# Patient Record
Sex: FEMALE | Race: WHITE | NOT HISPANIC OR LATINO | Employment: OTHER | URBAN - METROPOLITAN AREA
[De-identification: names, ages, dates, MRNs, and addresses within clinical notes are randomized per-mention and may not be internally consistent; named-entity substitution may affect disease eponyms.]

---

## 2017-01-18 ENCOUNTER — TRANSCRIBE ORDERS (OUTPATIENT)
Dept: ADMINISTRATIVE | Facility: HOSPITAL | Age: 62
End: 2017-01-18

## 2017-01-18 DIAGNOSIS — I82.4Z9 EMBOLISM FROM THROMBOSIS OF VEIN OF DISTAL END OF LOWER EXTREMITY (HCC): ICD-10-CM

## 2017-01-18 DIAGNOSIS — I82.499 DEEP VEIN THROMBOSIS (DVT) OF OTHER VEIN OF LOWER EXTREMITY, UNSPECIFIED CHRONICITY, UNSPECIFIED LATERALITY (HCC): ICD-10-CM

## 2017-01-18 DIAGNOSIS — Z12.31 SCREENING MAMMOGRAM, ENCOUNTER FOR: Primary | ICD-10-CM

## 2017-01-18 DIAGNOSIS — Z79.01 CURRENT USE OF ANTICOAGULANT THERAPY: ICD-10-CM

## 2017-01-18 LAB
INR PPP: 2.33 (ref 0.9–1.1)
PROTHROMBIN TIME: 26 SECONDS (ref 12.1–15)

## 2017-01-25 ENCOUNTER — HOSPITAL ENCOUNTER (OUTPATIENT)
Dept: MAMMOGRAPHY | Facility: HOSPITAL | Age: 62
Discharge: HOME | End: 2017-01-25
Admitting: FAMILY MEDICINE

## 2017-01-25 DIAGNOSIS — Z12.31 SCREENING MAMMOGRAM, ENCOUNTER FOR: ICD-10-CM

## 2017-01-25 PROCEDURE — 77063 BREAST TOMOSYNTHESIS BI: CPT

## 2017-01-25 PROCEDURE — G0202 SCR MAMMO BI INCL CAD: HCPCS

## 2017-02-21 DIAGNOSIS — I82.4Z9 EMBOLISM FROM THROMBOSIS OF VEIN OF DISTAL END OF LOWER EXTREMITY (HCC): Primary | ICD-10-CM

## 2017-02-21 LAB
INR PPP: 2.1 (ref 0.9–1.1)
PROTHROMBIN TIME: 23.9 SECONDS (ref 12.1–15)

## 2017-04-04 RX ORDER — WARFARIN SODIUM 2 MG/1
TABLET ORAL
Qty: 38 TABLET | Refills: 2 | Status: SHIPPED | OUTPATIENT
Start: 2017-04-04 | End: 2017-08-21 | Stop reason: SDUPTHER

## 2017-04-04 RX ORDER — OMEPRAZOLE 20 MG/1
CAPSULE, DELAYED RELEASE ORAL
Qty: 90 CAPSULE | Refills: 0 | Status: SHIPPED | OUTPATIENT
Start: 2017-04-04 | End: 2017-07-19 | Stop reason: SDUPTHER

## 2017-04-08 ENCOUNTER — RESULTS ENCOUNTER (OUTPATIENT)
Dept: FAMILY MEDICINE CLINIC | Facility: CLINIC | Age: 62
End: 2017-04-08

## 2017-04-08 DIAGNOSIS — I10 ESSENTIAL HYPERTENSION: ICD-10-CM

## 2017-04-08 DIAGNOSIS — E55.9 VITAMIN D DEFICIENCY: ICD-10-CM

## 2017-04-08 DIAGNOSIS — E78.2 MIXED HYPERLIPIDEMIA: ICD-10-CM

## 2017-04-21 ENCOUNTER — LAB (OUTPATIENT)
Dept: FAMILY MEDICINE CLINIC | Facility: CLINIC | Age: 62
End: 2017-04-21

## 2017-04-21 DIAGNOSIS — I82.4Z9 EMBOLISM FROM THROMBOSIS OF VEIN OF DISTAL END OF LOWER EXTREMITY (HCC): Primary | ICD-10-CM

## 2017-04-21 LAB
INR PPP: 2.73 (ref 0.9–1.1)
PROTHROMBIN TIME: 29.5 SECONDS (ref 12.1–15)

## 2017-04-27 RX ORDER — METOPROLOL SUCCINATE 25 MG/1
TABLET, EXTENDED RELEASE ORAL
Qty: 90 TABLET | Refills: 0 | Status: SHIPPED | OUTPATIENT
Start: 2017-04-27 | End: 2017-07-25 | Stop reason: SDUPTHER

## 2017-05-23 DIAGNOSIS — I82.4Z9 EMBOLISM FROM THROMBOSIS OF VEIN OF DISTAL END OF LOWER EXTREMITY (HCC): Primary | ICD-10-CM

## 2017-05-24 DIAGNOSIS — E55.9 VITAMIN D DEFICIENCY: ICD-10-CM

## 2017-05-24 DIAGNOSIS — I10 ESSENTIAL HYPERTENSION: ICD-10-CM

## 2017-05-24 DIAGNOSIS — R53.83 FATIGUE, UNSPECIFIED TYPE: ICD-10-CM

## 2017-05-24 DIAGNOSIS — E78.2 MIXED HYPERLIPIDEMIA: Primary | ICD-10-CM

## 2017-05-24 LAB
25(OH)D3+25(OH)D2 SERPL-MCNC: 41.7 NG/ML
ALBUMIN SERPL-MCNC: 4.3 G/DL (ref 3.5–5.2)
ALBUMIN/GLOB SERPL: 1.5 G/DL
ALP SERPL-CCNC: 101 U/L (ref 40–129)
ALT SERPL-CCNC: 51 U/L (ref 5–33)
AST SERPL-CCNC: 76 U/L (ref 5–32)
BASOPHILS # BLD AUTO: 0.03 10*3/MM3 (ref 0–0.2)
BASOPHILS NFR BLD AUTO: 0.8 % (ref 0–2)
BILIRUB SERPL-MCNC: 1 MG/DL (ref 0.2–1.2)
BUN SERPL-MCNC: 11 MG/DL (ref 8–23)
BUN/CREAT SERPL: 14.9 (ref 7–25)
CALCIUM SERPL-MCNC: 9.3 MG/DL (ref 8.8–10.5)
CHLORIDE SERPL-SCNC: 101 MMOL/L (ref 98–107)
CHOLEST SERPL-MCNC: 195 MG/DL (ref 0–200)
CO2 SERPL-SCNC: 24.8 MMOL/L (ref 22–29)
CREAT SERPL-MCNC: 0.74 MG/DL (ref 0.57–1)
EOSINOPHIL # BLD AUTO: 0.12 10*3/MM3 (ref 0.1–0.3)
EOSINOPHIL NFR BLD AUTO: 3.4 % (ref 0–4)
ERYTHROCYTE [DISTWIDTH] IN BLOOD BY AUTOMATED COUNT: 14.6 % (ref 11.5–14.5)
GLOBULIN SER CALC-MCNC: 2.9 GM/DL
GLUCOSE SERPL-MCNC: 67 MG/DL (ref 65–99)
HCT VFR BLD AUTO: 41.1 % (ref 37–47)
HDLC SERPL-MCNC: 103 MG/DL (ref 40–60)
HGB BLD-MCNC: 13.9 G/DL (ref 12–16)
IMM GRANULOCYTES # BLD: 0.01 10*3/MM3 (ref 0–0.03)
IMM GRANULOCYTES NFR BLD: 0.3 % (ref 0–0.5)
INR PPP: 1.97 (ref 0.9–1.1)
LDLC SERPL CALC-MCNC: 77 MG/DL (ref 0–100)
LDLC/HDLC SERPL: 0.75 {RATIO}
LYMPHOCYTES # BLD AUTO: 0.57 10*3/MM3 (ref 0.6–4.8)
LYMPHOCYTES NFR BLD AUTO: 15.9 % (ref 20–45)
MCH RBC QN AUTO: 32 PG (ref 27–31)
MCHC RBC AUTO-ENTMCNC: 33.8 G/DL (ref 31–37)
MCV RBC AUTO: 94.5 FL (ref 81–99)
MONOCYTES # BLD AUTO: 0.36 10*3/MM3 (ref 0–1)
MONOCYTES NFR BLD AUTO: 10.1 % (ref 3–8)
NEUTROPHILS # BLD AUTO: 2.49 10*3/MM3 (ref 1.5–8.3)
NEUTROPHILS NFR BLD AUTO: 69.5 % (ref 45–70)
NRBC BLD AUTO-RTO: 0 /100 WBC (ref 0–0)
PLATELET # BLD AUTO: 213 10*3/MM3 (ref 140–500)
POTASSIUM SERPL-SCNC: 4.2 MMOL/L (ref 3.5–5.2)
PROT SERPL-MCNC: 7.2 G/DL (ref 6–8.5)
PROTHROMBIN TIME: 22.7 SECONDS (ref 12.1–15)
RBC # BLD AUTO: 4.35 10*6/MM3 (ref 4.2–5.4)
SODIUM SERPL-SCNC: 143 MMOL/L (ref 136–145)
TRIGL SERPL-MCNC: 75 MG/DL (ref 0–150)
TSH SERPL DL<=0.005 MIU/L-ACNC: 1.17 MIU/ML (ref 0.27–4.2)
VLDLC SERPL CALC-MCNC: 15 MG/DL (ref 7–27)
WBC # BLD AUTO: 3.58 10*3/MM3 (ref 4.8–10.8)

## 2017-05-31 ENCOUNTER — OFFICE VISIT (OUTPATIENT)
Dept: FAMILY MEDICINE CLINIC | Facility: CLINIC | Age: 62
End: 2017-05-31

## 2017-05-31 VITALS
HEART RATE: 83 BPM | DIASTOLIC BLOOD PRESSURE: 76 MMHG | WEIGHT: 167 LBS | SYSTOLIC BLOOD PRESSURE: 120 MMHG | OXYGEN SATURATION: 97 % | TEMPERATURE: 98.5 F | HEIGHT: 66 IN | BODY MASS INDEX: 26.84 KG/M2

## 2017-05-31 DIAGNOSIS — I82.499 DEEP VEIN THROMBOSIS (DVT) OF OTHER VEIN OF LOWER EXTREMITY, UNSPECIFIED CHRONICITY, UNSPECIFIED LATERALITY (HCC): ICD-10-CM

## 2017-05-31 DIAGNOSIS — I10 ESSENTIAL HYPERTENSION: ICD-10-CM

## 2017-05-31 DIAGNOSIS — K21.00 GASTROESOPHAGEAL REFLUX DISEASE WITH ESOPHAGITIS: ICD-10-CM

## 2017-05-31 DIAGNOSIS — E78.2 MIXED HYPERLIPIDEMIA: Primary | ICD-10-CM

## 2017-05-31 DIAGNOSIS — E55.9 VITAMIN D DEFICIENCY: ICD-10-CM

## 2017-05-31 DIAGNOSIS — Z78.0 POST-MENOPAUSAL: ICD-10-CM

## 2017-05-31 DIAGNOSIS — I82.4Z9 EMBOLISM FROM THROMBOSIS OF VEIN OF DISTAL END OF LOWER EXTREMITY (HCC): ICD-10-CM

## 2017-05-31 PROCEDURE — 99213 OFFICE O/P EST LOW 20 MIN: CPT | Performed by: FAMILY MEDICINE

## 2017-05-31 RX ORDER — SIMVASTATIN 40 MG
40 TABLET ORAL NIGHTLY
Qty: 90 TABLET | Refills: 1 | Status: SHIPPED | OUTPATIENT
Start: 2017-05-31 | End: 2019-05-29 | Stop reason: SDUPTHER

## 2017-06-26 ENCOUNTER — OFFICE VISIT (OUTPATIENT)
Dept: ORTHOPEDIC SURGERY | Facility: CLINIC | Age: 62
End: 2017-06-26

## 2017-06-26 VITALS
WEIGHT: 167 LBS | SYSTOLIC BLOOD PRESSURE: 126 MMHG | BODY MASS INDEX: 26.84 KG/M2 | HEIGHT: 66 IN | HEART RATE: 99 BPM | DIASTOLIC BLOOD PRESSURE: 85 MMHG

## 2017-06-26 DIAGNOSIS — R52 PAIN: Primary | ICD-10-CM

## 2017-06-26 DIAGNOSIS — S62.335A CLOSED DISPLACED FRACTURE OF NECK OF FOURTH METACARPAL BONE OF LEFT HAND, INITIAL ENCOUNTER: ICD-10-CM

## 2017-06-26 PROCEDURE — 73130 X-RAY EXAM OF HAND: CPT | Performed by: ORTHOPAEDIC SURGERY

## 2017-06-26 PROCEDURE — 26600 TREAT METACARPAL FRACTURE: CPT | Performed by: ORTHOPAEDIC SURGERY

## 2017-06-26 PROCEDURE — 99214 OFFICE O/P EST MOD 30 MIN: CPT | Performed by: ORTHOPAEDIC SURGERY

## 2017-06-26 NOTE — PROGRESS NOTES
Subjective: Left fourth metacarpal fracture     Patient ID: Camille Pantoja is a 61 y.o. female.    Chief Complaint:    History of Present Illness 61-year-old female known to me presents for evaluation of her left hand which he injured when she fell on Saturday the 24th while walking a dog.  Develop immediate pain and discomfort in the hand.  Presents here for evaluation of her nondominant hand.       Social History     Occupational History   • Not on file.     Social History Main Topics   • Smoking status: Never Smoker   • Smokeless tobacco: Never Used   • Alcohol use Yes   • Drug use: No   • Sexual activity: Defer      Review of Systems   Constitutional: Negative for chills, diaphoresis, fever and unexpected weight change.   HENT: Positive for tinnitus. Negative for hearing loss, nosebleeds and sore throat.    Eyes: Negative for pain and visual disturbance.   Respiratory: Negative for cough, shortness of breath and wheezing.    Cardiovascular: Negative for chest pain and palpitations.   Gastrointestinal: Negative for abdominal pain, diarrhea, nausea and vomiting.   Endocrine: Negative for cold intolerance, heat intolerance and polydipsia.   Genitourinary: Negative for difficulty urinating, dysuria and hematuria.   Musculoskeletal: Positive for arthralgias, joint swelling and myalgias.   Skin: Negative for rash and wound.   Allergic/Immunologic: Negative for environmental allergies.   Neurological: Negative for dizziness, syncope and numbness.   Hematological: Does not bruise/bleed easily.   Psychiatric/Behavioral: Negative for dysphoric mood and sleep disturbance. The patient is not nervous/anxious.          Past Medical History:   Diagnosis Date   • Hyperlipidemia    • Hypertension      Past Surgical History:   Procedure Laterality Date   • HYSTERECTOMY     • ORIF SHOULDER DISLOCATION W/ HUMERAL FRACTURE     • TIBIA FRACTURE SURGERY       Family History   Problem Relation Age of Onset   • Breast cancer Brother           Objective:  Vitals:    06/26/17 1534   BP: 126/85   Pulse: 99     Last 3 weights    06/26/17  1534   Weight: 167 lb (75.8 kg)     Body mass index is 27.37 kg/(m^2).       Ortho Exam  AP lateral oblique view of the left hand shows a an oblique fracture the orthopedic metacarpal shaft with shortening.  The x-ray shows shortening to the point where the fourth metacarpal is the same length as the fifth.  No prior x-rays for comparison.  She is alert and oriented ×3.  On exam there is ecchymosis and tenderness to the dorsal and volar aspect of the hand.  She has full extension and there is no extensor lag at this time.  There is no motor deficit good distal pulses no sensory loss.  She has full flexion and extension of the hand.  She is not able to take any anti-inflammatories due to fact he is on warfarin is also intolerant to Tylenol.  There is pain to palpation over the fracture site dorsally.    Assessment:       1. Pain    2. Closed displaced fracture of neck of fourth metacarpal bone of left hand, initial encounter          Plan:      discussed the fracture and the reviewed the x-rays with her.  There is shortening there is no real functional problem at this time but the shortening does make her susceptible to extensor lag but she does not want to consider surgery.  She is placed in the ulnar gutter splint shoulder return in 3 weeks with x-ray of that left hand.  Also x-ray the left knee on return if she relates an injury to that knee several months ago this still gives her occasional discomfort      Work Status:    [facility] query complete.    Orders:  Orders Placed This Encounter   Procedures   • XR Hand 3+ View Left       Medications:  No orders of the defined types were placed in this encounter.      Followup:  Return in about 3 weeks (around 7/17/2017).          Dragon transcription disclaimer     Much of this encounter note is an electronic transcription/translation of spoken language to printed text.  The electronic translation of spoken language may permit erroneous, or at times, nonsensical words or phrases to be inadvertently transcribed. Although I have reviewed the note for such errors, some may still exist.

## 2017-06-27 DIAGNOSIS — I82.499 DEEP VEIN THROMBOSIS (DVT) OF OTHER VEIN OF LOWER EXTREMITY, UNSPECIFIED CHRONICITY, UNSPECIFIED LATERALITY (HCC): Primary | ICD-10-CM

## 2017-06-28 LAB
INR PPP: 1.55 (ref 0.9–1.1)
PROTHROMBIN TIME: 18.7 SECONDS (ref 12.1–15)

## 2017-07-19 ENCOUNTER — OFFICE VISIT (OUTPATIENT)
Dept: ORTHOPEDIC SURGERY | Facility: CLINIC | Age: 62
End: 2017-07-19

## 2017-07-19 VITALS — WEIGHT: 165 LBS | HEIGHT: 66 IN | BODY MASS INDEX: 26.52 KG/M2

## 2017-07-19 DIAGNOSIS — R52 PAIN: Primary | ICD-10-CM

## 2017-07-19 DIAGNOSIS — S62.335A CLOSED DISPLACED FRACTURE OF NECK OF FOURTH METACARPAL BONE OF LEFT HAND, INITIAL ENCOUNTER: ICD-10-CM

## 2017-07-19 DIAGNOSIS — M17.12 PRIMARY OSTEOARTHRITIS OF LEFT KNEE: ICD-10-CM

## 2017-07-19 PROCEDURE — 99214 OFFICE O/P EST MOD 30 MIN: CPT | Performed by: ORTHOPAEDIC SURGERY

## 2017-07-19 PROCEDURE — 73562 X-RAY EXAM OF KNEE 3: CPT | Performed by: ORTHOPAEDIC SURGERY

## 2017-07-19 PROCEDURE — 73130 X-RAY EXAM OF HAND: CPT | Performed by: ORTHOPAEDIC SURGERY

## 2017-07-19 RX ORDER — OMEPRAZOLE 20 MG/1
CAPSULE, DELAYED RELEASE ORAL
Qty: 90 CAPSULE | Refills: 0 | Status: SHIPPED | OUTPATIENT
Start: 2017-07-19 | End: 2017-10-18 | Stop reason: SDUPTHER

## 2017-07-19 NOTE — PROGRESS NOTES
Subjective: Left hand pain, left knee pain     Patient ID: Camille Pantoja is a 61 y.o. female.    Chief Complaint:    History of Present Illness 61-year-old female is seen in follow-up to the left hand injury sustained approximately 3 weeks ago and for new complaint of left knee pain that she's had for almost 2 years.  When last seen regarding the hand she had a mildly displaced fourth metacarpal fracture of the left hand with shortening and she opted to treated nonoperatively was placed in this leg.  She subsequently fell that weekend after her initial visit exam is some slight increase in the pain since that episode.  With regard to the knee she's had a two-year history of knee pain resulting from which he had a seizure at home on the left knee.  She states as result of that injury she developed immediate swelling that gradually resolved and she now had intermittent pain in that knee.  States when cause discomfort for 4-5 days and then no pain for a day or 2 when she is symptomatic she has difficulty getting in and out of a chair and navigating steps.  He is unable to take any oral agents due to the fact that she is on Coumadin.  Has not sought treatment for the knee up until today.       Social History     Occupational History   • Not on file.     Social History Main Topics   • Smoking status: Never Smoker   • Smokeless tobacco: Never Used   • Alcohol use Yes   • Drug use: No   • Sexual activity: Defer      Review of Systems   Constitutional: Negative for chills, diaphoresis, fever and unexpected weight change.   HENT: Negative for hearing loss, nosebleeds, sore throat and tinnitus.    Eyes: Negative for pain and visual disturbance.   Respiratory: Negative for cough, shortness of breath and wheezing.    Cardiovascular: Negative for chest pain and palpitations.   Gastrointestinal: Negative for abdominal pain, diarrhea, nausea and vomiting.   Endocrine: Negative for cold intolerance, heat intolerance and  polydipsia.   Genitourinary: Negative for difficulty urinating, dysuria and hematuria.   Musculoskeletal: Positive for arthralgias and myalgias. Negative for joint swelling.   Skin: Negative for rash and wound.   Allergic/Immunologic: Negative for environmental allergies.   Neurological: Negative for dizziness, syncope and numbness.   Hematological: Does not bruise/bleed easily.   Psychiatric/Behavioral: Negative for dysphoric mood and sleep disturbance. The patient is not nervous/anxious.          Past Medical History:   Diagnosis Date   • Hyperlipidemia    • Hypertension      Past Surgical History:   Procedure Laterality Date   • HYSTERECTOMY     • ORIF SHOULDER DISLOCATION W/ HUMERAL FRACTURE     • TIBIA FRACTURE SURGERY       Family History   Problem Relation Age of Onset   • Breast cancer Brother          Objective:  There were no vitals filed for this visit.  Last 3 weights    07/19/17  1517   Weight: 165 lb (74.8 kg)     Body mass index is 27.04 kg/(m^2).       Ortho Exam  AP lateral oblique of the left hand done to evaluate the left fourth metacarpal fracture shows some slight displacement but no further shortening of the fracture.  Compared to previous x-rays again there is further displacement.  AP lateral sunrise view of the left knee shows no acute changes there is mild decrease in the medial joint space and patellofemoral joint space.  No prior x-rays for comparison.  On exam she is alert and oriented ×3.  This splint on the left hand is an good condition.  Non-insulin-dependent significant pain in his no swelling she has good capillary refill.  With regard to the left knee there is no swelling effusion erythema.  Skin is cool to touch.  His no motor deficit good distal pulses.  Calf is nontender negative Homans.  Quad function is 5 over 5.  There is mild patellofemoral crepitus with range of motion which is 0-125° but there is no instability.  Quad function is 5 over 5.  There is no joint line  tenderness but negative Tl's.    Assessment:       1. Pain    2. Closed displaced fracture of neck of fourth metacarpal bone of left hand, initial encounter    3. Primary osteoarthritis of left knee          Plan:      reviewed the x-rays of the hand and the knee with the patient.  Despite the slight displacement of the metacarpal fracture I think it is no further displacement she 48 healing but that is a possibility may develop a nonunion and she understands.  With regard to the knee over prescribed Voltaren gel to apply 3-4 times a day to try to control her mild arthritic changes.  Return to see me in 2 weeks with a repeat x-ray of the left hand      Work Status:    [facility] query complete.    Orders:  Orders Placed This Encounter   Procedures   • XR Hand 3+ View Left   • XR Knee 3 View Left       Medications:  New Medications Ordered This Visit   Medications   • diclofenac (VOLTAREN) 1 % gel gel     Sig: Apply 4 g topically 4 (Four) Times a Day.     Dispense:  100 g     Refill:  5       Followup:  Return in about 2 weeks (around 8/2/2017).          Dragon transcription disclaimer     Much of this encounter note is an electronic transcription/translation of spoken language to printed text. The electronic translation of spoken language may permit erroneous, or at times, nonsensical words or phrases to be inadvertently transcribed. Although I have reviewed the note for such errors, some may still exist.

## 2017-07-25 RX ORDER — METOPROLOL SUCCINATE 25 MG/1
TABLET, EXTENDED RELEASE ORAL
Qty: 90 TABLET | Refills: 0 | Status: SHIPPED | OUTPATIENT
Start: 2017-07-25 | End: 2017-10-30 | Stop reason: SDUPTHER

## 2017-08-02 ENCOUNTER — OFFICE VISIT (OUTPATIENT)
Dept: ORTHOPEDIC SURGERY | Facility: CLINIC | Age: 62
End: 2017-08-02

## 2017-08-02 VITALS — BODY MASS INDEX: 26.52 KG/M2 | WEIGHT: 165 LBS | HEIGHT: 66 IN

## 2017-08-02 DIAGNOSIS — R52 PAIN: Primary | ICD-10-CM

## 2017-08-02 DIAGNOSIS — S62.335A CLOSED DISPLACED FRACTURE OF NECK OF FOURTH METACARPAL BONE OF LEFT HAND, INITIAL ENCOUNTER: ICD-10-CM

## 2017-08-02 PROCEDURE — 99024 POSTOP FOLLOW-UP VISIT: CPT | Performed by: ORTHOPAEDIC SURGERY

## 2017-08-02 PROCEDURE — 73130 X-RAY EXAM OF HAND: CPT | Performed by: ORTHOPAEDIC SURGERY

## 2017-08-02 NOTE — PROGRESS NOTES
Subjective: Fourth metacarpal fracture left hand     Patient ID: Camille Pantoja is a 61 y.o. female.    Chief Complaint:    History of Present Illness patient approximate 5 weeks out doing well with minimal to no pain.  She has been in an ulnar gutter splint that she's been wearing since the initial injury.       Social History     Occupational History   • Not on file.     Social History Main Topics   • Smoking status: Never Smoker   • Smokeless tobacco: Never Used   • Alcohol use Yes   • Drug use: No   • Sexual activity: Defer      Review of Systems   Constitutional: Negative for chills, diaphoresis, fever and unexpected weight change.   HENT: Negative for hearing loss, nosebleeds, sore throat and tinnitus.    Eyes: Negative for pain and visual disturbance.   Respiratory: Negative for cough, shortness of breath and wheezing.    Cardiovascular: Negative for chest pain and palpitations.   Gastrointestinal: Negative for abdominal pain, diarrhea, nausea and vomiting.   Endocrine: Negative for cold intolerance, heat intolerance and polydipsia.   Genitourinary: Negative for difficulty urinating, dysuria and hematuria.   Musculoskeletal: Positive for arthralgias, joint swelling and myalgias.   Skin: Negative for rash and wound.   Allergic/Immunologic: Negative for environmental allergies.   Neurological: Negative for dizziness, syncope and numbness.   Hematological: Does not bruise/bleed easily.   Psychiatric/Behavioral: Negative for dysphoric mood and sleep disturbance. The patient is not nervous/anxious.          Past Medical History:   Diagnosis Date   • Hyperlipidemia    • Hypertension      Past Surgical History:   Procedure Laterality Date   • HYSTERECTOMY     • ORIF SHOULDER DISLOCATION W/ HUMERAL FRACTURE     • TIBIA FRACTURE SURGERY       Family History   Problem Relation Age of Onset   • Breast cancer Brother          Objective:  There were no vitals filed for this visit.  Last 3 weights    08/02/17  1454    Weight: 165 lb (74.8 kg)     Body mass index is 27.04 kg/(m^2).       Ortho Exam  AP lateral oblique view of the hand shows a solitary and a callus formation at the fracture site but no change in position compared to previous x-rays.  Exam out of the cast she'll full range of motion of the hand no extensor lag noted on the fourth digit.  No motor deficit good distal pulses no sensory loss.    Assessment:       1. Pain    2. Closed displaced fracture of neck of fourth metacarpal bone of left hand, initial encounter          Plan:      placed in a wrist immobilizer that she skin remove for bathing but she is to wear at all times.  Can remove for exercises which she is at home but she is to wear the splint at work.  Return in 4 weeks with a final x-ray.      Work Status:    [facility] query complete.    Orders:  Orders Placed This Encounter   Procedures   • XR Hand 3+ View Left       Medications:  No orders of the defined types were placed in this encounter.      Followup:  Return in about 4 weeks (around 8/30/2017).          Dragon transcription disclaimer     Much of this encounter note is an electronic transcription/translation of spoken language to printed text. The electronic translation of spoken language may permit erroneous, or at times, nonsensical words or phrases to be inadvertently transcribed. Although I have reviewed the note for such errors, some may still exist.

## 2017-08-21 ENCOUNTER — OFFICE VISIT (OUTPATIENT)
Dept: ORTHOPEDIC SURGERY | Facility: CLINIC | Age: 62
End: 2017-08-21

## 2017-08-21 VITALS — BODY MASS INDEX: 26.03 KG/M2 | HEIGHT: 66 IN | WEIGHT: 162 LBS

## 2017-08-21 DIAGNOSIS — S62.335A CLOSED DISPLACED FRACTURE OF NECK OF FOURTH METACARPAL BONE OF LEFT HAND, INITIAL ENCOUNTER: ICD-10-CM

## 2017-08-21 DIAGNOSIS — R52 PAIN: Primary | ICD-10-CM

## 2017-08-21 PROCEDURE — 73130 X-RAY EXAM OF HAND: CPT | Performed by: ORTHOPAEDIC SURGERY

## 2017-08-21 PROCEDURE — 99024 POSTOP FOLLOW-UP VISIT: CPT | Performed by: ORTHOPAEDIC SURGERY

## 2017-08-21 RX ORDER — WARFARIN SODIUM 2 MG/1
TABLET ORAL
Qty: 38 TABLET | Refills: 1 | Status: SHIPPED | OUTPATIENT
Start: 2017-08-21 | End: 2017-11-06 | Stop reason: SDUPTHER

## 2017-08-21 NOTE — PROGRESS NOTES
Subjective: Left fourth metacarpal fracture     Patient ID: Camille Pantoja is a 61 y.o. female.    Chief Complaint:    History of Present Illness patient is almost 2 months out the above-stated injury and is doing well with minimal discomfort in her hand.  She has in a wrist splint that she wears at work but leave the splint off at home.       Social History     Occupational History   • Not on file.     Social History Main Topics   • Smoking status: Never Smoker   • Smokeless tobacco: Never Used   • Alcohol use Yes   • Drug use: No   • Sexual activity: Defer      Review of Systems   Constitutional: Negative for chills, diaphoresis, fever and unexpected weight change.   HENT: Negative for hearing loss, nosebleeds, sore throat and tinnitus.    Eyes: Negative for pain and visual disturbance.   Respiratory: Negative for cough, shortness of breath and wheezing.    Cardiovascular: Negative for chest pain and palpitations.   Gastrointestinal: Negative for abdominal pain, diarrhea, nausea and vomiting.   Endocrine: Negative for cold intolerance, heat intolerance and polydipsia.   Genitourinary: Negative for difficulty urinating, dysuria and hematuria.   Musculoskeletal: Positive for arthralgias and myalgias. Negative for joint swelling.   Skin: Negative for rash and wound.   Allergic/Immunologic: Negative for environmental allergies.   Neurological: Negative for dizziness, syncope and numbness.   Hematological: Does not bruise/bleed easily.   Psychiatric/Behavioral: Negative for dysphoric mood and sleep disturbance. The patient is not nervous/anxious.          Past Medical History:   Diagnosis Date   • Hyperlipidemia    • Hypertension      Past Surgical History:   Procedure Laterality Date   • HYSTERECTOMY     • ORIF SHOULDER DISLOCATION W/ HUMERAL FRACTURE     • TIBIA FRACTURE SURGERY       Family History   Problem Relation Age of Onset   • Breast cancer Brother          Objective:  There were no vitals filed for this  visit.  Last 3 weights    08/21/17  1451   Weight: 162 lb (73.5 kg)     Body mass index is 26.55 kg/(m^2).       Ortho Exam  AP lateral oblique view of the left hand shows complete consolidation at the fracture site.  Is some shortening of the metacarpal but no functional problems patient is full range of motion of the handle there is some stiffness.  His no motor deficit good distal pulses no sensory loss.    Assessment:       1. Pain    2. Closed displaced fracture of neck of fourth metacarpal bone of left hand, initial encounter          Plan:      activity as tolerated with regard to the hand.  I assured her the soreness should slowly resolve.  Return to see me as needed      Work Status:    [facility] query complete.    Orders:  Orders Placed This Encounter   Procedures   • XR Hand 3+ View Left       Medications:  No orders of the defined types were placed in this encounter.      Followup:  Return if symptoms worsen or fail to improve.          Dragon transcription disclaimer     Much of this encounter note is an electronic transcription/translation of spoken language to printed text. The electronic translation of spoken language may permit erroneous, or at times, nonsensical words or phrases to be inadvertently transcribed. Although I have reviewed the note for such errors, some may still exist.

## 2017-08-29 DIAGNOSIS — I82.4Z9 EMBOLISM FROM THROMBOSIS OF VEIN OF DISTAL END OF LOWER EXTREMITY (HCC): Primary | ICD-10-CM

## 2017-08-29 LAB
INR PPP: 2.91 (ref 0.9–1.1)
PROTHROMBIN TIME: 31 SECONDS (ref 12.1–15)

## 2017-10-02 DIAGNOSIS — I82.499 DEEP VEIN THROMBOSIS (DVT) OF OTHER VEIN OF LOWER EXTREMITY, UNSPECIFIED CHRONICITY, UNSPECIFIED LATERALITY (HCC): ICD-10-CM

## 2017-10-02 DIAGNOSIS — I82.4Z9 EMBOLISM FROM THROMBOSIS OF VEIN OF DISTAL END OF LOWER EXTREMITY (HCC): Primary | ICD-10-CM

## 2017-10-03 LAB
INR PPP: 2.9 (ref 0.9–1.1)
PROTHROMBIN TIME: 30.9 SECONDS (ref 12.1–15)

## 2017-10-18 ENCOUNTER — HOSPITAL ENCOUNTER (OUTPATIENT)
Dept: OCCUPATIONAL THERAPY | Facility: HOSPITAL | Age: 62
Setting detail: THERAPIES SERIES
Discharge: HOME | End: 2017-10-18

## 2017-10-18 ENCOUNTER — OFFICE VISIT (OUTPATIENT)
Dept: ORTHOPEDIC SURGERY | Facility: CLINIC | Age: 62
End: 2017-10-18

## 2017-10-18 VITALS — BODY MASS INDEX: 25.43 KG/M2 | HEIGHT: 67 IN | WEIGHT: 162 LBS

## 2017-10-18 DIAGNOSIS — S62.335S CLOSED DISPLACED FRACTURE OF NECK OF FOURTH METACARPAL BONE OF LEFT HAND, SEQUELA: Primary | ICD-10-CM

## 2017-10-18 DIAGNOSIS — S62.335A CLOSED DISPLACED FRACTURE OF NECK OF FOURTH METACARPAL BONE OF LEFT HAND, INITIAL ENCOUNTER: ICD-10-CM

## 2017-10-18 DIAGNOSIS — R52 PAIN: Primary | ICD-10-CM

## 2017-10-18 PROCEDURE — 73130 X-RAY EXAM OF HAND: CPT | Performed by: ORTHOPAEDIC SURGERY

## 2017-10-18 PROCEDURE — L3999 UPPER LIMB ORTHOSIS NOS: HCPCS

## 2017-10-18 PROCEDURE — 99213 OFFICE O/P EST LOW 20 MIN: CPT | Performed by: ORTHOPAEDIC SURGERY

## 2017-10-18 PROCEDURE — G8986 CARRY D/C STATUS: HCPCS

## 2017-10-18 PROCEDURE — G8984 CARRY CURRENT STATUS: HCPCS

## 2017-10-18 PROCEDURE — 97762: CPT

## 2017-10-18 PROCEDURE — G8985 CARRY GOAL STATUS: HCPCS

## 2017-10-18 RX ORDER — OMEPRAZOLE 20 MG/1
CAPSULE, DELAYED RELEASE ORAL
Qty: 90 CAPSULE | Refills: 0 | Status: SHIPPED | OUTPATIENT
Start: 2017-10-18 | End: 2018-01-16 | Stop reason: SDUPTHER

## 2017-10-18 NOTE — PROGRESS NOTES
Subjective: Left hand pain     Patient ID: Camille Pantoja is a 61 y.o. female.    Chief Complaint:    History of Present Illness patient is almost 4 months out from fracture of the left fourth metacarpal shaft.  Patient opted to treated nonoperatively in the fracture has healed but she has significant shortening of the fourth metacarpal.  She complains of swelling and pain in the hand.       Social History     Occupational History   • Not on file.     Social History Main Topics   • Smoking status: Never Smoker   • Smokeless tobacco: Never Used   • Alcohol use Yes   • Drug use: No   • Sexual activity: Defer      Review of Systems   Constitutional: Negative for chills, diaphoresis, fever and unexpected weight change.   HENT: Negative for hearing loss, nosebleeds, sore throat and tinnitus.    Eyes: Negative for pain and visual disturbance.   Respiratory: Negative for cough, shortness of breath and wheezing.    Cardiovascular: Negative for chest pain and palpitations.   Gastrointestinal: Negative for abdominal pain, diarrhea, nausea and vomiting.   Endocrine: Negative for cold intolerance, heat intolerance and polydipsia.   Genitourinary: Negative for difficulty urinating, dysuria and hematuria.   Musculoskeletal: Positive for arthralgias and myalgias. Negative for joint swelling.   Skin: Negative for rash and wound.   Allergic/Immunologic: Negative for environmental allergies.   Neurological: Negative for dizziness, syncope and numbness.   Hematological: Does not bruise/bleed easily.   Psychiatric/Behavioral: Negative for dysphoric mood and sleep disturbance. The patient is not nervous/anxious.          Past Medical History:   Diagnosis Date   • Hyperlipidemia    • Hypertension      Past Surgical History:   Procedure Laterality Date   • HYSTERECTOMY     • ORIF SHOULDER DISLOCATION W/ HUMERAL FRACTURE     • TIBIA FRACTURE SURGERY       Family History   Problem Relation Age of Onset   • Breast cancer Brother           Objective:  There were no vitals filed for this visit.  Last 3 weights    10/18/17  1151   Weight: 162 lb (73.5 kg)     Body mass index is 25.76 kg/(m^2).       Ortho Exam  AP lateral oblique view of the hand shows complete consolidation at the fracture site and significant shortening of the metacarpal itself.  There is no malrotation but there is shortening of the least 4-5 mm.  The hand does show some fusiform swelling to the hand but not the digits.  She complains of pain index metacarpophalangeal joint which is full range of motion of the hand no sensory loss no motor deficit good distal pulses.  There is no ecchymosis.  Skin is cool to touch.  She can't take anti-inflammatories as she is on Coumadin.    Assessment:       1. Pain    2. Closed displaced fracture of neck of fourth metacarpal bone of left hand, initial encounter          Plan:      • occupational therapy for an edema glove.  We'll repeat orders of the Voltaren gel to apply to the hand 3-4 times a day.  Patient told her she doesn't show improvement a month call or make a referral to hand specialist      Work Status:    [facility] query complete.    Orders:  Orders Placed This Encounter   Procedures   • XR Hand 3+ View Left   • Ambulatory Referral to Occupational Therapy       Medications:  New Medications Ordered This Visit   Medications   • diclofenac (VOLTAREN) 1 % gel gel     Sig: Apply 4 g topically 4 (Four) Times a Day.     Dispense:  100 g     Refill:  5       Followup:  Return if symptoms worsen or fail to improve.          Dragon transcription disclaimer     Much of this encounter note is an electronic transcription/translation of spoken language to printed text. The electronic translation of spoken language may permit erroneous, or at times, nonsensical words or phrases to be inadvertently transcribed. Although I have reviewed the note for such errors, some may still exist.

## 2017-10-18 NOTE — THERAPY DISCHARGE NOTE
"Outpatient Occupational Therapy Hand Initial Eval/Discharge    Rebeca Cochran     Patient Name: Camille Pantoja  : 1955  MRN: [id number]  Today's Date: 10/18/2017      Visit Date: 10/18/2017    Patient Active Problem List   Diagnosis   • Embolism from thrombosis of vein of distal end of lower extremity   • Deep vein thrombosis of lower extremity   • Hyperlipidemia   • Hypertension   • Gastroesophageal reflux disease with esophagitis   • Vitamin D deficiency   • Tinnitus   • Osteoarthritis        Past Medical History:   Diagnosis Date   • Hyperlipidemia    • Hypertension         Past Surgical History:   Procedure Laterality Date   • HYSTERECTOMY     • ORIF SHOULDER DISLOCATION W/ HUMERAL FRACTURE     • TIBIA FRACTURE SURGERY           Visit Dx:    ICD-10-CM ICD-9-CM   1. Closed displaced fracture of neck of fourth metacarpal bone of left hand, sequela S62.335S 905.2             Patient History       10/18/17 1200          History    Chief Complaint --   edema  -SD      Date Current Problem(s) Began 17  -SD      Brief Description of Current Complaint Patient had 2 falls in  while walking her dog and fractured the left hand. Paitent reported the physician recommended surgery however she declined.  Patient reported her ROM has improved but she continues to have edema.    -SD      Patient/Caregiver Goals Decrease swelling  -SD      Current Tobacco Use No  -SD      Patient's Rating of General Health Good  -SD      Hand Dominance right-handed  -SD      Occupation/sports/leisure activities Retired  -SD      Pain     Pain Location Other (Comment)   It's aggravating\" did not place numeric value  -SD      Is your sleep disturbed? No  -SD      Fall Risk Assessment    Any falls in the past year: Yes   2 (5 days apart resulting in current injury)  -SD      Number of falls reported in the last 12 months 2  -SD      Factors that contributed to the fall: --   both falls resulting from walking injured dog  -SD      " Daily Activities    Primary Language English  -SD      Are you able to read Yes  -SD      Are you able to write Yes  -SD        User Key  (r) = Recorded By, (t) = Taken By, (c) = Cosigned By    Initials Name Provider Type    RADHA Raya, OTR Occupational Therapist             Hand Therapy (last 24 hours)      Hand Eval       10/18/17 1200          Right Middle Circumference (cm)    Right Middle Prox. [place] Cir (cm) 6.3  -SD      Right Middle PIPJ (IPJ) Cir (cm) 6  -SD      Right Middle Mid [place] Cir (cm) 5.6  -SD      Right Middle DIPJ (IPJ) Cir (cm) 4.9  -SD      Right Ring Circumference (cm)    Right Ring Prox. [place] (cm) 4  -SD      Right Ring PiPJ (IPJ) Cir (cm) 5.7  -SD      Right Ring Mid [place] (cm) 5.6  -SD      Right Ring DIPJ (IPJ) Cir (cm) 4.7  -SD      Left Middle PIPJ (IPJ) Cir (cm)    Left Middle Prox. [place] (cm) 6.5  -SD      Left Middle PIPJ (IPJ) Cir (cm) 6  -SD      Left Middle Mid [place] (cm) 5.6  -SD      Left Middle DIPJ (IPJ) Cir (cm) 4.9  -SD      Left Ring Mid [place] (cm)    Left Ring Prox. [place] (cm) 5.7  -SD      Left Ring PIPJ (IPJ) Cir (cm) 5.7  -SD      Left Ring Mid [place] (cm) 5.6  -SD      Left Ring DIPJ (IPJ) Cir (cm) 4.7  -SD      Therapy Education    Education Details Edema management techniques  -SD      Given Symptoms/condition management;Edema management   edema glove, digit sleeves  -SD      Program New  -SD      How Provided Verbal;Demonstration  -SD      Provided to Patient  -SD      Level of Understanding Teach back education performed;Verbalized;Demonstrated  -SD        User Key  (r) = Recorded By, (t) = Taken By, (c) = Cosigned By    Initials Name Provider Type    DALILA Palumbo Occupational Therapist                        Therapy Education       10/18/17 1308          Therapy Education    Given Edema management;Symptoms/condition management;Other (comment)   edema glove and digit sleeves  -SD      Program New   -SD      How Provided Demonstration;Verbal  -SD      Provided to Patient  -SD      Level of Understanding Teach back education performed;Verbalized  -SD        User Key  (r) = Recorded By, (t) = Taken By, (c) = Cosigned By    Initials Name Provider Type    RADHA Raya OTR Occupational Therapist                     OT Goals       10/18/17 1200       OT Short Term Goals    STG Date to Achieve 10/18/17  -SD     STG 1 Patient to don/doff left edema garments independently.  -SD     STG 1 Progress Met  -SD     Long Term Goals    LTG Date to Achieve 10/18/17  -SD     LTG 1 Patient to demonstrate 3 edema management techniques for decreased left hand edema.  -SD     LTG 1 Progress Met  -SD     LTG 1 Progress Comments Overhead pumping, retrograde edema massage, edema garments  -SD       User Key  (r) = Recorded By, (t) = Taken By, (c) = Cosigned By    Initials Name Provider Type    RADHA Raya OTCALEB Occupational Therapist                OT Assessment/Plan       10/18/17 1303       OT Assessment    Impairments Edema  -SD     Assessment Comments Patient present with min edema in left digit 3 and moderate edema along MCP's.  Patient referred to OT for edema management including edema glove and digit sleeves.  Patient educated on overhead pumping, retrograde edema massage and edema garments.  -SD     Please refer to paper survey for additional self-reported information Yes  -SD     OT Rehab Potential Good  -SD     Patient/caregiver participated in establishment of treatment plan and goals Yes  -SD     Patient would benefit from skilled therapy intervention Yes  -SD     OT Plan    OT Frequency One time visit  -SD     Planned CPT's? OT EVAL LOW COMPLEXITY: 70874  -SD     OT Plan Comments Patient issued edema glove, digit sleeves and educated on management of edema.  -SD       User Key  (r) = Recorded By, (t) = Taken By, (c) = Cosigned By    Initials Name Provider Type    DALILA Palumbo Occupational  Therapist                    Outcome Measures       10/18/17 1300          Quick DASH    Open a tight or new jar. 2  -SD      Do heavy household chores (e.g., wash walls, wash floors) 2  -SD      Carry a shopping bag or briefcase 1  -SD      Wash your back 1  -SD      Use a knife to cut food 1  -SD      Recreational activities in which you take some force or impact through your arm, should or hand (e.g. golf, hammering, tennis, etc.) 2  -SD      During the past week, to what extent has your arm, shoulder, or hand problem interfered with your normal social activites with family, friends, neighbors or groups? 2  -SD      During the past week, were you limited in your work or other regular daily activities as a result of your arm, shoulder or hand problem? 2  -SD      Arm, Shoulder, or hand pain 2  -SD      Tingling (pins and needles) in your arm, shoulder, or hand 1  -SD      During the past week, how much difficulty have you had sleeping because of the pain in your arm, shoulder or hand? 1  -SD      Number of Questions Answered 11  -SD      Quick DASH Score 13.64  -SD      Functional Assessment    Outcome Measure Options Quick DASH  -SD        User Key  (r) = Recorded By, (t) = Taken By, (c) = Cosigned By    Initials Name Provider Type    DALILA Palumbo Occupational Therapist            Time Calculation:        Therapy Charges for Today     Code Description Service Date Service Provider Modifiers Qty    65774551693 HC OT CARRY MOV HAND OBJ CURRENT 10/18/2017 DALILA Vora GO, CI 1    87985099747 HC OT CARRY MOV HAND OBJ PROJECTED 10/18/2017 DALILA Vora GO, CI 1    97406769178 HC OT CARRY MOV HAND OBJ DISCHARGE 10/18/2017 DALILA Vora GO, CI 1          OT G-codes  OT Professional Judgement Used?: Yes  OT Functional Scales Options: Quick DASH  Score: 13.64  Functional Limitation: Carrying, moving and handling objects  Carrying, Moving and Handling Objects Current Status (): At  least 1 percent but less than 20 percent impaired, limited or restricted  Carrying, Moving and Handling Objects Goal Status (): At least 1 percent but less than 20 percent impaired, limited or restricted  Carrying, Moving and Handling Objects Discharge Status (): At least 1 percent but less than 20 percent impaired, limited or restricted            Reinaldo Raya, OTR  10/18/2017

## 2017-10-30 DIAGNOSIS — I82.4Z9 EMBOLISM FROM THROMBOSIS OF VEIN OF DISTAL END OF LOWER EXTREMITY (HCC): Primary | ICD-10-CM

## 2017-10-30 RX ORDER — METOPROLOL SUCCINATE 25 MG/1
TABLET, EXTENDED RELEASE ORAL
Qty: 90 TABLET | Refills: 0 | Status: SHIPPED | OUTPATIENT
Start: 2017-10-30 | End: 2018-01-29 | Stop reason: SDUPTHER

## 2017-11-07 RX ORDER — WARFARIN SODIUM 2 MG/1
TABLET ORAL
Qty: 38 TABLET | Refills: 0 | Status: SHIPPED | OUTPATIENT
Start: 2017-11-07 | End: 2017-12-20 | Stop reason: SDUPTHER

## 2017-11-13 ENCOUNTER — PROCEDURE VISIT (OUTPATIENT)
Dept: FAMILY MEDICINE CLINIC | Facility: CLINIC | Age: 62
End: 2017-11-13

## 2017-11-13 VITALS
DIASTOLIC BLOOD PRESSURE: 80 MMHG | TEMPERATURE: 98.3 F | SYSTOLIC BLOOD PRESSURE: 126 MMHG | HEART RATE: 88 BPM | RESPIRATION RATE: 16 BRPM | WEIGHT: 165 LBS | HEIGHT: 67 IN | BODY MASS INDEX: 25.9 KG/M2 | OXYGEN SATURATION: 98 %

## 2017-11-13 DIAGNOSIS — Z01.419 PAP SMEAR, AS PART OF ROUTINE GYNECOLOGICAL EXAMINATION: Primary | ICD-10-CM

## 2017-11-13 LAB
DEVELOPER EXPIRATION DATE: NORMAL
DEVELOPER LOT NUMBER: NORMAL
EXPIRATION DATE: NORMAL
FECAL OCCULT BLOOD SCREEN, POC: NEGATIVE
Lab: NORMAL
NEGATIVE CONTROL: NEGATIVE
POSITIVE CONTROL: POSITIVE

## 2017-11-13 PROCEDURE — 99396 PREV VISIT EST AGE 40-64: CPT | Performed by: PHYSICIAN ASSISTANT

## 2017-11-13 PROCEDURE — G0328 FECAL BLOOD SCRN IMMUNOASSAY: HCPCS | Performed by: PHYSICIAN ASSISTANT

## 2017-11-13 NOTE — PROGRESS NOTES
"Subjective   Camille Pantoja is a 61 y.o. female.   Chief Complaint   Patient presents with   • Gynecologic Exam       History of Present Illness     Camille is a 61 year old female who presents for pap exam.  She has gained 3 pounds since October 18,2017.  She had a mammogram in January 2017.  Camille states she had a colonoscopy in 2015 by Dr. Reji Lynn.  Describes her bowel movements as daily without dark black tarry stools.  She does not do breast exams at home.  Denied any breast pain, breast discharge, chest pain, shortness of air, cough, upper respiratory symptoms, dysuria, urine frequency, incontinence, vaginal discharge or swelling of ankles.  Appetite has been slightly healthy and sleep has been normal.    The following portions of the patient's history were reviewed and updated as appropriate: allergies, current medications, past family history, past medical history, past social history and past surgical history.    Review of Systems   Constitutional: Negative.    HENT: Negative.    Eyes: Negative.    Respiratory: Negative.    Cardiovascular: Negative.  Negative for chest pain, palpitations and leg swelling.   Gastrointestinal: Negative.    Endocrine: Negative.    Genitourinary: Negative.  Negative for dysuria, frequency and vaginal discharge.   Musculoskeletal: Negative.    Skin: Negative.    Allergic/Immunologic: Negative.    Neurological: Negative.    Hematological: Negative.    Psychiatric/Behavioral: Negative.    All other systems reviewed and are negative.    Vitals:    11/13/17 1445   BP: 126/80   BP Location: Left arm   Patient Position: Sitting   Cuff Size: Adult   Pulse: 88   Resp: 16   Temp: 98.3 °F (36.8 °C)   TempSrc: Oral   SpO2: 98%   Weight: 165 lb (74.8 kg)   Height: 66.5\" (168.9 cm)     Wt Readings from Last 3 Encounters:   11/13/17 165 lb (74.8 kg)   10/18/17 162 lb (73.5 kg)   08/21/17 162 lb (73.5 kg)       BP Readings from Last 3 Encounters:   11/13/17 126/80   06/26/17 126/85 "   05/31/17 120/76     Body mass index is 26.23 kg/(m^2).  Allergies   Allergen Reactions   • Cephalexin    • Citrus    • Fish Allergy        Objective   Physical Exam   Constitutional: She is oriented to person, place, and time. Vital signs are normal. She appears well-developed and well-nourished.   Neck: Phonation normal. Neck supple. No edema present. No thyroid mass and no thyromegaly present.   Cardiovascular: Normal rate, regular rhythm, S1 normal, S2 normal, normal heart sounds and normal pulses.  PMI is not displaced.    No murmur heard.  Pulmonary/Chest: Effort normal and breath sounds normal. Chest wall is not dull to percussion. She exhibits no mass, no tenderness, no bony tenderness, no laceration, no crepitus, no edema, no deformity, no swelling and no retraction. Right breast exhibits no inverted nipple, no mass, no nipple discharge, no skin change and no tenderness. Left breast exhibits no inverted nipple, no mass, no nipple discharge, no skin change and no tenderness. Breasts are symmetrical. There is no breast swelling.   Abdominal: Soft. Normal appearance and bowel sounds are normal. There is no hepatomegaly. There is no tenderness. Hernia confirmed negative in the right inguinal area and confirmed negative in the left inguinal area.   Genitourinary: Rectum normal and vagina normal. Rectal exam shows guaiac negative stool. No breast tenderness, discharge or bleeding. Pelvic exam was performed with patient supine. No labial fusion. There is no rash, tenderness, lesion or injury on the right labia. There is no rash, tenderness, lesion or injury on the left labia. Right adnexum displays no mass, no tenderness and no fullness. Left adnexum displays no mass, no tenderness and no fullness. No erythema, tenderness or bleeding in the vagina. No foreign body in the vagina. No signs of injury around the vagina. No vaginal discharge found.   Genitourinary Comments: Exam was chaperoned by Yadira Najera LPN.   Uterus is absent due to partial hysterectomy.   Lymphadenopathy:     She has no cervical adenopathy.     She has no axillary adenopathy.        Right: No inguinal adenopathy present.        Left: No inguinal adenopathy present.   Neurological: She is alert and oriented to person, place, and time.   Skin: Skin is warm, dry and intact.   Psychiatric: She has a normal mood and affect. Her speech is normal and behavior is normal. Judgment and thought content normal. Cognition and memory are normal.     Results for orders placed or performed in visit on 11/13/17   POC Occult Blood Stool   Result Value Ref Range    Fecal Occult Blood Negative Negative    Lot Number [id number]     Expiration Date 0715608     DEVELOPER LOT NUMBER [id number]     DEVELOPER EXPIRATION DATE [id number]     Positive Control Positive Positive    Negative Control Negative Negative     Assessment/Plan   Camille was seen today for gynecologic exam.    Diagnoses and all orders for this visit:    Pap smear, as part of routine gynecological examination  -     POC Occult Blood Stool  -     Pap IG, Rfx HPV All Pth - ThinPrep Vial, Cervix      Ms. Camille Pantoja was seen in office today for Pap smear examination.  A Pap smear was collected and sent to the laboratory for further evaluation.  Her in office Hemoccult was negative.  Camille will keep her follow-up appointments with Dr. Cobb.  She'll be notified of test results when completed.        Dragon transcription disclaimer    Much of this encounter note is an electronic transcription/translation of spoken language to printed text.  The electronic translation of spoken language may permit erroneous, or at times, nonsensical words or phrases to be inadvertently transcribed.  Although I have reviewed the note for such errors, some may still exist.    Margaux Bennett PA-C  Family Practice

## 2017-11-16 DIAGNOSIS — E78.2 MIXED HYPERLIPIDEMIA: ICD-10-CM

## 2017-11-16 DIAGNOSIS — I10 ESSENTIAL HYPERTENSION: ICD-10-CM

## 2017-11-16 DIAGNOSIS — E55.9 VITAMIN D DEFICIENCY: ICD-10-CM

## 2017-11-16 DIAGNOSIS — I82.499 DEEP VEIN THROMBOSIS (DVT) OF OTHER VEIN OF LOWER EXTREMITY, UNSPECIFIED CHRONICITY, UNSPECIFIED LATERALITY (HCC): ICD-10-CM

## 2017-11-16 DIAGNOSIS — I82.4Z9 EMBOLISM FROM THROMBOSIS OF VEIN OF DISTAL END OF LOWER EXTREMITY (HCC): ICD-10-CM

## 2017-11-16 LAB
CONV .: NORMAL
CYTOLOGIST CVX/VAG CYTO: NORMAL
CYTOLOGY CVX/VAG DOC THIN PREP: NORMAL
DX ICD CODE: NORMAL
HIV 1 & 2 AB SER-IMP: NORMAL
Lab: NORMAL
OTHER STN SPEC: NORMAL
PATH REPORT.FINAL DX SPEC: NORMAL
STAT OF ADQ CVX/VAG CYTO-IMP: NORMAL

## 2017-11-20 ENCOUNTER — TELEPHONE (OUTPATIENT)
Dept: FAMILY MEDICINE CLINIC | Facility: CLINIC | Age: 62
End: 2017-11-20

## 2017-11-20 LAB
25(OH)D3+25(OH)D2 SERPL-MCNC: 46.5 NG/ML
ALBUMIN SERPL-MCNC: 4.3 G/DL (ref 3.5–5.2)
ALBUMIN/GLOB SERPL: 1.4 G/DL
ALP SERPL-CCNC: 89 U/L (ref 40–129)
ALT SERPL-CCNC: 36 U/L (ref 5–33)
AST SERPL-CCNC: 50 U/L (ref 5–32)
BASOPHILS # BLD AUTO: 0.05 10*3/MM3 (ref 0–0.2)
BASOPHILS NFR BLD AUTO: 1.7 % (ref 0–2)
BILIRUB SERPL-MCNC: 0.4 MG/DL (ref 0.2–1.2)
BUN SERPL-MCNC: 11 MG/DL (ref 8–23)
BUN/CREAT SERPL: 13.4 (ref 7–25)
CALCIUM SERPL-MCNC: 10.1 MG/DL (ref 8.8–10.5)
CHLORIDE SERPL-SCNC: 101 MMOL/L (ref 98–107)
CHOLEST SERPL-MCNC: 235 MG/DL (ref 0–200)
CO2 SERPL-SCNC: 32.1 MMOL/L (ref 22–29)
CREAT SERPL-MCNC: 0.82 MG/DL (ref 0.57–1)
EOSINOPHIL # BLD AUTO: 0.19 10*3/MM3 (ref 0.1–0.3)
EOSINOPHIL NFR BLD AUTO: 6.4 % (ref 0–4)
ERYTHROCYTE [DISTWIDTH] IN BLOOD BY AUTOMATED COUNT: 14.1 % (ref 11.5–14.5)
GFR SERPLBLD CREATININE-BSD FMLA CKD-EPI: 70 ML/MIN/1.73
GFR SERPLBLD CREATININE-BSD FMLA CKD-EPI: 85 ML/MIN/1.73
GLOBULIN SER CALC-MCNC: 3.1 GM/DL
GLUCOSE SERPL-MCNC: 82 MG/DL (ref 65–99)
HCT VFR BLD AUTO: 40.9 % (ref 37–47)
HDLC SERPL-MCNC: 78 MG/DL (ref 40–60)
HGB BLD-MCNC: 13.9 G/DL (ref 12–16)
IMM GRANULOCYTES # BLD: 0.01 10*3/MM3 (ref 0–0.03)
IMM GRANULOCYTES NFR BLD: 0.3 % (ref 0–0.5)
INR PPP: 2.61 (ref 0.9–1.1)
LDLC SERPL CALC-MCNC: 139 MG/DL (ref 0–100)
LYMPHOCYTES # BLD AUTO: 0.77 10*3/MM3 (ref 0.6–4.8)
LYMPHOCYTES NFR BLD AUTO: 26.1 % (ref 20–45)
MCH RBC QN AUTO: 34.7 PG (ref 27–31)
MCHC RBC AUTO-ENTMCNC: 34 G/DL (ref 31–37)
MCV RBC AUTO: 102 FL (ref 81–99)
MONOCYTES # BLD AUTO: 0.43 10*3/MM3 (ref 0–1)
MONOCYTES NFR BLD AUTO: 14.6 % (ref 3–8)
NEUTROPHILS # BLD AUTO: 1.5 10*3/MM3 (ref 1.5–8.3)
NEUTROPHILS NFR BLD AUTO: 50.9 % (ref 45–70)
NRBC BLD AUTO-RTO: 0 /100 WBC (ref 0–0)
PLATELET # BLD AUTO: 201 10*3/MM3 (ref 140–500)
POTASSIUM SERPL-SCNC: 5.5 MMOL/L (ref 3.5–5.2)
PROT SERPL-MCNC: 7.4 G/DL (ref 6–8.5)
PROTHROMBIN TIME: 28.4 SECONDS (ref 12.1–15)
RBC # BLD AUTO: 4.01 10*6/MM3 (ref 4.2–5.4)
SODIUM SERPL-SCNC: 143 MMOL/L (ref 136–145)
TRIGL SERPL-MCNC: 88 MG/DL (ref 0–150)
TSH SERPL DL<=0.005 MIU/L-ACNC: 0.77 MIU/ML (ref 0.27–4.2)
VLDLC SERPL CALC-MCNC: 17.6 MG/DL (ref 7–27)
WBC # BLD AUTO: 2.95 10*3/MM3 (ref 4.8–10.8)

## 2017-11-20 NOTE — TELEPHONE ENCOUNTER
----- Message from Anjum Livingston MD sent at 11/20/2017 12:54 PM EST -----  Pap is normal  Repeat in 3 yr

## 2017-11-27 ENCOUNTER — OFFICE VISIT (OUTPATIENT)
Dept: FAMILY MEDICINE CLINIC | Facility: CLINIC | Age: 62
End: 2017-11-27

## 2017-11-27 VITALS
HEART RATE: 79 BPM | TEMPERATURE: 98.5 F | WEIGHT: 171 LBS | HEIGHT: 67 IN | DIASTOLIC BLOOD PRESSURE: 78 MMHG | SYSTOLIC BLOOD PRESSURE: 118 MMHG | BODY MASS INDEX: 26.84 KG/M2 | OXYGEN SATURATION: 98 %

## 2017-11-27 DIAGNOSIS — R79.89 ELEVATED LFTS: ICD-10-CM

## 2017-11-27 DIAGNOSIS — E55.9 VITAMIN D DEFICIENCY: ICD-10-CM

## 2017-11-27 DIAGNOSIS — E87.5 HYPERKALEMIA: ICD-10-CM

## 2017-11-27 DIAGNOSIS — D72.819 LEUKOPENIA, UNSPECIFIED TYPE: ICD-10-CM

## 2017-11-27 DIAGNOSIS — E78.2 MIXED HYPERLIPIDEMIA: Primary | ICD-10-CM

## 2017-11-27 DIAGNOSIS — I10 ESSENTIAL HYPERTENSION: ICD-10-CM

## 2017-11-27 PROCEDURE — 99213 OFFICE O/P EST LOW 20 MIN: CPT | Performed by: FAMILY MEDICINE

## 2017-11-27 NOTE — PROGRESS NOTES
Subjective   Camille Pantoja is a 61 y.o. female with   Chief Complaint   Patient presents with   • Hyperlipidemia     follow up labs   .    History of Present Illness     Patient presents for follow up of stable Vitamin D Deficiency and stable HTN, and unstable HLD.  Patient states that she has been using only half dose of Simvastatin because taking a whole pill causes her to feel poorly the following day.  She does not always remember to to take the half dose of Simvastatin.  Patient continues to use Warfarin, Omeprazole, and Metoprolol daily.      The following portions of the patient's history were reviewed and updated as appropriate: allergies, current medications, past family history, past medical history, past social history, past surgical history and problem list.    Review of Systems   Cardiovascular: Negative for chest pain, palpitations and leg swelling.        HTN  HLD   Endocrine: Negative for cold intolerance and heat intolerance.        Vit D Def   Hematological:        DVT   All other systems reviewed and are negative.      Objective     Vitals:    11/27/17 1504   BP: 118/78   Pulse: 79   Temp: 98.5 °F (36.9 °C)   SpO2: 98%     BP Readings from Last 3 Encounters:   11/27/17 118/78   11/13/17 126/80   06/26/17 126/85      Wt Readings from Last 3 Encounters:   11/27/17 171 lb (77.6 kg)   11/13/17 165 lb (74.8 kg)   10/18/17 162 lb (73.5 kg)      Orders Only on 11/16/2017   Component Date Value Ref Range Status   • Total Cholesterol 11/20/2017 235* 0 - 200 mg/dL Final   • Triglycerides 11/20/2017 88  0 - 150 mg/dL Final   • HDL Cholesterol 11/20/2017 78* 40 - 60 mg/dL Final   • VLDL Cholesterol 11/20/2017 17.6  7 - 27 mg/dL Final   • LDL Cholesterol  11/20/2017 139* 0 - 100 mg/dL Final   • TSH 11/20/2017 0.766  0.270 - 4.200 mIU/mL Final   • 25 Hydroxy, Vitamin D 11/20/2017 46.5  ng/mL Final    Comment: Reference Range for Total Vitamin D 25(OH)  Deficiency    <20.0 ng/mL  Insufficiency 21-29  ng/mL  Sufficiency   30-74 ng/mL     • INR 11/20/2017 2.61* 0.90 - 1.10 Final    Comment: Therapeutic Ranges for INR:2.0-3.0 (PT 20-30)                              2.5-3.5 (PT 25-34)     • Protime 11/20/2017 28.4* 12.1 - 15.0 Seconds Final   • WBC 11/20/2017 2.95* 4.80 - 10.80 10*3/mm3 Final   • RBC 11/20/2017 4.01* 4.20 - 5.40 10*6/mm3 Final   • Hemoglobin 11/20/2017 13.9  12.0 - 16.0 g/dL Final   • Hematocrit 11/20/2017 40.9  37.0 - 47.0 % Final   • MCV 11/20/2017 102.0* 81.0 - 99.0 fL Final   • MCH 11/20/2017 34.7* 27.0 - 31.0 pg Final   • MCHC 11/20/2017 34.0  31.0 - 37.0 g/dL Final   • RDW 11/20/2017 14.1  11.5 - 14.5 % Final   • Platelets 11/20/2017 201  140 - 500 10*3/mm3 Final   • Neutrophil Rel % 11/20/2017 50.9  45.0 - 70.0 % Final   • Lymphocyte Rel % 11/20/2017 26.1  20.0 - 45.0 % Final   • Monocyte Rel % 11/20/2017 14.6* 3.0 - 8.0 % Final   • Eosinophil Rel % 11/20/2017 6.4* 0.0 - 4.0 % Final   • Basophil Rel % 11/20/2017 1.7  0.0 - 2.0 % Final   • Neutrophils Absolute 11/20/2017 1.50  1.50 - 8.30 10*3/mm3 Final   • Lymphocytes Absolute 11/20/2017 0.77  0.60 - 4.80 10*3/mm3 Final   • Monocytes Absolute 11/20/2017 0.43  0.00 - 1.00 10*3/mm3 Final   • Eosinophils Absolute 11/20/2017 0.19  0.10 - 0.30 10*3/mm3 Final   • Basophils Absolute 11/20/2017 0.05  0.00 - 0.20 10*3/mm3 Final   • Immature Granulocyte Rel % 11/20/2017 0.3  0.0 - 0.5 % Final   • Immature Grans Absolute 11/20/2017 0.01  0.00 - 0.03 10*3/mm3 Final   • nRBC 11/20/2017 0.0  0.0 - 0.0 /100 WBC Final   • Glucose 11/20/2017 82  65 - 99 mg/dL Final   • BUN 11/20/2017 11  8 - 23 mg/dL Final   • Creatinine 11/20/2017 0.82  0.57 - 1.00 mg/dL Final   • eGFR Non  Am 11/20/2017 70  >60 mL/min/1.73 Final   • eGFR African Am 11/20/2017 85  >60 mL/min/1.73 Final   • BUN/Creatinine Ratio 11/20/2017 13.4  7.0 - 25.0 Final   • Sodium 11/20/2017 143  136 - 145 mmol/L Final   • Potassium 11/20/2017 5.5* 3.5 - 5.2 mmol/L Final   • Chloride 11/20/2017  101  98 - 107 mmol/L Final   • Total CO2 11/20/2017 32.1* 22.0 - 29.0 mmol/L Final   • Calcium 11/20/2017 10.1  8.8 - 10.5 mg/dL Final   • Total Protein 11/20/2017 7.4  6.0 - 8.5 g/dL Final   • Albumin 11/20/2017 4.30  3.50 - 5.20 g/dL Final   • Globulin 11/20/2017 3.1  gm/dL Final   • A/G Ratio 11/20/2017 1.4  g/dL Final   • Total Bilirubin 11/20/2017 0.4  0.2 - 1.2 mg/dL Final   • Alkaline Phosphatase 11/20/2017 89  40 - 129 U/L Final   • AST (SGOT) 11/20/2017 50* 5 - 32 U/L Final   • ALT (SGPT) 11/20/2017 36* 5 - 33 U/L Final       Physical Exam   Constitutional: She is oriented to person, place, and time. She appears well-developed and well-nourished.   HENT:   Head: Normocephalic and atraumatic.   Neck: Trachea normal and phonation normal. Neck supple. Normal carotid pulses present. Carotid bruit is not present. No thyroid mass and no thyromegaly present.   Cardiovascular: Normal rate, regular rhythm and normal heart sounds.  Exam reveals no gallop and no friction rub.    No murmur heard.  Pulmonary/Chest: Effort normal and breath sounds normal. No respiratory distress. She has no decreased breath sounds. She has no wheezes. She has no rhonchi. She has no rales.   Lymphadenopathy:     She has no cervical adenopathy.   Neurological: She is alert and oriented to person, place, and time.   Skin: Skin is warm and dry. No rash noted.   Psychiatric: She has a normal mood and affect. Her speech is normal and behavior is normal. Judgment and thought content normal. Cognition and memory are normal.   Nursing note and vitals reviewed.      Assessment/Plan   Camille was seen today for hyperlipidemia.    Diagnoses and all orders for this visit:    Mixed hyperlipidemia  -     Comprehensive Metabolic Panel; Future  -     Lipid Panel; Future    Essential hypertension  -     Comprehensive Metabolic Panel; Future  -     Lipid Panel; Future    Vitamin D deficiency    Leukopenia, unspecified type  -     CBC & Differential;  Future    Hyperkalemia    Elevated LFTs      Patient Instructions   RTO in 2 months for fasting labs.  Have a strict diet with regards to cholesterol.         Return in about 2 months (around 1/27/2018) for for recheck of cholesterol.    Scribed for Chandrakant Cobb MD by Trevon Castillo. 11/27/2017    IChandrakant MD personally performed the services described in this documentation, as scribed by Trevon Castillo in my presence, and it is both accurate and complete

## 2017-11-28 PROBLEM — R79.89 ELEVATED LFTS: Status: ACTIVE | Noted: 2017-11-28

## 2017-12-21 RX ORDER — WARFARIN SODIUM 2 MG/1
TABLET ORAL
Qty: 38 TABLET | Refills: 0 | Status: SHIPPED | OUTPATIENT
Start: 2017-12-21 | End: 2018-01-29 | Stop reason: SDUPTHER

## 2018-01-16 RX ORDER — OMEPRAZOLE 20 MG/1
CAPSULE, DELAYED RELEASE ORAL
Qty: 90 CAPSULE | Refills: 1 | Status: SHIPPED | OUTPATIENT
Start: 2018-01-16 | End: 2018-07-18 | Stop reason: SDUPTHER

## 2018-01-30 RX ORDER — WARFARIN SODIUM 2 MG/1
TABLET ORAL
Qty: 38 TABLET | Refills: 5 | Status: SHIPPED | OUTPATIENT
Start: 2018-01-30 | End: 2018-10-12 | Stop reason: SDUPTHER

## 2018-01-30 RX ORDER — METOPROLOL SUCCINATE 25 MG/1
TABLET, EXTENDED RELEASE ORAL
Qty: 90 TABLET | Refills: 1 | Status: SHIPPED | OUTPATIENT
Start: 2018-01-30 | End: 2019-05-29 | Stop reason: SDUPTHER

## 2018-02-27 ENCOUNTER — RESULTS ENCOUNTER (OUTPATIENT)
Dept: FAMILY MEDICINE CLINIC | Facility: CLINIC | Age: 63
End: 2018-02-27

## 2018-02-27 DIAGNOSIS — E78.2 MIXED HYPERLIPIDEMIA: ICD-10-CM

## 2018-02-27 DIAGNOSIS — D72.819 LEUKOPENIA, UNSPECIFIED TYPE: ICD-10-CM

## 2018-02-27 DIAGNOSIS — I10 ESSENTIAL HYPERTENSION: ICD-10-CM

## 2018-03-12 DIAGNOSIS — I82.499 DEEP VEIN THROMBOSIS (DVT) OF OTHER VEIN OF LOWER EXTREMITY, UNSPECIFIED CHRONICITY, UNSPECIFIED LATERALITY (HCC): Primary | ICD-10-CM

## 2018-03-13 LAB
INR PPP: 5.46 (ref 0.9–1.1)
PROTHROMBIN TIME: 51.3 SECONDS (ref 12.1–15)

## 2018-03-15 ENCOUNTER — TELEPHONE (OUTPATIENT)
Dept: FAMILY MEDICINE CLINIC | Facility: CLINIC | Age: 63
End: 2018-03-15

## 2018-03-15 NOTE — TELEPHONE ENCOUNTER
Godfrey called patient with coumadin change yesterday.  She states she took her medication twice in one day, on two different days last week, so she expected it to be to thin.  She states she isnt going to make the change in the coumadin, unless you arent ok with that.  She wants to stay on 2mg daily and repeat in 2 weeks.  Please advise her if that isnt ok.

## 2018-03-29 DIAGNOSIS — I82.4Z9 EMBOLISM FROM THROMBOSIS OF VEIN OF DISTAL END OF LOWER EXTREMITY (HCC): Primary | ICD-10-CM

## 2018-03-30 ENCOUNTER — TELEPHONE (OUTPATIENT)
Dept: FAMILY MEDICINE CLINIC | Facility: CLINIC | Age: 63
End: 2018-03-30

## 2018-03-30 LAB
INR PPP: 5.11 (ref 0.9–1.1)
PROTHROMBIN TIME: 48.7 SECONDS (ref 12.1–15)

## 2018-03-30 NOTE — TELEPHONE ENCOUNTER
I spoke with patient on 3/30/2018 at 7 pm.  An called from Lab with 5.1 INR result.  I spoke with   Camille on  3/30/18 at 7:10 pm .  She will hold her Coumadin for 3 days.  She will restart 2 mg on Monday April 2,2018.  She refuses to lower her dose of Coumadin.  Plan to repeat INR in 2 weeks.

## 2018-05-01 DIAGNOSIS — I82.499 DEEP VEIN THROMBOSIS (DVT) OF OTHER VEIN OF LOWER EXTREMITY, UNSPECIFIED CHRONICITY, UNSPECIFIED LATERALITY (HCC): Primary | ICD-10-CM

## 2018-05-01 LAB
INR PPP: 2.92 (ref 0.9–1.1)
PROTHROMBIN TIME: 31.1 SECONDS (ref 12.1–15)

## 2018-05-23 ENCOUNTER — TRANSCRIBE ORDERS (OUTPATIENT)
Dept: ADMINISTRATIVE | Facility: HOSPITAL | Age: 63
End: 2018-05-23

## 2018-05-23 DIAGNOSIS — Z12.39 SCREENING BREAST EXAMINATION: Primary | ICD-10-CM

## 2018-06-06 ENCOUNTER — APPOINTMENT (OUTPATIENT)
Dept: MAMMOGRAPHY | Facility: HOSPITAL | Age: 63
End: 2018-06-06

## 2018-06-28 ENCOUNTER — OFFICE VISIT (OUTPATIENT)
Dept: FAMILY MEDICINE CLINIC | Facility: CLINIC | Age: 63
End: 2018-06-28

## 2018-06-28 ENCOUNTER — HOSPITAL ENCOUNTER (OUTPATIENT)
Dept: GENERAL RADIOLOGY | Facility: HOSPITAL | Age: 63
Discharge: HOME | End: 2018-06-28
Admitting: FAMILY MEDICINE

## 2018-06-28 VITALS
TEMPERATURE: 98.4 F | RESPIRATION RATE: 18 BRPM | BODY MASS INDEX: 25.99 KG/M2 | HEART RATE: 97 BPM | WEIGHT: 165.6 LBS | OXYGEN SATURATION: 97 % | HEIGHT: 67 IN | DIASTOLIC BLOOD PRESSURE: 68 MMHG | SYSTOLIC BLOOD PRESSURE: 110 MMHG

## 2018-06-28 DIAGNOSIS — E78.2 MIXED HYPERLIPIDEMIA: ICD-10-CM

## 2018-06-28 DIAGNOSIS — R06.09 DYSPNEA ON EXERTION: ICD-10-CM

## 2018-06-28 DIAGNOSIS — M15.9 PRIMARY OSTEOARTHRITIS INVOLVING MULTIPLE JOINTS: ICD-10-CM

## 2018-06-28 DIAGNOSIS — E55.9 VITAMIN D DEFICIENCY: ICD-10-CM

## 2018-06-28 DIAGNOSIS — I10 ESSENTIAL HYPERTENSION: ICD-10-CM

## 2018-06-28 DIAGNOSIS — R06.02 SHORTNESS OF BREATH: Primary | ICD-10-CM

## 2018-06-28 DIAGNOSIS — R53.83 FATIGUE, UNSPECIFIED TYPE: ICD-10-CM

## 2018-06-28 PROCEDURE — 99214 OFFICE O/P EST MOD 30 MIN: CPT | Performed by: FAMILY MEDICINE

## 2018-06-28 PROCEDURE — 71046 X-RAY EXAM CHEST 2 VIEWS: CPT

## 2018-07-01 PROCEDURE — 93010 ELECTROCARDIOGRAM REPORT: CPT | Performed by: FAMILY MEDICINE

## 2018-07-12 DIAGNOSIS — I82.499 DEEP VEIN THROMBOSIS (DVT) OF OTHER VEIN OF LOWER EXTREMITY, UNSPECIFIED CHRONICITY, UNSPECIFIED LATERALITY (HCC): Primary | ICD-10-CM

## 2018-07-13 LAB
INR PPP: 1.95 (ref 0.9–1.1)
PROTHROMBIN TIME: 22.5 SECONDS (ref 12.1–15)

## 2018-07-18 RX ORDER — OMEPRAZOLE 20 MG/1
CAPSULE, DELAYED RELEASE ORAL
Qty: 90 CAPSULE | Refills: 0 | Status: SHIPPED | OUTPATIENT
Start: 2018-07-18 | End: 2018-10-17 | Stop reason: SDUPTHER

## 2018-07-26 ENCOUNTER — TELEPHONE (OUTPATIENT)
Dept: FAMILY MEDICINE CLINIC | Facility: CLINIC | Age: 63
End: 2018-07-26

## 2018-07-26 NOTE — TELEPHONE ENCOUNTER
Completely normal with the exclusion of old healed rib fractures.  I have we not communicated this to her before?

## 2018-07-28 ENCOUNTER — RESULTS ENCOUNTER (OUTPATIENT)
Dept: FAMILY MEDICINE CLINIC | Facility: CLINIC | Age: 63
End: 2018-07-28

## 2018-07-28 DIAGNOSIS — R06.02 SHORTNESS OF BREATH: ICD-10-CM

## 2018-07-28 DIAGNOSIS — E78.2 MIXED HYPERLIPIDEMIA: ICD-10-CM

## 2018-07-28 DIAGNOSIS — R53.83 FATIGUE, UNSPECIFIED TYPE: ICD-10-CM

## 2018-07-28 DIAGNOSIS — E55.9 VITAMIN D DEFICIENCY: ICD-10-CM

## 2018-10-12 RX ORDER — WARFARIN SODIUM 2 MG/1
TABLET ORAL
Qty: 38 TABLET | Refills: 1 | Status: SHIPPED | OUTPATIENT
Start: 2018-10-12 | End: 2019-05-29 | Stop reason: SDUPTHER

## 2018-10-18 RX ORDER — OMEPRAZOLE 20 MG/1
CAPSULE, DELAYED RELEASE ORAL
Qty: 90 CAPSULE | Refills: 0 | Status: SHIPPED | OUTPATIENT
Start: 2018-10-18 | End: 2019-01-28 | Stop reason: SDUPTHER

## 2019-01-28 RX ORDER — OMEPRAZOLE 20 MG/1
CAPSULE, DELAYED RELEASE ORAL
Qty: 90 CAPSULE | Refills: 0 | Status: SHIPPED | OUTPATIENT
Start: 2019-01-28 | End: 2019-04-30 | Stop reason: SDUPTHER

## 2019-03-04 ENCOUNTER — TRANSCRIBE ORDERS (OUTPATIENT)
Dept: ADMINISTRATIVE | Facility: HOSPITAL | Age: 64
End: 2019-03-04

## 2019-03-04 DIAGNOSIS — Z12.39 SCREENING BREAST EXAMINATION: Primary | ICD-10-CM

## 2019-05-01 RX ORDER — OMEPRAZOLE 20 MG/1
CAPSULE, DELAYED RELEASE ORAL
Qty: 90 CAPSULE | Refills: 0 | Status: SHIPPED | OUTPATIENT
Start: 2019-05-01 | End: 2019-05-29 | Stop reason: SDUPTHER

## 2019-05-22 ENCOUNTER — OFFICE VISIT (OUTPATIENT)
Dept: RETAIL CLINIC | Facility: CLINIC | Age: 64
End: 2019-05-22

## 2019-05-22 VITALS
DIASTOLIC BLOOD PRESSURE: 68 MMHG | HEART RATE: 66 BPM | OXYGEN SATURATION: 97 % | SYSTOLIC BLOOD PRESSURE: 110 MMHG | TEMPERATURE: 98.5 F | RESPIRATION RATE: 18 BRPM

## 2019-05-22 DIAGNOSIS — Z76.0 MEDICATION REFILL: Primary | ICD-10-CM

## 2019-05-22 DIAGNOSIS — I10 HYPERTENSION, UNSPECIFIED TYPE: ICD-10-CM

## 2019-05-22 PROCEDURE — 99213 OFFICE O/P EST LOW 20 MIN: CPT | Performed by: NURSE PRACTITIONER

## 2019-05-22 RX ORDER — METOPROLOL SUCCINATE 25 MG/1
25 TABLET, EXTENDED RELEASE ORAL DAILY
Qty: 30 TABLET | Refills: 0 | Status: SHIPPED | OUTPATIENT
Start: 2019-05-22 | End: 2019-05-29

## 2019-05-22 NOTE — PROGRESS NOTES
Subjective     Camille Pantoja is a 63 y.o.. female.     HPI: pt here today for medication refill    The following portions of the patient's history were reviewed and updated as appropriate: allergies, current medications, past medical history, past social history, past surgical history and problem list.    Review of Systems   Constitutional: Negative.    HENT: Negative.    Respiratory: Negative.  Negative for cough, chest tightness, shortness of breath and wheezing.    Cardiovascular: Negative.  Negative for chest pain, palpitations and leg swelling.   Gastrointestinal: Negative.    Neurological: Negative.  Negative for dizziness, light-headedness and headaches.       Objective     Vitals:    05/22/19 1543   BP: 110/68   BP Location: Left arm   Patient Position: Sitting   Cuff Size: Adult   Pulse: 66   Resp: 18   Temp: 98.5 °F (36.9 °C)   TempSrc: Oral   SpO2: 97%       Physical Exam   Constitutional: She is oriented to person, place, and time. She appears well-developed and well-nourished.   HENT:   Head: Normocephalic and atraumatic.   Eyes: Pupils are equal, round, and reactive to light.   Cardiovascular: Normal heart sounds. An irregular rhythm present.   No murmur heard.  Carotid, radial and pedal pulses were wnl; radial pulse noted to be irregular as well; no carotid bruit noted; no swelling noted to lower extremities   Pulmonary/Chest: Effort normal and breath sounds normal. No stridor. No respiratory distress. She has no wheezes. She has no rales.   Musculoskeletal:   Walks with limp   Neurological: She is alert and oriented to person, place, and time.   Vitals reviewed.      Assessment/Plan   Camille was seen today for med refill.    Diagnoses and all orders for this visit:    Medication refill  -     metoprolol succinate XL (TOPROL-XL) 25 MG 24 hr tablet; Take 1 tablet by mouth Daily for 30 days.    Hypertension, unspecified type        Patient Instructions   Discussed pt's irregular heart beat noted with  auscultation and radial pulse. Pt stating she is going to see new primary on Wednesday next week. Pt informed of s/s to monitor for for worsening issues and if having any to go to ER immediately for further cardiac workup. Pt verb. Understanding. Pt denied any issues today and HR and b/p wnl. Pt informed of adverse effects of not getting further eval. Done on abnormal rhythm and informed of the importance of further eval. Pt stating she would keep appt for next week and go to ER if having any complaints or issues.     Hypertension  Hypertension is another name for high blood pressure. High blood pressure forces your heart to work harder to pump blood. This can cause problems over time.  There are two numbers in a blood pressure reading. There is a top number (systolic) over a bottom number (diastolic). It is best to have a blood pressure below 120/80. Healthy choices can help lower your blood pressure. You may need medicine to help lower your blood pressure if:  · Your blood pressure cannot be lowered with healthy choices.  · Your blood pressure is higher than 130/80.    Follow these instructions at home:  Eating and drinking  · If directed, follow the DASH eating plan. This diet includes:  ? Filling half of your plate at each meal with fruits and vegetables.  ? Filling one quarter of your plate at each meal with whole grains. Whole grains include whole wheat pasta, brown rice, and whole grain bread.  ? Eating or drinking low-fat dairy products, such as skim milk or low-fat yogurt.  ? Filling one quarter of your plate at each meal with low-fat (lean) proteins. Low-fat proteins include fish, skinless chicken, eggs, beans, and tofu.  ? Avoiding fatty meat, cured and processed meat, or chicken with skin.  ? Avoiding premade or processed food.  · Eat less than 1,500 mg of salt (sodium) a day.  · Limit alcohol use to no more than 1 drink a day for nonpregnant women and 2 drinks a day for men. One drink equals 12 oz of  beer, 5 oz of wine, or 1½ oz of hard liquor.  Lifestyle  · Work with your doctor to stay at a healthy weight or to lose weight. Ask your doctor what the best weight is for you.  · Get at least 30 minutes of exercise that causes your heart to beat faster (aerobic exercise) most days of the week. This may include walking, swimming, or biking.  · Get at least 30 minutes of exercise that strengthens your muscles (resistance exercise) at least 3 days a week. This may include lifting weights or pilates.  · Do not use any products that contain nicotine or tobacco. This includes cigarettes and e-cigarettes. If you need help quitting, ask your doctor.  · Check your blood pressure at home as told by your doctor.  · Keep all follow-up visits as told by your doctor. This is important.  Medicines  · Take over-the-counter and prescription medicines only as told by your doctor. Follow directions carefully.  · Do not skip doses of blood pressure medicine. The medicine does not work as well if you skip doses. Skipping doses also puts you at risk for problems.  · Ask your doctor about side effects or reactions to medicines that you should watch for.  Contact a doctor if:  · You think you are having a reaction to the medicine you are taking.  · You have headaches that keep coming back (recurring).  · You feel dizzy.  · You have swelling in your ankles.  · You have trouble with your vision.  Get help right away if:  · You get a very bad headache.  · You start to feel confused.  · You feel weak or numb.  · You feel faint.  · You get very bad pain in your:  ? Chest.  ? Belly (abdomen).  · You throw up (vomit) more than once.  · You have trouble breathing.  Summary  · Hypertension is another name for high blood pressure.  · Making healthy choices can help lower blood pressure. If your blood pressure cannot be controlled with healthy choices, you may need to take medicine.  This information is not intended to replace advice given to you by  your health care provider. Make sure you discuss any questions you have with your health care provider.  Document Released: 06/05/2009 Document Revised: 11/15/2017 Document Reviewed: 11/15/2017  [employer] Interactive Patient Education © 2019 [employer]        Return for follow up with primary care provider for re-eval and tx of htn.

## 2019-05-22 NOTE — PATIENT INSTRUCTIONS
Discussed pt's irregular heart beat noted with auscultation and radial pulse. Pt stating she is going to see new primary on Wednesday next week. Pt informed of s/s to monitor for for worsening issues and if having any to go to ER immediately for further cardiac workup. Pt verb. Understanding. Pt denied any issues today and HR and b/p wnl. Pt informed of adverse effects of not getting further eval. Done on abnormal rhythm and informed of the importance of further eval. Pt stating she would keep appt for next week and go to ER if having any complaints or issues.     Hypertension  Hypertension is another name for high blood pressure. High blood pressure forces your heart to work harder to pump blood. This can cause problems over time.  There are two numbers in a blood pressure reading. There is a top number (systolic) over a bottom number (diastolic). It is best to have a blood pressure below 120/80. Healthy choices can help lower your blood pressure. You may need medicine to help lower your blood pressure if:  · Your blood pressure cannot be lowered with healthy choices.  · Your blood pressure is higher than 130/80.    Follow these instructions at home:  Eating and drinking  · If directed, follow the DASH eating plan. This diet includes:  ? Filling half of your plate at each meal with fruits and vegetables.  ? Filling one quarter of your plate at each meal with whole grains. Whole grains include whole wheat pasta, brown rice, and whole grain bread.  ? Eating or drinking low-fat dairy products, such as skim milk or low-fat yogurt.  ? Filling one quarter of your plate at each meal with low-fat (lean) proteins. Low-fat proteins include fish, skinless chicken, eggs, beans, and tofu.  ? Avoiding fatty meat, cured and processed meat, or chicken with skin.  ? Avoiding premade or processed food.  · Eat less than 1,500 mg of salt (sodium) a day.  · Limit alcohol use to no more than 1 drink a day for nonpregnant women and 2  drinks a day for men. One drink equals 12 oz of beer, 5 oz of wine, or 1½ oz of hard liquor.  Lifestyle  · Work with your doctor to stay at a healthy weight or to lose weight. Ask your doctor what the best weight is for you.  · Get at least 30 minutes of exercise that causes your heart to beat faster (aerobic exercise) most days of the week. This may include walking, swimming, or biking.  · Get at least 30 minutes of exercise that strengthens your muscles (resistance exercise) at least 3 days a week. This may include lifting weights or pilates.  · Do not use any products that contain nicotine or tobacco. This includes cigarettes and e-cigarettes. If you need help quitting, ask your doctor.  · Check your blood pressure at home as told by your doctor.  · Keep all follow-up visits as told by your doctor. This is important.  Medicines  · Take over-the-counter and prescription medicines only as told by your doctor. Follow directions carefully.  · Do not skip doses of blood pressure medicine. The medicine does not work as well if you skip doses. Skipping doses also puts you at risk for problems.  · Ask your doctor about side effects or reactions to medicines that you should watch for.  Contact a doctor if:  · You think you are having a reaction to the medicine you are taking.  · You have headaches that keep coming back (recurring).  · You feel dizzy.  · You have swelling in your ankles.  · You have trouble with your vision.  Get help right away if:  · You get a very bad headache.  · You start to feel confused.  · You feel weak or numb.  · You feel faint.  · You get very bad pain in your:  ? Chest.  ? Belly (abdomen).  · You throw up (vomit) more than once.  · You have trouble breathing.  Summary  · Hypertension is another name for high blood pressure.  · Making healthy choices can help lower blood pressure. If your blood pressure cannot be controlled with healthy choices, you may need to take medicine.  This information  is not intended to replace advice given to you by your health care provider. Make sure you discuss any questions you have with your health care provider.  Document Released: 06/05/2009 Document Revised: 11/15/2017 Document Reviewed: 11/15/2017  [employer] Interactive Patient Education © 2019 [employer]

## 2019-05-28 ENCOUNTER — HOSPITAL ENCOUNTER (OUTPATIENT)
Dept: MAMMOGRAPHY | Facility: HOSPITAL | Age: 64
Discharge: HOME | End: 2019-05-28
Admitting: FAMILY MEDICINE

## 2019-05-28 DIAGNOSIS — Z12.39 SCREENING BREAST EXAMINATION: ICD-10-CM

## 2019-05-28 PROCEDURE — 77067 SCR MAMMO BI INCL CAD: CPT

## 2019-05-28 PROCEDURE — 77063 BREAST TOMOSYNTHESIS BI: CPT

## 2019-05-29 ENCOUNTER — OFFICE VISIT (OUTPATIENT)
Dept: FAMILY MEDICINE CLINIC | Facility: CLINIC | Age: 64
End: 2019-05-29

## 2019-05-29 VITALS
BODY MASS INDEX: 25.49 KG/M2 | OXYGEN SATURATION: 98 % | HEART RATE: 97 BPM | DIASTOLIC BLOOD PRESSURE: 86 MMHG | HEIGHT: 65 IN | SYSTOLIC BLOOD PRESSURE: 130 MMHG | TEMPERATURE: 97.9 F | WEIGHT: 153 LBS

## 2019-05-29 DIAGNOSIS — Z00.00 ENCOUNTER FOR WELL ADULT EXAM WITHOUT ABNORMAL FINDINGS: ICD-10-CM

## 2019-05-29 DIAGNOSIS — E78.2 MIXED HYPERLIPIDEMIA: ICD-10-CM

## 2019-05-29 DIAGNOSIS — I82.499 DEEP VEIN THROMBOSIS (DVT) OF OTHER VEIN OF LOWER EXTREMITY, UNSPECIFIED CHRONICITY, UNSPECIFIED LATERALITY (HCC): ICD-10-CM

## 2019-05-29 DIAGNOSIS — K21.00 GASTROESOPHAGEAL REFLUX DISEASE WITH ESOPHAGITIS: ICD-10-CM

## 2019-05-29 DIAGNOSIS — R79.89 ELEVATED LFTS: ICD-10-CM

## 2019-05-29 DIAGNOSIS — I10 ESSENTIAL HYPERTENSION: Primary | ICD-10-CM

## 2019-05-29 LAB — INR PPP: 1.8 (ref 0.9–1.1)

## 2019-05-29 PROCEDURE — 36416 COLLJ CAPILLARY BLOOD SPEC: CPT | Performed by: FAMILY MEDICINE

## 2019-05-29 PROCEDURE — 85610 PROTHROMBIN TIME: CPT | Performed by: FAMILY MEDICINE

## 2019-05-29 PROCEDURE — 99214 OFFICE O/P EST MOD 30 MIN: CPT | Performed by: FAMILY MEDICINE

## 2019-05-29 RX ORDER — SIMVASTATIN 40 MG
40 TABLET ORAL NIGHTLY
Qty: 90 TABLET | Refills: 1 | Status: SHIPPED | OUTPATIENT
Start: 2019-05-29 | End: 2019-06-12

## 2019-05-29 RX ORDER — METOPROLOL SUCCINATE 25 MG/1
25 TABLET, EXTENDED RELEASE ORAL DAILY
Qty: 90 TABLET | Refills: 1 | Status: SHIPPED | OUTPATIENT
Start: 2019-05-29 | End: 2020-01-03 | Stop reason: SDUPTHER

## 2019-05-29 RX ORDER — MELATONIN
3000 DAILY
COMMUNITY
End: 2020-01-10 | Stop reason: HOSPADM

## 2019-05-29 RX ORDER — WARFARIN SODIUM 2 MG/1
2 TABLET ORAL NIGHTLY
Qty: 30 TABLET | Refills: 3 | Status: SHIPPED | OUTPATIENT
Start: 2019-05-29 | End: 2019-05-29

## 2019-05-29 RX ORDER — OMEPRAZOLE 20 MG/1
20 CAPSULE, DELAYED RELEASE ORAL DAILY
Qty: 90 CAPSULE | Refills: 0 | Status: SHIPPED | OUTPATIENT
Start: 2019-05-29 | End: 2019-07-30 | Stop reason: ALTCHOICE

## 2019-05-29 RX ORDER — WARFARIN SODIUM 2.5 MG/1
2.5 TABLET ORAL NIGHTLY
Qty: 30 TABLET | Refills: 3 | Status: SHIPPED | OUTPATIENT
Start: 2019-05-29 | End: 2019-06-25 | Stop reason: SDUPTHER

## 2019-05-29 NOTE — PROGRESS NOTES
Subjective   Camille Pantoja is a 63 y.o. female is here for   Chief Complaint   Patient presents with   • Establish Care   • Cancer   • Hyperlipidemia   • Hypertension   • Med Refill       History of Present Illness     She had a blood clot in her left leg in 2004.  She has been on warfarin milligrams once a day.  She states her last INR was 3.6 on May 3, 2019.  Week prior INR was 1.6.  She was instructed to take 4 mg 2 days in a row, and then 3 mg daily until she came in the following week to have her INR repeated in order to help get her INR up to therapeutic range.  She had been on 2 mg of Coumadin for many years.  She is not sure what through her INR off last month.  On May 14 she resumed back on 2 mg of Coumadin.  He has hypertension that is controlled with metoprolol.  His GERD is controlled with omeprazole.  She has hyperlipidemia that is controlled with Zocor.  The following portions of the patient's history were reviewed and updated as appropriate: allergies, current medications, past family history, past medical history, past social history, past surgical history and problem list.     reports that she has never smoked. She has never used smokeless tobacco. She reports that she drinks alcohol. She reports that she does not use drugs.    Review of Systems   Constitutional: Negative for activity change and unexpected weight change.   Respiratory: Positive for shortness of breath. Negative for wheezing.    Cardiovascular: Negative for chest pain and palpitations.   Gastrointestinal: Negative for abdominal pain, blood in stool and constipation.   Genitourinary: Negative for difficulty urinating and hematuria.   Musculoskeletal: Negative for gait problem.   Skin: Negative for color change and rash.        PHQ-9 Depression Screening  Little interest or pleasure in doing things?     Feeling down, depressed, or hopeless?     Trouble falling or staying asleep, or sleeping too much?     Feeling tired or having  "little energy?     Poor appetite or overeating?     Feeling bad about yourself - or that you are a failure or have let yourself or your family down?     Trouble concentrating on things, such as reading the newspaper or watching television?     Moving or speaking so slowly that other people could have noticed? Or the opposite - being so fidgety or restless that you have been moving around a lot more than usual?     Thoughts that you would be better off dead, or of hurting yourself in some way?     PHQ-9 Total Score     If you checked off any problems, how difficult have these problems made it for you to do your work, take care of things at home, or get along with other people?           Objective   /86 (BP Location: Right arm, Patient Position: Sitting, Cuff Size: Adult)   Pulse 97   Temp 97.9 °F (36.6 °C)   Ht 165.1 cm (65\")   Wt 69.4 kg (153 lb)   SpO2 98%   BMI 25.46 kg/m²   Physical Exam   Constitutional: She is oriented to person, place, and time. She appears well-developed and well-nourished. No distress.   HENT:   Head: Normocephalic and atraumatic.   Right Ear: External ear normal.   Left Ear: External ear normal.   Nose: Nose normal.   Mouth/Throat: Oropharynx is clear and moist. No oropharyngeal exudate.   Eyes: Lids are normal. Right eye exhibits no discharge. Left eye exhibits no discharge. No scleral icterus.   Neck: Trachea normal, normal range of motion and full passive range of motion without pain. Neck supple. No tracheal deviation and no edema present. No thyromegaly present.   Cardiovascular: Normal rate, regular rhythm, normal heart sounds and intact distal pulses. Exam reveals no gallop and no friction rub.   No murmur heard.  Pulmonary/Chest: Effort normal and breath sounds normal. No stridor. No tachypnea and no bradypnea. No respiratory distress. She has no decreased breath sounds. She has no wheezes. She has no rales. She exhibits no tenderness.   Abdominal: Normal appearance. " There is no hepatosplenomegaly.   Musculoskeletal: She exhibits no edema.   Lymphadenopathy:        Head (right side): No submental, no submandibular, no tonsillar, no preauricular, no posterior auricular and no occipital adenopathy present.        Head (left side): No submental, no submandibular, no tonsillar, no preauricular, no posterior auricular and no occipital adenopathy present.     She has no cervical adenopathy.        Right cervical: No superficial cervical, no deep cervical and no posterior cervical adenopathy present.       Left cervical: No superficial cervical, no deep cervical and no posterior cervical adenopathy present.   Neurological: She is alert and oriented to person, place, and time. She has normal strength and normal reflexes. She is not disoriented.   Skin: Skin is warm, dry and intact. Capillary refill takes less than 2 seconds. No rash noted. She is not diaphoretic. No cyanosis or erythema. No pallor. Nails show no clubbing.   Psychiatric: She has a normal mood and affect. Her behavior is normal. Cognition and memory are normal.   Nursing note and vitals reviewed.      Procedures    Assessment/Plan   Diagnoses and all orders for this visit:    1. Essential hypertension (Primary)  Assessment & Plan:  Controlled with metoprolol    Orders:  -     metoprolol succinate XL (TOPROL-XL) 25 MG 24 hr tablet; Take 1 tablet by mouth Daily.  Dispense: 90 tablet; Refill: 1    2. Gastroesophageal reflux disease with esophagitis  Assessment & Plan:  Controlled with omeprazole    Orders:  -     omeprazole (priLOSEC) 20 MG capsule; Take 1 capsule by mouth Daily.  Dispense: 90 capsule; Refill: 0    3. Deep vein thrombosis (DVT) of other vein of lower extremity, unspecified chronicity, unspecified laterality (CMS/HCC)  -     Discontinue: warfarin (COUMADIN) 2 MG tablet; Take 1 tablet by mouth Every Night.  Dispense: 30 tablet; Refill: 3  -     POCT INR  -     warfarin (COUMADIN) 2.5 MG tablet; Take 1 tablet  by mouth Every Night.  Dispense: 30 tablet; Refill: 3    4. Mixed hyperlipidemia  -     simvastatin (ZOCOR) 40 MG tablet; Take 1 tablet by mouth Every Night.  Dispense: 90 tablet; Refill: 1  These are all new problems to me.

## 2019-05-29 NOTE — PATIENT INSTRUCTIONS

## 2019-06-03 LAB
ALBUMIN SERPL-MCNC: 4.5 G/DL (ref 3.5–5.2)
ALBUMIN/GLOB SERPL: 1.5 G/DL
ALP SERPL-CCNC: 80 U/L (ref 39–117)
ALT SERPL-CCNC: 10 U/L (ref 1–33)
AST SERPL-CCNC: 15 U/L (ref 1–32)
BASOPHILS # BLD AUTO: (no result) 10*3/UL
BASOPHILS # BLD MANUAL: 0.05 10*3/MM3 (ref 0–0.2)
BASOPHILS NFR BLD MANUAL: 1 % (ref 0–1.5)
BILIRUB SERPL-MCNC: 0.9 MG/DL (ref 0.2–1.2)
BUN SERPL-MCNC: 10 MG/DL (ref 8–23)
BUN/CREAT SERPL: 11 (ref 7–25)
CALCIUM SERPL-MCNC: 10.6 MG/DL (ref 8.6–10.5)
CHLORIDE SERPL-SCNC: 103 MMOL/L (ref 98–107)
CHOLEST SERPL-MCNC: 129 MG/DL (ref 0–200)
CHOLEST/HDLC SERPL: 2.53 {RATIO}
CO2 SERPL-SCNC: 24.2 MMOL/L (ref 22–29)
CREAT SERPL-MCNC: 0.91 MG/DL (ref 0.57–1)
DIFFERENTIAL COMMENT: ABNORMAL
EOSINOPHIL # BLD AUTO: (no result) 10*3/UL
EOSINOPHIL # BLD MANUAL: 0.25 10*3/MM3 (ref 0–0.4)
EOSINOPHIL NFR BLD AUTO: (no result) %
EOSINOPHIL NFR BLD MANUAL: 5 % (ref 0.3–6.2)
ERYTHROCYTE [DISTWIDTH] IN BLOOD BY AUTOMATED COUNT: 13.8 % (ref 12.3–15.4)
GLOBULIN SER CALC-MCNC: 3 GM/DL
GLUCOSE SERPL-MCNC: 104 MG/DL (ref 65–99)
HCT VFR BLD AUTO: 43.4 % (ref 34–46.6)
HDLC SERPL-MCNC: 51 MG/DL (ref 40–60)
HGB BLD-MCNC: 13.7 G/DL (ref 12–15.9)
LDLC SERPL CALC-MCNC: 61 MG/DL (ref 0–100)
LYMPHOCYTES # BLD AUTO: (no result) 10*3/UL
LYMPHOCYTES # BLD MANUAL: 0.8 10*3/MM3 (ref 0.7–3.1)
LYMPHOCYTES NFR BLD AUTO: (no result) %
LYMPHOCYTES NFR BLD MANUAL: 16 % (ref 19.6–45.3)
MCH RBC QN AUTO: 28.9 PG (ref 26.6–33)
MCHC RBC AUTO-ENTMCNC: 31.6 G/DL (ref 31.5–35.7)
MCV RBC AUTO: 91.6 FL (ref 79–97)
MONOCYTES # BLD MANUAL: 0.3 10*3/MM3 (ref 0.1–0.9)
MONOCYTES NFR BLD AUTO: (no result) %
MONOCYTES NFR BLD MANUAL: 6 % (ref 5–12)
NEUTROPHILS # BLD MANUAL: 3.61 10*3/MM3 (ref 1.7–7)
NEUTROPHILS NFR BLD AUTO: (no result) %
NEUTROPHILS NFR BLD MANUAL: 72 % (ref 42.7–76)
PLATELET # BLD AUTO: 188 10*3/MM3 (ref 140–450)
PLATELET BLD QL SMEAR: ABNORMAL
POTASSIUM SERPL-SCNC: 4.4 MMOL/L (ref 3.5–5.2)
PROT SERPL-MCNC: 7.5 G/DL (ref 6–8.5)
RBC # BLD AUTO: 4.74 10*6/MM3 (ref 3.77–5.28)
RBC MORPH BLD: ABNORMAL
SODIUM SERPL-SCNC: 140 MMOL/L (ref 136–145)
TRIGL SERPL-MCNC: 83 MG/DL (ref 0–150)
TSH SERPL DL<=0.005 MIU/L-ACNC: 1.01 MIU/ML (ref 0.27–4.2)
VLDLC SERPL CALC-MCNC: 16.6 MG/DL
WBC # BLD AUTO: 5.02 10*3/MM3 (ref 3.4–10.8)

## 2019-06-04 ENCOUNTER — CLINICAL SUPPORT (OUTPATIENT)
Dept: FAMILY MEDICINE CLINIC | Facility: CLINIC | Age: 64
End: 2019-06-04

## 2019-06-04 DIAGNOSIS — I82.499 DEEP VEIN THROMBOSIS (DVT) OF OTHER VEIN OF LOWER EXTREMITY, UNSPECIFIED CHRONICITY, UNSPECIFIED LATERALITY (HCC): ICD-10-CM

## 2019-06-04 DIAGNOSIS — I82.4Z9 EMBOLISM FROM THROMBOSIS OF VEIN OF DISTAL END OF LOWER EXTREMITY (HCC): Primary | ICD-10-CM

## 2019-06-04 LAB — INR PPP: 1.5 (ref 2–2.9)

## 2019-06-04 PROCEDURE — 85610 PROTHROMBIN TIME: CPT | Performed by: FAMILY MEDICINE

## 2019-06-04 PROCEDURE — 36416 COLLJ CAPILLARY BLOOD SPEC: CPT | Performed by: FAMILY MEDICINE

## 2019-06-12 ENCOUNTER — OFFICE VISIT (OUTPATIENT)
Dept: FAMILY MEDICINE CLINIC | Facility: CLINIC | Age: 64
End: 2019-06-12

## 2019-06-12 VITALS
TEMPERATURE: 97.4 F | HEART RATE: 49 BPM | DIASTOLIC BLOOD PRESSURE: 82 MMHG | HEIGHT: 65 IN | WEIGHT: 153 LBS | OXYGEN SATURATION: 97 % | SYSTOLIC BLOOD PRESSURE: 132 MMHG | BODY MASS INDEX: 25.49 KG/M2

## 2019-06-12 DIAGNOSIS — I10 ESSENTIAL HYPERTENSION: ICD-10-CM

## 2019-06-12 DIAGNOSIS — K21.00 GASTROESOPHAGEAL REFLUX DISEASE WITH ESOPHAGITIS: ICD-10-CM

## 2019-06-12 DIAGNOSIS — E66.3 OVERWEIGHT (BMI 25.0-29.9): ICD-10-CM

## 2019-06-12 DIAGNOSIS — I82.403 DEEP VEIN THROMBOSIS (DVT) OF BOTH LOWER EXTREMITIES, UNSPECIFIED CHRONICITY, UNSPECIFIED VEIN (HCC): Primary | ICD-10-CM

## 2019-06-12 DIAGNOSIS — I82.4Z9 EMBOLISM FROM THROMBOSIS OF VEIN OF DISTAL END OF LOWER EXTREMITY (HCC): ICD-10-CM

## 2019-06-12 DIAGNOSIS — E78.2 MIXED HYPERLIPIDEMIA: ICD-10-CM

## 2019-06-12 LAB — INR PPP: 1.6 (ref 0.9–1.1)

## 2019-06-12 PROCEDURE — 36416 COLLJ CAPILLARY BLOOD SPEC: CPT | Performed by: FAMILY MEDICINE

## 2019-06-12 PROCEDURE — 99214 OFFICE O/P EST MOD 30 MIN: CPT | Performed by: FAMILY MEDICINE

## 2019-06-12 PROCEDURE — 85610 PROTHROMBIN TIME: CPT | Performed by: FAMILY MEDICINE

## 2019-06-12 RX ORDER — SIMVASTATIN 20 MG
40 TABLET ORAL NIGHTLY
Qty: 90 TABLET | Refills: 1 | Status: SHIPPED
Start: 2019-06-12 | End: 2019-06-27

## 2019-06-12 NOTE — ASSESSMENT & PLAN NOTE
DVT of the left lower extremity in 2004.  No pulmonary embolism.  INR is 1.6 on June 12, 2019.  Will continue current dose and recheck next week.

## 2019-06-12 NOTE — PROGRESS NOTES
Subjective   Camille Pantoja is a 63 y.o. female is here for   Chief Complaint   Patient presents with   • Anticoagulation       History of Present Illness     The patient stopped her Coumadin in order to have a dental procedure.  She had a wisdom tooth pulled 6 days ago on June 5.  She resumed her Coumadin on June 6.  Prior to her dental procedure she had been taking 2 mg of Coumadin daily.  Her INR had been subtherapeutic.  Therefore she was instructed to start taking 2.5 mg after the procedure, which she has been doing.  She accidentally missed a day/dose of her Coumadin about 2 or 3 days ago.  She has hypertension that is controlled.  She also has hyperlipidemia and has been taking simvastatin 40 mg 1/2 tablet daily.  Her cholesterol has been controlled.  No medication side effects.  She has GERD that is controlled with omeprazole.    The following portions of the patient's history were reviewed and updated as appropriate: allergies, current medications, past family history, past medical history, past social history, past surgical history and problem list.     reports that she has never smoked. She has never used smokeless tobacco. She reports that she drinks alcohol. She reports that she does not use drugs.    Review of Systems   Constitutional: Negative for activity change and unexpected weight change.   Respiratory: Negative for shortness of breath and wheezing.    Cardiovascular: Negative for chest pain and palpitations.   Gastrointestinal: Negative for abdominal pain, blood in stool and constipation.   Genitourinary: Negative for difficulty urinating and hematuria.   Musculoskeletal: Negative for gait problem.   Skin: Negative for color change and rash.        PHQ-9 Depression Screening  Little interest or pleasure in doing things?     Feeling down, depressed, or hopeless?     Trouble falling or staying asleep, or sleeping too much?     Feeling tired or having little energy?     Poor appetite or overeating?  "    Feeling bad about yourself - or that you are a failure or have let yourself or your family down?     Trouble concentrating on things, such as reading the newspaper or watching television?     Moving or speaking so slowly that other people could have noticed? Or the opposite - being so fidgety or restless that you have been moving around a lot more than usual?     Thoughts that you would be better off dead, or of hurting yourself in some way?     PHQ-9 Total Score     If you checked off any problems, how difficult have these problems made it for you to do your work, take care of things at home, or get along with other people?           Objective   /82 (BP Location: Left arm, Patient Position: Sitting, Cuff Size: Adult)   Pulse (!) 49   Temp 97.4 °F (36.3 °C) (Oral)   Ht 165.1 cm (65\")   Wt 69.4 kg (153 lb)   SpO2 97%   BMI 25.46 kg/m²   Physical Exam   Constitutional: She is oriented to person, place, and time. She appears well-developed and well-nourished. No distress.   HENT:   Head: Normocephalic and atraumatic.   Right Ear: External ear normal.   Left Ear: External ear normal.   Nose: Nose normal.   Mouth/Throat: Oropharynx is clear and moist. No oropharyngeal exudate.   Eyes: Lids are normal. Right eye exhibits no discharge. Left eye exhibits no discharge. No scleral icterus.   Neck: Trachea normal, normal range of motion and full passive range of motion without pain. Neck supple. No tracheal deviation and no edema present. No thyromegaly present.   Cardiovascular: Normal rate, regular rhythm, normal heart sounds and intact distal pulses. Exam reveals no gallop and no friction rub.   No murmur heard.  Pulmonary/Chest: Effort normal and breath sounds normal. No stridor. No tachypnea and no bradypnea. No respiratory distress. She has no decreased breath sounds. She has no wheezes. She has no rales. She exhibits no tenderness.   Abdominal: Normal appearance. There is no hepatosplenomegaly. "   Musculoskeletal: She exhibits no edema.   Lymphadenopathy:        Head (right side): No submental, no submandibular, no tonsillar, no preauricular, no posterior auricular and no occipital adenopathy present.        Head (left side): No submental, no submandibular, no tonsillar, no preauricular, no posterior auricular and no occipital adenopathy present.     She has no cervical adenopathy.        Right cervical: No superficial cervical, no deep cervical and no posterior cervical adenopathy present.       Left cervical: No superficial cervical, no deep cervical and no posterior cervical adenopathy present.   Neurological: She is alert and oriented to person, place, and time. She has normal strength and normal reflexes. She is not disoriented.   Skin: Skin is warm, dry and intact. Capillary refill takes less than 2 seconds. No rash noted. She is not diaphoretic. No cyanosis or erythema. No pallor. Nails show no clubbing.   Psychiatric: She has a normal mood and affect. Her behavior is normal. Cognition and memory are normal.   Nursing note and vitals reviewed.      Procedures    Assessment/Plan   Diagnoses and all orders for this visit:    1. Deep vein thrombosis (DVT) of both lower extremities, unspecified chronicity, unspecified vein (CMS/HCC) (Primary)  -     POCT INR    2. Embolism from thrombosis of vein of distal end of lower extremity  Assessment & Plan:  DVT of the left lower extremity in 2004.  No pulmonary embolism.  INR is 1.6 on June 12, 2019.  Will continue current dose and recheck next week.      3. Mixed hyperlipidemia  Assessment & Plan:  Currently taking simvastatin 40 mg 1/2 tablet nightly.      4. Essential hypertension  Assessment & Plan:  Controlled.      5. Gastroesophageal reflux disease with esophagitis    6. Overweight (BMI 25.0-29.9)  Assessment & Plan:  Improve diet and exercise.    Her INR is 1.6 today.  Since she missed a dose of Coumadin a couple of days ago we are going to keep that 2.5  mg dose she is been currently taking and recheck her INR next week.

## 2019-06-12 NOTE — PATIENT INSTRUCTIONS

## 2019-06-19 ENCOUNTER — CLINICAL SUPPORT (OUTPATIENT)
Dept: FAMILY MEDICINE CLINIC | Facility: CLINIC | Age: 64
End: 2019-06-19

## 2019-06-19 LAB — INR PPP: 2.5 (ref 2–2.9)

## 2019-06-19 PROCEDURE — 36415 COLL VENOUS BLD VENIPUNCTURE: CPT | Performed by: FAMILY MEDICINE

## 2019-06-19 PROCEDURE — 85610 PROTHROMBIN TIME: CPT | Performed by: FAMILY MEDICINE

## 2019-06-25 ENCOUNTER — CLINICAL SUPPORT (OUTPATIENT)
Dept: FAMILY MEDICINE CLINIC | Facility: CLINIC | Age: 64
End: 2019-06-25

## 2019-06-25 ENCOUNTER — ANTICOAGULATION VISIT (OUTPATIENT)
Dept: FAMILY MEDICINE CLINIC | Facility: CLINIC | Age: 64
End: 2019-06-25

## 2019-06-25 DIAGNOSIS — I82.499 DEEP VEIN THROMBOSIS (DVT) OF OTHER VEIN OF LOWER EXTREMITY, UNSPECIFIED CHRONICITY, UNSPECIFIED LATERALITY (HCC): ICD-10-CM

## 2019-06-25 DIAGNOSIS — Z86.718 HISTORY OF DVT (DEEP VEIN THROMBOSIS): Primary | ICD-10-CM

## 2019-06-25 PROCEDURE — 36415 COLL VENOUS BLD VENIPUNCTURE: CPT | Performed by: FAMILY MEDICINE

## 2019-06-25 PROCEDURE — 85610 PROTHROMBIN TIME: CPT | Performed by: FAMILY MEDICINE

## 2019-06-25 PROCEDURE — 99212 OFFICE O/P EST SF 10 MIN: CPT | Performed by: FAMILY MEDICINE

## 2019-06-25 RX ORDER — WARFARIN SODIUM 2.5 MG/1
2.5 TABLET ORAL NIGHTLY
Qty: 30 TABLET | Refills: 3 | Status: SHIPPED | OUTPATIENT
Start: 2019-06-25 | End: 2019-07-02

## 2019-06-27 ENCOUNTER — TELEPHONE (OUTPATIENT)
Dept: FAMILY MEDICINE CLINIC | Facility: CLINIC | Age: 64
End: 2019-06-27

## 2019-06-27 DIAGNOSIS — E78.2 MIXED HYPERLIPIDEMIA: ICD-10-CM

## 2019-06-27 RX ORDER — SIMVASTATIN 40 MG
40 TABLET ORAL NIGHTLY
Start: 2019-06-27 | End: 2019-07-02 | Stop reason: SDUPTHER

## 2019-06-27 NOTE — TELEPHONE ENCOUNTER
The patient called and said that she certainly had been taking her simvastatin 20 mg 1 tablet by mouth every night instead of 2 tablets by mouth every night.  She states that she just noticed that her prescription bottle said to take 2 tablets.  Please let her know that I have changed the prescription on her chart to be a 40 mg tablet taken 1 tablet by mouth every night and that I called that prescription and to [facility] in [place].  If she would like she could take 2 of the 20 mg tablets for a total of 40 mg so that she does not have to waste any of those tablets.  When she gets the new prescription for 40 mg she would just take 140 mg tablet daily.  Please call the patient and explained this to her.  Thank you.

## 2019-07-02 ENCOUNTER — DOCUMENTATION (OUTPATIENT)
Dept: FAMILY MEDICINE CLINIC | Facility: CLINIC | Age: 64
End: 2019-07-02

## 2019-07-02 ENCOUNTER — CLINICAL SUPPORT (OUTPATIENT)
Dept: FAMILY MEDICINE CLINIC | Facility: CLINIC | Age: 64
End: 2019-07-02

## 2019-07-02 ENCOUNTER — ANTICOAGULATION VISIT (OUTPATIENT)
Dept: FAMILY MEDICINE CLINIC | Facility: CLINIC | Age: 64
End: 2019-07-02

## 2019-07-02 DIAGNOSIS — I82.499 DEEP VEIN THROMBOSIS (DVT) OF OTHER VEIN OF LOWER EXTREMITY, UNSPECIFIED CHRONICITY, UNSPECIFIED LATERALITY (HCC): ICD-10-CM

## 2019-07-02 DIAGNOSIS — Z86.718 HISTORY OF DVT (DEEP VEIN THROMBOSIS): Primary | ICD-10-CM

## 2019-07-02 DIAGNOSIS — E78.2 MIXED HYPERLIPIDEMIA: ICD-10-CM

## 2019-07-02 LAB
INR PPP: 3.5 (ref 2–2.9)
INR PPP: 3.5 (ref 2–3)

## 2019-07-02 PROCEDURE — 36415 COLL VENOUS BLD VENIPUNCTURE: CPT | Performed by: FAMILY MEDICINE

## 2019-07-02 PROCEDURE — 85610 PROTHROMBIN TIME: CPT | Performed by: FAMILY MEDICINE

## 2019-07-02 PROCEDURE — 99211 OFF/OP EST MAY X REQ PHY/QHP: CPT | Performed by: FAMILY MEDICINE

## 2019-07-02 RX ORDER — WARFARIN SODIUM 2 MG/1
2 TABLET ORAL NIGHTLY
Qty: 30 TABLET | Refills: 5
Start: 2019-07-02 | End: 2019-11-18 | Stop reason: SDUPTHER

## 2019-07-02 RX ORDER — SIMVASTATIN 20 MG
20 TABLET ORAL NIGHTLY
Qty: 90 TABLET | Refills: 1 | Status: SHIPPED | OUTPATIENT
Start: 2019-07-02 | End: 2020-01-16 | Stop reason: HOSPADM

## 2019-07-02 RX ORDER — WARFARIN SODIUM 2 MG/1
2 TABLET ORAL NIGHTLY
Qty: 30 TABLET | Refills: 5 | Status: SHIPPED
Start: 2019-07-02 | End: 2019-07-02 | Stop reason: SDUPTHER

## 2019-07-02 NOTE — PROGRESS NOTES
"In a telephone encounter note from June 27, 2019 there is a typo about the patient's previous atorvastatin - \"When she gets the new prescription for 40 mg she would just take 140 mg tablet daily. \"  This should read one (1) 40 mg tablet daily.  "

## 2019-07-09 ENCOUNTER — CLINICAL SUPPORT (OUTPATIENT)
Dept: FAMILY MEDICINE CLINIC | Facility: CLINIC | Age: 64
End: 2019-07-09

## 2019-07-09 DIAGNOSIS — I82.4Z9 DEEP VEIN THROMBOSIS (DVT) OF DISTAL VEIN OF LOWER EXTREMITY, UNSPECIFIED CHRONICITY, UNSPECIFIED LATERALITY (HCC): Primary | ICD-10-CM

## 2019-07-09 LAB — INR PPP: 2.2 (ref 2–3)

## 2019-07-09 PROCEDURE — 36416 COLLJ CAPILLARY BLOOD SPEC: CPT | Performed by: FAMILY MEDICINE

## 2019-07-09 PROCEDURE — 85610 PROTHROMBIN TIME: CPT | Performed by: FAMILY MEDICINE

## 2019-07-24 ENCOUNTER — CLINICAL SUPPORT (OUTPATIENT)
Dept: FAMILY MEDICINE CLINIC | Facility: CLINIC | Age: 64
End: 2019-07-24

## 2019-07-24 ENCOUNTER — ANTICOAGULATION VISIT (OUTPATIENT)
Dept: FAMILY MEDICINE CLINIC | Facility: CLINIC | Age: 64
End: 2019-07-24

## 2019-07-24 DIAGNOSIS — I82.4Z9 DEEP VEIN THROMBOSIS (DVT) OF DISTAL VEIN OF LOWER EXTREMITY, UNSPECIFIED CHRONICITY, UNSPECIFIED LATERALITY (HCC): Primary | ICD-10-CM

## 2019-07-24 DIAGNOSIS — I82.509 CHRONIC DEEP VEIN THROMBOSIS (DVT) OF LOWER EXTREMITY, UNSPECIFIED LATERALITY, UNSPECIFIED VEIN (HCC): ICD-10-CM

## 2019-07-24 LAB
INR PPP: 2.5 (ref 0.9–1.1)
INR PPP: 2.5 (ref 2–3)

## 2019-07-24 PROCEDURE — 85610 PROTHROMBIN TIME: CPT | Performed by: FAMILY MEDICINE

## 2019-07-30 ENCOUNTER — OFFICE VISIT (OUTPATIENT)
Dept: FAMILY MEDICINE CLINIC | Facility: CLINIC | Age: 64
End: 2019-07-30

## 2019-07-30 VITALS
OXYGEN SATURATION: 98 % | TEMPERATURE: 97.6 F | DIASTOLIC BLOOD PRESSURE: 84 MMHG | BODY MASS INDEX: 22.99 KG/M2 | RESPIRATION RATE: 14 BRPM | HEIGHT: 65 IN | HEART RATE: 55 BPM | SYSTOLIC BLOOD PRESSURE: 130 MMHG | WEIGHT: 138 LBS

## 2019-07-30 DIAGNOSIS — I10 ESSENTIAL HYPERTENSION: ICD-10-CM

## 2019-07-30 DIAGNOSIS — K14.6 TONGUE SORE: Primary | ICD-10-CM

## 2019-07-30 DIAGNOSIS — K05.10 GINGIVITIS: ICD-10-CM

## 2019-07-30 DIAGNOSIS — R12 HEARTBURN: ICD-10-CM

## 2019-07-30 PROCEDURE — 99214 OFFICE O/P EST MOD 30 MIN: CPT | Performed by: FAMILY MEDICINE

## 2019-07-30 RX ORDER — RANITIDINE 150 MG/1
150 CAPSULE ORAL DAILY PRN
Qty: 30 CAPSULE | Refills: 3 | Status: SHIPPED | OUTPATIENT
Start: 2019-07-30 | End: 2019-12-09 | Stop reason: ALTCHOICE

## 2019-07-30 NOTE — ASSESSMENT & PLAN NOTE
Controlled with metoprolol XL 25 mg daily.  No medication side effects.  Continue current treatment.

## 2019-07-30 NOTE — ASSESSMENT & PLAN NOTE
She states that she has some reflux gets worse at night.  She drinks diet soda.  She has not been drinking water.  She agrees to drink 8 ounces of water before she has 8 ounces of soda or Crystal light.  She states the only medicine she is taken for heartburn before his omeprazole.  She would like to try different medication.  Discontinue omeprazole.  Start ranitidine 150 mg once daily as needed.

## 2019-07-30 NOTE — PROGRESS NOTES
Subjective   Camille Pantoja is a 63 y.o. female is here for annual physical exam.    Chief Complaint   Patient presents with   • Consult     gerd, choking, tounge sores       History of Present Illness     She has a sore on her tongue that she noticed last night.  She also has some problems with her teeth and is good to be getting into see the dentist.    She states that she has some reflux gets worse at night.  She drinks diet soda.  She has not been drinking water.  She states the only medicine she is taken for heartburn before his omeprazole.  She would like to try different medication.    She has hypertension that is controlled with metoprolol.    The following portions of the patient's history were reviewed and updated as appropriate: allergies, current medications, past family history, past medical history, past social history, past surgical history and problem list.    Family History   Problem Relation Age of Onset   • Breast cancer Brother    • Macular degeneration Mother    • Heart disease Mother    • Heart disease Father    • Heart disease Maternal Uncle        Social History     Socioeconomic History   • Marital status:      Spouse name: Not on file   • Number of children: Not on file   • Years of education: Not on file   • Highest education level: Not on file   Tobacco Use   • Smoking status: Never Smoker   • Smokeless tobacco: Never Used   Substance and Sexual Activity   • Alcohol use: Yes     Comment: Reports a hx of drinking everyday for 18 years atleast. She says that she quit May 2018, but says that she drinks occasionally now.    • Drug use: No   • Sexual activity: Defer         Current Outpatient Medications:   •  cholecalciferol (VITAMIN D3) 1000 units tablet, Take 3,000 Units by mouth Daily., Disp: , Rfl:   •  metoprolol succinate XL (TOPROL-XL) 25 MG 24 hr tablet, Take 1 tablet by mouth Daily., Disp: 90 tablet, Rfl: 1  •  simvastatin (ZOCOR) 20 MG tablet, Take 1 tablet by mouth Every  Night., Disp: 90 tablet, Rfl: 1  •  warfarin (COUMADIN) 2 MG tablet, Take 1 tablet by mouth Every Night., Disp: 30 tablet, Rfl: 5  •  Zinc 50 MG capsule, Take 50 mg by mouth Daily., Disp: , Rfl:   •  ranitidine (ZANTAC) 150 MG capsule, Take 1 capsule by mouth Daily As Needed for Indigestion or Heartburn., Disp: 30 capsule, Rfl: 3    Review of Systems   Constitutional: Negative for activity change and unexpected weight change.   Respiratory: Positive for cough and choking. Negative for shortness of breath and wheezing.    Cardiovascular: Negative for chest pain and palpitations.   Gastrointestinal: Negative for abdominal pain, blood in stool and constipation.   Genitourinary: Negative for difficulty urinating and hematuria.   Musculoskeletal: Negative for gait problem.   Skin: Negative for color change and rash.       PHQ-9 Depression Screening  Little interest or pleasure in doing things? 0   Feeling down, depressed, or hopeless? 0   Trouble falling or staying asleep, or sleeping too much?     Feeling tired or having little energy?     Poor appetite or overeating?     Feeling bad about yourself - or that you are a failure or have let yourself or your family down?     Trouble concentrating on things, such as reading the newspaper or watching television?     Moving or speaking so slowly that other people could have noticed? Or the opposite - being so fidgety or restless that you have been moving around a lot more than usual?     Thoughts that you would be better off dead, or of hurting yourself in some way?     PHQ-9 Total Score 0   If you checked off any problems, how difficult have these problems made it for you to do your work, take care of things at home, or get along with other people?         Objective   Vitals:    07/30/19 1408   BP: 130/84   Pulse: 55   Resp: 14   Temp: 97.6 °F (36.4 °C)   SpO2: 98%     Physical Exam   Constitutional: She is oriented to person, place, and time. She appears well-developed and  well-nourished. No distress.   HENT:   Head: Normocephalic and atraumatic.   Right Ear: External ear normal.   Left Ear: External ear normal.   Nose: Nose normal.   Mouth/Throat: Oropharynx is clear and moist. No oropharyngeal exudate.   There is an open sore on the tip of the tongue on the ventral surface.  There are also some smaller sores along the lateral border on both sides of the tongue.   Eyes: Lids are normal. Right eye exhibits no discharge. Left eye exhibits no discharge. No scleral icterus.   Neck: Trachea normal, normal range of motion and full passive range of motion without pain. Neck supple. No tracheal deviation and no edema present. No thyromegaly present.   Cardiovascular: Normal rate, regular rhythm, normal heart sounds and intact distal pulses. Exam reveals no gallop and no friction rub.   No murmur heard.  Pulmonary/Chest: Effort normal and breath sounds normal. No stridor. No tachypnea and no bradypnea. No respiratory distress. She has no decreased breath sounds. She has no wheezes. She has no rales. She exhibits no tenderness.   Abdominal: Normal appearance. There is no hepatosplenomegaly.   Musculoskeletal: She exhibits no edema.   Lymphadenopathy:        Head (right side): No submental, no submandibular, no tonsillar, no preauricular, no posterior auricular and no occipital adenopathy present.        Head (left side): No submental, no submandibular, no tonsillar, no preauricular, no posterior auricular and no occipital adenopathy present.     She has no cervical adenopathy.        Right cervical: No superficial cervical, no deep cervical and no posterior cervical adenopathy present.       Left cervical: No superficial cervical, no deep cervical and no posterior cervical adenopathy present.   Neurological: She is alert and oriented to person, place, and time. She has normal strength and normal reflexes. She is not disoriented.   Skin: Skin is warm, dry and intact. Capillary refill takes less  than 2 seconds. No rash noted. She is not diaphoretic. No cyanosis or erythema. No pallor. Nails show no clubbing.   Psychiatric: She has a normal mood and affect. Her behavior is normal. Cognition and memory are normal.   Nursing note and vitals reviewed.      Procedures      Assessment/Plan   Diagnoses and all orders for this visit:    1. Tongue sore (Primary)  Assessment & Plan:  Refer to ENT          Orders:  -     Ambulatory Referral to Dentistry  -     Ambulatory Referral to ENT (Otolaryngology)    2. Gingivitis  Assessment & Plan:  Refer to dentist.      Orders:  -     Ambulatory Referral to Dentistry    3. Heartburn  Assessment & Plan:  She states that she has some reflux gets worse at night.  She drinks diet soda.  She has not been drinking water.  She agrees to drink 8 ounces of water before she has 8 ounces of soda or Crystal light.  She states the only medicine she is taken for heartburn before his omeprazole.  She would like to try different medication.  Discontinue omeprazole.  Start ranitidine 150 mg once daily as needed.        Orders:  -     ranitidine (ZANTAC) 150 MG capsule; Take 1 capsule by mouth Daily As Needed for Indigestion or Heartburn.  Dispense: 30 capsule; Refill: 3    4. Essential hypertension  Assessment & Plan:  Controlled with metoprolol XL 25 mg daily.  No medication side effects.  Continue current treatment.

## 2019-07-30 NOTE — PATIENT INSTRUCTIONS

## 2019-08-09 ENCOUNTER — TELEPHONE (OUTPATIENT)
Dept: FAMILY MEDICINE CLINIC | Facility: CLINIC | Age: 64
End: 2019-08-09

## 2019-08-09 NOTE — TELEPHONE ENCOUNTER
Pt called today and stated that the new medication that she was given at her last visit Ranitidine 150Mg is making her sick to her stomach and she would like to try something else if possible.

## 2019-08-12 NOTE — TELEPHONE ENCOUNTER
Stop taking ranitidine.  Avoid any and all beverage and food triggers.  Try Tums over-the-counter as needed.  Thank you.

## 2019-09-10 ENCOUNTER — CLINICAL SUPPORT (OUTPATIENT)
Dept: FAMILY MEDICINE CLINIC | Facility: CLINIC | Age: 64
End: 2019-09-10

## 2019-09-10 DIAGNOSIS — I82.509 CHRONIC DEEP VEIN THROMBOSIS (DVT) OF LOWER EXTREMITY, UNSPECIFIED LATERALITY, UNSPECIFIED VEIN (HCC): ICD-10-CM

## 2019-09-10 LAB — INR PPP: 2.8 (ref 2–3)

## 2019-09-10 PROCEDURE — 85610 PROTHROMBIN TIME: CPT | Performed by: FAMILY MEDICINE

## 2019-09-10 PROCEDURE — 36416 COLLJ CAPILLARY BLOOD SPEC: CPT | Performed by: FAMILY MEDICINE

## 2019-10-08 ENCOUNTER — CLINICAL SUPPORT (OUTPATIENT)
Dept: FAMILY MEDICINE CLINIC | Facility: CLINIC | Age: 64
End: 2019-10-08

## 2019-10-08 DIAGNOSIS — I82.509 CHRONIC DEEP VEIN THROMBOSIS (DVT) OF LOWER EXTREMITY, UNSPECIFIED LATERALITY, UNSPECIFIED VEIN (HCC): ICD-10-CM

## 2019-10-08 LAB — INR PPP: 2.3 (ref 2–3)

## 2019-10-08 PROCEDURE — 36416 COLLJ CAPILLARY BLOOD SPEC: CPT | Performed by: FAMILY MEDICINE

## 2019-10-08 PROCEDURE — 85610 PROTHROMBIN TIME: CPT | Performed by: FAMILY MEDICINE

## 2019-11-18 DIAGNOSIS — I82.499 DEEP VEIN THROMBOSIS (DVT) OF OTHER VEIN OF LOWER EXTREMITY, UNSPECIFIED CHRONICITY, UNSPECIFIED LATERALITY (HCC): ICD-10-CM

## 2019-11-18 RX ORDER — WARFARIN SODIUM 2 MG/1
2 TABLET ORAL NIGHTLY
Qty: 30 TABLET | Refills: 0
Start: 2019-11-18 | End: 2019-12-17 | Stop reason: SDUPTHER

## 2019-11-25 ENCOUNTER — CLINICAL SUPPORT (OUTPATIENT)
Dept: FAMILY MEDICINE CLINIC | Facility: CLINIC | Age: 64
End: 2019-11-25

## 2019-11-25 DIAGNOSIS — I82.509 CHRONIC DEEP VEIN THROMBOSIS (DVT) OF LOWER EXTREMITY, UNSPECIFIED LATERALITY, UNSPECIFIED VEIN (HCC): ICD-10-CM

## 2019-11-25 LAB — INR PPP: 2.1 (ref 2–3)

## 2019-11-25 PROCEDURE — 85610 PROTHROMBIN TIME: CPT | Performed by: FAMILY MEDICINE

## 2019-11-25 PROCEDURE — 36416 COLLJ CAPILLARY BLOOD SPEC: CPT | Performed by: FAMILY MEDICINE

## 2019-12-07 DIAGNOSIS — K21.00 GASTROESOPHAGEAL REFLUX DISEASE WITH ESOPHAGITIS: ICD-10-CM

## 2019-12-09 RX ORDER — OMEPRAZOLE 20 MG/1
CAPSULE, DELAYED RELEASE ORAL
Qty: 90 CAPSULE | Refills: 0 | Status: ON HOLD | OUTPATIENT
Start: 2019-12-09 | End: 2020-01-25 | Stop reason: SDUPTHER

## 2019-12-15 DIAGNOSIS — I82.499 DEEP VEIN THROMBOSIS (DVT) OF OTHER VEIN OF LOWER EXTREMITY, UNSPECIFIED CHRONICITY, UNSPECIFIED LATERALITY (HCC): ICD-10-CM

## 2019-12-16 RX ORDER — WARFARIN SODIUM 2 MG/1
TABLET ORAL
Qty: 30 TABLET | Refills: 0 | OUTPATIENT
Start: 2019-12-16

## 2019-12-17 DIAGNOSIS — I82.499 DEEP VEIN THROMBOSIS (DVT) OF OTHER VEIN OF LOWER EXTREMITY, UNSPECIFIED CHRONICITY, UNSPECIFIED LATERALITY (HCC): ICD-10-CM

## 2019-12-17 RX ORDER — WARFARIN SODIUM 2 MG/1
2 TABLET ORAL NIGHTLY
Qty: 30 TABLET | Refills: 0
Start: 2019-12-17 | End: 2019-12-31

## 2019-12-17 NOTE — TELEPHONE ENCOUNTER
Pt needs lab appointment for INR around the first week of January 2020. Her last INR was 11-25-19. She should be getting her INR checked monthly, please. Once her INR check appointment is scheduled the first week of January, you may authorize a refill of her coumadin.

## 2019-12-17 NOTE — TELEPHONE ENCOUNTER
Call pt cell #and left vm for pt to call to schedule appt an INR visit. Pt's home phone is not working.

## 2019-12-30 ENCOUNTER — OFFICE VISIT (OUTPATIENT)
Dept: FAMILY MEDICINE CLINIC | Facility: CLINIC | Age: 64
End: 2019-12-30

## 2019-12-30 ENCOUNTER — CLINICAL SUPPORT (OUTPATIENT)
Dept: FAMILY MEDICINE CLINIC | Facility: CLINIC | Age: 64
End: 2019-12-30

## 2019-12-30 DIAGNOSIS — I82.509 CHRONIC DEEP VEIN THROMBOSIS (DVT) OF LOWER EXTREMITY, UNSPECIFIED LATERALITY, UNSPECIFIED VEIN (HCC): ICD-10-CM

## 2019-12-30 DIAGNOSIS — J18.9 PNEUMONIA OF RIGHT LOWER LOBE DUE TO INFECTIOUS ORGANISM: Primary | ICD-10-CM

## 2019-12-30 DIAGNOSIS — I82.499 DEEP VEIN THROMBOSIS (DVT) OF OTHER VEIN OF LOWER EXTREMITY, UNSPECIFIED CHRONICITY, UNSPECIFIED LATERALITY (HCC): ICD-10-CM

## 2019-12-30 LAB — INR PPP: 1.9 (ref 2–3)

## 2019-12-30 PROCEDURE — 36415 COLL VENOUS BLD VENIPUNCTURE: CPT | Performed by: FAMILY MEDICINE

## 2019-12-30 PROCEDURE — 99214 OFFICE O/P EST MOD 30 MIN: CPT | Performed by: FAMILY MEDICINE

## 2019-12-30 PROCEDURE — 85610 PROTHROMBIN TIME: CPT | Performed by: FAMILY MEDICINE

## 2019-12-30 RX ORDER — LEVOFLOXACIN 500 MG/1
500 TABLET, FILM COATED ORAL DAILY
Qty: 7 TABLET | Refills: 0 | Status: SHIPPED | OUTPATIENT
Start: 2019-12-30 | End: 2020-01-08

## 2019-12-30 NOTE — PATIENT INSTRUCTIONS
Community-Acquired Pneumonia, Adult  Pneumonia is an infection of the lungs. It causes swelling in the airways of the lungs. Mucus and fluid may also build up inside the airways.  One type of pneumonia can happen while a person is in a hospital. A different type can happen when a person is not in a hospital (community-acquired pneumonia).   What are the causes?    This condition is caused by germs (viruses, bacteria, or fungi). Some types of germs can be passed from one person to another. This can happen when you breathe in droplets from the cough or sneeze of an infected person.  What increases the risk?  You are more likely to develop this condition if you:  · Have a long-term (chronic) disease, such as:  ? Chronic obstructive pulmonary disease (COPD).  ? Asthma.  ? Cystic fibrosis.  ? Congestive heart failure.  ? Diabetes.  ? Kidney disease.  · Have HIV.  · Have sickle cell disease.  · Have had your spleen removed.  · Do not take good care of your teeth and mouth (poor dental hygiene).  · Have a medical condition that increases the risk of breathing in droplets from your own mouth and nose.  · Have a weakened body defense system (immune system).  · Are a smoker.  · Travel to areas where the germs that cause this illness are common.  · Are around certain animals or the places they live.  What are the signs or symptoms?  · A dry cough.  · A wet (productive) cough.  · Fever.  · Sweating.  · Chest pain. This often happens when breathing deeply or coughing.  · Fast breathing or trouble breathing.  · Shortness of breath.  · Shaking chills.  · Feeling tired (fatigue).  · Muscle aches.  How is this treated?  Treatment for this condition depends on many things. Most adults can be treated at home. In some cases, treatment must happen in a hospital. Treatment may include:  · Medicines given by mouth or through an IV tube.  · Being given extra oxygen.  · Respiratory therapy.  In rare cases, treatment for very bad pneumonia  may include:  · Using a machine to help you breathe.  · Having a procedure to remove fluid from around your lungs.  Follow these instructions at home:  Medicines  · Take over-the-counter and prescription medicines only as told by your doctor.  ? Only take cough medicine if you are losing sleep.  · If you were prescribed an antibiotic medicine, take it as told by your doctor. Do not stop taking the antibiotic even if you start to feel better.  General instructions    · Sleep with your head and neck raised (elevated). You can do this by sleeping in a recliner or by putting a few pillows under your head.  · Rest as needed. Get at least 8 hours of sleep each night.  · Drink enough water to keep your pee (urine) pale yellow.  · Eat a healthy diet that includes plenty of vegetables, fruits, whole grains, low-fat dairy products, and lean protein.  · Do not use any products that contain nicotine or tobacco. These include cigarettes, e-cigarettes, and chewing tobacco. If you need help quitting, ask your doctor.  · Keep all follow-up visits as told by your doctor. This is important.  How is this prevented?  A shot (vaccine) can help prevent pneumonia. Shots are often suggested for:  · People older than 65 years of age.  · People older than 19 years of age who:  ? Are having cancer treatment.  ? Have long-term (chronic) lung disease.  ? Have problems with their body's defense system.  You may also prevent pneumonia if you take these actions:  · Get the flu (influenza) shot every year.  · Go to the dentist as often as told.  · Wash your hands often. If you cannot use soap and water, use hand .  Contact a doctor if:  · You have a fever.  · You lose sleep because your cough medicine does not help.  Get help right away if:  · You are short of breath and it gets worse.  · You have more chest pain.  · Your sickness gets worse. This is very serious if:  ? You are an older adult.  ? Your body's defense system is weak.  · You  cough up blood.  Summary  · Pneumonia is an infection of the lungs.  · Most adults can be treated at home. Some will need treatment in a hospital.  · Drink enough water to keep your pee pale yellow.  · Get at least 8 hours of sleep each night.  This information is not intended to replace advice given to you by your health care provider. Make sure you discuss any questions you have with your health care provider.  Document Released: 06/05/2009 Document Revised: 08/15/2019 Document Reviewed: 08/15/2019  [employer] Patient Education © 2019 [employer]

## 2019-12-30 NOTE — PROGRESS NOTES
Subjective   Camille Pantoja is a 64 y.o. female is here for   Chief Complaint   Patient presents with   • URI       History of Present Illness     Patient was here today for her INR check.  Her INR was 1.9.  She takes 2.5 mg of Coumadin daily.  While she was here she complained of having a cold.  We decided to go ahead and see her for a regular visit.    She states she started feeling bad a couple of days ago.  She has had a cough and is felt a little tired and weak.  She is also been coughing up some sputum and has a little tightness in her chest.  She has felt a little warm.  No nausea, vomiting, or diarrhea.    The following portions of the patient's history were reviewed and updated as appropriate: allergies, current medications, past family history, past medical history, past social history, past surgical history and problem list.     reports that she has never smoked. She has never used smokeless tobacco. She reports that she drinks alcohol. She reports that she does not use drugs.    Review of Systems   Constitutional: Negative for activity change and unexpected weight change.   HENT: Positive for congestion.    Respiratory: Positive for cough, chest tightness and shortness of breath. Negative for wheezing.    Cardiovascular: Negative for chest pain and palpitations.   Gastrointestinal: Negative for abdominal pain, blood in stool and constipation.   Genitourinary: Negative for difficulty urinating and hematuria.   Musculoskeletal: Negative for gait problem.   Skin: Negative for color change and rash.        PHQ-9 Depression Screening  Little interest or pleasure in doing things?     Feeling down, depressed, or hopeless?     Trouble falling or staying asleep, or sleeping too much?     Feeling tired or having little energy?     Poor appetite or overeating?     Feeling bad about yourself - or that you are a failure or have let yourself or your family down?     Trouble concentrating on things, such as reading  the newspaper or watching television?     Moving or speaking so slowly that other people could have noticed? Or the opposite - being so fidgety or restless that you have been moving around a lot more than usual?     Thoughts that you would be better off dead, or of hurting yourself in some way?     PHQ-9 Total Score     If you checked off any problems, how difficult have these problems made it for you to do your work, take care of things at home, or get along with other people?           Objective   There were no vitals taken for this visit.  Physical Exam   Constitutional: She is oriented to person, place, and time. She appears well-developed and well-nourished. No distress.   HENT:   Head: Normocephalic and atraumatic.   Right Ear: External ear normal.   Left Ear: External ear normal.   Nose: Nose normal.   Mouth/Throat: Oropharynx is clear and moist. No oropharyngeal exudate.   Eyes: Lids are normal. Right eye exhibits no discharge. Left eye exhibits no discharge. No scleral icterus.   Neck: Trachea normal, normal range of motion and full passive range of motion without pain. Neck supple. No tracheal deviation and no edema present. No thyromegaly present.   Cardiovascular: Normal rate, regular rhythm, normal heart sounds and intact distal pulses. Exam reveals no gallop and no friction rub.   No murmur heard.  Pulmonary/Chest: Effort normal. No stridor. No tachypnea and no bradypnea. No respiratory distress. She has decreased breath sounds in the right lower field and the left lower field. She has no wheezes. She has no rhonchi. She has rales in the right lower field. She exhibits no tenderness.   Abdominal: Normal appearance. There is no hepatosplenomegaly.   Musculoskeletal: She exhibits no edema.   Lymphadenopathy:        Head (right side): No submental, no submandibular, no tonsillar, no preauricular, no posterior auricular and no occipital adenopathy present.        Head (left side): No submental, no  submandibular, no tonsillar, no preauricular, no posterior auricular and no occipital adenopathy present.     She has no cervical adenopathy.        Right cervical: No superficial cervical, no deep cervical and no posterior cervical adenopathy present.       Left cervical: No superficial cervical, no deep cervical and no posterior cervical adenopathy present.   Neurological: She is alert and oriented to person, place, and time. She has normal strength and normal reflexes. She is not disoriented.   Skin: Skin is warm, dry and intact. Capillary refill takes less than 2 seconds. No rash noted. She is not diaphoretic. No cyanosis or erythema. No pallor. Nails show no clubbing.   Psychiatric: She has a normal mood and affect. Her behavior is normal. Cognition and memory are normal.   Nursing note and vitals reviewed.      Procedures    Assessment/Plan   Diagnoses and all orders for this visit:    1. Pneumonia of right lower lobe due to infectious organism (CMS/[facility]) (Primary)  -     levoFLOXacin (LEVAQUIN) 500 MG tablet; Take 1 tablet by mouth Daily.  Dispense: 7 tablet; Refill: 0  -     XR Chest PA & Lateral

## 2019-12-31 ENCOUNTER — HOSPITAL ENCOUNTER (OUTPATIENT)
Dept: GENERAL RADIOLOGY | Facility: HOSPITAL | Age: 64
Discharge: HOME | End: 2019-12-31
Admitting: FAMILY MEDICINE

## 2019-12-31 PROCEDURE — 71046 X-RAY EXAM CHEST 2 VIEWS: CPT

## 2019-12-31 RX ORDER — WARFARIN SODIUM 2 MG/1
TABLET ORAL
Qty: 30 TABLET | Refills: 0 | Status: SHIPPED | OUTPATIENT
Start: 2019-12-31 | End: 2020-01-10 | Stop reason: HOSPADM

## 2019-12-31 NOTE — PROGRESS NOTES
I called the patient's home and cell phone number to give her the results of her chest x-ray, however, she did not answer either phone.  I called both numbers several times.  I left messages for her to return my call.  Please call the patient and schedule an appointment as soon as possible for follow-up of her community-acquired pneumonia and her abnormal chest x-ray.

## 2020-01-03 ENCOUNTER — OFFICE VISIT (OUTPATIENT)
Dept: FAMILY MEDICINE CLINIC | Facility: CLINIC | Age: 65
End: 2020-01-03

## 2020-01-03 VITALS
TEMPERATURE: 97.8 F | OXYGEN SATURATION: 98 % | WEIGHT: 125 LBS | BODY MASS INDEX: 20.83 KG/M2 | SYSTOLIC BLOOD PRESSURE: 138 MMHG | HEIGHT: 65 IN | DIASTOLIC BLOOD PRESSURE: 78 MMHG | HEART RATE: 91 BPM

## 2020-01-03 DIAGNOSIS — I10 ESSENTIAL HYPERTENSION: ICD-10-CM

## 2020-01-03 DIAGNOSIS — J18.9 PNEUMONIA OF RIGHT LOWER LOBE DUE TO INFECTIOUS ORGANISM: Primary | ICD-10-CM

## 2020-01-03 DIAGNOSIS — E78.2 MIXED HYPERLIPIDEMIA: ICD-10-CM

## 2020-01-03 PROCEDURE — 99214 OFFICE O/P EST MOD 30 MIN: CPT | Performed by: FAMILY MEDICINE

## 2020-01-03 RX ORDER — METOPROLOL SUCCINATE 25 MG/1
25 TABLET, EXTENDED RELEASE ORAL DAILY
Qty: 90 TABLET | Refills: 1 | Status: SHIPPED | OUTPATIENT
Start: 2020-01-03 | End: 2020-01-10 | Stop reason: HOSPADM

## 2020-01-03 NOTE — ASSESSMENT & PLAN NOTE
Controlled with simvastatin 20 mg daily.  No medication side effects.  Continue current treatment.

## 2020-01-03 NOTE — PATIENT INSTRUCTIONS
Community-Acquired Pneumonia, Adult  Pneumonia is an infection of the lungs. There are different types of pneumonia. One type can develop while a person is in a hospital. A different type, called community-acquired pneumonia, develops in people who are not, or have not recently been, in the hospital or other health care facility.  What are the causes?    Pneumonia may be caused by bacteria, viruses, or funguses. Community-acquired pneumonia is often caused by Streptococcus pneumonia bacteria. These bacteria are often passed from one person to another by breathing in droplets from the cough or sneeze of an infected person.  What increases the risk?  The condition is more likely to develop in:  · People who have chronic diseases, such as chronic obstructive pulmonary disease (COPD), asthma, congestive heart failure, cystic fibrosis, diabetes, or kidney disease.  · People who have early-stage or late-stage HIV.  · People who have sickle cell disease.  · People who have had their spleen removed (splenectomy).  · People who have poor dental hygiene.  · People who have medical conditions that increase the risk of breathing in (aspirating) secretions their own mouth and nose.  · People who have a weakened immune system (immunocompromised).  · People who smoke.  · People who travel to areas where pneumonia-causing germs commonly exist.  · People who are around animal habitats or animals that have pneumonia-causing germs, including birds, bats, rabbits, cats, and farm animals.  What are the signs or symptoms?  Symptoms of this condition include:  · A dry cough.  · A wet (productive) cough.  · Fever.  · Sweating.  · Chest pain, especially when breathing deeply or coughing.  · Rapid breathing or difficulty breathing.  · Shortness of breath.  · Shaking chills.  · Fatigue.  · Muscle aches.  How is this diagnosed?  Your health care provider will take a medical history and perform a physical exam. You may also have other tests,  including:  · Imaging studies of your chest, including X-rays.  · Tests to check your blood oxygen level and other blood gases.  · Other tests on blood, mucus (sputum), fluid around your lungs (pleural fluid), and urine.  If your pneumonia is severe, other tests may be done to identify the specific cause of your illness.  How is this treated?  The type of treatment that you receive depends on many factors, such as the cause of your pneumonia, the medicines you take, and other medical conditions that you have. For most adults, treatment and recovery from pneumonia may occur at home. In some cases, treatment must happen in a hospital. Treatment may include:  · Antibiotic medicines, if the pneumonia was caused by bacteria.  · Antiviral medicines, if the pneumonia was caused by a virus.  · Medicines that are given by mouth or through an IV tube.  · Oxygen.  · Respiratory therapy.  Although rare, treating severe pneumonia may include:  · Mechanical ventilation. This is done if you are not breathing well on your own and you cannot maintain a safe blood oxygen level.  · Thoracentesis. This procedure removes fluid around one lung or both lungs to help you breathe better.  Follow these instructions at home:    · Take over-the-counter and prescription medicines only as told by your health care provider.  ? Only take cough medicine if you are losing sleep. Understand that cough medicine can prevent your body’s natural ability to remove mucus from your lungs.  ? If you were prescribed an antibiotic medicine, take it as told by your health care provider. Do not stop taking the antibiotic even if you start to feel better.  · Sleep in a semi-upright position at night. Try sleeping in a reclining chair, or place a few pillows under your head.  · Do not use tobacco products, including cigarettes, chewing tobacco, and e-cigarettes. If you need help quitting, ask your health care provider.  · Drink enough water to keep your urine  clear or pale yellow. This will help to thin out mucus secretions in your lungs.  How is this prevented?  There are ways that you can decrease your risk of developing community-acquired pneumonia. Consider getting a pneumococcal vaccine if:  · You are older than 65 years of age.  · You are older than 19 years of age and are undergoing cancer treatment, have chronic lung disease, or have other medical conditions that affect your immune system. Ask your health care provider if this applies to you.  There are different types and schedules of pneumococcal vaccines. Ask your health care provider which vaccination option is best for you.  You may also prevent community-acquired pneumonia if you take these actions:  · Get an influenza vaccine every year. Ask your health care provider which type of influenza vaccine is best for you.  · Go to the dentist on a regular basis.  · Wash your hands often. Use hand  if soap and water are not available.  Contact a health care provider if:  · You have a fever.  · You are losing sleep because you cannot control your cough with cough medicine.  Get help right away if:  · You have worsening shortness of breath.  · You have increased chest pain.  · Your sickness becomes worse, especially if you are an older adult or have a weakened immune system.  · You cough up blood.  This information is not intended to replace advice given to you by your health care provider. Make sure you discuss any questions you have with your health care provider.  Document Released: 12/18/2006 Document Revised: 09/06/2018 Document Reviewed: 04/13/2016  [employer] Interactive Patient Education © 2019 [employer]

## 2020-01-03 NOTE — PROGRESS NOTES
"Subjective   Camille Pantoja is a 64 y.o. female is here for   Chief Complaint   Patient presents with   • Cough   • Pneumonia   • Med Refill       History of Present Illness     Ms. Pantoja is here for follow-up of her pneumonia.  She came in last week and had reported having a cough and was short of breath.  She had some occasional subjective chills.  We started her on Levaquin and got a chest x-ray that showed:     \"1. New cardiomegaly and probable subtle mild vascular congestion.  2. New small right-sided effusion with right basilar atelectasis or  pneumonia. Follow-up to clearing recommended.  3. Probable left base atelectasis.\"    She started taking the Levaquin on Tuesday and states her cough and shortness of breath may be getting much better.    She has hypertension is controlled.    The following portions of the patient's history were reviewed and updated as appropriate: allergies, current medications, past family history, past medical history, past social history, past surgical history and problem list.     reports that she has never smoked. She has never used smokeless tobacco. She reports that she drinks alcohol. She reports that she does not use drugs.    Review of Systems   Constitutional: Positive for unexpected weight change. Negative for activity change.   HENT: Negative for congestion.    Respiratory: Positive for cough, shortness of breath and wheezing.    Cardiovascular: Negative for chest pain and palpitations.   Gastrointestinal: Negative for abdominal pain, blood in stool and constipation.   Genitourinary: Negative for difficulty urinating and hematuria.   Skin: Negative for color change and rash.        PHQ-9 Depression Screening  Little interest or pleasure in doing things?     Feeling down, depressed, or hopeless?     Trouble falling or staying asleep, or sleeping too much?     Feeling tired or having little energy?     Poor appetite or overeating?     Feeling bad about yourself - or that " "you are a failure or have let yourself or your family down?     Trouble concentrating on things, such as reading the newspaper or watching television?     Moving or speaking so slowly that other people could have noticed? Or the opposite - being so fidgety or restless that you have been moving around a lot more than usual?     Thoughts that you would be better off dead, or of hurting yourself in some way?     PHQ-9 Total Score     If you checked off any problems, how difficult have these problems made it for you to do your work, take care of things at home, or get along with other people?           Objective   /78 (BP Location: Left arm, Patient Position: Sitting, Cuff Size: Adult)   Pulse 91   Temp 97.8 °F (36.6 °C) (Oral)   Ht 165.1 cm (65\")   Wt 56.7 kg (125 lb)   SpO2 98%   BMI 20.80 kg/m²   Physical Exam   Constitutional: She is oriented to person, place, and time. She appears well-developed and well-nourished. No distress.   HENT:   Head: Normocephalic and atraumatic.   Right Ear: External ear normal.   Left Ear: External ear normal.   Nose: Nose normal.   Mouth/Throat: Oropharynx is clear and moist. No oropharyngeal exudate.   Eyes: Lids are normal. Right eye exhibits no discharge. Left eye exhibits no discharge. No scleral icterus.   Neck: Trachea normal, normal range of motion and full passive range of motion without pain. Neck supple. No tracheal deviation and no edema present. No thyromegaly present.   Cardiovascular: Normal rate, regular rhythm, normal heart sounds and intact distal pulses. Exam reveals no gallop and no friction rub.   No murmur heard.  Pulmonary/Chest: Effort normal. No stridor. No tachypnea and no bradypnea. No respiratory distress. She has no decreased breath sounds. She has no wheezes. She has rales in the right lower field. She exhibits no tenderness.   Right lower lobe crackles are improved compared to last week.  Breath sounds are improved.   Abdominal: Normal " appearance. There is no hepatosplenomegaly.   Musculoskeletal: She exhibits no edema.   Lymphadenopathy:        Head (right side): No submental, no submandibular, no tonsillar, no preauricular, no posterior auricular and no occipital adenopathy present.        Head (left side): No submental, no submandibular, no tonsillar, no preauricular, no posterior auricular and no occipital adenopathy present.     She has no cervical adenopathy.        Right cervical: No superficial cervical, no deep cervical and no posterior cervical adenopathy present.       Left cervical: No superficial cervical, no deep cervical and no posterior cervical adenopathy present.   Neurological: She is alert and oriented to person, place, and time. She has normal strength and normal reflexes. She is not disoriented.   Skin: Skin is warm, dry and intact. Capillary refill takes less than 2 seconds. No rash noted. She is not diaphoretic. No cyanosis or erythema. No pallor. Nails show no clubbing.   Psychiatric: She has a normal mood and affect. Her behavior is normal. Cognition and memory are normal.   Nursing note and vitals reviewed.      Procedures    Assessment/Plan   Diagnoses and all orders for this visit:    1. Pneumonia of right lower lobe due to infectious organism (CMS/HCC) (Primary)  Comments:  Currently on Levaquin 500 mg daily for 1 week.  Started Tuesday.  Continue current treatment.  Improving.  Follow-up next week for evaluation.    2. Essential hypertension  Assessment & Plan:  Controlled with metoprolol XL 25 mg daily.  No medication side effects.  Continue current treatment.      3. Mixed hyperlipidemia  Assessment & Plan:  Controlled with simvastatin 20 mg daily.  No medication side effects.  Continue current treatment.      Hold warfarin while on antibiotics.  Resume warfarin after the antibiotics are completed.

## 2020-01-08 ENCOUNTER — OFFICE VISIT (OUTPATIENT)
Dept: FAMILY MEDICINE CLINIC | Facility: CLINIC | Age: 65
End: 2020-01-08

## 2020-01-08 VITALS
RESPIRATION RATE: 16 BRPM | WEIGHT: 125 LBS | HEART RATE: 120 BPM | DIASTOLIC BLOOD PRESSURE: 62 MMHG | BODY MASS INDEX: 20.83 KG/M2 | HEIGHT: 65 IN | TEMPERATURE: 97.4 F | SYSTOLIC BLOOD PRESSURE: 114 MMHG | OXYGEN SATURATION: 98 %

## 2020-01-08 DIAGNOSIS — J18.9 PNEUMONIA DUE TO INFECTIOUS ORGANISM, UNSPECIFIED LATERALITY, UNSPECIFIED PART OF LUNG: Primary | ICD-10-CM

## 2020-01-08 PROCEDURE — 99213 OFFICE O/P EST LOW 20 MIN: CPT | Performed by: FAMILY MEDICINE

## 2020-01-08 PROCEDURE — 96160 PT-FOCUSED HLTH RISK ASSMT: CPT | Performed by: FAMILY MEDICINE

## 2020-01-08 PROCEDURE — G0439 PPPS, SUBSEQ VISIT: HCPCS | Performed by: FAMILY MEDICINE

## 2020-01-08 NOTE — PATIENT INSTRUCTIONS
Community-Acquired Pneumonia, Adult  Pneumonia is a type of lung infection that causes swelling in the airways of the lungs. Mucus and fluid may also build up inside the airways. This may cause coughing and difficulty breathing.  There are different types of pneumonia. One type can develop while a person is in a hospital. A different type is called community-acquired pneumonia. It develops in people who are not, and have not recently been, in the hospital or another type of health care facility.  What are the causes?  This condition may be caused by:  · Viruses. This is the most common cause of pneumonia.  · Bacteria. Community-acquired pneumonia is often caused by Streptococcus pneumoniae bacteria. These bacteria are often passed from one person to another by breathing in droplets from the cough or sneeze of an infected person.  · Fungi. This is the least common cause of pneumonia.  What increases the risk?  The following factors may make you more likely to develop this condition:  · Having a chronic disease, such as chronic obstructive pulmonary disease (COPD), asthma, congestive heart failure, cystic fibrosis, diabetes, or kidney disease.  · Having early-stage or late-stage HIV.  · Having sickle cell disease.  · Having had your spleen removed (splenectomy).  · Having poor dental hygiene.  · Having a medical condition that increases the risk of breathing in (aspirating) secretions from your own mouth and nose.  · Having a weakened body defense system (immune system).  · Being a smoker.  · Traveling to areas where pneumonia-causing germs commonly exist.  · Being around animal habitats or animals that have pneumonia-causing germs, including birds, bats, rabbits, cats, and farm animals.  What are the signs or symptoms?  Symptoms of this condition include:  · A dry cough.  · A wet (productive) cough.  · Fever.  · Sweating.  · Chest pain, especially when breathing deeply or coughing.  · Rapid breathing or difficulty  breathing.  · Shortness of breath.  · Shaking chills.  · Fatigue.  · Muscle aches.  How is this diagnosed?  This condition may be diagnosed based on:  · Your medical history.  · A physical exam.  You may also have tests, including:  · Chest X-rays.  · Tests of your blood oxygen level and other blood gases.  · Tests on blood, mucus (sputum), fluid around your lungs (pleural fluid), and urine.  If your pneumonia is severe, other tests may be done to find the exact cause of your illness.  How is this treated?  Treatment for this condition depends on many factors, such as the cause of your pneumonia, the medicines you take, and other medical conditions that you have.  For most adults, treatment and recovery from pneumonia may occur at home. In some cases, treatment must happen in a hospital. Treatment may include:  · Medicines that are given by mouth or through an IV, including:  ? Antibiotic medicines, if the pneumonia was caused by bacteria.  ? Antiviral medicines, if the pneumonia was caused by a virus.  · Being given extra oxygen.  · Respiratory therapy.  Although rare, treating severe pneumonia may include:  · Using a machine to help you breathe (mechanical ventilation). This is done if you are not breathing well on your own and you cannot maintain a safe blood oxygen level.  · Thoracentesis. This is a procedure to remove fluid from around one lung or both lungs to help you breathe better.  Follow these instructions at home:    Medicines  · Take over-the-counter and prescription medicines only as told by your health care provider.  ? Only take cough medicine if you are losing sleep. Be aware that cough medicine can prevent your body's natural ability to remove mucus from your lungs.  · If you were prescribed an antibiotic medicine, take it as told by your health care provider. Do not stop taking the antibiotic even if you start to feel better.  General instructions  · Sleep in a semi-upright position at night. Try  sleeping in a reclining chair, or place a few pillows under your head.  · Rest as needed and get at least 8 hours of sleep each night.  · Drink enough water to keep your urine pale yellow. This will help to thin out mucus secretions in your lungs.  · Eat a healthy diet that includes plenty of vegetables, fruits, whole grains, low-fat dairy products, and lean protein.  · Do not use any products that contain nicotine or tobacco, such as cigarettes, e-cigarettes, and chewing tobacco. If you need help quitting, ask your health care provider.  · Keep all follow-up visits as told by your health care provider. This is important.  How is this prevented?  You can lower your risk of developing community-acquired pneumonia by:  · Getting a pneumococcal vaccine. There are different types and schedules of pneumococcal vaccines. Ask your health care provider which option is best for you. Consider getting the vaccine if:  ? You are older than 65 years of age.  ? You are older than 19 years of age and are undergoing cancer treatment, have chronic lung disease, or have other medical conditions that affect your immune system. Ask your health care provider if this applies to you.  · Getting an influenza vaccine every year. Ask your health care provider which type of vaccine is best for you.  · Getting regular checkups from your dentist.  · Washing your hands often. If soap and water are not available, use hand .  Contact a health care provider if:  · You have a fever.  · You are losing sleep because you cannot control your cough with cough medicine.  Get help right away if:  · You have worsening shortness of breath.  · You have increased chest pain.  · Your sickness becomes worse, especially if you are an older adult or have a weakened immune system.  · You cough up blood.  Summary  · Pneumonia is an infection of the lungs.  · Community-acquired pneumonia develops in people who have not been in the hospital. It can be caused  by bacteria, viruses, or fungi.  · This condition may be treated with antibiotics or antiviral medicines.  · Severe cases may require hospitalization, mechanical ventilation, and other procedures to drain fluid from the lungs.  This information is not intended to replace advice given to you by your health care provider. Make sure you discuss any questions you have with your health care provider.  Document Released: 12/18/2006 Document Revised: 08/15/2019 Document Reviewed: 08/15/2019  [employer] Interactive Patient Education © 2019 [employer]

## 2020-01-08 NOTE — PROGRESS NOTES
Subsequent Medicare Wellness Visit   The ABC's of the Annual Wellness Visit    Chief Complaint   Patient presents with   • Follow-up     pneumonia        HPI:  Camille Pantoja, -1955, is a 64 y.o. female who presents for a Subsequent Medicare Wellness Visit.    Recent Hospitalizations:  No hospitalization(s) within the last year..    Current Medical Providers:  Patient Care Team:  Nahun Ann Jr., DO as PCP - General (Family Medicine)    Health Habits and Functional and Cognitive Screening and Depression Screening:  Functional & Cognitive Status 2020   Do you have difficulty preparing food and eating? No   Do you have difficulty bathing yourself, getting dressed or grooming yourself? No   Do you have difficulty using the toilet? No   Do you have difficulty moving around from place to place? No   Do you have trouble with steps or getting out of a bed or a chair? No   Current Diet Well Balanced Diet   Dental Exam Up to date   Eye Exam Not up to date   Exercise (times per week) 0 times per week   Current Exercise Activities Include None   Do you need help using the phone?  No   Are you deaf or do you have serious difficulty hearing?  No   Do you need help with transportation? No   Do you need help shopping? No   Do you need help preparing meals?  No   Do you need help with housework?  No   Do you need help with laundry? No   Do you need help taking your medications? No   Do you need help managing money? No   Do you ever drive or ride in a car without wearing a seat belt? No   Have you felt unusual stress, anger or loneliness in the last month? No   Who do you live with? Alone   If you need help, do you have trouble finding someone available to you? No   Have you been bothered in the last four weeks by sexual problems? No   Do you have difficulty concentrating, remembering or making decisions? No       Compared to one year ago, the patient feels her physical health is better and her mental health is  the same.    Depression Screen:  PHQ-2/PHQ-9 Depression Screening 1/8/2020   Little interest or pleasure in doing things 0   Feeling down, depressed, or hopeless 0   Trouble falling or staying asleep, or sleeping too much 0   Feeling tired or having little energy 0   Poor appetite or overeating 0   Feeling bad about yourself - or that you are a failure or have let yourself or your family down 0   Trouble concentrating on things, such as reading the newspaper or watching television 0   Moving or speaking so slowly that other people could have noticed. Or the opposite - being so fidgety or restless that you have been moving around a lot more than usual 0   Thoughts that you would be better off dead, or of hurting yourself in some way 0   Total Score 0   If you checked off any problems, how difficult have these problems made it for you to do your work, take care of things at home, or get along with other people? Not difficult at all         Past Medical/Family/Social History:  The following portions of the patient's history were reviewed and updated as appropriate: allergies, current medications, past family history, past medical history, past social history, past surgical history and problem list.    Allergies   Allergen Reactions   • Cephalexin Other (See Comments)     Reports she doesn't remember having an allergic reaction.    • Citrus Rash   • Duricef [Cefadroxil] Rash   • Fish Allergy Rash         Current Outpatient Medications:   •  cholecalciferol (VITAMIN D3) 1000 units tablet, Take 3,000 Units by mouth Daily., Disp: , Rfl:   •  metoprolol succinate XL (TOPROL-XL) 25 MG 24 hr tablet, Take 1 tablet by mouth Daily., Disp: 90 tablet, Rfl: 1  •  omeprazole (priLOSEC) 20 MG capsule, TAKE ONE CAPSULE BY MOUTH DAILY, Disp: 90 capsule, Rfl: 0  •  simvastatin (ZOCOR) 20 MG tablet, Take 1 tablet by mouth Every Night., Disp: 90 tablet, Rfl: 1  •  warfarin (COUMADIN) 2 MG tablet, TAKE ONE TABLET BY MOUTH EVERY EVENING,  "Disp: 30 tablet, Rfl: 0  •  Zinc 50 MG capsule, Take 50 mg by mouth Daily., Disp: , Rfl:   •  levoFLOXacin (LEVAQUIN) 500 MG tablet, Take 1 tablet by mouth Daily., Disp: 7 tablet, Rfl: 0    Aspirin use counseling: Does not need ASA (and currently is not on it)    Current medication list contains no high risk medications.  No harmful drug interactions have been identified.     Family History   Problem Relation Age of Onset   • Breast cancer Brother    • Macular degeneration Mother    • Heart disease Mother    • Heart disease Father    • Heart disease Maternal Uncle        Social History     Tobacco Use   • Smoking status: Never Smoker   • Smokeless tobacco: Never Used   Substance Use Topics   • Alcohol use: Yes     Comment: Reports a hx of drinking everyday for 18 years atleast. She says that she quit May 2018, but says that she drinks occasionally now.        Past Surgical History:   Procedure Laterality Date   • FINGER SURGERY     • HYSTERECTOMY     • JOINT REPLACEMENT     • ORIF SHOULDER DISLOCATION W/ HUMERAL FRACTURE     • TIBIA FRACTURE SURGERY         Patient Active Problem List   Diagnosis   • Embolism from thrombosis of vein of distal end of lower extremity   • Deep vein thrombosis of lower extremity (CMS/HCC)   • Hyperlipidemia   • Hypertension   • Vitamin D deficiency   • Tinnitus   • Osteoarthritis   • Pap smear, as part of routine gynecological examination   • Elevated LFTs   • Overweight (BMI 25.0-29.9)   • Gingivitis   • Tongue sore   • Heartburn       Review of Systems    Objective     Vitals:    01/08/20 1425   BP: 114/62   BP Location: Left arm   Patient Position: Sitting   Cuff Size: Adult   Pulse: 120   Resp: 16   Temp: 97.4 °F (36.3 °C)   TempSrc: Oral   SpO2: 98%   Weight: 56.7 kg (125 lb)   Height: 165.1 cm (65\")       Patient's Body mass index is 20.8 kg/m². BMI is within normal parameters. No follow-up required..      No exam data present    The patient has no evidence of cognitve impairment. "     Physical Exam    Recent Lab Results:  Lab Results   Component Value Date     (H) 05/29/2019     Lab Results   Component Value Date    TRIG 83 05/29/2019    HDL 51 05/29/2019    VLDL 16.6 05/29/2019    LDLHDL 0.75 05/24/2017       Assessment/Plan   Age-appropriate Screening Schedule:  Refer to the list below for future screening recommendations based on patient's age, sex and/or medical conditions.      Health Maintenance   Topic Date Due   • TDAP/TD VACCINES (1 - Tdap) 12/21/1964   • ZOSTER VACCINE (1 of 2) 12/21/2003   • LIPID PANEL  05/29/2020   • MAMMOGRAM  05/28/2021   • COLONOSCOPY  10/05/2025   • INFLUENZA VACCINE  Addressed       Medicare Risks and Personalized Health Plan:  Cardiovascular risk      CMS-Preventive Services Quick Reference  Medicare Preventive Services Addressed:  Annual Wellness Visit (AWV)    Advance Care Planning:  Patient has an advance directive - a copy has not been provided. Have asked the patient to send this to us to add to record    There are no diagnoses linked to this encounter.    An After Visit Summary and PPPS with all of these plans were given to the patient.      Follow Up:  No follow-ups on file.

## 2020-01-09 ENCOUNTER — APPOINTMENT (OUTPATIENT)
Dept: CT IMAGING | Facility: HOSPITAL | Age: 65
End: 2020-01-09

## 2020-01-09 ENCOUNTER — APPOINTMENT (OUTPATIENT)
Dept: GENERAL RADIOLOGY | Facility: HOSPITAL | Age: 65
End: 2020-01-09

## 2020-01-09 ENCOUNTER — HOSPITAL ENCOUNTER (INPATIENT)
Facility: HOSPITAL | Age: 65
LOS: 1 days | Discharge: ANOTHER HOSPITAL | End: 2020-01-10
Attending: EMERGENCY MEDICINE | Admitting: HOSPITALIST

## 2020-01-09 DIAGNOSIS — R53.1 WEAKNESS: ICD-10-CM

## 2020-01-09 DIAGNOSIS — I48.20 CHRONIC ATRIAL FIBRILLATION WITH RAPID VENTRICULAR RESPONSE (HCC): Primary | ICD-10-CM
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
## 2020-01-09 PROBLEM — R79.89 ELEVATED LFTS: Status: RESOLVED | Noted: 2017-11-28 | Resolved: 2020-01-09

## 2020-01-09 PROBLEM — I48.0 PAROXYSMAL ATRIAL FIBRILLATION WITH RAPID VENTRICULAR RESPONSE: Status: ACTIVE | Noted: 2020-01-09

## 2020-01-09 PROBLEM — K05.10 GINGIVITIS: Status: RESOLVED | Noted: 2019-07-30 | Resolved: 2020-01-09

## 2020-01-09 PROBLEM — Z01.419 PAP SMEAR, AS PART OF ROUTINE GYNECOLOGICAL EXAMINATION: Status: RESOLVED | Noted: 2017-11-13 | Resolved: 2020-01-09

## 2020-01-09 PROBLEM — R12 HEARTBURN: Status: RESOLVED | Noted: 2019-07-30 | Resolved: 2020-01-09

## 2020-01-09 PROBLEM — K14.6 TONGUE SORE: Status: RESOLVED | Noted: 2019-07-30 | Resolved: 2020-01-09

## 2020-01-09 LAB
ALBUMIN SERPL-MCNC: 4 G/DL (ref 3.5–5.2)
ALBUMIN/GLOB SERPL: 1.2 G/DL
ALP SERPL-CCNC: 74 U/L (ref 39–117)
ALT SERPL W P-5'-P-CCNC: 17 U/L (ref 1–33)
AMORPH URATE CRY URNS QL MICRO: ABNORMAL /HPF
ANION GAP SERPL CALCULATED.3IONS-SCNC: 15.2 MMOL/L (ref 5–15)
AST SERPL-CCNC: 26 U/L (ref 1–32)
BACTERIA UR QL AUTO: ABNORMAL /HPF
BASOPHILS # BLD AUTO: 0.04 10*3/MM3 (ref 0–0.2)
BASOPHILS NFR BLD AUTO: 0.4 % (ref 0–1.5)
BILIRUB SERPL-MCNC: 2 MG/DL (ref 0.2–1.2)
BILIRUB UR QL STRIP: NEGATIVE
BUN BLD-MCNC: 14 MG/DL (ref 8–23)
BUN/CREAT SERPL: 18.9 (ref 7–25)
CALCIUM SPEC-SCNC: 10.5 MG/DL (ref 8.6–10.5)
CHLORIDE SERPL-SCNC: 100 MMOL/L (ref 98–107)
CLARITY UR: CLEAR
CO2 SERPL-SCNC: 23.8 MMOL/L (ref 22–29)
COLOR UR: YELLOW
CREAT BLD-MCNC: 0.74 MG/DL (ref 0.57–1)
DEPRECATED RDW RBC AUTO: 51.2 FL (ref 37–54)
EOSINOPHIL # BLD AUTO: 0 10*3/MM3 (ref 0–0.4)
EOSINOPHIL NFR BLD AUTO: 0 % (ref 0.3–6.2)
ERYTHROCYTE [DISTWIDTH] IN BLOOD BY AUTOMATED COUNT: 16.2 % (ref 12.3–15.4)
GFR SERPL CREATININE-BSD FRML MDRD: 79 ML/MIN/1.73
GLOBULIN UR ELPH-MCNC: 3.4 GM/DL
GLUCOSE BLD-MCNC: 95 MG/DL (ref 65–99)
GLUCOSE UR STRIP-MCNC: NEGATIVE MG/DL
HCT VFR BLD AUTO: 42.3 % (ref 34–46.6)
HGB BLD-MCNC: 13.9 G/DL (ref 12–15.9)
HGB UR QL STRIP.AUTO: ABNORMAL
HYALINE CASTS UR QL AUTO: ABNORMAL /LPF
IMM GRANULOCYTES # BLD AUTO: 0.04 10*3/MM3 (ref 0–0.05)
IMM GRANULOCYTES NFR BLD AUTO: 0.4 % (ref 0–0.5)
INR PPP: 1.25 (ref 0.9–1.1)
KETONES UR QL STRIP: ABNORMAL
LEUKOCYTE ESTERASE UR QL STRIP.AUTO: NEGATIVE
LYMPHOCYTES # BLD AUTO: 0.89 10*3/MM3 (ref 0.7–3.1)
LYMPHOCYTES NFR BLD AUTO: 9.9 % (ref 19.6–45.3)
MCH RBC QN AUTO: 28.7 PG (ref 26.6–33)
MCHC RBC AUTO-ENTMCNC: 32.9 G/DL (ref 31.5–35.7)
MCV RBC AUTO: 87.2 FL (ref 79–97)
MONOCYTES # BLD AUTO: 0.62 10*3/MM3 (ref 0.1–0.9)
MONOCYTES NFR BLD AUTO: 6.9 % (ref 5–12)
NEUTROPHILS # BLD AUTO: 7.42 10*3/MM3 (ref 1.7–7)
NEUTROPHILS NFR BLD AUTO: 82.4 % (ref 42.7–76)
NITRITE UR QL STRIP: NEGATIVE
NRBC BLD AUTO-RTO: 0 /100 WBC (ref 0–0.2)
PH UR STRIP.AUTO: 6 [PH] (ref 4.5–8)
PLATELET # BLD AUTO: 188 10*3/MM3 (ref 140–450)
PMV BLD AUTO: 12.5 FL (ref 6–12)
POTASSIUM BLD-SCNC: 3.8 MMOL/L (ref 3.5–5.2)
PROT SERPL-MCNC: 7.4 G/DL (ref 6–8.5)
PROT UR QL STRIP: ABNORMAL
PROTHROMBIN TIME: 15.5 SECONDS (ref 12.1–15)
RBC # BLD AUTO: 4.85 10*6/MM3 (ref 3.77–5.28)
RBC # UR: ABNORMAL /HPF
REF LAB TEST METHOD: ABNORMAL
SODIUM BLD-SCNC: 139 MMOL/L (ref 136–145)
SP GR UR STRIP: 1.02 (ref 1–1.03)
SQUAMOUS #/AREA URNS HPF: ABNORMAL /HPF
TSH SERPL DL<=0.05 MIU/L-ACNC: 1.52 UIU/ML (ref 0.27–4.2)
UROBILINOGEN UR QL STRIP: ABNORMAL
WBC NRBC COR # BLD: 9.01 10*3/MM3 (ref 3.4–10.8)
WBC UR QL AUTO: ABNORMAL /HPF

## 2020-01-09 PROCEDURE — 99284 EMERGENCY DEPT VISIT MOD MDM: CPT | Performed by: EMERGENCY MEDICINE

## 2020-01-09 PROCEDURE — 93010 ELECTROCARDIOGRAM REPORT: CPT | Performed by: INTERNAL MEDICINE

## 2020-01-09 PROCEDURE — 99285 EMERGENCY DEPT VISIT HI MDM: CPT

## 2020-01-09 PROCEDURE — 99223 1ST HOSP IP/OBS HIGH 75: CPT | Performed by: INTERNAL MEDICINE

## 2020-01-09 PROCEDURE — 81001 URINALYSIS AUTO W/SCOPE: CPT | Performed by: EMERGENCY MEDICINE

## 2020-01-09 PROCEDURE — 70450 CT HEAD/BRAIN W/O DYE: CPT

## 2020-01-09 PROCEDURE — 85610 PROTHROMBIN TIME: CPT | Performed by: EMERGENCY MEDICINE

## 2020-01-09 PROCEDURE — 80307 DRUG TEST PRSMV CHEM ANLYZR: CPT | Performed by: EMERGENCY MEDICINE

## 2020-01-09 PROCEDURE — 85025 COMPLETE CBC W/AUTO DIFF WBC: CPT | Performed by: EMERGENCY MEDICINE

## 2020-01-09 PROCEDURE — 84443 ASSAY THYROID STIM HORMONE: CPT | Performed by: EMERGENCY MEDICINE

## 2020-01-09 PROCEDURE — 71045 X-RAY EXAM CHEST 1 VIEW: CPT

## 2020-01-09 PROCEDURE — 93005 ELECTROCARDIOGRAM TRACING: CPT | Performed by: EMERGENCY MEDICINE

## 2020-01-09 PROCEDURE — 80053 COMPREHEN METABOLIC PANEL: CPT | Performed by: EMERGENCY MEDICINE

## 2020-01-09 RX ORDER — DILTIAZEM HYDROCHLORIDE 5 MG/ML
5 INJECTION INTRAVENOUS ONCE
Status: COMPLETED | OUTPATIENT
Start: 2020-01-09 | End: 2020-01-09

## 2020-01-09 RX ADMIN — DILTIAZEM HYDROCHLORIDE 5 MG: 5 INJECTION INTRAVENOUS at 22:46

## 2020-01-09 RX ADMIN — SODIUM CHLORIDE 1000 ML: 9 INJECTION, SOLUTION INTRAVENOUS at 23:29

## 2020-01-09 RX ADMIN — DILTIAZEM HYDROCHLORIDE 5 MG/HR: 100 INJECTION, POWDER, LYOPHILIZED, FOR SOLUTION INTRAVENOUS at 23:45

## 2020-01-09 RX ADMIN — SODIUM CHLORIDE 100 ML/HR: 9 INJECTION, SOLUTION INTRAVENOUS at 23:45

## 2020-01-09 RX ADMIN — SODIUM CHLORIDE 1000 ML: 9 INJECTION, SOLUTION INTRAVENOUS at 21:41

## 2020-01-10 ENCOUNTER — HOSPITAL ENCOUNTER (INPATIENT)
Facility: HOSPITAL | Age: 65
LOS: 6 days | Discharge: NURSING, REHAB OR LONG-TERM CARE | End: 2020-01-16
Attending: INTERNAL MEDICINE | Admitting: INTERNAL MEDICINE

## 2020-01-10 ENCOUNTER — APPOINTMENT (OUTPATIENT)
Dept: MRI IMAGING | Facility: HOSPITAL | Age: 65
End: 2020-01-10

## 2020-01-10 ENCOUNTER — APPOINTMENT (OUTPATIENT)
Dept: CARDIOLOGY | Facility: HOSPITAL | Age: 65
End: 2020-01-10

## 2020-01-10 VITALS
HEART RATE: 99 BPM | TEMPERATURE: 98.1 F | RESPIRATION RATE: 18 BRPM | OXYGEN SATURATION: 95 % | HEIGHT: 65 IN | SYSTOLIC BLOOD PRESSURE: 121 MMHG | WEIGHT: 123 LBS | DIASTOLIC BLOOD PRESSURE: 83 MMHG | BODY MASS INDEX: 20.49 KG/M2

## 2020-01-10 DIAGNOSIS — I48.91 ATRIAL FIBRILLATION, UNSPECIFIED TYPE (HCC): ICD-10-CM

## 2020-01-10 DIAGNOSIS — I48.91 ATRIAL FIBRILLATION, UNSPECIFIED TYPE (HCC): Primary | ICD-10-CM

## 2020-01-10 PROBLEM — G93.89 CEREBRAL VENTRICULOMEGALY: Status: ACTIVE | Noted: 2020-01-10

## 2020-01-10 PROBLEM — I63.512 ACUTE ISCHEMIC LEFT MCA STROKE: Status: ACTIVE | Noted: 2020-01-10

## 2020-01-10 PROBLEM — G93.41 METABOLIC ENCEPHALOPATHY: Status: ACTIVE | Noted: 2020-01-10

## 2020-01-10 LAB
ALBUMIN SERPL-MCNC: 3.4 G/DL (ref 3.5–5.2)
ALBUMIN/GLOB SERPL: 1.3 G/DL
ALP SERPL-CCNC: 60 U/L (ref 39–117)
ALT SERPL W P-5'-P-CCNC: 13 U/L (ref 1–33)
AMMONIA BLD-SCNC: 29 UMOL/L (ref 11–51)
ANION GAP SERPL CALCULATED.3IONS-SCNC: 13.3 MMOL/L (ref 5–15)
AST SERPL-CCNC: 25 U/L (ref 1–32)
BASOPHILS # BLD AUTO: 0.05 10*3/MM3 (ref 0–0.2)
BASOPHILS NFR BLD AUTO: 0.9 % (ref 0–1.5)
BH CV ECHO MEAS - ACS: 2 CM
BH CV ECHO MEAS - AO MAX PG (FULL): 5.7 MMHG
BH CV ECHO MEAS - AO MAX PG: 10.1 MMHG
BH CV ECHO MEAS - AO MEAN PG (FULL): 3 MMHG
BH CV ECHO MEAS - AO MEAN PG: 5 MMHG
BH CV ECHO MEAS - AO ROOT AREA (BSA CORRECTED): 1.8
BH CV ECHO MEAS - AO ROOT AREA: 6.6 CM^2
BH CV ECHO MEAS - AO ROOT DIAM: 2.9 CM
BH CV ECHO MEAS - AO V2 MAX: 159 CM/SEC
BH CV ECHO MEAS - AO V2 MEAN: 106 CM/SEC
BH CV ECHO MEAS - AO V2 VTI: 26.2 CM
BH CV ECHO MEAS - ASC AORTA: 3.4 CM
BH CV ECHO MEAS - AVA(I,A): 1.6 CM^2
BH CV ECHO MEAS - AVA(I,D): 1.6 CM^2
BH CV ECHO MEAS - AVA(V,A): 2.1 CM^2
BH CV ECHO MEAS - AVA(V,D): 2.1 CM^2
BH CV ECHO MEAS - BSA(HAYCOCK): 1.6 M^2
BH CV ECHO MEAS - BSA: 1.6 M^2
BH CV ECHO MEAS - BZI_BMI: 20.5 KILOGRAMS/M^2
BH CV ECHO MEAS - BZI_METRIC_HEIGHT: 165.1 CM
BH CV ECHO MEAS - BZI_METRIC_WEIGHT: 55.8 KG
BH CV ECHO MEAS - EDV(CUBED): 145 ML
BH CV ECHO MEAS - EDV(MOD-SP2): 179 ML
BH CV ECHO MEAS - EDV(MOD-SP4): 139 ML
BH CV ECHO MEAS - EDV(TEICH): 132.6 ML
BH CV ECHO MEAS - EF(CUBED): 20.3 %
BH CV ECHO MEAS - EF(MOD-BP): 27 %
BH CV ECHO MEAS - EF(MOD-SP2): 27.9 %
BH CV ECHO MEAS - EF(MOD-SP4): 29.1 %
BH CV ECHO MEAS - EF(TEICH): 16.1 %
BH CV ECHO MEAS - ESV(CUBED): 115.5 ML
BH CV ECHO MEAS - ESV(MOD-SP2): 129 ML
BH CV ECHO MEAS - ESV(MOD-SP4): 98.5 ML
BH CV ECHO MEAS - ESV(TEICH): 111.2 ML
BH CV ECHO MEAS - FS: 7.3 %
BH CV ECHO MEAS - IVS/LVPW: 1.1
BH CV ECHO MEAS - IVSD: 1.2 CM
BH CV ECHO MEAS - LAT PEAK E' VEL: 7 CM/SEC
BH CV ECHO MEAS - LV DIASTOLIC VOL/BSA (35-75): 86.4 ML/M^2
BH CV ECHO MEAS - LV MASS(C)D: 241.4 GRAMS
BH CV ECHO MEAS - LV MASS(C)DI: 150 GRAMS/M^2
BH CV ECHO MEAS - LV MAX PG: 4.4 MMHG
BH CV ECHO MEAS - LV MEAN PG: 2 MMHG
BH CV ECHO MEAS - LV SYSTOLIC VOL/BSA (12-30): 61.2 ML/M^2
BH CV ECHO MEAS - LV V1 MAX: 105 CM/SEC
BH CV ECHO MEAS - LV V1 MEAN: 61 CM/SEC
BH CV ECHO MEAS - LV V1 VTI: 13.7 CM
BH CV ECHO MEAS - LVIDD: 5.3 CM
BH CV ECHO MEAS - LVIDS: 4.9 CM
BH CV ECHO MEAS - LVLD AP2: 7.6 CM
BH CV ECHO MEAS - LVLD AP4: 7.9 CM
BH CV ECHO MEAS - LVLS AP2: 7.1 CM
BH CV ECHO MEAS - LVLS AP4: 6.6 CM
BH CV ECHO MEAS - LVOT AREA (M): 3.1 CM^2
BH CV ECHO MEAS - LVOT AREA: 3.1 CM^2
BH CV ECHO MEAS - LVOT DIAM: 2 CM
BH CV ECHO MEAS - LVPWD: 1.1 CM
BH CV ECHO MEAS - MED PEAK E' VEL: 7 CM/SEC
BH CV ECHO MEAS - MR MAX PG: 89.1 MMHG
BH CV ECHO MEAS - MR MAX VEL: 472 CM/SEC
BH CV ECHO MEAS - MR MEAN PG: 56 MMHG
BH CV ECHO MEAS - MR MEAN VEL: 351 CM/SEC
BH CV ECHO MEAS - MR VTI: 140 CM
BH CV ECHO MEAS - MV DEC SLOPE: 600 CM/SEC^2
BH CV ECHO MEAS - MV DEC TIME: 114 SEC
BH CV ECHO MEAS - MV E MAX VEL: 78.6 CM/SEC
BH CV ECHO MEAS - MV MAX PG: 2.9 MMHG
BH CV ECHO MEAS - MV MEAN PG: 1.5 MMHG
BH CV ECHO MEAS - MV P1/2T MAX VEL: 93 CM/SEC
BH CV ECHO MEAS - MV P1/2T: 45.4 MSEC
BH CV ECHO MEAS - MV V2 MAX: 85 CM/SEC
BH CV ECHO MEAS - MV V2 MEAN: 59.1 CM/SEC
BH CV ECHO MEAS - MV V2 VTI: 17.4 CM
BH CV ECHO MEAS - MVA P1/2T LCG: 2.4 CM^2
BH CV ECHO MEAS - MVA(P1/2T): 4.8 CM^2
BH CV ECHO MEAS - MVA(VTI): 2.5 CM^2
BH CV ECHO MEAS - PA ACC TIME: 0.06 SEC
BH CV ECHO MEAS - PA MAX PG (FULL): 1.2 MMHG
BH CV ECHO MEAS - PA MAX PG: 2 MMHG
BH CV ECHO MEAS - PA PR(ACCEL): 52.9 MMHG
BH CV ECHO MEAS - PA V2 MAX: 70.8 CM/SEC
BH CV ECHO MEAS - PVA(V,A): 1.6 CM^2
BH CV ECHO MEAS - PVA(V,D): 1.6 CM^2
BH CV ECHO MEAS - QP/QS: 0.48
BH CV ECHO MEAS - RAP SYSTOLE: 8 MMHG
BH CV ECHO MEAS - RV MAX PG: 0.84 MMHG
BH CV ECHO MEAS - RV MEAN PG: 0 MMHG
BH CV ECHO MEAS - RV V1 MAX: 45.8 CM/SEC
BH CV ECHO MEAS - RV V1 MEAN: 29.1 CM/SEC
BH CV ECHO MEAS - RV V1 VTI: 8.1 CM
BH CV ECHO MEAS - RVOT AREA: 2.5 CM^2
BH CV ECHO MEAS - RVOT DIAM: 1.8 CM
BH CV ECHO MEAS - RVSP: 29.4 MMHG
BH CV ECHO MEAS - SI(AO): 107.6 ML/M^2
BH CV ECHO MEAS - SI(CUBED): 18.3 ML/M^2
BH CV ECHO MEAS - SI(LVOT): 26.8 ML/M^2
BH CV ECHO MEAS - SI(MOD-SP2): 31.1 ML/M^2
BH CV ECHO MEAS - SI(MOD-SP4): 25.2 ML/M^2
BH CV ECHO MEAS - SI(TEICH): 13.3 ML/M^2
BH CV ECHO MEAS - SV(AO): 173.1 ML
BH CV ECHO MEAS - SV(CUBED): 29.5 ML
BH CV ECHO MEAS - SV(LVOT): 43 ML
BH CV ECHO MEAS - SV(MOD-SP2): 50 ML
BH CV ECHO MEAS - SV(MOD-SP4): 40.5 ML
BH CV ECHO MEAS - SV(RVOT): 20.5 ML
BH CV ECHO MEAS - SV(TEICH): 21.4 ML
BH CV ECHO MEAS - TAPSE (>1.6): 1.4 CM
BH CV ECHO MEAS - TR MAX VEL: 230.5 CM/SEC
BH CV ECHO MEASUREMENTS AVERAGE E/E' RATIO: 11.23
BH CV VAS BP RIGHT ARM: NORMAL MMHG
BH CV XLRA - RV BASE: 4.2 CM
BH CV XLRA - TDI S': 8 CM/SEC
BILIRUB SERPL-MCNC: 2 MG/DL (ref 0.2–1.2)
BUN BLD-MCNC: 10 MG/DL (ref 8–23)
BUN/CREAT SERPL: 16.1 (ref 7–25)
CALCIUM SPEC-SCNC: 9.3 MG/DL (ref 8.6–10.5)
CHLORIDE SERPL-SCNC: 105 MMOL/L (ref 98–107)
CHOLEST SERPL-MCNC: 106 MG/DL (ref 0–200)
CK SERPL-CCNC: 179 U/L (ref 20–180)
CO2 SERPL-SCNC: 21.7 MMOL/L (ref 22–29)
CREAT BLD-MCNC: 0.62 MG/DL (ref 0.57–1)
CRP SERPL-MCNC: 1.05 MG/DL (ref 0–0.5)
D-LACTATE SERPL-SCNC: 1.2 MMOL/L (ref 0.5–2)
DEPRECATED RDW RBC AUTO: 51.8 FL (ref 37–54)
EOSINOPHIL # BLD AUTO: 0.09 10*3/MM3 (ref 0–0.4)
EOSINOPHIL NFR BLD AUTO: 1.5 % (ref 0.3–6.2)
ERYTHROCYTE [DISTWIDTH] IN BLOOD BY AUTOMATED COUNT: 16.2 % (ref 12.3–15.4)
ERYTHROCYTE [SEDIMENTATION RATE] IN BLOOD: 12 MM/HR (ref 0–20)
ETHANOL BLD-MCNC: <10 MG/DL (ref 0–10)
ETHANOL UR QL: <0.01 %
FERRITIN SERPL-MCNC: 109 NG/ML (ref 13–150)
FOLATE SERPL-MCNC: 11 NG/ML (ref 4.78–24.2)
GFR SERPL CREATININE-BSD FRML MDRD: 96 ML/MIN/1.73
GLOBULIN UR ELPH-MCNC: 2.6 GM/DL
GLUCOSE BLD-MCNC: 70 MG/DL (ref 65–99)
HBA1C MFR BLD: 5.5 % (ref 4.8–5.6)
HCT VFR BLD AUTO: 35.3 % (ref 34–46.6)
HDLC SERPL-MCNC: 40 MG/DL (ref 40–60)
HGB BLD-MCNC: 11.5 G/DL (ref 12–15.9)
IMM GRANULOCYTES # BLD AUTO: 0.02 10*3/MM3 (ref 0–0.05)
IMM GRANULOCYTES NFR BLD AUTO: 0.3 % (ref 0–0.5)
INR PPP: 1.3 (ref 0.9–1.1)
LDLC SERPL CALC-MCNC: 52 MG/DL (ref 0–100)
LDLC/HDLC SERPL: 1.31 {RATIO}
LEFT ATRIUM VOLUME INDEX: 52 ML/M2
LYMPHOCYTES # BLD AUTO: 0.88 10*3/MM3 (ref 0.7–3.1)
LYMPHOCYTES NFR BLD AUTO: 15.1 % (ref 19.6–45.3)
MAGNESIUM SERPL-MCNC: 1.1 MG/DL (ref 1.6–2.4)
MAGNESIUM SERPL-MCNC: 1.1 MG/DL (ref 1.6–2.4)
MAGNESIUM SERPL-MCNC: 2.7 MG/DL (ref 1.6–2.4)
MAXIMAL PREDICTED HEART RATE: 154 BPM
MCH RBC QN AUTO: 28.6 PG (ref 26.6–33)
MCHC RBC AUTO-ENTMCNC: 32.6 G/DL (ref 31.5–35.7)
MCV RBC AUTO: 87.8 FL (ref 79–97)
MONOCYTES # BLD AUTO: 0.37 10*3/MM3 (ref 0.1–0.9)
MONOCYTES NFR BLD AUTO: 6.3 % (ref 5–12)
NEUTROPHILS # BLD AUTO: 4.43 10*3/MM3 (ref 1.7–7)
NEUTROPHILS NFR BLD AUTO: 75.9 % (ref 42.7–76)
NRBC BLD AUTO-RTO: 0 /100 WBC (ref 0–0.2)
NT-PROBNP SERPL-MCNC: ABNORMAL PG/ML (ref 5–900)
PHOSPHATE SERPL-MCNC: 2.5 MG/DL (ref 2.5–4.5)
PLATELET # BLD AUTO: 150 10*3/MM3 (ref 140–450)
PMV BLD AUTO: 12.8 FL (ref 6–12)
POTASSIUM BLD-SCNC: 3.2 MMOL/L (ref 3.5–5.2)
POTASSIUM BLD-SCNC: 3.3 MMOL/L (ref 3.5–5.2)
POTASSIUM BLD-SCNC: 3.6 MMOL/L (ref 3.5–5.2)
PROCALCITONIN SERPL-MCNC: 0.05 NG/ML (ref 0.1–0.25)
PROT SERPL-MCNC: 6 G/DL (ref 6–8.5)
PROTHROMBIN TIME: 16 SECONDS (ref 12.1–15)
RBC # BLD AUTO: 4.02 10*6/MM3 (ref 3.77–5.28)
SODIUM BLD-SCNC: 140 MMOL/L (ref 136–145)
STRESS TARGET HR: 131 BPM
TRIGL SERPL-MCNC: 69 MG/DL (ref 0–150)
TROPONIN T SERPL-MCNC: 0.07 NG/ML (ref 0–0.03)
TROPONIN T SERPL-MCNC: 0.07 NG/ML (ref 0–0.03)
TSH SERPL DL<=0.05 MIU/L-ACNC: 0.99 UIU/ML (ref 0.27–4.2)
URATE SERPL-MCNC: 5.9 MG/DL (ref 2.4–5.7)
VIT B12 BLD-MCNC: 435 PG/ML (ref 211–946)
VLDLC SERPL-MCNC: 13.8 MG/DL (ref 7–27)
WBC NRBC COR # BLD: 5.84 10*3/MM3 (ref 3.4–10.8)

## 2020-01-10 PROCEDURE — 82746 ASSAY OF FOLIC ACID SERUM: CPT | Performed by: INTERNAL MEDICINE

## 2020-01-10 PROCEDURE — 4A023N7 MEASUREMENT OF CARDIAC SAMPLING AND PRESSURE, LEFT HEART, PERCUTANEOUS APPROACH: ICD-10-PCS | Performed by: INTERNAL MEDICINE

## 2020-01-10 PROCEDURE — 84484 ASSAY OF TROPONIN QUANT: CPT | Performed by: INTERNAL MEDICINE

## 2020-01-10 PROCEDURE — 99223 1ST HOSP IP/OBS HIGH 75: CPT | Performed by: INTERNAL MEDICINE

## 2020-01-10 PROCEDURE — 84145 PROCALCITONIN (PCT): CPT | Performed by: INTERNAL MEDICINE

## 2020-01-10 PROCEDURE — 99152 MOD SED SAME PHYS/QHP 5/>YRS: CPT | Performed by: INTERNAL MEDICINE

## 2020-01-10 PROCEDURE — 99238 HOSP IP/OBS DSCHRG MGMT 30/<: CPT | Performed by: HOSPITALIST

## 2020-01-10 PROCEDURE — 25010000002 ENOXAPARIN PER 10 MG: Performed by: INTERNAL MEDICINE

## 2020-01-10 PROCEDURE — 25010000002 FENTANYL CITRATE (PF) 100 MCG/2ML SOLUTION: Performed by: INTERNAL MEDICINE

## 2020-01-10 PROCEDURE — 86140 C-REACTIVE PROTEIN: CPT | Performed by: INTERNAL MEDICINE

## 2020-01-10 PROCEDURE — 25010000002 PERFLUTREN (DEFINITY) 8.476 MG IN SODIUM CHLORIDE 0.9 % 10 ML INJECTION: Performed by: HOSPITALIST

## 2020-01-10 PROCEDURE — B2011ZZ PLAIN RADIOGRAPHY OF MULTIPLE CORONARY ARTERIES USING LOW OSMOLAR CONTRAST: ICD-10-PCS | Performed by: INTERNAL MEDICINE

## 2020-01-10 PROCEDURE — 84100 ASSAY OF PHOSPHORUS: CPT | Performed by: INTERNAL MEDICINE

## 2020-01-10 PROCEDURE — 82607 VITAMIN B-12: CPT | Performed by: INTERNAL MEDICINE

## 2020-01-10 PROCEDURE — 70551 MRI BRAIN STEM W/O DYE: CPT

## 2020-01-10 PROCEDURE — C1769 GUIDE WIRE: HCPCS | Performed by: INTERNAL MEDICINE

## 2020-01-10 PROCEDURE — 80053 COMPREHEN METABOLIC PANEL: CPT | Performed by: INTERNAL MEDICINE

## 2020-01-10 PROCEDURE — 83735 ASSAY OF MAGNESIUM: CPT | Performed by: HOSPITALIST

## 2020-01-10 PROCEDURE — 83735 ASSAY OF MAGNESIUM: CPT | Performed by: INTERNAL MEDICINE

## 2020-01-10 PROCEDURE — 82140 ASSAY OF AMMONIA: CPT | Performed by: INTERNAL MEDICINE

## 2020-01-10 PROCEDURE — 84443 ASSAY THYROID STIM HORMONE: CPT | Performed by: INTERNAL MEDICINE

## 2020-01-10 PROCEDURE — 85025 COMPLETE CBC W/AUTO DIFF WBC: CPT | Performed by: INTERNAL MEDICINE

## 2020-01-10 PROCEDURE — 93458 L HRT ARTERY/VENTRICLE ANGIO: CPT | Performed by: INTERNAL MEDICINE

## 2020-01-10 PROCEDURE — 83880 ASSAY OF NATRIURETIC PEPTIDE: CPT | Performed by: INTERNAL MEDICINE

## 2020-01-10 PROCEDURE — 25010000002 MAGNESIUM SULFATE 2 GM/50ML SOLUTION: Performed by: INTERNAL MEDICINE

## 2020-01-10 PROCEDURE — 93306 TTE W/DOPPLER COMPLETE: CPT

## 2020-01-10 PROCEDURE — C1894 INTRO/SHEATH, NON-LASER: HCPCS | Performed by: INTERNAL MEDICINE

## 2020-01-10 PROCEDURE — 80061 LIPID PANEL: CPT | Performed by: INTERNAL MEDICINE

## 2020-01-10 PROCEDURE — 84550 ASSAY OF BLOOD/URIC ACID: CPT | Performed by: INTERNAL MEDICINE

## 2020-01-10 PROCEDURE — 0 IOPAMIDOL PER 1 ML: Performed by: INTERNAL MEDICINE

## 2020-01-10 PROCEDURE — 84132 ASSAY OF SERUM POTASSIUM: CPT | Performed by: INTERNAL MEDICINE

## 2020-01-10 PROCEDURE — 85652 RBC SED RATE AUTOMATED: CPT | Performed by: INTERNAL MEDICINE

## 2020-01-10 PROCEDURE — 93306 TTE W/DOPPLER COMPLETE: CPT | Performed by: INTERNAL MEDICINE

## 2020-01-10 PROCEDURE — 25010000002 MIDAZOLAM PER 1 MG: Performed by: INTERNAL MEDICINE

## 2020-01-10 PROCEDURE — 25010000002 HEPARIN (PORCINE) PER 1000 UNITS: Performed by: INTERNAL MEDICINE

## 2020-01-10 PROCEDURE — 82550 ASSAY OF CK (CPK): CPT | Performed by: INTERNAL MEDICINE

## 2020-01-10 PROCEDURE — 85610 PROTHROMBIN TIME: CPT | Performed by: INTERNAL MEDICINE

## 2020-01-10 PROCEDURE — 83605 ASSAY OF LACTIC ACID: CPT | Performed by: INTERNAL MEDICINE

## 2020-01-10 PROCEDURE — 83036 HEMOGLOBIN GLYCOSYLATED A1C: CPT | Performed by: INTERNAL MEDICINE

## 2020-01-10 PROCEDURE — 84132 ASSAY OF SERUM POTASSIUM: CPT | Performed by: HOSPITALIST

## 2020-01-10 PROCEDURE — 82728 ASSAY OF FERRITIN: CPT | Performed by: INTERNAL MEDICINE

## 2020-01-10 RX ORDER — SODIUM CHLORIDE 0.9 % (FLUSH) 0.9 %
10 SYRINGE (ML) INJECTION EVERY 12 HOURS SCHEDULED
Status: DISCONTINUED | OUTPATIENT
Start: 2020-01-10 | End: 2020-01-10 | Stop reason: HOSPADM

## 2020-01-10 RX ORDER — ONDANSETRON 2 MG/ML
4 INJECTION INTRAMUSCULAR; INTRAVENOUS EVERY 6 HOURS PRN
Status: DISCONTINUED | OUTPATIENT
Start: 2020-01-10 | End: 2020-01-16 | Stop reason: HOSPADM

## 2020-01-10 RX ORDER — SODIUM CHLORIDE 9 MG/ML
INJECTION, SOLUTION INTRAVENOUS CONTINUOUS PRN
Status: COMPLETED | OUTPATIENT
Start: 2020-01-10 | End: 2020-01-10

## 2020-01-10 RX ORDER — FENTANYL CITRATE 50 UG/ML
INJECTION, SOLUTION INTRAMUSCULAR; INTRAVENOUS AS NEEDED
Status: DISCONTINUED | OUTPATIENT
Start: 2020-01-10 | End: 2020-01-10 | Stop reason: HOSPADM

## 2020-01-10 RX ORDER — TRAMADOL HYDROCHLORIDE 50 MG/1
50 TABLET ORAL EVERY 6 HOURS PRN
Status: DISCONTINUED | OUTPATIENT
Start: 2020-01-10 | End: 2020-01-10 | Stop reason: HOSPADM

## 2020-01-10 RX ORDER — ACETAMINOPHEN 325 MG/1
650 TABLET ORAL EVERY 4 HOURS PRN
Status: DISCONTINUED | OUTPATIENT
Start: 2020-01-10 | End: 2020-01-10 | Stop reason: HOSPADM

## 2020-01-10 RX ORDER — SENNA AND DOCUSATE SODIUM 50; 8.6 MG/1; MG/1
2 TABLET, FILM COATED ORAL 2 TIMES DAILY PRN
Status: DISCONTINUED | OUTPATIENT
Start: 2020-01-10 | End: 2020-01-16 | Stop reason: HOSPADM

## 2020-01-10 RX ORDER — FAMOTIDINE 20 MG/1
40 TABLET, FILM COATED ORAL DAILY
Status: DISCONTINUED | OUTPATIENT
Start: 2020-01-10 | End: 2020-01-10 | Stop reason: HOSPADM

## 2020-01-10 RX ORDER — LOSARTAN POTASSIUM 50 MG/1
50 TABLET ORAL NIGHTLY
Status: DISCONTINUED | OUTPATIENT
Start: 2020-01-10 | End: 2020-01-10 | Stop reason: HOSPADM

## 2020-01-10 RX ORDER — LIDOCAINE HYDROCHLORIDE 20 MG/ML
INJECTION, SOLUTION INFILTRATION; PERINEURAL AS NEEDED
Status: DISCONTINUED | OUTPATIENT
Start: 2020-01-10 | End: 2020-01-10 | Stop reason: HOSPADM

## 2020-01-10 RX ORDER — POTASSIUM CHLORIDE 7.45 MG/ML
10 INJECTION INTRAVENOUS
Status: DISCONTINUED | OUTPATIENT
Start: 2020-01-10 | End: 2020-01-10 | Stop reason: HOSPADM

## 2020-01-10 RX ORDER — ONDANSETRON 4 MG/1
4 TABLET, FILM COATED ORAL EVERY 6 HOURS PRN
Status: DISCONTINUED | OUTPATIENT
Start: 2020-01-10 | End: 2020-01-16 | Stop reason: HOSPADM

## 2020-01-10 RX ORDER — DOCUSATE SODIUM 100 MG/1
100 CAPSULE, LIQUID FILLED ORAL 2 TIMES DAILY PRN
Status: DISCONTINUED | OUTPATIENT
Start: 2020-01-10 | End: 2020-01-16 | Stop reason: HOSPADM

## 2020-01-10 RX ORDER — MELATONIN
2000 DAILY
Start: 2020-01-11 | End: 2020-01-25 | Stop reason: HOSPADM

## 2020-01-10 RX ORDER — LOSARTAN POTASSIUM 50 MG/1
50 TABLET ORAL NIGHTLY
Start: 2020-01-10 | End: 2020-01-16 | Stop reason: HOSPADM

## 2020-01-10 RX ORDER — METOPROLOL SUCCINATE 25 MG/1
25 TABLET, EXTENDED RELEASE ORAL DAILY
Status: DISCONTINUED | OUTPATIENT
Start: 2020-01-10 | End: 2020-01-10

## 2020-01-10 RX ORDER — MELATONIN
2000 DAILY
Status: DISCONTINUED | OUTPATIENT
Start: 2020-01-10 | End: 2020-01-10 | Stop reason: HOSPADM

## 2020-01-10 RX ORDER — METOPROLOL SUCCINATE 50 MG/1
50 TABLET, EXTENDED RELEASE ORAL DAILY
Status: ON HOLD
Start: 2020-01-11 | End: 2020-01-13 | Stop reason: SDUPTHER

## 2020-01-10 RX ORDER — DILTIAZEM HYDROCHLORIDE 100 MG/1
INJECTION, POWDER, LYOPHILIZED, FOR SOLUTION INTRAVENOUS
Status: COMPLETED
Start: 2020-01-10 | End: 2020-01-10

## 2020-01-10 RX ORDER — DOCUSATE SODIUM 100 MG/1
100 CAPSULE, LIQUID FILLED ORAL 2 TIMES DAILY PRN
Status: DISCONTINUED | OUTPATIENT
Start: 2020-01-10 | End: 2020-01-10 | Stop reason: HOSPADM

## 2020-01-10 RX ORDER — METOPROLOL SUCCINATE 50 MG/1
50 TABLET, EXTENDED RELEASE ORAL DAILY
Status: DISCONTINUED | OUTPATIENT
Start: 2020-01-11 | End: 2020-01-10 | Stop reason: HOSPADM

## 2020-01-10 RX ORDER — ATORVASTATIN CALCIUM 10 MG/1
10 TABLET, FILM COATED ORAL DAILY
Status: DISCONTINUED | OUTPATIENT
Start: 2020-01-10 | End: 2020-01-11

## 2020-01-10 RX ORDER — POTASSIUM CHLORIDE 1.5 G/1.77G
40 POWDER, FOR SOLUTION ORAL AS NEEDED
Status: DISCONTINUED | OUTPATIENT
Start: 2020-01-10 | End: 2020-01-10 | Stop reason: HOSPADM

## 2020-01-10 RX ORDER — MIDAZOLAM HYDROCHLORIDE 1 MG/ML
INJECTION INTRAMUSCULAR; INTRAVENOUS AS NEEDED
Status: DISCONTINUED | OUTPATIENT
Start: 2020-01-10 | End: 2020-01-10 | Stop reason: HOSPADM

## 2020-01-10 RX ORDER — ACETAMINOPHEN 650 MG/1
650 SUPPOSITORY RECTAL EVERY 4 HOURS PRN
Status: DISCONTINUED | OUTPATIENT
Start: 2020-01-10 | End: 2020-01-10 | Stop reason: HOSPADM

## 2020-01-10 RX ORDER — ACETAMINOPHEN 325 MG/1
650 TABLET ORAL EVERY 4 HOURS PRN
Status: DISCONTINUED | OUTPATIENT
Start: 2020-01-10 | End: 2020-01-16 | Stop reason: HOSPADM

## 2020-01-10 RX ORDER — SODIUM CHLORIDE 9 MG/ML
100 INJECTION, SOLUTION INTRAVENOUS CONTINUOUS
Status: DISCONTINUED | OUTPATIENT
Start: 2020-01-10 | End: 2020-01-10

## 2020-01-10 RX ORDER — SODIUM CHLORIDE 0.9 % (FLUSH) 0.9 %
10 SYRINGE (ML) INJECTION AS NEEDED
Status: DISCONTINUED | OUTPATIENT
Start: 2020-01-10 | End: 2020-01-10 | Stop reason: HOSPADM

## 2020-01-10 RX ORDER — MAGNESIUM SULFATE HEPTAHYDRATE 40 MG/ML
4 INJECTION, SOLUTION INTRAVENOUS AS NEEDED
Status: DISCONTINUED | OUTPATIENT
Start: 2020-01-10 | End: 2020-01-10 | Stop reason: HOSPADM

## 2020-01-10 RX ORDER — MAGNESIUM SULFATE HEPTAHYDRATE 40 MG/ML
2 INJECTION, SOLUTION INTRAVENOUS AS NEEDED
Status: DISCONTINUED | OUTPATIENT
Start: 2020-01-10 | End: 2020-01-10 | Stop reason: HOSPADM

## 2020-01-10 RX ORDER — ZINC SULFATE 50(220)MG
220 CAPSULE ORAL DAILY
Status: DISCONTINUED | OUTPATIENT
Start: 2020-01-10 | End: 2020-01-10 | Stop reason: HOSPADM

## 2020-01-10 RX ORDER — METOPROLOL SUCCINATE 50 MG/1
50 TABLET, EXTENDED RELEASE ORAL DAILY
Status: DISCONTINUED | OUTPATIENT
Start: 2020-01-10 | End: 2020-01-11

## 2020-01-10 RX ORDER — POTASSIUM CHLORIDE 20 MEQ/1
40 TABLET, EXTENDED RELEASE ORAL AS NEEDED
Status: DISCONTINUED | OUTPATIENT
Start: 2020-01-10 | End: 2020-01-10 | Stop reason: HOSPADM

## 2020-01-10 RX ORDER — SODIUM CHLORIDE 9 MG/ML
100 INJECTION, SOLUTION INTRAVENOUS CONTINUOUS
Status: DISCONTINUED | OUTPATIENT
Start: 2020-01-10 | End: 2020-01-15

## 2020-01-10 RX ORDER — SODIUM CHLORIDE 9 MG/ML
40 INJECTION, SOLUTION INTRAVENOUS AS NEEDED
Status: DISCONTINUED | OUTPATIENT
Start: 2020-01-10 | End: 2020-01-10 | Stop reason: HOSPADM

## 2020-01-10 RX ORDER — LOSARTAN POTASSIUM 50 MG/1
50 TABLET ORAL NIGHTLY
Status: DISCONTINUED | OUTPATIENT
Start: 2020-01-10 | End: 2020-01-15

## 2020-01-10 RX ORDER — PANTOPRAZOLE SODIUM 40 MG/1
40 TABLET, DELAYED RELEASE ORAL EVERY MORNING
Status: DISCONTINUED | OUTPATIENT
Start: 2020-01-11 | End: 2020-01-16 | Stop reason: HOSPADM

## 2020-01-10 RX ORDER — WARFARIN SODIUM 2.5 MG/1
2.5 TABLET ORAL
Status: DISCONTINUED | OUTPATIENT
Start: 2020-01-10 | End: 2020-01-11

## 2020-01-10 RX ORDER — NITROGLYCERIN 0.4 MG/1
0.4 TABLET SUBLINGUAL
Status: DISCONTINUED | OUTPATIENT
Start: 2020-01-10 | End: 2020-01-10 | Stop reason: HOSPADM

## 2020-01-10 RX ORDER — LOSARTAN POTASSIUM 50 MG/1
50 TABLET ORAL
Status: DISCONTINUED | OUTPATIENT
Start: 2020-01-10 | End: 2020-01-10

## 2020-01-10 RX ORDER — ACETAMINOPHEN 160 MG/5ML
650 SOLUTION ORAL EVERY 4 HOURS PRN
Status: DISCONTINUED | OUTPATIENT
Start: 2020-01-10 | End: 2020-01-10 | Stop reason: HOSPADM

## 2020-01-10 RX ORDER — METOPROLOL SUCCINATE 25 MG/1
25 TABLET, EXTENDED RELEASE ORAL ONCE
Status: COMPLETED | OUTPATIENT
Start: 2020-01-10 | End: 2020-01-10

## 2020-01-10 RX ORDER — PANTOPRAZOLE SODIUM 40 MG/1
40 TABLET, DELAYED RELEASE ORAL EVERY MORNING
Status: DISCONTINUED | OUTPATIENT
Start: 2020-01-10 | End: 2020-01-10 | Stop reason: HOSPADM

## 2020-01-10 RX ORDER — ATORVASTATIN CALCIUM 10 MG/1
10 TABLET, FILM COATED ORAL DAILY
Status: DISCONTINUED | OUTPATIENT
Start: 2020-01-10 | End: 2020-01-10 | Stop reason: HOSPADM

## 2020-01-10 RX ADMIN — METOPROLOL SUCCINATE 25 MG: 25 TABLET, EXTENDED RELEASE ORAL at 08:11

## 2020-01-10 RX ADMIN — DILTIAZEM HYDROCHLORIDE 5 MG: 100 INJECTION, POWDER, LYOPHILIZED, FOR SOLUTION INTRAVENOUS at 00:45

## 2020-01-10 RX ADMIN — PANTOPRAZOLE SODIUM 40 MG: 40 TABLET, DELAYED RELEASE ORAL at 08:11

## 2020-01-10 RX ADMIN — FAMOTIDINE 40 MG: 20 TABLET ORAL at 08:10

## 2020-01-10 RX ADMIN — MAGNESIUM SULFATE HEPTAHYDRATE 2 G: 40 INJECTION, SOLUTION INTRAVENOUS at 11:34

## 2020-01-10 RX ADMIN — ZINC SULFATE CAP 220 MG (50 MG ELEMENTAL ZN) 220 MG: 220 (50 ZN) CAP at 08:13

## 2020-01-10 RX ADMIN — ATORVASTATIN CALCIUM 10 MG: 10 TABLET, FILM COATED ORAL at 08:10

## 2020-01-10 RX ADMIN — MAGNESIUM SULFATE HEPTAHYDRATE 2 G: 40 INJECTION, SOLUTION INTRAVENOUS at 10:41

## 2020-01-10 RX ADMIN — METOPROLOL SUCCINATE 25 MG: 25 TABLET, EXTENDED RELEASE ORAL at 10:45

## 2020-01-10 RX ADMIN — LOSARTAN POTASSIUM 50 MG: 50 TABLET, FILM COATED ORAL at 21:17

## 2020-01-10 RX ADMIN — PERFLUTREN 1.5 ML: 6.52 INJECTION, SUSPENSION INTRAVENOUS at 07:30

## 2020-01-10 RX ADMIN — VITAMIN D, TAB 1000IU (100/BT) 2000 UNITS: 25 TAB at 08:10

## 2020-01-10 RX ADMIN — MAGNESIUM SULFATE HEPTAHYDRATE 2 G: 40 INJECTION, SOLUTION INTRAVENOUS at 08:10

## 2020-01-10 RX ADMIN — ATORVASTATIN CALCIUM 10 MG: 10 TABLET, FILM COATED ORAL at 21:17

## 2020-01-10 RX ADMIN — ACETAMINOPHEN 650 MG: 325 TABLET, FILM COATED ORAL at 21:17

## 2020-01-10 RX ADMIN — WARFARIN SODIUM 2.5 MG: 2.5 TABLET ORAL at 21:17

## 2020-01-10 RX ADMIN — SODIUM CHLORIDE, PRESERVATIVE FREE 10 ML: 5 INJECTION INTRAVENOUS at 08:13

## 2020-01-10 RX ADMIN — POTASSIUM CHLORIDE 40 MEQ: 1500 TABLET, EXTENDED RELEASE ORAL at 08:09

## 2020-01-10 RX ADMIN — SODIUM CHLORIDE, PRESERVATIVE FREE 10 ML: 5 INJECTION INTRAVENOUS at 01:05

## 2020-01-10 NOTE — ED NOTES
"Pt's \"neighbor\" at bedside reports that patient's speech is slurred and delayed more then normal and that pt normally is up walking around fine. Pt appears fatigued with weak BLE. Pt is Aox4 but has delayed responses. Pt is not sure when she last urinated and reports that she has not been eating or drinking well.  Pt lip, mucous membranes, and skin very dry and flaky. Neighbor reports \"she recently was treated for pneumonia\" but pt was not hospitalized. Pt reports that she lives alone.      Carli Sevilla, RN  01/09/20 8636    "

## 2020-01-10 NOTE — ED NOTES
"Patient reports when questioned that the last time she drank \"was a couple days ago\". Pt denies drinking any ETOH today. Pt reports preferred drink is vodka with juice of which she drinks \"a couple drinks a day\".      Carli Sevilla, RN  01/10/20 0011    "

## 2020-01-10 NOTE — ED PROVIDER NOTES
Subjective   History of Present Illness  History of Present Illness    Chief complaint: Patient found on the floor with inability to get up    Location: Home    Quality/Severity: Unable to get up from the floor    Timing/Onset/Duration: Found by neighbor at 6:30 PM.    Modifying Factors: Nothing makes it better    Associated Symptoms: No headache.  No fever or chills.  The patient's had a cough, she has just gotten over pneumonia.  The cough is nearly resolved.  No chest pain or shortness of breath.  No abdominal pain.  No vomiting or diarrhea.  No nausea.  No change in the color of her stools patient states that her legs have been stiff for a while.    Narrative: This 64-year-old white female who recently got over pneumonia, presents after being found sitting on the floor leaning up against her bed at home by a neighbor.  The patient has not eaten today.  She lives alone.    PCP: Marissa      Review of Systems   Constitutional: Positive for chills. Negative for fever.   HENT: Negative for ear pain and sore throat.    Eyes: Negative for visual disturbance.   Respiratory: Positive for cough. Negative for chest tightness and shortness of breath.    Cardiovascular: Negative for chest pain, palpitations and leg swelling.   Gastrointestinal: Negative for abdominal pain, blood in stool, constipation, diarrhea, nausea and vomiting.   Genitourinary: Negative for difficulty urinating and dysuria.   Musculoskeletal: Negative for back pain, neck pain and neck stiffness.   Skin: Negative for color change, pallor, rash and wound.   Neurological: Positive for weakness (Generalized). Negative for dizziness, speech difficulty, light-headedness, numbness and headaches.   Hematological: Negative for adenopathy.   Psychiatric/Behavioral: Negative.  Negative for confusion.        Medication List      ASK your doctor about these medications    cholecalciferol 25 MCG (1000 UT) tablet  Commonly known as:  VITAMIN D3     metoprolol  succinate XL 25 MG 24 hr tablet  Commonly known as:  TOPROL-XL  Take 1 tablet by mouth Daily.     omeprazole 20 MG capsule  Commonly known as:  priLOSEC  TAKE ONE CAPSULE BY MOUTH DAILY     simvastatin 20 MG tablet  Commonly known as:  ZOCOR  Take 1 tablet by mouth Every Night.     warfarin 2 MG tablet  Commonly known as:  COUMADIN  TAKE ONE TABLET BY MOUTH EVERY EVENING     Zinc 50 MG capsule          Past Medical History:   Diagnosis Date   • Hyperlipidemia    • Hypertension    • Irregular heart beat    • Skin cancer        Allergies   Allergen Reactions   • Cephalexin Other (See Comments)     Reports she doesn't remember having an allergic reaction.    • Citrus Rash   • Duricef [Cefadroxil] Rash   • Fish Allergy Rash       Past Surgical History:   Procedure Laterality Date   • FINGER SURGERY     • HYSTERECTOMY     • JOINT REPLACEMENT     • ORIF SHOULDER DISLOCATION W/ HUMERAL FRACTURE     • TIBIA FRACTURE SURGERY         Family History   Problem Relation Age of Onset   • Breast cancer Brother    • Macular degeneration Mother    • Heart disease Mother    • Heart disease Father    • Heart disease Maternal Uncle        Social History     Socioeconomic History   • Marital status:      Spouse name: Not on file   • Number of children: Not on file   • Years of education: Not on file   • Highest education level: Not on file   Tobacco Use   • Smoking status: Never Smoker   • Smokeless tobacco: Never Used   Substance and Sexual Activity   • Alcohol use: Yes     Comment: Reports a hx of drinking everyday for 18 years atleast. She says that she quit May 2018, but says that she drinks occasionally now.    • Drug use: No   • Sexual activity: Defer           Objective   Physical Exam   Constitutional: She is oriented to person, place, and time. She appears well-developed and well-nourished. No distress.   ED Triage Vitals (01/09/20 1943)  Temp: 97.5 °F (36.4 °C)  Heart Rate: 56  Resp: 18  BP: 107/81  SpO2: 96 %  Temp  src: Oral  Heart Rate Source: Monitor  Patient Position: Sitting  BP Location: Left arm  FiO2 (%): n/a    The patient's vitals were reviewed by me.  Unless otherwise noted they are within normal limits.     HENT:   Head: Normocephalic and atraumatic.   Right Ear: External ear normal.   Left Ear: External ear normal.   Nose: Nose normal.   Mucous membranes are dry   Eyes: Pupils are equal, round, and reactive to light. Conjunctivae and EOM are normal. Right eye exhibits no discharge. Left eye exhibits no discharge. No scleral icterus.   Neck: Normal range of motion. Neck supple. No JVD present. No tracheal deviation present. No thyromegaly present.   There is no tenderness, deformity, or bony step-offs upon palpation the cervical, thoracic, lumbar sacrococcygeal spine.   Cardiovascular: Normal rate, regular rhythm, normal heart sounds and intact distal pulses. Exam reveals no gallop and no friction rub.   No murmur heard.  Pulmonary/Chest: Effort normal and breath sounds normal. No stridor. No respiratory distress. She has no wheezes. She has no rales. She exhibits no tenderness.   Abdominal: Soft. Bowel sounds are normal. She exhibits no distension and no mass. There is no tenderness. There is no rebound and no guarding. No hernia.   Musculoskeletal: Normal range of motion. She exhibits no edema, tenderness or deformity.   Lymphadenopathy:     She has no cervical adenopathy.   Neurological: She is alert and oriented to person, place, and time. No cranial nerve deficit or sensory deficit. She exhibits abnormal muscle tone (Generalized weakness). Coordination normal.   Skin: Skin is warm and dry. No rash noted. She is not diaphoretic. No erythema. No pallor.   Psychiatric: Her behavior is normal.   Nursing note and vitals reviewed.      Procedures           ED Course  ED Course as of Jan 09 2226   Thu Jan 09, 2020 2208 The laboratory values were reviewed by me.  The total bilirubin is 2.0.  The anion gap is 15.  The  PT is 15.  The INR is 1.25.    [RC]      ED Course User Index  [RC] Philippe Carreon MD      9:05 PM, 01/09/20:  The EKG was obtained at 2059.  EKG was read by me at 2104.  The EKG shows an atrial fibrillation with rate of 130.  There is a left anterior fascicular block.  There is left ventricular hypertrophy.  The QRS, and QT interval are unremarkable.  There is no acute ST elevation or depression.  There is artifact in V4 and V5.        11:21 PM, 01/09/20:  The patient was reassessed.  She has no new complaints.  Her vital signs were reviewed and are stable.  Abdominal exam: Soft nontender no masses positive bowel sounds.  Neurological; conscious alert oriented x4 with no focal deficits noted.    11:20 PM, 01/09/20:  The patient's diagnosis of A. fib with rapid ventricular response, fall, and generalized weakness was discussed with her.  The patient will be admitted to Dr. Issa.  The patient is in fair condition.                                    [facility]  No orders to display     Labs Reviewed   PROTIME-INR     Xr Chest Pa & Lateral    Result Date: 12/31/2019  Narrative: XR CHEST PA AND LATERAL-: 12/31/2019 11:35 AM  INDICATION:  Shortness of air that began 2 weeks ago. Cough.  COMPARISON:  06/28/2018.  FINDINGS: PA and lateral views of the chest.  Interval development of cardiomegaly. There is mild indistinctness and prominence of interstitium suggestive of faint vascular congestion. There is new bandlike atelectasis in the left lung base. There is a small right-sided effusion with right basilar atelectasis or pneumonia. No pneumothorax. Chronic left rib deformities. Sequela of prior right shoulder surgery. There is thoracic spondylosis and kyphosis.      Impression:  1. New cardiomegaly and probable subtle mild vascular congestion. 2. New small right-sided effusion with right basilar atelectasis or pneumonia. Follow-up to clearing recommended. 3. Probable left base atelectasis.  This report was finalized on  12/31/2019 11:55 AM by Dr. Keith Dickerson MD.        Final diagnoses:   Chronic atrial fibrillation with rapid ventricular response   Fall, initial encounter   Weakness         ED Medications:  Medications - No data to display    New Medications:     Medication List      ASK your doctor about these medications    cholecalciferol 25 MCG (1000 UT) tablet  Commonly known as:  VITAMIN D3     metoprolol succinate XL 25 MG 24 hr tablet  Commonly known as:  TOPROL-XL  Take 1 tablet by mouth Daily.     omeprazole 20 MG capsule  Commonly known as:  priLOSEC  TAKE ONE CAPSULE BY MOUTH DAILY     simvastatin 20 MG tablet  Commonly known as:  ZOCOR  Take 1 tablet by mouth Every Night.     warfarin 2 MG tablet  Commonly known as:  COUMADIN  TAKE ONE TABLET BY MOUTH EVERY EVENING     Zinc 50 MG capsule          Stopped Medications:     Medication List      ASK your doctor about these medications    cholecalciferol 25 MCG (1000 UT) tablet  Commonly known as:  VITAMIN D3     metoprolol succinate XL 25 MG 24 hr tablet  Commonly known as:  TOPROL-XL  Take 1 tablet by mouth Daily.     omeprazole 20 MG capsule  Commonly known as:  priLOSEC  TAKE ONE CAPSULE BY MOUTH DAILY     simvastatin 20 MG tablet  Commonly known as:  ZOCOR  Take 1 tablet by mouth Every Night.     warfarin 2 MG tablet  Commonly known as:  COUMADIN  TAKE ONE TABLET BY MOUTH EVERY EVENING     Zinc 50 MG capsule            Final diagnoses:   Chronic atrial fibrillation with rapid ventricular response   Fall, initial encounter   Weakness            Philippe Carreon MD  01/09/20 [id number]       Philippe Carreon MD  01/09/20 [id number]

## 2020-01-10 NOTE — ED NOTES
MD notified of orthostatics. IVF's ordered. Pt reports she does not feel like she could urinate prior to going to CT     Carli Sevilla RN  01/09/20 [id number]

## 2020-01-10 NOTE — H&P
[facility]      Patient Name: Camille Pantoja  : 1955  MRN: [id number]  Primary Care Physician: Nahun Ann Jr., DO  Date of admission: 2020    Patient Care Team:  Nahun Ann Jr., DO as PCP - General (Family Medicine)          Subjective   History Present Illness     Chief Complaint:   Chief Complaint   Patient presents with   • Fall   • Weakness - Generalized       Ms. Pantoja is a 64 y.o.  presents to [facility] complaining of change in mental status.  I have seen and examined the patient in the ER and also spoke to nurse at the bedside as well as ER physician.  I do not have any family member or neighbor at the bedside.  I reviewed the chart.  According to the chart the patient is a 64-year-old female who was recently treated for right lower lobe pneumonia with Levaquin and she lives at home by herself.  Neighbor called the paramedics as patient was found on the floor and was not acting as normal.  According to the neighbor and per records patient has not been eating well and does not seem to be drinking enough fluids and she is just little confused.  When I inquired from the patient she told me that she is at [facility] ER and she was brought into the ER by her neighbor but she does not know what is wrong with her.  She was surprised when I told her that she will be keeping her in the hospital.  She denies any complaints of fever/chills/nausea/vomiting/diarrhea or any other symptoms whatsoever.  History is not reliable because she does not know much about her health history at this time.  I reviewed the chart and found that patient has history of DVT as well as patient history of recent pneumonia and she also has history of hypertension hyperlipidemia.         History of Present Illness    Review of Systems   Unable to perform ROS: mental status change (Not sure to be accurate)   All other systems reviewed and are  negative.          Personal History     Past Medical History:   Past Medical History:   Diagnosis Date   • Hyperlipidemia    • Hypertension    • Irregular heart beat    • Skin cancer        Surgical History:      Past Surgical History:   Procedure Laterality Date   • FINGER SURGERY     • HYSTERECTOMY     • JOINT REPLACEMENT     • ORIF SHOULDER DISLOCATION W/ HUMERAL FRACTURE     • TIBIA FRACTURE SURGERY             Family History: family history includes Breast cancer in her brother; Heart disease in her father, maternal uncle, and mother; Macular degeneration in her mother. Otherwise pertinent FHx was reviewed and unremarkable.     Social History:  reports that she has never smoked. She has never used smokeless tobacco. She reports that she drinks alcohol. She reports that she does not use drugs.      Medications:  Prior to Admission medications    Medication Sig Start Date End Date Taking? Authorizing Provider   cholecalciferol (VITAMIN D3) 1000 units tablet Take 3,000 Units by mouth Daily.    Provider, MD Trace   metoprolol succinate XL (TOPROL-XL) 25 MG 24 hr tablet Take 1 tablet by mouth Daily. 1/3/20   Nahun Ann Jr., DO   omeprazole (priLOSEC) 20 MG capsule TAKE ONE CAPSULE BY MOUTH DAILY 12/9/19   Nahun Ann Jr., DO   simvastatin (ZOCOR) 20 MG tablet Take 1 tablet by mouth Every Night. 7/2/19   Nahun Ann Jr., DO   warfarin (COUMADIN) 2 MG tablet TAKE ONE TABLET BY MOUTH EVERY EVENING 12/31/19   Nahun Ann Jr., DO   Zinc 50 MG capsule Take 50 mg by mouth Daily.    Provider, MD Trace       Allergies:    Allergies   Allergen Reactions   • Cephalexin Other (See Comments)     Reports she doesn't remember having an allergic reaction.    • Citrus Rash   • Duricef [Cefadroxil] Rash   • Fish Allergy Rash       Objective   Objective     Vital Signs  Temp:  [97.5 °F (36.4 °C)] 97.5 °F (36.4 °C)  Heart Rate:  [] 103  Resp:  [15-22] 18  BP: (101-144)/()  135/103  SpO2:  [95 %-98 %] 97 %  on   ;   Device (Oxygen Therapy): room air  Body mass index is 20.8 kg/m².    Physical Exam   Constitutional: She is oriented to person, place, and time. No distress.   HENT:   Head: Normocephalic and atraumatic.   Nose: Nose normal.   Mouth/Throat: Oropharynx is clear and moist. No oropharyngeal exudate.   Eyes: Pupils are equal, round, and reactive to light. Conjunctivae and EOM are normal. Right eye exhibits no discharge. Left eye exhibits no discharge. No scleral icterus.   Neck: Normal range of motion. No JVD present. No tracheal deviation present. No thyromegaly present.   Cardiovascular: Normal rate, regular rhythm, normal heart sounds and intact distal pulses. Exam reveals no gallop and no friction rub.   No murmur heard.  Pulmonary/Chest: Effort normal and breath sounds normal. No stridor. No respiratory distress. She has no wheezes. She has no rales. She exhibits no tenderness.   Abdominal: Soft. Bowel sounds are normal. She exhibits no distension and no mass. There is no tenderness. There is no rebound and no guarding. No hernia.   Musculoskeletal: Normal range of motion. She exhibits no edema, tenderness or deformity.   Lymphadenopathy:     She has no cervical adenopathy.   Neurological: She is alert and oriented to person, place, and time. No cranial nerve deficit or sensory deficit. She exhibits normal muscle tone. Coordination normal.   Skin: Skin is warm and dry. No rash noted. She is not diaphoretic. No erythema.   Nursing note and vitals reviewed.      Results Review:  I have personally reviewed most recent cardiac tracings, lab results and radiology images and interpretations and agree with findings, most notably: Patient has rapid A. fib in the EKG and also CT scan of the head shows possible hydrocephalus.  I also looked at her INR which is 1.25 some not sure she has been taking her medications at home.    Results from last 7 days   Lab Units 01/09/20 2052   WBC  10*3/mm3 9.01   HEMOGLOBIN g/dL 13.9   HEMATOCRIT % 42.3   PLATELETS 10*3/mm3 188   INR  1.25*     Results from last 7 days   Lab Units 01/09/20 2052   SODIUM mmol/L 139   POTASSIUM mmol/L 3.8   CHLORIDE mmol/L 100   CO2 mmol/L 23.8   BUN mg/dL 14   CREATININE mg/dL 0.74   GLUCOSE mg/dL 95   CALCIUM mg/dL 10.5   ALT (SGPT) U/L 17   AST (SGOT) U/L 26     Estimated Creatinine Clearance: 61.9 mL/min (by C-G formula based on SCr of 0.74 mg/dL).  Brief Urine Lab Results  (Last result in the past 365 days)      Color   Clarity   Blood   Leuk Est   Nitrite   Protein   CREAT   Urine HCG        01/09/20 2244 Yellow Clear Small (1+) Negative Negative 30 mg/dL (1+)               Microbiology Results (last 10 days)     ** No results found for the last 240 hours. **          ECG/EMG Results (most recent)     Procedure Component Value Units Date/Time    ECG 12 Lead [[id number]] Collected:  01/09/20 2059     Updated:  01/09/20 2102    Narrative:       HEART RATE= 130  bpm  RR Interval= 460  ms  NM Interval=   ms  P Horizontal Axis=   deg  P Front Axis=   deg  QRSD Interval= 84  ms  QT Interval= 320  ms  QRS Axis= -40  deg  T Wave Axis= 118  deg  - ABNORMAL ECG -  Atrial fibrillation  Left anterior fascicular block  Nonspecific repol abnormality, diffuse leads  Electronically Signed By:   Date and Time of Study: 2020-01-09 20:59:29                    Ct Head Without Contrast    Result Date: 1/9/2020  Senescent changes without acute abnormality. Somewhat disproportional ventriculomegaly is noted. Correlate clinically for evidence of normal pressure hydrocephalus. Signer Name: Anthony Vidal MD  Signed: 1/9/2020 9:31 PM  Workstation Name: [email or web address]    Xr Chest 1 View    Result Date: 1/9/2020  Cardiomegaly with probable small bilateral pleural effusions right greater than left. Signer Name: DIANE Rodriguez MD  Signed: 1/9/2020 9:47 PM  Workstation Name: [email or web address]   [place]        Estimated Creatinine Clearance: 61.9 mL/min (by C-G formula based on SCr of 0.74 mg/dL).    Assessment/Plan   Assessment/Plan       Active Hospital Problems    Diagnosis  POA   • **Paroxysmal atrial fibrillation with rapid ventricular response (CMS/HCC) [I48.0]  Yes     Priority: High     Class: Acute   • Chronic atrial fibrillation with rapid ventricular response [I48.20]  Yes   • Vitamin D deficiency [E55.9]  Yes   • Osteoarthritis [M19.90]  Yes   • Hyperlipidemia [E78.5]  Yes   • Hypertension [I10]  Yes   • Deep vein thrombosis of lower extremity (CMS/HCC) [I82.409]  Yes      Resolved Hospital Problems   No resolved problems to display.       #1 change in mental status due to unknown etiology  -Patient does not have any systemic signs and symptoms of sepsis or acute stroke  -Her CT scan does show some hydrocephalus so I may consider doing MRI of the brain and neurology consult  -I doubt that this is due to Levaquin which she was taking it for pneumonia  -I will check vitamin B12 as well as TSH and ammonia level  -Patient has normal neurological exam    #2 rapid A. fib new diagnosis  -I have reviewed her previous EKGs in the system I do not find any history of A. fib as well as I inquired patient and she says she does not have any history of irregular heartbeat.  Patient does have a history of being on Coumadin but this was due to DVT in the past.  -We will keep her on the telemetry and also use Cardizem drip since she was given 5 mg of Cardizem in the ER  -I will get echocardiogram on her as well as cardiology consult  -Patient will be anticoagulate with Lovenox at this time    #3 DVT by history  -Patient will be anticoagulated with Lovenox and will be switched to either Eliquis or warfarin at the time of discharge    #4 hypertension patient is on metoprolol XL which will be continued    #5 hyperlipidemia we will check lipid profile as well as will continue statin therapy    #6 GERD stable on  omeprazole and will continue it      VTE Prophylaxis - Pharmacy to dose Lovenox.    CODE STATUS:    Code Status and Medical Interventions:   Ordered at: 01/09/20 5893     Level Of Support Discussed With:    Patient     Code Status:    CPR     Medical Interventions (Level of Support Prior to Arrest):    Full       Admission Status:  I believe this patient meets inpatient criteria.      I discussed the patient's findings and my recommendations with patient.        Electronically signed by Ministerio Schultz MD, 01/09/20, 11:31 PM.  [facility]

## 2020-01-10 NOTE — NURSING NOTE
Called EMS to get an ETA.  They currently didn't have any trucks available but thought they'd have a truck here in 10-15 minutes to transport pt to [facility] for heart cath.     Called and notified Megan in cath lab of ETA for EMS to  patient for transport.

## 2020-01-10 NOTE — CONSULTS
"  Patient Identification:  NAME:  Camille Pantoja  Age:  64 y.o.   Sex:  female   :  1955   MRN:  [id number]       Chief complaint: I fell out of bed, reason for consult abnormal CT brain/ventriculomegaly    History of present illness: This patient is a 64-year-old right-handed white female who has had some diarrhea recently lower lobe pneumonia on the left side treated with Levaquin who became progressively weak and when she reached for something in her bed which was very tall she slid out got on the floor and was not able to get up she did not have syncope at that time was just too weak to get off the floor.  Quality was no syncope context patient who has enlargement of the ventricles out of proportion to her amount of atrophy associated symptoms she does not have urinary incontinence and she has not had any true falls for \"a couple of years\".  She denies any loss of memory function or trouble doing checks etc..  No modifying factors other than the Levaquin she had for the pneumonia location is not pertinent she denies anything to suggest a stroke no paresthesias paralysis.  The MRI I have just spoken to the radiologist and she does appear to have an acute left hemisphere subcortical stroke! She does have some weakness and confusion that appears to have cleared overnight, but again she does not appear to have focality on her exam..  She has been transferred to [facility] for a cardiac cath.  She will be followed by stroke neurology there thanks      Past medical history:  Past Medical History:   Diagnosis Date   • Hyperlipidemia    • Hypertension    • Irregular heart beat    • Skin cancer        Past surgical history:  Past Surgical History:   Procedure Laterality Date   • FINGER SURGERY     • HYSTERECTOMY     • JOINT REPLACEMENT     • ORIF SHOULDER DISLOCATION W/ HUMERAL FRACTURE     • TIBIA FRACTURE SURGERY         Allergies:  Cephalexin; Citrus; Duricef [cefadroxil]; and Fish allergy    Home " medications:  Medications Prior to Admission   Medication Sig Dispense Refill Last Dose   • cholecalciferol (VITAMIN D3) 1000 units tablet Take 3,000 Units by mouth Daily.   Unknown at Unknown time   • metoprolol succinate XL (TOPROL-XL) 25 MG 24 hr tablet Take 1 tablet by mouth Daily. 90 tablet 1 Unknown at Unknown time   • omeprazole (priLOSEC) 20 MG capsule TAKE ONE CAPSULE BY MOUTH DAILY 90 capsule 0 Unknown at Unknown time   • simvastatin (ZOCOR) 20 MG tablet Take 1 tablet by mouth Every Night. 90 tablet 1 Unknown at Unknown time   • warfarin (COUMADIN) 2 MG tablet TAKE ONE TABLET BY MOUTH EVERY EVENING 30 tablet 0 Unknown at Unknown time   • Zinc 50 MG capsule Take 50 mg by mouth Daily.   Unknown at Unknown time        Hospital medications:    atorvastatin 10 mg Oral Daily   cholecalciferol 2,000 Units Oral Daily   enoxaparin 1 mg/kg Subcutaneous Q12H   famotidine 40 mg Oral Daily   losartan 50 mg Oral Nightly   [START ON 1/11/2020] metoprolol succinate XL 50 mg Oral Daily   pantoprazole 40 mg Oral QAM   sodium chloride 10 mL Intravenous Q12H   zinc sulfate 220 mg Oral Daily       Pharmacy to Dose enoxaparin (LOVENOX)      •  acetaminophen **OR** acetaminophen **OR** acetaminophen  •  docusate sodium  •  magnesium sulfate **OR** magnesium sulfate **OR** magnesium sulfate  •  nitroglycerin  •  Pharmacy to Dose enoxaparin (LOVENOX)  •  potassium & sodium phosphates **OR** potassium & sodium phosphates  •  potassium chloride **OR** potassium chloride **OR** potassium chloride  •  sodium chloride  •  sodium chloride  •  traMADol    Family history:  Family History   Problem Relation Age of Onset   • Breast cancer Brother    • Macular degeneration Mother    • Heart disease Mother    • Heart disease Father    • Heart disease Maternal Uncle        Social history:  Social History     Tobacco Use   • Smoking status: Never Smoker   • Smokeless tobacco: Never Used   Substance Use Topics   • Alcohol use: Yes     Comment:  Reports a hx of drinking everyday for 18 years atleast. She says that she quit May 2018, but says that she drinks occasionally now.    • Drug use: No       Review of systems:    She denies any urinary incontinence she strongly disagrees that she has had any balance problems and states it is been years since she had any type of fall and this time she just slid out of bed.  She denies any loss of memory function either still does checkbooks everything without problems.  No hair eyes ears nose throat skin joint  lymphatic hematologic or oncologic complaints no current fever chills or rash she did have diarrhea when she came in and is been very weak in a general fashion no rash vertigo double vision paresthesias or paralysis    Objective:  Vitals Ranges:   Temp:  [97.5 °F (36.4 °C)-98.4 °F (36.9 °C)] 98.1 °F (36.7 °C)  Heart Rate:  [] 101  Resp:  [15-22] 18  BP: ()/() 117/80      Physical Exam:  Awake alert and oriented x3 in no distress fund of knowledge good attention span concentration good recent remote memory pretty good language function normal well-developed well-nourished no distress pupils one half constricting to 1 normal disc retinas and visual fields extraocular movements are full including good upgaze nasolabial folds palate tongue symmetrical normal hearing facial sensation head turning shoulder shrug motor not the greatest effort but 5- out of 5 x4 no pronator drift some distal atrophy in the intrinsic hand muscles no rigidity or bradykinesia reflexes trace throughout symmetrical toes downgoing bilaterally normal light touch face arms and legs coordination normal in the upper extremity she is able to ambulate without at this time with a walker as she is doing only since she slid off her bed and laid on the floor.  Heart regular without murmur neck supple without bruits extremities no clubbing cyanosis edema visual acuity normal at 3 feet.  In short she has nothing to suggest focality.   Her language is good in particular    Results review:   I reviewed the patient's new clinical results.    Data review:  Lab Results (last 24 hours)     Procedure Component Value Units Date/Time    Folate [[id number]]  (Normal) Collected:  01/10/20 0549    Specimen:  Blood Updated:  01/10/20 1101     Folate 11.00 ng/mL     Narrative:       Results may be falsely increased if patient taking Biotin.      Vitamin B12 [[id number]]  (Normal) Collected:  01/10/20 0549    Specimen:  Blood Updated:  01/10/20 1101     Vitamin B-12 435 pg/mL     Narrative:       Results may be falsely increased if patient taking Biotin.      C-reactive Protein [[id number]]  (Abnormal) Collected:  01/10/20 0549    Specimen:  Blood Updated:  01/10/20 1046     C-Reactive Protein 1.05 mg/dL     Troponin [[id number]]  (Abnormal) Collected:  01/10/20 0847    Specimen:  Blood Updated:  01/10/20 0926     Troponin T 0.070 ng/mL     Narrative:       Troponin T Reference Range:  <= 0.03 ng/mL-   Negative for AMI  >0.03 ng/mL-     Abnormal for myocardial necrosis.  Clinicians would have to utilize clinical acumen, EKG, Troponin and serial changes to determine if it is an Acute Myocardial Infarction or myocardial injury due to an underlying chronic condition.       Results may be falsely decreased if patient taking Biotin.      Protime-INR [[id number]]  (Abnormal) Collected:  01/10/20 0549    Specimen:  Blood Updated:  01/10/20 0637     Protime 16.0 Seconds      INR 1.30    Narrative:       Therapeutic Ranges for INR: 2.0-3.0 (PT 20-30)                              2.5-3.5 (PT 25-34)    Troponin [[id number]]  (Abnormal) Collected:  01/10/20 0549    Specimen:  Blood Updated:  01/10/20 0634     Troponin T 0.065 ng/mL     Narrative:       Troponin T Reference Range:  <= 0.03 ng/mL-   Negative for AMI  >0.03 ng/mL-     Abnormal for myocardial necrosis.  Clinicians would have to utilize clinical acumen, EKG, Troponin and serial changes to determine if it is an Acute  Myocardial Infarction or myocardial injury due to an underlying chronic condition.       Results may be falsely decreased if patient taking Biotin.      Procalcitonin [[id number]]  (Abnormal) Collected:  01/10/20 0549    Specimen:  Blood Updated:  01/10/20 0625     Procalcitonin 0.05 ng/mL     Narrative:       Results may be falsely decreased if patient taking Biotin.     TSH [[id number]]  (Normal) Collected:  01/10/20 0549    Specimen:  Blood Updated:  01/10/20 0625     TSH 0.991 uIU/mL     BNP [[id number]]  (Abnormal) Collected:  01/10/20 0549    Specimen:  Blood Updated:  01/10/20 0623     proBNP 11,108.0 pg/mL     Narrative:       Among patients with dyspnea, NT-proBNP is highly sensitive for the detection of acute congestive heart failure. In addition NT-proBNP of <300 pg/ml effectively rules out acute congestive heart failure with 99% negative predictive value.    Results may be falsely decreased if patient taking Biotin.      CK [[id number]]  (Normal) Collected:  01/10/20 0549    Specimen:  Blood Updated:  01/10/20 0621     Creatine Kinase 179 U/L     Comprehensive Metabolic Panel [[id number]]  (Abnormal) Collected:  01/10/20 0549    Specimen:  Blood Updated:  01/10/20 0621     Glucose 70 mg/dL      BUN 10 mg/dL      Creatinine 0.62 mg/dL      Sodium 140 mmol/L      Potassium 3.2 mmol/L      Chloride 105 mmol/L      CO2 21.7 mmol/L      Calcium 9.3 mg/dL      Total Protein 6.0 g/dL      Albumin 3.40 g/dL      ALT (SGPT) 13 U/L      AST (SGOT) 25 U/L      Alkaline Phosphatase 60 U/L      Total Bilirubin 2.0 mg/dL      eGFR Non African Amer 96 mL/min/1.73      Globulin 2.6 gm/dL      A/G Ratio 1.3 g/dL      BUN/Creatinine Ratio 16.1     Anion Gap 13.3 mmol/L     Narrative:       GFR Normal >60  Chronic Kidney Disease <60  Kidney Failure <15      Lipid Panel [[id number]] Collected:  01/10/20 0549    Specimen:  Blood Updated:  01/10/20 0621     Total Cholesterol 106 mg/dL      Triglycerides 69 mg/dL      HDL  Cholesterol 40 mg/dL      LDL Cholesterol  52 mg/dL      VLDL Cholesterol 13.8 mg/dL      LDL/HDL Ratio 1.31    Narrative:       Cholesterol Reference Ranges  (U.S. Department of Health and Human Services ATP III Classifications)    Desirable          <200 mg/dL  Borderline High    200-239 mg/dL  High Risk          >240 mg/dL      Triglyceride Reference Ranges  (U.S. Department of Health and Human Services ATP III Classifications)    Normal           <150 mg/dL  Borderline High  150-199 mg/dL  High             200-499 mg/dL  Very High        >500 mg/dL    HDL Reference Ranges  (U.S. Department of Health and Human Services ATP III Classifcations)    Low     <40 mg/dl (major risk factor for CHD)  High    >60 mg/dl ('negative' risk factor for CHD)        LDL Reference Ranges  (U.S. Department of Health and Human Services ATP III Classifcations)    Optimal          <100 mg/dL  Near Optimal     100-129 mg/dL  Borderline High  130-159 mg/dL  High             160-189 mg/dL  Very High        >189 mg/dL    Ferritin [[id number]]  (Normal) Collected:  01/10/20 0549    Specimen:  Blood Updated:  01/10/20 0621     Ferritin 109.00 ng/mL     Narrative:       Results may be falsely decreased if patient taking Biotin.      Uric Acid [[id number]]  (Abnormal) Collected:  01/10/20 0549    Specimen:  Blood Updated:  01/10/20 0621     Uric Acid 5.9 mg/dL     Magnesium [[id number]]  (Abnormal) Collected:  01/10/20 0549    Specimen:  Blood Updated:  01/10/20 0621     Magnesium 1.1 mg/dL     Phosphorus [[id number]]  (Normal) Collected:  01/10/20 0549    Specimen:  Blood Updated:  01/10/20 0621     Phosphorus 2.5 mg/dL     Ammonia [[id number]]  (Normal) Collected:  01/10/20 0549    Specimen:  Blood Updated:  01/10/20 0616     Ammonia 29 umol/L     Lactic Acid, Plasma [[id number]]  (Normal) Collected:  01/10/20 0549    Specimen:  Blood Updated:  01/10/20 0613     Lactate 1.2 mmol/L     Hemoglobin A1c [[id number]]  (Normal) Collected:  01/10/20 0549      Specimen:  Blood Updated:  01/10/20 0609     Hemoglobin A1C 5.50 %     Narrative:       Hemoglobin A1C Ranges:    Increased Risk for Diabetes  5.7% to 6.4%  Diabetes                     >= 6.5%  Diabetic Goal                < 7.0%    Sedimentation Rate [[id number]]  (Normal) Collected:  01/10/20 0549    Specimen:  Blood Updated:  01/10/20 0602     Sed Rate 12 mm/hr     CBC Auto Differential [[id number]]  (Abnormal) Collected:  01/10/20 0549    Specimen:  Blood Updated:  01/10/20 0601     WBC 5.84 10*3/mm3      RBC 4.02 10*6/mm3      Hemoglobin 11.5 g/dL      Hematocrit 35.3 %      MCV 87.8 fL      MCH 28.6 pg      MCHC 32.6 g/dL      RDW 16.2 %      RDW-SD 51.8 fl      MPV 12.8 fL      Platelets 150 10*3/mm3      Neutrophil % 75.9 %      Lymphocyte % 15.1 %      Monocyte % 6.3 %      Eosinophil % 1.5 %      Basophil % 0.9 %      Immature Grans % 0.3 %      Neutrophils, Absolute 4.43 10*3/mm3      Lymphocytes, Absolute 0.88 10*3/mm3      Monocytes, Absolute 0.37 10*3/mm3      Eosinophils, Absolute 0.09 10*3/mm3      Basophils, Absolute 0.05 10*3/mm3      Immature Grans, Absolute 0.02 10*3/mm3      nRBC 0.0 /100 WBC     Ethanol [[id number]] Collected:  01/09/20 2052    Specimen:  Blood Updated:  01/10/20 0027     Ethanol <10 mg/dL      Ethanol % <0.010 %     Urinalysis, Microscopic Only - Urine, Catheter [[id number]]  (Abnormal) Collected:  01/09/20 2244    Specimen:  Urine, Catheter Updated:  01/09/20 2258     RBC, UA 0-2 /HPF      WBC, UA None Seen /HPF      Bacteria, UA Trace /HPF      Squamous Epithelial Cells, UA 0-2 /HPF      Hyaline Casts, UA None Seen /LPF      Amorphous Crystals, UA Moderate/2+ /HPF      Methodology Manual Light Microscopy    Urinalysis With Microscopic If Indicated (No Culture) - Urine, Catheter [[id number]]  (Abnormal) Collected:  01/09/20 2244    Specimen:  Urine, Catheter Updated:  01/09/20 2251     Color, UA Yellow     Appearance, UA Clear     pH, UA 6.0     Specific Gravity, UA 1.020      Glucose, UA Negative     Ketones, UA 40 mg/dL (2+)     Bilirubin, UA Negative     Blood, UA Small (1+)     Protein, UA 30 mg/dL (1+)     Leuk Esterase, UA Negative     Nitrite, UA Negative     Urobilinogen, UA 0.2 E.U./dL    TSH [[id number]]  (Normal) Collected:  01/09/20 2052    Specimen:  Blood Updated:  01/09/20 2134     TSH 1.520 uIU/mL     Comprehensive Metabolic Panel [[id number]]  (Abnormal) Collected:  01/09/20 2052    Specimen:  Blood Updated:  01/09/20 2124     Glucose 95 mg/dL      BUN 14 mg/dL      Creatinine 0.74 mg/dL      Sodium 139 mmol/L      Potassium 3.8 mmol/L      Chloride 100 mmol/L      CO2 23.8 mmol/L      Calcium 10.5 mg/dL      Total Protein 7.4 g/dL      Albumin 4.00 g/dL      ALT (SGPT) 17 U/L      AST (SGOT) 26 U/L      Alkaline Phosphatase 74 U/L      Total Bilirubin 2.0 mg/dL      eGFR Non African Amer 79 mL/min/1.73      Globulin 3.4 gm/dL      A/G Ratio 1.2 g/dL      BUN/Creatinine Ratio 18.9     Anion Gap 15.2 mmol/L     Narrative:       GFR Normal >60  Chronic Kidney Disease <60  Kidney Failure <15      Protime-INR [[id number]]  (Abnormal) Collected:  01/09/20 2052    Specimen:  Blood Updated:  01/09/20 2117     Protime 15.5 Seconds      INR 1.25    Narrative:       Therapeutic Ranges for INR: 2.0-3.0 (PT 20-30)                              2.5-3.5 (PT 25-34)    CBC & Differential [[id number]] Collected:  01/09/20 2052    Specimen:  Blood Updated:  01/09/20 2107    Narrative:       The following orders were created for panel order CBC & Differential.  Procedure                               Abnormality         Status                     ---------                               -----------         ------                     CBC Auto Differential[[id number]]        Abnormal            Final result                 Please view results for these tests on the individual orders.    CBC Auto Differential [[id number]]  (Abnormal) Collected:  01/09/20 2052    Specimen:  Blood Updated:  01/09/20  2107     WBC 9.01 10*3/mm3      RBC 4.85 10*6/mm3      Hemoglobin 13.9 g/dL      Hematocrit 42.3 %      MCV 87.2 fL      MCH 28.7 pg      MCHC 32.9 g/dL      RDW 16.2 %      RDW-SD 51.2 fl      MPV 12.5 fL      Platelets 188 10*3/mm3      Neutrophil % 82.4 %      Lymphocyte % 9.9 %      Monocyte % 6.9 %      Eosinophil % 0.0 %      Basophil % 0.4 %      Immature Grans % 0.4 %      Neutrophils, Absolute 7.42 10*3/mm3      Lymphocytes, Absolute 0.89 10*3/mm3      Monocytes, Absolute 0.62 10*3/mm3      Eosinophils, Absolute 0.00 10*3/mm3      Basophils, Absolute 0.04 10*3/mm3      Immature Grans, Absolute 0.04 10*3/mm3      nRBC 0.0 /100 WBC            Imaging:  Imaging Results (Last 24 Hours)     Procedure Component Value Units Date/Time    MRI Brain Without Contrast [[id number]] Resulted:  01/10/20 0941     Updated:  01/10/20 1047    CT Head Without Contrast [[id number]] Collected:  01/09/20 2131     Updated:  01/09/20 2211    Narrative:       CT Head WO    HISTORY:   Postural hypotension after falling earlier today    TECHNIQUE:   Axial unenhanced head CT. Radiation dose reduction techniques included automated exposure control or exposure modulation based on body size. Count of known CT and cardiac nuc med studies performed in previous 12 months: 0.     Time of scan: 9:24 PM    COMPARISON:   None.    FINDINGS:   No intracranial hemorrhage, mass, or infarct. No hydrocephalus or extra-axial fluid collection. There are senescent changes, including volume loss and nonspecific white matter change, but no acute abnormality is seen. Ventricular enlargement is somewhat  out of proportion to the sulci. Consider normal pressure hydrocephalus in the appropriate clinical setting. The skull base, calvarium, and extracranial soft tissues are normal.      Impression:       Senescent changes without acute abnormality. Somewhat disproportional ventriculomegaly is noted. Correlate clinically for evidence of normal pressure  hydrocephalus.          Signer Name: Anthony Vidal MD   Signed: 1/9/2020 9:31 PM   Workstation Name: [email or web address]    [facility]    XR Chest 1 View [[id number]] Collected:  01/09/20 2147     Updated:  01/09/20 2210    Narrative:       CR Chest 1 Vw    INDICATION:   Low blood pressure. Patient fell today.     COMPARISON:    12/31/2019    FINDINGS:  Single portable AP view(s) of the chest.  Cardiomegaly without failure. Blunting of both CP angles may indicate small amount pleural fluid right greater than left. Postsurgical changes right proximal humerus from prior ORIF. No focal consolidation. Old  left rib fractures.      Impression:       Cardiomegaly with probable small bilateral pleural effusions right greater than left.    Signer Name: DIANE Rodriguez MD   Signed: 1/9/2020 9:47 PM   Workstation Name: [email or web address]    [facility]         I reviewed the CT with an independent eyeball review which shows no evidence of stroke or hemorrhage does show enlarged ventricles   The MRI by my independent eyeball review does show change consistent with an acute stroke left hemisphere subcortically.  Assessment and Plan:       Paroxysmal atrial fibrillation with rapid ventricular response (CMS/HCC)    Deep vein thrombosis of lower extremity (CMS/HCC)    Hyperlipidemia    Hypertension    Vitamin D deficiency    Osteoarthritis    Chronic atrial fibrillation with rapid ventricular response    This patient indeed has enlarged ventricles/ventriculomegaly but does not appear to have any significant dementia nor urinary incontinence.  She states she has not had any falls and this time actually rolled/fell out of bed.  She does not present like an acute stroke but according to the MRI and my independent eyeball review she does have an acute left hemisphere subcortical stroke.  I would not recommend further neurologic work-up at this time as she is being transferred to [facility] for cardiac catheterization there is no evidence of hemorrhage or edema.   She has had some confusion that goes with a metabolic encephalopathy which has improved dramatically overnight with hydration IV and current treatment.  She is neurologically cleared for any and all cardiac procedures as planned and I agree with cardiology plans.  She will be followed by stroke neurology at [facility].  I am okay certainly with holding the Lovenox for now as she is going to get a cardiac cath soon.   thanks      Bobby Grady MD  01/10/20  11:54 AM

## 2020-01-10 NOTE — PLAN OF CARE
Problem: Patient Care Overview  Goal: Plan of Care Review  Outcome: Ongoing (interventions implemented as appropriate)  Flowsheets (Taken 1/10/2020 0615)  Progress: improving  Plan of Care Reviewed With: patient  Outcome Summary: New Admit - Remains on Cardizem gtt at 5mg/hr. A-fib per monitor with heart rate running 90's - 110's. Blood pressure stable. Denies pain, palpitation, and any chest discomfort. Afebrile.

## 2020-01-10 NOTE — PLAN OF CARE
Discharge Planning Assessment   Rebeca Cochran     Patient Name: Camille Pantoja  MRN: [id number]  Today's Date: 1/10/2020    Admit Date: 1/9/2020    Discharge Needs Assessment       Row Name 01/10/20 1514       Living Environment    Lives With  alone    Current Living Arrangements  home/apartment/condo    Primary Care Provided by  self    Provides Primary Care For  no one    Family Caregiver if Needed  parent(s)    Family Caregiver Names  Judi Pantoja- mother     Quality of Family Relationships  unable to assess    Able to Return to Prior Arrangements  yes       Resource/Environmental Concerns    Resource/Environmental Concerns  none    Transportation Concerns  car, none       Transition Planning    Patient/Family Anticipates Transition to  home    Patient/Family Anticipated Services at Transition  none    Transportation Anticipated  car, drives self;family or friend will provide       Discharge Needs Assessment    Readmission Within the Last 30 Days  no previous admission in last 30 days    Concerns to be Addressed  no discharge needs identified    Equipment Currently Used at Home  none    Anticipated Changes Related to Illness  none    Equipment Needed After Discharge  none            Discharge Plan       Row Name 01/10/20 1516       Plan    Plan  D/C to [facility]    Patient/Family in Agreement with Plan  yes    Plan Comments  Into room to visit with pt.  Facesheet verified.  Pt lives in a home alone.  She has a neighbor who checks on her, and a sister in law who checks on her.  Pt is typically independent with ADL;s and driving.  She denies any problems picking up medications or paying for them.  She does not use any DME/HH/rehab services.  Pt will be d/c to [facility] for cardiad cath.  No further needs noted at this time.             Destination        Coordination has not been started for this encounter.          Durable Medical Equipment        Coordination has not been started for this encounter.           Dialysis/Infusion        Coordination has not been started for this encounter.          Home Medical Care        Coordination has not been started for this encounter.          Therapy        Coordination has not been started for this encounter.          Community Resources        Coordination has not been started for this encounter.            Expected Discharge Date and Time       Expected Discharge Date Expected Discharge Time    Cj 10, 2020             Demographic Summary       Row Name 01/10/20 1513       General Information    Admission Type  inpatient    Arrived From  home    Referral Source  admission list    Reason for Consult  discharge planning    Preferred Language  English     Used During This Interaction  no            Functional Status       Row Name 01/10/20 1513       Functional Status    Usual Activity Tolerance  good    Current Activity Tolerance  good       Functional Status, IADL    Medications  independent    Meal Preparation  independent    Housekeeping  independent    Laundry  independent    Shopping  independent       Mental Status    General Appearance WDL  WDL       Mental Status Summary    Recent Changes in Mental Status/Cognitive Functioning  no changes            Psychosocial    No documentation.         Abuse/Neglect    No documentation.         Legal    No documentation.         Substance Abuse    No documentation.         Patient Forms    No documentation.             Tomer Carmona RN

## 2020-01-10 NOTE — H&P (VIEW-ONLY)
Date of Hospital Visit: [unfilled]ENC@  Encounter Provider: Jovita Brown MD  Place of Service: [facility] CARDIOLOGY  Patient Name: Camille Pantoja  :1955  Referral Provider: No ref. provider found    Chief complaint    weakness    History of Present Illness    This is a 64-year-old female who recently had left lower lobe pneumonia diagnosed last week and treated with Levaquin.  She began to have diarrhea and to become progressively more weak.  She lives alone and her neighbor found her down the floor. She said that she had slid off of her bed.  She not been eating well or drinking enough fluid and seemed confused according to the neighbor.  She came to the emergency department by EMS.  She denied complaints of fevers, chills, nausea, vomiting or diarrhea.    She has a history of DVT, hypertension, GERD and hyperlipidemia.  Her blood pressure on arrival was 107/81 with a heart rate of 56.  Her labs showed troponin 0 0.065, proBNP greater than 11,000, potassium 3.2, TSH normal, HDL 40, LDL 52, magnesium low at 1.1, unremarkable CBC.  EKG showed atrial fibrillation with rapid ventricular response.  There is also left anterior fascicular block.  When compared with ECG from 2018, the atrial fibrillation was new but the left anterior fascicular block is old.  At home, she was on warfarin for a DVT that happened in .     She still feels somewhat weak at this time but denies chest pain, pressure, difficulty breathing.  She is never had atrial fibrillation before.  She has no orthopnea or PND.  She does not smoke, and quit drinking heavy alcohol in 2018.  Her mother, father, maternal and paternal uncle all had myocardial infarction's.  She is not  and has no children.    Past Medical History:   Diagnosis Date   • Hyperlipidemia    • Hypertension    • Irregular heart beat    • Skin cancer        Past Surgical History:   Procedure Laterality Date   • FINGER SURGERY     •  HYSTERECTOMY     • JOINT REPLACEMENT     • ORIF SHOULDER DISLOCATION W/ HUMERAL FRACTURE     • TIBIA FRACTURE SURGERY         Medications Prior to Admission   Medication Sig Dispense Refill Last Dose   • cholecalciferol (VITAMIN D3) 1000 units tablet Take 3,000 Units by mouth Daily.   Unknown at Unknown time   • metoprolol succinate XL (TOPROL-XL) 25 MG 24 hr tablet Take 1 tablet by mouth Daily. 90 tablet 1 Unknown at Unknown time   • omeprazole (priLOSEC) 20 MG capsule TAKE ONE CAPSULE BY MOUTH DAILY 90 capsule 0 Unknown at Unknown time   • simvastatin (ZOCOR) 20 MG tablet Take 1 tablet by mouth Every Night. 90 tablet 1 Unknown at Unknown time   • warfarin (COUMADIN) 2 MG tablet TAKE ONE TABLET BY MOUTH EVERY EVENING 30 tablet 0 Unknown at Unknown time   • Zinc 50 MG capsule Take 50 mg by mouth Daily.   Unknown at Unknown time       Current Meds  Scheduled Meds:  atorvastatin 10 mg Oral Daily   cholecalciferol 2,000 Units Oral Daily   enoxaparin 1 mg/kg Subcutaneous Q12H   famotidine 40 mg Oral Daily   metoprolol succinate XL 25 mg Oral Daily   pantoprazole 40 mg Oral QAM   sodium chloride 10 mL Intravenous Q12H   zinc sulfate 220 mg Oral Daily     Continuous Infusions:  dilTIAZem 5-15 mg/hr Last Rate: 5 mg/hr (01/09/20 2345)   Pharmacy to Dose enoxaparin (LOVENOX)     sodium chloride 100 mL/hr Last Rate: 100 mL/hr (01/09/20 2345)     PRN Meds:.•  acetaminophen **OR** acetaminophen **OR** acetaminophen  •  docusate sodium  •  magnesium sulfate **OR** magnesium sulfate **OR** magnesium sulfate  •  nitroglycerin  •  Pharmacy to Dose enoxaparin (LOVENOX)  •  potassium & sodium phosphates **OR** potassium & sodium phosphates  •  potassium chloride **OR** potassium chloride **OR** potassium chloride  •  sodium chloride  •  sodium chloride  •  traMADol    Allergies as of 01/09/2020 - Reviewed 01/09/2020   Allergen Reaction Noted   • Cephalexin Other (See Comments) 05/31/2016   • Standard Rash 05/31/2016   • Jw  "[cefadroxil] Rash 05/22/2019   • Fish allergy Rash 05/31/2016       Social History     Socioeconomic History   • Marital status:      Spouse name: Not on file   • Number of children: Not on file   • Years of education: Not on file   • Highest education level: Not on file   Tobacco Use   • Smoking status: Never Smoker   • Smokeless tobacco: Never Used   Substance and Sexual Activity   • Alcohol use: Yes     Comment: Reports a hx of drinking everyday for 18 years atleast. She says that she quit May 2018, but says that she drinks occasionally now.    • Drug use: No   • Sexual activity: Defer       Family History   Problem Relation Age of Onset   • Breast cancer Brother    • Macular degeneration Mother    • Heart disease Mother    • Heart disease Father    • Heart disease Maternal Uncle        REVIEW OF SYSTEMS:   12 point ROS was performed and is negative except as outlined in HPI        Objective:   Temp:  [97.5 °F (36.4 °C)-98.4 °F (36.9 °C)] 98.1 °F (36.7 °C)  Heart Rate:  [] 108  Resp:  [15-22] 18  BP: ()/() 134/90  Body mass index is 20.62 kg/m².  Flowsheet Rows      First Filed Value   Admission Height  165.1 cm (65\") Documented at 01/09/2020 1943   Admission Weight  56.7 kg (125 lb) Documented at 01/09/2020 1943        Vitals:    01/10/20 0810   BP: 134/90   Pulse: 108   Resp:    Temp:    SpO2:        General Appearance:    Alert, cooperative, in no acute distress, still seems very weak.    Head:    Normocephalic, without obvious abnormality, atraumatic   Eyes:            Lids and lashes normal, conjunctivae and sclerae normal, no   icterus, no pallor, corneas clear, PERRLA   Ears:    Ears appear intact with no abnormalities noted   Throat:   No oral lesions, no thrush, oral mucosa moist   Neck:   No adenopathy, supple, trachea midline, no thyromegaly, no   carotid bruit, no JVD   Back:     No kyphosis present, no scoliosis present, no skin lesions, erythema or scars, no tenderness to " palpation, range of motion normal   Lungs:     Clear to auscultation,respirations regular, even and unlabored    Heart:    irregular rhythm and normal rate, normal S1 and S2, no murmur, no gallop, no rub, no click   Chest Wall:    No abnormalities observed   Abdomen:     Normal bowel sounds, no masses, no organomegaly, soft, nontender, nondistended, no guarding, no rebound  tenderness   Extremities:   Moves all extremities well, no edema, no cyanosis, no redness   Pulses:   Pulses palpable and equal bilaterally. Normal radial, carotid, femoral, dorsalis pedis and posterior tibial pulses bilaterally.    Skin:  Psychiatric:   No bleeding, bruising or rash    Alert and oriented x 3, normal mood and affect                 Lab Review:    Results from last 7 days   Lab Units 01/10/20  0549   SODIUM mmol/L 140   POTASSIUM mmol/L 3.2*   CHLORIDE mmol/L 105   CO2 mmol/L 21.7*   BUN mg/dL 10   CREATININE mg/dL 0.62   CALCIUM mg/dL 9.3   BILIRUBIN mg/dL 2.0*   ALK PHOS U/L 60   ALT (SGPT) U/L 13   AST (SGOT) U/L 25   GLUCOSE mg/dL 70     Results from last 7 days   Lab Units 01/10/20  0549   CK TOTAL U/L 179   TROPONIN T ng/mL 0.065*     Results from last 7 days   Lab Units 01/10/20  0549 01/09/20 2052   WBC 10*3/mm3 5.84 9.01   HEMOGLOBIN g/dL 11.5* 13.9   HEMATOCRIT % 35.3 42.3   PLATELETS 10*3/mm3 150 188     Results from last 7 days   Lab Units 01/10/20  0549 01/09/20 2052   INR  1.30* 1.25*     Results from last 7 days   Lab Units 01/10/20  0549   MAGNESIUM mg/dL 1.1*     I personally viewed and interpreted the patient's EKG/Telemetry data  )  Patient Active Problem List   Diagnosis   • Deep vein thrombosis of lower extremity (CMS/HCC)   • Hyperlipidemia   • Hypertension   • Vitamin D deficiency   • Osteoarthritis   • Overweight (BMI 25.0-29.9)   • Paroxysmal atrial fibrillation with rapid ventricular response (CMS/HCC)   • Chronic atrial fibrillation with rapid ventricular response     Assessment and  Plan:    1. Weakness, confusion and fall, improving and not confused this am. Likely due to dehydration and diarrhea from levaquin.   2. Atrial fibrillation, new. Was on warfarin but INR subtherapeutic.  3. Low magnesium - replacement goal 2.   4. Elevated troponin, no ACS on ECG.   5. Hypertension  6. Hyperlipidemia, controlled.   7. H/o DVT in 2004 and been on warfarin since then.   8. Heavy alcohol use, quit in 2018.   9. Cardiomyopathy, new, set up cath today.     MRI pending for confusion and weakness. Start losartan, d/c diltiazem IV. Cath today. D/w pt therapy plan and d/w Dr. Anderson.     Jovita Brown MD  01/10/20  8:22 AM.  Time spent in reviewing chart, discussion and examination:

## 2020-01-10 NOTE — NURSING NOTE
Discharge Planning Assessment   Rebeca Cochran     Patient Name: Camille Pantoja  MRN: [id number]  Today's Date: 1/10/2020    Admit Date: 1/9/2020    Discharge Needs Assessment     Row Name 01/10/20 1514       Living Environment    Lives With  alone    Current Living Arrangements  home/apartment/condo    Primary Care Provided by  self    Provides Primary Care For  no one    Family Caregiver if Needed  parent(s)    Family Caregiver Names  Judi Pantoja- mother     Quality of Family Relationships  unable to assess    Able to Return to Prior Arrangements  yes       Resource/Environmental Concerns    Resource/Environmental Concerns  none    Transportation Concerns  car, none       Transition Planning    Patient/Family Anticipates Transition to  home    Patient/Family Anticipated Services at Transition  none    Transportation Anticipated  car, drives self;family or friend will provide       Discharge Needs Assessment    Readmission Within the Last 30 Days  no previous admission in last 30 days    Concerns to be Addressed  no discharge needs identified    Equipment Currently Used at Home  none    Anticipated Changes Related to Illness  none    Equipment Needed After Discharge  none        Discharge Plan     Row Name 01/10/20 1516       Plan    Plan  D/C to [facility]    Patient/Family in Agreement with Plan  yes    Plan Comments  Into room to visit with pt.  Facesheet verified.  Pt lives in a home alone.  She has a neighbor who checks on her, and a sister in law who checks on her.  Pt is typically independent with ADL;s and driving.  She denies any problems picking up medications or paying for them.  She does not use any DME/HH/rehab services.  Pt will be d/c to [facility] for cardiad cath.  No further needs noted at this time.         Destination      Coordination has not been started for this encounter.      Durable Medical Equipment      Coordination has not been started for this encounter.      Dialysis/Infusion       Coordination has not been started for this encounter.      Home Medical Care      Coordination has not been started for this encounter.      Therapy      Coordination has not been started for this encounter.      Community Resources      Coordination has not been started for this encounter.        Expected Discharge Date and Time     Expected Discharge Date Expected Discharge Time    Cj 10, 2020         Demographic Summary     Row Name 01/10/20 1513       General Information    Admission Type  inpatient    Arrived From  home    Referral Source  admission list    Reason for Consult  discharge planning    Preferred Language  English     Used During This Interaction  no        Functional Status     Row Name 01/10/20 1513       Functional Status    Usual Activity Tolerance  good    Current Activity Tolerance  good       Functional Status, IADL    Medications  independent    Meal Preparation  independent    Housekeeping  independent    Laundry  independent    Shopping  independent       Mental Status    General Appearance WDL  WDL       Mental Status Summary    Recent Changes in Mental Status/Cognitive Functioning  no changes        Psychosocial    No documentation.       Abuse/Neglect    No documentation.       Legal    No documentation.       Substance Abuse    No documentation.       Patient Forms    No documentation.           Tomer Carmona RN

## 2020-01-10 NOTE — PROGRESS NOTES
"Pharmacy Consult - Lovenox    Camille Pantoja has been consulted for pharmacy to dose enoxaparin for atrial fibrillation requiring full anticoagulation per Dr Schultz's request.       Relevant clinical data and objective history reviewed:  64 y.o. female 165.1 cm (65\") 56.7 kg (125 lb)    Home Anticoagulation: warfarin 2 mg daily per medication reconciliation (not confirmed with patient due to time of consult)     Past Medical History:   Diagnosis Date    Hyperlipidemia     Hypertension     Irregular heart beat     Skin cancer      is allergic to cephalexin; citrus; duricef [cefadroxil]; and fish allergy.    Lab Results   Component Value Date    WBC 9.01 01/09/2020    HGB 13.9 01/09/2020    HCT 42.3 01/09/2020    MCV 87.2 01/09/2020     01/09/2020     Lab Results   Component Value Date    GLUCOSE 95 01/09/2020    CALCIUM 10.5 01/09/2020     01/09/2020    K 3.8 01/09/2020    CO2 23.8 01/09/2020     01/09/2020    BUN 14 01/09/2020    CREATININE 0.74 01/09/2020    EGFRIFAFRI 75 05/29/2019    EGFRIFNONA 79 01/09/2020    BCR 18.9 01/09/2020    ANIONGAP 15.2 (H) 01/09/2020       Estimated Creatinine Clearance: 61.9 mL/min (by C-G formula based on SCr of 0.74 mg/dL).    Active Inpatient Anticoagulation Orders: None at this time     Assessment/Plan    Will start patient on 60 mg (1 mg/kg) subcutaneous every 12 hours, based on indicated and renal function.Pharmacy will continue to follow and adjust therapy as clinically indicated.    Lakeshia Storey, [facility]    "

## 2020-01-10 NOTE — CONSULTS
Date of Hospital Visit: [unfilled]ENC@  Encounter Provider: Jovita Brown MD  Place of Service: [facility] CARDIOLOGY  Patient Name: Camille Pantoja  :1955  Referral Provider: No ref. provider found    Chief complaint    weakness    History of Present Illness    This is a 64-year-old female who recently had left lower lobe pneumonia diagnosed last week and treated with Levaquin.  She began to have diarrhea and to become progressively more weak.  She lives alone and her neighbor found her down the floor. She said that she had slid off of her bed.  She not been eating well or drinking enough fluid and seemed confused according to the neighbor.  She came to the emergency department by EMS.  She denied complaints of fevers, chills, nausea, vomiting or diarrhea.    She has a history of DVT, hypertension, GERD and hyperlipidemia.  Her blood pressure on arrival was 107/81 with a heart rate of 56.  Her labs showed troponin 0 0.065, proBNP greater than 11,000, potassium 3.2, TSH normal, HDL 40, LDL 52, magnesium low at 1.1, unremarkable CBC.  EKG showed atrial fibrillation with rapid ventricular response.  There is also left anterior fascicular block.  When compared with ECG from 2018, the atrial fibrillation was new but the left anterior fascicular block is old.  At home, she was on warfarin for a DVT that happened in .     She still feels somewhat weak at this time but denies chest pain, pressure, difficulty breathing.  She is never had atrial fibrillation before.  She has no orthopnea or PND.  She does not smoke, and quit drinking heavy alcohol in 2018.  Her mother, father, maternal and paternal uncle all had myocardial infarction's.  She is not  and has no children.    Past Medical History:   Diagnosis Date   • Hyperlipidemia    • Hypertension    • Irregular heart beat    • Skin cancer        Past Surgical History:   Procedure Laterality Date   • FINGER SURGERY     •  HYSTERECTOMY     • JOINT REPLACEMENT     • ORIF SHOULDER DISLOCATION W/ HUMERAL FRACTURE     • TIBIA FRACTURE SURGERY         Medications Prior to Admission   Medication Sig Dispense Refill Last Dose   • cholecalciferol (VITAMIN D3) 1000 units tablet Take 3,000 Units by mouth Daily.   Unknown at Unknown time   • metoprolol succinate XL (TOPROL-XL) 25 MG 24 hr tablet Take 1 tablet by mouth Daily. 90 tablet 1 Unknown at Unknown time   • omeprazole (priLOSEC) 20 MG capsule TAKE ONE CAPSULE BY MOUTH DAILY 90 capsule 0 Unknown at Unknown time   • simvastatin (ZOCOR) 20 MG tablet Take 1 tablet by mouth Every Night. 90 tablet 1 Unknown at Unknown time   • warfarin (COUMADIN) 2 MG tablet TAKE ONE TABLET BY MOUTH EVERY EVENING 30 tablet 0 Unknown at Unknown time   • Zinc 50 MG capsule Take 50 mg by mouth Daily.   Unknown at Unknown time       Current Meds  Scheduled Meds:  atorvastatin 10 mg Oral Daily   cholecalciferol 2,000 Units Oral Daily   enoxaparin 1 mg/kg Subcutaneous Q12H   famotidine 40 mg Oral Daily   metoprolol succinate XL 25 mg Oral Daily   pantoprazole 40 mg Oral QAM   sodium chloride 10 mL Intravenous Q12H   zinc sulfate 220 mg Oral Daily     Continuous Infusions:  dilTIAZem 5-15 mg/hr Last Rate: 5 mg/hr (01/09/20 2345)   Pharmacy to Dose enoxaparin (LOVENOX)     sodium chloride 100 mL/hr Last Rate: 100 mL/hr (01/09/20 2345)     PRN Meds:.•  acetaminophen **OR** acetaminophen **OR** acetaminophen  •  docusate sodium  •  magnesium sulfate **OR** magnesium sulfate **OR** magnesium sulfate  •  nitroglycerin  •  Pharmacy to Dose enoxaparin (LOVENOX)  •  potassium & sodium phosphates **OR** potassium & sodium phosphates  •  potassium chloride **OR** potassium chloride **OR** potassium chloride  •  sodium chloride  •  sodium chloride  •  traMADol    Allergies as of 01/09/2020 - Reviewed 01/09/2020   Allergen Reaction Noted   • Cephalexin Other (See Comments) 05/31/2016   • Eccles Rash 05/31/2016   • Jw  "[cefadroxil] Rash 05/22/2019   • Fish allergy Rash 05/31/2016       Social History     Socioeconomic History   • Marital status:      Spouse name: Not on file   • Number of children: Not on file   • Years of education: Not on file   • Highest education level: Not on file   Tobacco Use   • Smoking status: Never Smoker   • Smokeless tobacco: Never Used   Substance and Sexual Activity   • Alcohol use: Yes     Comment: Reports a hx of drinking everyday for 18 years atleast. She says that she quit May 2018, but says that she drinks occasionally now.    • Drug use: No   • Sexual activity: Defer       Family History   Problem Relation Age of Onset   • Breast cancer Brother    • Macular degeneration Mother    • Heart disease Mother    • Heart disease Father    • Heart disease Maternal Uncle        REVIEW OF SYSTEMS:   12 point ROS was performed and is negative except as outlined in HPI        Objective:   Temp:  [97.5 °F (36.4 °C)-98.4 °F (36.9 °C)] 98.1 °F (36.7 °C)  Heart Rate:  [] 108  Resp:  [15-22] 18  BP: ()/() 134/90  Body mass index is 20.62 kg/m².  Flowsheet Rows      First Filed Value   Admission Height  165.1 cm (65\") Documented at 01/09/2020 1943   Admission Weight  56.7 kg (125 lb) Documented at 01/09/2020 1943        Vitals:    01/10/20 0810   BP: 134/90   Pulse: 108   Resp:    Temp:    SpO2:        General Appearance:    Alert, cooperative, in no acute distress, still seems very weak.    Head:    Normocephalic, without obvious abnormality, atraumatic   Eyes:            Lids and lashes normal, conjunctivae and sclerae normal, no   icterus, no pallor, corneas clear, PERRLA   Ears:    Ears appear intact with no abnormalities noted   Throat:   No oral lesions, no thrush, oral mucosa moist   Neck:   No adenopathy, supple, trachea midline, no thyromegaly, no   carotid bruit, no JVD   Back:     No kyphosis present, no scoliosis present, no skin lesions, erythema or scars, no tenderness to " palpation, range of motion normal   Lungs:     Clear to auscultation,respirations regular, even and unlabored    Heart:    irregular rhythm and normal rate, normal S1 and S2, no murmur, no gallop, no rub, no click   Chest Wall:    No abnormalities observed   Abdomen:     Normal bowel sounds, no masses, no organomegaly, soft, nontender, nondistended, no guarding, no rebound  tenderness   Extremities:   Moves all extremities well, no edema, no cyanosis, no redness   Pulses:   Pulses palpable and equal bilaterally. Normal radial, carotid, femoral, dorsalis pedis and posterior tibial pulses bilaterally.    Skin:  Psychiatric:   No bleeding, bruising or rash    Alert and oriented x 3, normal mood and affect                 Lab Review:    Results from last 7 days   Lab Units 01/10/20  0549   SODIUM mmol/L 140   POTASSIUM mmol/L 3.2*   CHLORIDE mmol/L 105   CO2 mmol/L 21.7*   BUN mg/dL 10   CREATININE mg/dL 0.62   CALCIUM mg/dL 9.3   BILIRUBIN mg/dL 2.0*   ALK PHOS U/L 60   ALT (SGPT) U/L 13   AST (SGOT) U/L 25   GLUCOSE mg/dL 70     Results from last 7 days   Lab Units 01/10/20  0549   CK TOTAL U/L 179   TROPONIN T ng/mL 0.065*     Results from last 7 days   Lab Units 01/10/20  0549 01/09/20 2052   WBC 10*3/mm3 5.84 9.01   HEMOGLOBIN g/dL 11.5* 13.9   HEMATOCRIT % 35.3 42.3   PLATELETS 10*3/mm3 150 188     Results from last 7 days   Lab Units 01/10/20  0549 01/09/20 2052   INR  1.30* 1.25*     Results from last 7 days   Lab Units 01/10/20  0549   MAGNESIUM mg/dL 1.1*     I personally viewed and interpreted the patient's EKG/Telemetry data  )  Patient Active Problem List   Diagnosis   • Deep vein thrombosis of lower extremity (CMS/HCC)   • Hyperlipidemia   • Hypertension   • Vitamin D deficiency   • Osteoarthritis   • Overweight (BMI 25.0-29.9)   • Paroxysmal atrial fibrillation with rapid ventricular response (CMS/HCC)   • Chronic atrial fibrillation with rapid ventricular response     Assessment and  Plan:    1. Weakness, confusion and fall, improving and not confused this am. Likely due to dehydration and diarrhea from levaquin.   2. Atrial fibrillation, new. Was on warfarin but INR subtherapeutic.  3. Low magnesium - replacement goal 2.   4. Elevated troponin, no ACS on ECG.   5. Hypertension  6. Hyperlipidemia, controlled.   7. H/o DVT in 2004 and been on warfarin since then.   8. Heavy alcohol use, quit in 2018.   9. Cardiomyopathy, new, set up cath today.     MRI pending for confusion and weakness. Start losartan, d/c diltiazem IV. Cath today. D/w pt therapy plan and d/w Dr. Anderson.     Jovita Brown MD  01/10/20  8:22 AM.  Time spent in reviewing chart, discussion and examination:

## 2020-01-10 NOTE — ED NOTES
Spoke with Carli in lab regarding ETOH add on from labs sent earlier Carli Bradley, RN  01/10/20 3772

## 2020-01-10 NOTE — DISCHARGE SUMMARY
Camille Pantoja  1955  [id number]    Hospitalists Discharge Summary    Date of Admission: 1/9/2020  Date of Discharge:  1/10/2020    Primary Discharge Diagnoses:    1.  Acute Infarct of Left Basal Ganglia and Left Insular Cortex  2.  New-onset Atrial Fibrillation w/ RVR    Secondary Discharge Diagnoses:    1.  HTN  2.  Dyslipidemia  3.  GERD  4.  Hx/O DVT w/ subtherapeutic INR    History of Present Illness (taken from H&P):    Ms. Pantoja is a 64 y.o.  presents to [facility] complaining of change in mental status.  I have seen and examined the patient in the ER and also spoke to nurse at the bedside as well as ER physician.  I do not have any family member or neighbor at the bedside.  I reviewed the chart.  According to the chart the patient is a 64-year-old female who was recently treated for right lower lobe pneumonia with Levaquin and she lives at home by herself.  Neighbor called the paramedics as patient was found on the floor and was not acting as normal.  According to the neighbor and per records patient has not been eating well and does not seem to be drinking enough fluids and she is just little confused.  When I inquired from the patient she told me that she is at [facility] and she was brought into the ER by her neighbor but she does not know what is wrong with her.  She was surprised when I told her that she will be keeping her in the hospital.  She denies any complaints of fever/chills/nausea/vomiting/diarrhea or any other symptoms whatsoever.  History is not reliable because she does not know much about her health history at this time.  I reviewed the chart and found that patient has history of DVT as well as patient history of recent pneumonia and she also has history of hypertension hyperlipidemia.    Hospital Course:  Ms. Pantoja was admitted to the ICU on a Diltiazem drip.  MRI was performed, and those results are noted.  Infact, I feel like the MRI findings reconcile  her clinical condition between the PCP office note of January 8th and the described findings that brought the patient to the ER.  She is now being transferred to [facility] form invasive cardiac testing.     PCP  Patient Care Team:  Nahun Ann Jr., DO as PCP - General (Family Medicine)    Consults:   Consults     Date and Time Order Name Status Description    1/10/2020 0041 Inpatient Neurology Consult General Completed     1/10/2020 0041 Inpatient Cardiology Consult Completed           Operations and Procedures Performed:       Ct Head Without Contrast    Result Date: 1/9/2020  Narrative: CT Head WO HISTORY: Postural hypotension after falling earlier today TECHNIQUE: Axial unenhanced head CT. Radiation dose reduction techniques included automated exposure control or exposure modulation based on body size. Count of known CT and cardiac nuc med studies performed in previous 12 months: 0. Time of scan: 9:24 PM COMPARISON: None. FINDINGS: No intracranial hemorrhage, mass, or infarct. No hydrocephalus or extra-axial fluid collection. There are senescent changes, including volume loss and nonspecific white matter change, but no acute abnormality is seen. Ventricular enlargement is somewhat out of proportion to the sulci. Consider normal pressure hydrocephalus in the appropriate clinical setting. The skull base, calvarium, and extracranial soft tissues are normal.     Impression: Senescent changes without acute abnormality. Somewhat disproportional ventriculomegaly is noted. Correlate clinically for evidence of normal pressure hydrocephalus. Signer Name: Anthony Vidal MD  Signed: 1/9/2020 9:31 PM  Workstation Name: [id number]  [facility]    Mri Brain Without Contrast    Result Date: 1/10/2020  Narrative: MRI Brain WO INDICATION: Decreased alertness. Status post fall 1/9/2020. TECHNIQUE: MRI of the brain without IV contrast. COMPARISON:  Head CT 1/9/2020 FINDINGS: Diffuse prominence of  ventricles, sulci and basilar cisterns compatible with generalized atrophy. Foci of increased T2 and FLAIR signal within the left basal ganglia and left insular cortex with corresponding restricted diffusion and low signal on the ADC map consistent with acute infarct. Increased T2 and FLAIR signal within the periventricular white matter which is nonspecific but may reflect chronic microvascular disease. No mass, mass effect or midline shift. No hemorrhage or abnormal extra axial fluid collections. Normal intracranial flow voids. Bony calvarium, skull base and mastoids unremarkable. Bilateral maxillary and left ethmoid sinus mucosal disease.     Impression: Acute infarct left basal ganglia and left insular cortex in the distribution of the left MCA. Ventriculomegaly with increased T2 signal within the periventricular white matter. Though this could be indicative of NPH this is most likely the sequela of chronic microvascular disease. Correlate with patient's clinical presentation. Dr. Grady relates that the patient does not have any clinical features of NPH. Bilateral maxillary and left ethmoid sinus mucosal disease. Results called to Dr. Grady at [facility] ICU at the time of this dictation. Signer Name: DIANE Rodriguez MD  Signed: 1/10/2020 12:07 PM  Workstation Name: [id number]  [facility]    Xr Chest 1 View    Result Date: 1/9/2020  Narrative: CR Chest 1 Vw INDICATION: Low blood pressure. Patient fell today. COMPARISON:  12/31/2019 FINDINGS: Single portable AP view(s) of the chest.  Cardiomegaly without failure. Blunting of both CP angles may indicate small amount pleural fluid right greater than left. Postsurgical changes right proximal humerus from prior ORIF. No focal consolidation. Old left rib fractures.     Impression: Cardiomegaly with probable small bilateral pleural effusions right greater than left. Signer Name: DIANE Rodriguez MD  Signed: 1/9/2020 9:47 PM  Workstation  Name: [email or web address]  [facility] [place]    Xr Chest Pa & Lateral    Result Date: 12/31/2019  Narrative: XR CHEST PA AND LATERAL-: 12/31/2019 11:35 AM  INDICATION:  Shortness of air that began 2 weeks ago. Cough.  COMPARISON:  06/28/2018.  FINDINGS: PA and lateral views of the chest.  Interval development of cardiomegaly. There is mild indistinctness and prominence of interstitium suggestive of faint vascular congestion. There is new bandlike atelectasis in the left lung base. There is a small right-sided effusion with right basilar atelectasis or pneumonia. No pneumothorax. Chronic left rib deformities. Sequela of prior right shoulder surgery. There is thoracic spondylosis and kyphosis.      Impression:  1. New cardiomegaly and probable subtle mild vascular congestion. 2. New small right-sided effusion with right basilar atelectasis or pneumonia. Follow-up to clearing recommended. 3. Probable left base atelectasis.  This report was finalized on 12/31/2019 11:55 AM by Dr. Keith Dickerson MD.        Allergies:  is allergic to cephalexin; citrus; duricef [cefadroxil]; and fish allergy.      Discharge Medications:     Discharge Medications      New Medications      Instructions Start Date   enoxaparin 60 MG/0.6ML solution syringe  Commonly known as:  LOVENOX   1 mg/kg (60 mg), Subcutaneous, Every 12 Hours      losartan 50 MG tablet  Commonly known as:  COZAAR   50 mg, Oral, Nightly         Changes to Medications      Instructions Start Date   cholecalciferol 25 MCG (1000 UT) tablet  Commonly known as:  VITAMIN D3  What changed:  how much to take   2,000 Units, Oral, Daily   Start Date:  January 11, 2020     metoprolol succinate XL 50 MG 24 hr tablet  Commonly known as:  TOPROL-XL  What changed:    · medication strength  · how much to take   50 mg, Oral, Daily   Start Date:  January 11, 2020        Continue These Medications      Instructions Start Date   omeprazole 20 MG capsule  Commonly known as:   priLOSEC   TAKE ONE CAPSULE BY MOUTH DAILY      simvastatin 20 MG tablet  Commonly known as:  ZOCOR   20 mg, Oral, Nightly      Zinc 50 MG capsule   50 mg, Oral, Daily         Stop These Medications    warfarin 2 MG tablet  Commonly known as:  COUMADIN            Last Lab Results:   Lab Results (most recent)     Procedure Component Value Units Date/Time    Troponin [[id number]]  (Abnormal) Collected:  01/10/20 0847    Specimen:  Blood Updated:  01/10/20 0926     Troponin T 0.070 ng/mL     Narrative:       Troponin T Reference Range:  <= 0.03 ng/mL-   Negative for AMI  >0.03 ng/mL-     Abnormal for myocardial necrosis.  Clinicians would have to utilize clinical acumen, EKG, Troponin and serial changes to determine if it is an Acute Myocardial Infarction or myocardial injury due to an underlying chronic condition.       Results may be falsely decreased if patient taking Biotin.      Protime-INR [[id number]]  (Abnormal) Collected:  01/10/20 0549    Specimen:  Blood Updated:  01/10/20 0637     Protime 16.0 Seconds      INR 1.30    Narrative:       Therapeutic Ranges for INR: 2.0-3.0 (PT 20-30)                              2.5-3.5 (PT 25-34)    Troponin [[id number]]  (Abnormal) Collected:  01/10/20 0549    Specimen:  Blood Updated:  01/10/20 0634     Troponin T 0.065 ng/mL     Narrative:       Troponin T Reference Range:  <= 0.03 ng/mL-   Negative for AMI  >0.03 ng/mL-     Abnormal for myocardial necrosis.  Clinicians would have to utilize clinical acumen, EKG, Troponin and serial changes to determine if it is an Acute Myocardial Infarction or myocardial injury due to an underlying chronic condition.       Results may be falsely decreased if patient taking Biotin.      Procalcitonin [[id number]]  (Abnormal) Collected:  01/10/20 0549    Specimen:  Blood Updated:  01/10/20 0625     Procalcitonin 0.05 ng/mL     Narrative:       Results may be falsely decreased if patient taking Biotin.     TSH [[id number]]  (Normal) Collected:   01/10/20 0549    Specimen:  Blood Updated:  01/10/20 0625     TSH 0.991 uIU/mL     BNP [[id number]]  (Abnormal) Collected:  01/10/20 0549    Specimen:  Blood Updated:  01/10/20 0623     proBNP 11,108.0 pg/mL     Narrative:       Among patients with dyspnea, NT-proBNP is highly sensitive for the detection of acute congestive heart failure. In addition NT-proBNP of <300 pg/ml effectively rules out acute congestive heart failure with 99% negative predictive value.    Results may be falsely decreased if patient taking Biotin.      CK [[id number]]  (Normal) Collected:  01/10/20 0549    Specimen:  Blood Updated:  01/10/20 0621     Creatine Kinase 179 U/L     Comprehensive Metabolic Panel [[id number]]  (Abnormal) Collected:  01/10/20 0549    Specimen:  Blood Updated:  01/10/20 0621     Glucose 70 mg/dL      BUN 10 mg/dL      Creatinine 0.62 mg/dL      Sodium 140 mmol/L      Potassium 3.2 mmol/L      Chloride 105 mmol/L      CO2 21.7 mmol/L      Calcium 9.3 mg/dL      Total Protein 6.0 g/dL      Albumin 3.40 g/dL      ALT (SGPT) 13 U/L      AST (SGOT) 25 U/L      Alkaline Phosphatase 60 U/L      Total Bilirubin 2.0 mg/dL      eGFR Non African Amer 96 mL/min/1.73      Globulin 2.6 gm/dL      A/G Ratio 1.3 g/dL      BUN/Creatinine Ratio 16.1     Anion Gap 13.3 mmol/L     Narrative:       GFR Normal >60  Chronic Kidney Disease <60  Kidney Failure <15      Lipid Panel [[id number]] Collected:  01/10/20 0549    Specimen:  Blood Updated:  01/10/20 0621     Total Cholesterol 106 mg/dL      Triglycerides 69 mg/dL      HDL Cholesterol 40 mg/dL      LDL Cholesterol  52 mg/dL      VLDL Cholesterol 13.8 mg/dL      LDL/HDL Ratio 1.31    Narrative:       Cholesterol Reference Ranges  (U.S. Department of Health and Human Services ATP III Classifications)    Desirable          <200 mg/dL  Borderline High    200-239 mg/dL  High Risk          >240 mg/dL      Triglyceride Reference Ranges  (U.S. Department of Health and Human Services ATP III  Classifications)    Normal           <150 mg/dL  Borderline High  150-199 mg/dL  High             200-499 mg/dL  Very High        >500 mg/dL    HDL Reference Ranges  (U.S. Department of Health and Human Services ATP III Classifcations)    Low     <40 mg/dl (major risk factor for CHD)  High    >60 mg/dl ('negative' risk factor for CHD)        LDL Reference Ranges  (U.S. Department of Health and Human Services ATP III Classifcations)    Optimal          <100 mg/dL  Near Optimal     100-129 mg/dL  Borderline High  130-159 mg/dL  High             160-189 mg/dL  Very High        >189 mg/dL    Ferritin [[id number]]  (Normal) Collected:  01/10/20 0549    Specimen:  Blood Updated:  01/10/20 0621     Ferritin 109.00 ng/mL     Narrative:       Results may be falsely decreased if patient taking Biotin.      Uric Acid [[id number]]  (Abnormal) Collected:  01/10/20 0549    Specimen:  Blood Updated:  01/10/20 0621     Uric Acid 5.9 mg/dL     Magnesium [[id number]]  (Abnormal) Collected:  01/10/20 0549    Specimen:  Blood Updated:  01/10/20 0621     Magnesium 1.1 mg/dL     Phosphorus [[id number]]  (Normal) Collected:  01/10/20 0549    Specimen:  Blood Updated:  01/10/20 0621     Phosphorus 2.5 mg/dL     Ammonia [[id number]]  (Normal) Collected:  01/10/20 0549    Specimen:  Blood Updated:  01/10/20 0616     Ammonia 29 umol/L     Lactic Acid, Plasma [[id number]]  (Normal) Collected:  01/10/20 0549    Specimen:  Blood Updated:  01/10/20 0613     Lactate 1.2 mmol/L     Hemoglobin A1c [[id number]]  (Normal) Collected:  01/10/20 0549    Specimen:  Blood Updated:  01/10/20 0609     Hemoglobin A1C 5.50 %     Narrative:       Hemoglobin A1C Ranges:    Increased Risk for Diabetes  5.7% to 6.4%  Diabetes                     >= 6.5%  Diabetic Goal                < 7.0%    Sedimentation Rate [[id number]]  (Normal) Collected:  01/10/20 0549    Specimen:  Blood Updated:  01/10/20 0602     Sed Rate 12 mm/hr     CBC Auto Differential [[id number]]   (Abnormal) Collected:  01/10/20 0549    Specimen:  Blood Updated:  01/10/20 0601     WBC 5.84 10*3/mm3      RBC 4.02 10*6/mm3      Hemoglobin 11.5 g/dL      Hematocrit 35.3 %      MCV 87.8 fL      MCH 28.6 pg      MCHC 32.6 g/dL      RDW 16.2 %      RDW-SD 51.8 fl      MPV 12.8 fL      Platelets 150 10*3/mm3      Neutrophil % 75.9 %      Lymphocyte % 15.1 %      Monocyte % 6.3 %      Eosinophil % 1.5 %      Basophil % 0.9 %      Immature Grans % 0.3 %      Neutrophils, Absolute 4.43 10*3/mm3      Lymphocytes, Absolute 0.88 10*3/mm3      Monocytes, Absolute 0.37 10*3/mm3      Eosinophils, Absolute 0.09 10*3/mm3      Basophils, Absolute 0.05 10*3/mm3      Immature Grans, Absolute 0.02 10*3/mm3      nRBC 0.0 /100 WBC     Vitamin B12 [[id number]] Collected:  01/10/20 0549    Specimen:  Blood Updated:  01/10/20 0552    Folate [[id number]] Collected:  01/10/20 0549    Specimen:  Blood Updated:  01/10/20 0552    C-reactive Protein [[id number]] Collected:  01/10/20 0549    Specimen:  Blood Updated:  01/10/20 0552    Ethanol [[id number]] Collected:  01/09/20 2052    Specimen:  Blood Updated:  01/10/20 0027     Ethanol <10 mg/dL      Ethanol % <0.010 %     Urinalysis, Microscopic Only - Urine, Catheter [[id number]]  (Abnormal) Collected:  01/09/20 2244    Specimen:  Urine, Catheter Updated:  01/09/20 2258     RBC, UA 0-2 /HPF      WBC, UA None Seen /HPF      Bacteria, UA Trace /HPF      Squamous Epithelial Cells, UA 0-2 /HPF      Hyaline Casts, UA None Seen /LPF      Amorphous Crystals, UA Moderate/2+ /HPF      Methodology Manual Light Microscopy    Urinalysis With Microscopic If Indicated (No Culture) - Urine, Catheter [[id number]]  (Abnormal) Collected:  01/09/20 2244    Specimen:  Urine, Catheter Updated:  01/09/20 2251     Color, UA Yellow     Appearance, UA Clear     pH, UA 6.0     Specific Gravity, UA 1.020     Glucose, UA Negative     Ketones, UA 40 mg/dL (2+)     Bilirubin, UA Negative     Blood, UA Small (1+)     Protein,  UA 30 mg/dL (1+)     Leuk Esterase, UA Negative     Nitrite, UA Negative     Urobilinogen, UA 0.2 E.U./dL    TSH [[id number]]  (Normal) Collected:  01/09/20 2052    Specimen:  Blood Updated:  01/09/20 2134     TSH 1.520 uIU/mL     Comprehensive Metabolic Panel [[id number]]  (Abnormal) Collected:  01/09/20 2052    Specimen:  Blood Updated:  01/09/20 2124     Glucose 95 mg/dL      BUN 14 mg/dL      Creatinine 0.74 mg/dL      Sodium 139 mmol/L      Potassium 3.8 mmol/L      Chloride 100 mmol/L      CO2 23.8 mmol/L      Calcium 10.5 mg/dL      Total Protein 7.4 g/dL      Albumin 4.00 g/dL      ALT (SGPT) 17 U/L      AST (SGOT) 26 U/L      Alkaline Phosphatase 74 U/L      Total Bilirubin 2.0 mg/dL      eGFR Non African Amer 79 mL/min/1.73      Globulin 3.4 gm/dL      A/G Ratio 1.2 g/dL      BUN/Creatinine Ratio 18.9     Anion Gap 15.2 mmol/L     Narrative:       GFR Normal >60  Chronic Kidney Disease <60  Kidney Failure <15      Protime-INR [[id number]]  (Abnormal) Collected:  01/09/20 2052    Specimen:  Blood Updated:  01/09/20 2117     Protime 15.5 Seconds      INR 1.25    Narrative:       Therapeutic Ranges for INR: 2.0-3.0 (PT 20-30)                              2.5-3.5 (PT 25-34)    CBC & Differential [[id number]] Collected:  01/09/20 2052    Specimen:  Blood Updated:  01/09/20 2107    Narrative:       The following orders were created for panel order CBC & Differential.  Procedure                               Abnormality         Status                     ---------                               -----------         ------                     CBC Auto Differential[[id number]]        Abnormal            Final result                 Please view results for these tests on the individual orders.    CBC Auto Differential [[id number]]  (Abnormal) Collected:  01/09/20 2052    Specimen:  Blood Updated:  01/09/20 2107     WBC 9.01 10*3/mm3      RBC 4.85 10*6/mm3      Hemoglobin 13.9 g/dL      Hematocrit 42.3 %      MCV 87.2 fL       MCH 28.7 pg      MCHC 32.9 g/dL      RDW 16.2 %      RDW-SD 51.2 fl      MPV 12.5 fL      Platelets 188 10*3/mm3      Neutrophil % 82.4 %      Lymphocyte % 9.9 %      Monocyte % 6.9 %      Eosinophil % 0.0 %      Basophil % 0.4 %      Immature Grans % 0.4 %      Neutrophils, Absolute 7.42 10*3/mm3      Lymphocytes, Absolute 0.89 10*3/mm3      Monocytes, Absolute 0.62 10*3/mm3      Eosinophils, Absolute 0.00 10*3/mm3      Basophils, Absolute 0.04 10*3/mm3      Immature Grans, Absolute 0.04 10*3/mm3      nRBC 0.0 /100 WBC         Imaging Results (Most Recent)     Procedure Component Value Units Date/Time    MRI Brain Without Contrast [[id number]] Collected:  01/10/20 1207     Updated:  01/10/20 1209    Narrative:       MRI Brain WO    INDICATION:   Decreased alertness. Status post fall 1/9/2020.    TECHNIQUE:   MRI of the brain without IV contrast.    COMPARISON:    Head CT 1/9/2020    FINDINGS:  Diffuse prominence of ventricles, sulci and basilar cisterns compatible with generalized atrophy. Foci of increased T2 and FLAIR signal within the left basal ganglia and left insular cortex with corresponding restricted diffusion and low signal on the  ADC map consistent with acute infarct.    Increased T2 and FLAIR signal within the periventricular white matter which is nonspecific but may reflect chronic microvascular disease. No mass, mass effect or midline shift. No hemorrhage or abnormal extra axial fluid collections. Normal intracranial  flow voids.    Bony calvarium, skull base and mastoids unremarkable. Bilateral maxillary and left ethmoid sinus mucosal disease.      Impression:       Acute infarct left basal ganglia and left insular cortex in the distribution of the left MCA.    Ventriculomegaly with increased T2 signal within the periventricular white matter. Though this could be indicative of NPH this is most likely the sequela of chronic microvascular disease. Correlate with patient's clinical presentation.   Miguel A relates  that the patient does not have any clinical features of NPH.    Bilateral maxillary and left ethmoid sinus mucosal disease.    Results called to Dr. Grady at [facility] ICU at the time of this dictation.    Signer Name: DIANE Rodriguez MD   Signed: 1/10/2020 12:07 PM   Workstation Name: [id number]    [facility]    CT Head Without Contrast [[id number]] Collected:  01/09/20 2131     Updated:  01/09/20 2211    Narrative:       CT Head WO    HISTORY:   Postural hypotension after falling earlier today    TECHNIQUE:   Axial unenhanced head CT. Radiation dose reduction techniques included automated exposure control or exposure modulation based on body size. Count of known CT and cardiac nuc med studies performed in previous 12 months: 0.     Time of scan: 9:24 PM    COMPARISON:   None.    FINDINGS:   No intracranial hemorrhage, mass, or infarct. No hydrocephalus or extra-axial fluid collection. There are senescent changes, including volume loss and nonspecific white matter change, but no acute abnormality is seen. Ventricular enlargement is somewhat  out of proportion to the sulci. Consider normal pressure hydrocephalus in the appropriate clinical setting. The skull base, calvarium, and extracranial soft tissues are normal.      Impression:       Senescent changes without acute abnormality. Somewhat disproportional ventriculomegaly is noted. Correlate clinically for evidence of normal pressure hydrocephalus.          Signer Name: Anthony Vidal MD   Signed: 1/9/2020 9:31 PM   Workstation Name: [id number]    [facility]    XR Chest 1 View [[id number]] Collected:  01/09/20 2147     Updated:  01/09/20 2210    Narrative:       CR Chest 1 Vw    INDICATION:   Low blood pressure. Patient fell today.     COMPARISON:    12/31/2019    FINDINGS:  Single portable AP view(s) of the chest.  Cardiomegaly without failure. Blunting of both CP angles may indicate small  amount pleural fluid right greater than left. Postsurgical changes right proximal humerus from prior ORIF. No focal consolidation. Old  left rib fractures.      Impression:       Cardiomegaly with probable small bilateral pleural effusions right greater than left.    Signer Name: DIANE Rodriguez MD   Signed: 1/9/2020 9:47 PM   Workstation Name: [id number]    [facility]          PROCEDURES      Condition on Discharge: Stable    Physical Exam at Discharge  Vital Signs  Temp:  [97.5 °F (36.4 °C)-98.4 °F (36.9 °C)] 98.1 °F (36.7 °C)  Heart Rate:  [] 99  Resp:  [15-22] 18  BP: ()/() 121/83    Physical Exam:  Physical Exam   Constitutional: Patient appears older than stated age and in no acute distress   Cardiovascular: IRR, S1 normal and S2 normal.  No murmur heard.  Pulmonary/Chest: Lungs are diminished to auscultation bilaterally. No respiratory distress. No wheezes. No rhonchi. No rales.   Abdominal: Soft. Bowel sounds are normal. There is no tenderness.   Extremities: No edema.   Neurological: Cranial nerves II-XII are grossly intact with no focal deficits.    Discharge Disposition  [facility]    Visiting Nurse:    No     Home PT/OT:  No     Home Safety Evaluation:  No     DME  None    Discharge Diet:      Dietary Orders (From admission, onward)     Start     Ordered    01/10/20 0833  NPO Diet  Diet Effective Now      01/10/20 0832                Activity at Discharge:  Bedrest    Follow-up Appointments  Future Appointments   Date Time Provider Department Center   1/29/2020  2:30 PM Nahun Ann Jr., DO [facility]         Test Results Pending at Discharge   Order Current Status    Magnesium In process    Potassium In process           Patrick Anderson MD  01/10/20  1:27 PM

## 2020-01-11 ENCOUNTER — APPOINTMENT (OUTPATIENT)
Dept: MRI IMAGING | Facility: HOSPITAL | Age: 65
End: 2020-01-11

## 2020-01-11 ENCOUNTER — APPOINTMENT (OUTPATIENT)
Dept: CT IMAGING | Facility: HOSPITAL | Age: 65
End: 2020-01-11

## 2020-01-11 LAB
ANION GAP SERPL CALCULATED.3IONS-SCNC: 14.9 MMOL/L (ref 5–15)
BUN BLD-MCNC: 11 MG/DL (ref 8–23)
BUN/CREAT SERPL: 18 (ref 7–25)
CALCIUM SPEC-SCNC: 8.8 MG/DL (ref 8.6–10.5)
CHLORIDE SERPL-SCNC: 103 MMOL/L (ref 98–107)
CO2 SERPL-SCNC: 21.1 MMOL/L (ref 22–29)
CREAT BLD-MCNC: 0.61 MG/DL (ref 0.57–1)
DEPRECATED RDW RBC AUTO: 42.5 FL (ref 37–54)
ERYTHROCYTE [DISTWIDTH] IN BLOOD BY AUTOMATED COUNT: 13.9 % (ref 12.3–15.4)
GFR SERPL CREATININE-BSD FRML MDRD: 98 ML/MIN/1.73
GLUCOSE BLD-MCNC: 90 MG/DL (ref 65–99)
HCT VFR BLD AUTO: 34.7 % (ref 34–46.6)
HGB BLD-MCNC: 11.3 G/DL (ref 12–15.9)
MCH RBC QN AUTO: 28 PG (ref 26.6–33)
MCHC RBC AUTO-ENTMCNC: 32.6 G/DL (ref 31.5–35.7)
MCV RBC AUTO: 85.9 FL (ref 79–97)
PLATELET # BLD AUTO: 164 10*3/MM3 (ref 140–450)
PMV BLD AUTO: 12.4 FL (ref 6–12)
POTASSIUM BLD-SCNC: 3.6 MMOL/L (ref 3.5–5.2)
RBC # BLD AUTO: 4.04 10*6/MM3 (ref 3.77–5.28)
SODIUM BLD-SCNC: 139 MMOL/L (ref 136–145)
WBC NRBC COR # BLD: 4.47 10*3/MM3 (ref 3.4–10.8)

## 2020-01-11 PROCEDURE — 93005 ELECTROCARDIOGRAM TRACING: CPT | Performed by: PSYCHIATRY & NEUROLOGY

## 2020-01-11 PROCEDURE — 97162 PT EVAL MOD COMPLEX 30 MIN: CPT

## 2020-01-11 PROCEDURE — 25010000002 ONDANSETRON PER 1 MG: Performed by: INTERNAL MEDICINE

## 2020-01-11 PROCEDURE — 97530 THERAPEUTIC ACTIVITIES: CPT

## 2020-01-11 PROCEDURE — 25010000002 ENOXAPARIN PER 10 MG: Performed by: NURSE PRACTITIONER

## 2020-01-11 PROCEDURE — 93010 ELECTROCARDIOGRAM REPORT: CPT | Performed by: INTERNAL MEDICINE

## 2020-01-11 PROCEDURE — 70547 MR ANGIOGRAPHY NECK W/O DYE: CPT

## 2020-01-11 PROCEDURE — 25010000002 PROMETHAZINE PER 50 MG: Performed by: NURSE PRACTITIONER

## 2020-01-11 PROCEDURE — 99233 SBSQ HOSP IP/OBS HIGH 50: CPT | Performed by: NURSE PRACTITIONER

## 2020-01-11 PROCEDURE — 99223 1ST HOSP IP/OBS HIGH 75: CPT | Performed by: PSYCHIATRY & NEUROLOGY

## 2020-01-11 PROCEDURE — 70544 MR ANGIOGRAPHY HEAD W/O DYE: CPT

## 2020-01-11 PROCEDURE — 70551 MRI BRAIN STEM W/O DYE: CPT

## 2020-01-11 PROCEDURE — 85027 COMPLETE CBC AUTOMATED: CPT | Performed by: INTERNAL MEDICINE

## 2020-01-11 PROCEDURE — 25010000002 DIGOXIN PER 500 MCG: Performed by: NURSE PRACTITIONER

## 2020-01-11 PROCEDURE — 25010000002 ENOXAPARIN PER 10 MG: Performed by: INTERNAL MEDICINE

## 2020-01-11 PROCEDURE — 80048 BASIC METABOLIC PNL TOTAL CA: CPT | Performed by: INTERNAL MEDICINE

## 2020-01-11 PROCEDURE — 70450 CT HEAD/BRAIN W/O DYE: CPT

## 2020-01-11 RX ORDER — CLOPIDOGREL BISULFATE 75 MG/1
75 TABLET ORAL DAILY
Status: DISCONTINUED | OUTPATIENT
Start: 2020-01-11 | End: 2020-01-13

## 2020-01-11 RX ORDER — ATORVASTATIN CALCIUM 80 MG/1
80 TABLET, FILM COATED ORAL NIGHTLY
Status: DISCONTINUED | OUTPATIENT
Start: 2020-01-11 | End: 2020-01-16 | Stop reason: HOSPADM

## 2020-01-11 RX ORDER — DIGOXIN 0.25 MG/ML
125 INJECTION INTRAMUSCULAR; INTRAVENOUS
Status: DISCONTINUED | OUTPATIENT
Start: 2020-01-12 | End: 2020-01-13

## 2020-01-11 RX ORDER — DIGOXIN 0.25 MG/ML
250 INJECTION INTRAMUSCULAR; INTRAVENOUS ONCE
Status: COMPLETED | OUTPATIENT
Start: 2020-01-11 | End: 2020-01-11

## 2020-01-11 RX ORDER — PROMETHAZINE HYDROCHLORIDE 25 MG/ML
12.5 INJECTION, SOLUTION INTRAMUSCULAR; INTRAVENOUS ONCE
Status: COMPLETED | OUTPATIENT
Start: 2020-01-11 | End: 2020-01-11

## 2020-01-11 RX ORDER — ASPIRIN 81 MG/1
81 TABLET, CHEWABLE ORAL DAILY
Status: DISCONTINUED | OUTPATIENT
Start: 2020-01-11 | End: 2020-01-13

## 2020-01-11 RX ORDER — METOPROLOL SUCCINATE 50 MG/1
50 TABLET, EXTENDED RELEASE ORAL ONCE
Status: DISCONTINUED | OUTPATIENT
Start: 2020-01-11 | End: 2020-01-11

## 2020-01-11 RX ADMIN — ONDANSETRON 4 MG: 2 INJECTION INTRAMUSCULAR; INTRAVENOUS at 10:34

## 2020-01-11 RX ADMIN — PANTOPRAZOLE SODIUM 40 MG: 40 TABLET, DELAYED RELEASE ORAL at 06:49

## 2020-01-11 RX ADMIN — ENOXAPARIN SODIUM 60 MG: 60 INJECTION SUBCUTANEOUS at 22:29

## 2020-01-11 RX ADMIN — ENOXAPARIN SODIUM 60 MG: 60 INJECTION SUBCUTANEOUS at 09:18

## 2020-01-11 RX ADMIN — METOPROLOL SUCCINATE 50 MG: 50 TABLET, FILM COATED, EXTENDED RELEASE ORAL at 09:18

## 2020-01-11 RX ADMIN — DIGOXIN 250 MCG: 0.25 INJECTION INTRAMUSCULAR; INTRAVENOUS at 18:23

## 2020-01-11 RX ADMIN — PROMETHAZINE HYDROCHLORIDE 12.5 MG: 25 INJECTION INTRAMUSCULAR; INTRAVENOUS at 14:07

## 2020-01-11 NOTE — CONSULTS
"NEUROLOGY CONSULT - STROKE TEAM    DOS: 2020  NAME: Camille Pantoja   : 1955  PCP: Nahun Ann Jr., DO  CC: Stroke  Referring MD: Cain Mcneil MD  Patient being seen at request of Dr. Mcneil for advice and opinion on worsening slurred speech.    Neurological Problem and Interval History:  64 y.o. female presents with chief complaint of: \"Feel terrible\"  Patient with a recent left MCA distribution stroke at [facility] with no residual deficits here for a cardiac cath yesterday and post procedure with no significant symptoms. However, this morning, patient noted to have markedly worsened slurred speech and right sided weakness. Patient also feels very nauseated and vomited during exam. Patient was transferring to CT scan during initial exam and found to have right facial droop and right arm and leg weakness.  Had an MRI but not a CTA at the outside hospital. Was on warfarin for DVT but INR yesterday 1.3. In atrial fibrillation, possibly new?      Past Medical/Surgical Hx:  Past Medical History:   Diagnosis Date   • CHF (congestive heart failure) (CMS/HCC)    • Hyperlipidemia    • Hypertension    • Irregular heart beat    • Skin cancer    • Stroke (CMS/HCC)      Past Surgical History:   Procedure Laterality Date   • FINGER SURGERY     • HYSTERECTOMY     • JOINT REPLACEMENT     • ORIF SHOULDER DISLOCATION W/ HUMERAL FRACTURE     • TIBIA FRACTURE SURGERY         Review of Systems:        No fever, sweats or chills, Fall and generalized weakness  No neck pain, back pain or joint pain  No loss of hearing or tinnitus  No blurry or double vision  No rashes or edema  No chest pain or palpitations  No shortness of breath or wheezing, recent pneumonia  No diarrhea or constipation, positive for nausea and vomiting  No urinary incontinence or dysuria  No seizures or syncope  No depression or anxiety    Medications On Admission  Medications Prior to Admission   Medication Sig Dispense Refill " Last Dose   • cholecalciferol (VITAMIN D3) 25 MCG (1000 UT) tablet Take 2 tablets by mouth Daily.      • losartan (COZAAR) 50 MG tablet Take 1 tablet by mouth Every Night.      • metoprolol succinate XL (TOPROL-XL) 50 MG 24 hr tablet Take 1 tablet by mouth Daily. (Patient taking differently: Take 25 mg by mouth Daily.)      • omeprazole (priLOSEC) 20 MG capsule TAKE ONE CAPSULE BY MOUTH DAILY 90 capsule 0 Unknown at Unknown time   • simvastatin (ZOCOR) 20 MG tablet Take 1 tablet by mouth Every Night. 90 tablet 1 Unknown at Unknown time   • Zinc 50 MG capsule Take 50 mg by mouth Daily.   Unknown at Unknown time   • enoxaparin (LOVENOX) 60 MG/0.6ML solution syringe Inject 0.6 mL under the skin into the appropriate area as directed Every 12 (Twelve) Hours. Indications: Atrial Fibrillation 16.8 mL         Allergies:  Allergies   Allergen Reactions   • Cephalexin Other (See Comments)     Reports she doesn't remember having an allergic reaction.    • Citrus Rash   • Duricef [Cefadroxil] Rash   • Fish Allergy Rash       Social Hx:  Social History     Socioeconomic History   • Marital status:      Spouse name: Not on file   • Number of children: Not on file   • Years of education: Not on file   • Highest education level: Not on file   Tobacco Use   • Smoking status: Never Smoker   • Smokeless tobacco: Never Used   Substance and Sexual Activity   • Alcohol use: Yes     Comment: Reports a hx of drinking everyday for 18 years atleast. She says that she quit May 2018, but says that she drinks occasionally now.    • Drug use: No   • Sexual activity: Defer       Family Hx:  Family History   Problem Relation Age of Onset   • Breast cancer Brother    • Macular degeneration Mother    • Heart disease Mother    • Heart disease Father    • Heart disease Maternal Uncle        Review of Imaging (Interpretation of images not reports):      Laboratory Results:   Lab Results   Component Value Date    GLUCOSE 90 01/11/2020    CALCIUM  "8.8 01/11/2020     01/11/2020    K 3.6 01/11/2020    CO2 21.1 (L) 01/11/2020     01/11/2020    BUN 11 01/11/2020    CREATININE 0.61 01/11/2020    EGFRIFAFRI 75 05/29/2019    EGFRIFNONA 98 01/11/2020    BCR 18.0 01/11/2020    ANIONGAP 14.9 01/11/2020     Lab Results   Component Value Date    WBC 4.47 01/11/2020    HGB 11.3 (L) 01/11/2020    HCT 34.7 01/11/2020    MCV 85.9 01/11/2020     01/11/2020     Lab Results   Component Value Date    CHOL 106 01/10/2020     Lab Results   Component Value Date    HDL 40 01/10/2020    HDL 51 05/29/2019    HDL 78 (H) 11/20/2017     Lab Results   Component Value Date    LDL 52 01/10/2020    LDL 61 05/29/2019     (H) 11/20/2017     Lab Results   Component Value Date    TRIG 69 01/10/2020    TRIG 83 05/29/2019    TRIG 88 11/20/2017     Lab Results   Component Value Date    HGBA1C 5.50 01/10/2020     Lab Results   Component Value Date    INR 1.30 (H) 01/10/2020    INR 1.25 (H) 01/09/2020    INR 1.90 (A) 12/30/2019    PROTIME 16.0 (H) 01/10/2020    PROTIME 15.5 (H) 01/09/2020         Physical Examination:   /98 (BP Location: Right arm, Patient Position: Lying)   Pulse 114   Temp 97.8 °F (36.6 °C) (Oral)   Resp 18   Ht 165.1 cm (65\")   Wt 55.2 kg (121 lb 11.2 oz)   SpO2 98%   BMI 20.25 kg/m²   General Appearance:   Well developed, well nourished, well groomed, alert, and cooperative but ill appearing, vomiting.  HEENT: Normocephalic. Atraumatic. No JVD, no tracheal deviation.  Neck and Spine: Normal range of motion.  Normal alignment. No mass or tenderness. No bruits.  Cardiac: Regular rate and rhythm. No murmurs.  Pulmonary: No shortness of breath, clear anteriorly to auscultation  Abdomen:  Soft, non-tender, non-distended   Peripheral Vasculature: Radial pulses are equal and symmetric. No signs of distal embolization.  Extremities:    No edema or deformities.   Skin:    No rashes or discoloration    Neurological examination:  Higher " Integrative  Function: Oriented to time, place and person. Follows commands, counts fingers in all visual fields, no neglect or extinction, could read the clock and name objects.   CN II: Pupils are equal, round, and reactive to light. Blinks to visual threat and counts fingers in all fields  CN III IV VI: Extraocular movements are full without nystagmus.   CN V: Normal facial sensation   CN VII: Right facial weakness with labial dysarthria.   CN VIII:   Auditory acuity is normal.  CN IX & X:   No palatal or pharnygeal dysarthria.  CN XI: Turns head without abnormality.   CN XII:   The tongue is midline.  Slight lingual dysarthria.   Motor: Right pronator drift, slight but able to give nearly full power bilateral arms. Right leg slow to lift and drifts but able to hold off bed. Clearly weaker than left. No fasciculations, rigidity, spasticity, or abnormal movements.  Reflexes: 2+ in the upper and lower extremities. Plantar responses are flexor.  Sensation: Normal to light touch, temperature in arms and legs.   Station and Gait: Deferred for bed rest    Coordination: Finger to nose test shows no dysmetria.  Rapid alternating movements are normal.  Heel to shin normal.    NIHSS:     Diagnoses / Discussion:  64 y.o. who presents with Sx of acute left subcortical stroke. Patient with a positive MRI but had returned to normal and this worsening may indicate a thrombus with reocclusion or a new event. Will order MRI and MRAX2 to further evaluate. With negative CT for hemorrhage, if okay with cardiology would start anticoagulation. Given atrial fibrillation, would prefer eliquis or low dose heparin (no bolus). Patient's MRI lesion burden is low enough that risk of hemorrhagic stroke is minimal.     Nausea and vomiting. Without intracranial hemorrhage, it's not clear why patient abruptly began having nausea. Her EKG shows atrial fibrillation and her cardiac cath did not demonstrate a significant coronary condition. Defer  work-up to primary team.    Plan:  MRI with MRAX2 for new stroke symptoms after cardiac cath.    I have discussed the above with the patient and family.  Time spent with patient: 45min    MDM  Reviewed: previous chart, nursing note and vitals  Reviewed previous: CT scan and labs  Interpretation: labs and CT scan  Total time providing critical care: 30-74 minutes. This excludes time spent performing separately reportable procedures and services.         Johny Martin MD  Neurology    Addendum:  Patient's MRI shows minor increase in DWI changes.  MRA does show decreased flow in the left MCA and would avoid excessive hypotension as this may limit flow. Would liberalize blood pressure, to goal of >110/80.   Neurochecks per shift.  Will add aspirin and plavix to regimen.  No other acute intervention at present    Johny Martin MD MS  Neurology

## 2020-01-11 NOTE — PROGRESS NOTES
"[facility]    Patient Name: Camille Pantoja  :1955  64 y.o.  LOS: 1  Encounter Provider: JOHN Kirkpatrick      Patient Care Team:  Nahun Ann Jr., DO as PCP - General (Family Medicine)    Chief Complaint: Follow-up syncope, new onset atrial fibrillation, cardiomyopathy, recent acute stroke in the left MCA distribution    Interval History: Per nursing staff patient with new neurological changes.  She reports that when she went to assess the patient for the first time this morning she noted slurred speech, mild confusion, mild right-sided weakness.  Patient with acute left MCA distribution stroke on MRI 2020 at [facility].  Patient currently denies chest pain, shortness of breath.       Objective   Vital Signs  Temp:  [97.5 °F (36.4 °C)-98.5 °F (36.9 °C)] 97.8 °F (36.6 °C)  Heart Rate:  [] 114  Resp:  [17-18] 18  BP: ()/() 132/98    Intake/Output Summary (Last 24 hours) at 2020 1043  Last data filed at 1/10/2020 1734  Gross per 24 hour   Intake 50 ml   Output 200 ml   Net -150 ml     Flowsheet Rows      First Filed Value   Admission Height  165.1 cm (65\") Documented at 01/10/2020 1618   Admission Weight  55.6 kg (122 lb 8 oz) Documented at 01/10/2020 1618            Physical Exam   Constitutional: She is oriented to person, place, and time. Vital signs are normal. She appears well-developed and well-nourished. She is active.   Eyes: Conjunctivae are normal.   Neck: Carotid bruit is not present.   Cardiovascular: Normal rate, regular rhythm and normal heart sounds.   Pulmonary/Chest: Breath sounds normal.   Abdominal: Normal appearance.   Musculoskeletal:   No cyanosis, clubbing, edema  Normal ROM   Neurological: She is alert and oriented to person, place, and time. GCS eye subscore is 4. GCS verbal subscore is 5. GCS motor subscore is 6.   Slight right-sided pronator drift.  Right lower extremity strength 4/5.  Sensory intact.   Skin: Skin " is warm, dry and intact.   Psychiatric: She has a normal mood and affect. Her speech is normal and behavior is normal. Judgment and thought content normal. Cognition and memory are normal.       Results Review:    Results from last 7 days   Lab Units 01/11/20  0442   SODIUM mmol/L 139   POTASSIUM mmol/L 3.6   CHLORIDE mmol/L 103   CO2 mmol/L 21.1*   BUN mg/dL 11   CREATININE mg/dL 0.61   GLUCOSE mg/dL 90   CALCIUM mg/dL 8.8     Results from last 7 days   Lab Units 01/10/20  0847 01/10/20  0549   CK TOTAL U/L  --  179   TROPONIN T ng/mL 0.070* 0.065*     Results from last 7 days   Lab Units 01/11/20  0442   WBC 10*3/mm3 4.47   HEMOGLOBIN g/dL 11.3*   HEMATOCRIT % 34.7   PLATELETS 10*3/mm3 164     Results from last 7 days   Lab Units 01/10/20  0549 01/09/20  2052   INR  1.30* 1.25*     Results from last 7 days   Lab Units 01/10/20  1323   MAGNESIUM mg/dL 2.7*     Results from last 7 days   Lab Units 01/10/20  0549   CHOLESTEROL mg/dL 106   TRIGLYCERIDES mg/dL 69   HDL CHOL mg/dL 40   LDL CHOL mg/dL 52               Medication Review:     atorvastatin 10 mg Oral Daily   enoxaparin 60 mg Subcutaneous Q12H   losartan 50 mg Oral Nightly   metoprolol succinate XL 50 mg Oral Daily   pantoprazole 40 mg Oral QAM   warfarin 2.5 mg Oral Daily          sodium chloride 100 mL/hr       Assessment/Plan   1. Syncope  2. New onset atrial fibrillation  3. Cardiomyopathy  4. Recent acute left MCA distribution stroke on MRI 1/9/2020 at [facility].  Patient seen by Dr. Grady, neurology at [facility].  5. History of DVT-was on warfarin for DVT.  Initial INR subtherapeutic at 1.25.  Patient has not been therapeutic for the past month.    -When I arrived to the nursing unit patient's nurse reports that patient is had neurological changes.  She reports that night shift did not state that patient had any neurological deficits however when she went to do initial assessment she noted slurred speech, right-sided weakness, mild  confusion.  I went immediately did a personal exam and I do agree with noted slurred speech, no facial droop, mild right sided upper extremity pronator drift.  Decreased strength to the left lower extremity.  Sensation intact.  Patient denies headache.  I reviewed MRI and CT images from [facility] dated 1/9/2020.  I have ordered a stat CT head for stroke protocol.  Stat consult to neurology and I personally spoke with Dr. Martin who will come and evaluate the patient.  Unknown time of neurological change.  Due to recent acute stroke patient is not a candidate for TPA or intervention.      -New onset atrial fibrillation.  Patient rates elevated.  Will increase metoprolol succinate to 100 mg daily to start tomorrow.  Will give an additional metoprolol succinate 50 mg x 1 now.  Patient already anticoagulated with warfarin at home however she has been subtherapeutic for the past month.  Bridging with Lovenox.  Monitoring INRs.  Patient currently with subtherapeutic INR.  I do not want to increase warfarin dosing until CT head is completed to ensure the patient does not have any type of intracranial bleeding.    -Cardiac catheterization yesterday did not reveal any obstructive coronary disease.    -LVEF 27%.  Optimize goal-directed medical therapy.  Patient on metoprolol succinate, losartan.  Appears euvolemic and is not in need of diuretics at this time.    JOHN Kirkpatrick  [facility]  01/11/20  10:43 AM     ADDENDUM:  01/11/20  11:47 AM    Dr. Martin, stroke neurology on floor.  He had at discussed patient's care face-to-face.  Patient is now evidently actively vomiting despite receiving Zofran.  I am unsure what the source of her vomiting is from.  CT head was fairly unremarkable.  He has ordered repeat MRI/MRA imaging of the brain as he does suspect that this could be a new event.  He has recommended apixaban 5 mg twice daily given patient's atrial fibrillation and the fact that her INR was  subtherapeutic for so long.  I definitely agree with this recommendation.  Patient received full dose enoxaparin at this morning.  Will start apixaban 5 mg twice daily tomorrow.  We will give one-time dose of Phenergan 12.5 mg now for active vomiting.  I am hopeful that we will not need to use this routinely as I do not want a mask any changes in her neurological status, however we obviously need her still to be able to perform an MRI/MRA imaging.

## 2020-01-11 NOTE — PLAN OF CARE
Problem: Patient Care Overview  Goal: Plan of Care Review  Outcome: Ongoing (interventions implemented as appropriate)  Flowsheets (Taken 1/11/2020 1962)  Plan of Care Reviewed With: patient  Note:   Order received for swallow evaluation. Patient s/p L MCA CVA 1/9. Patient presented with neurological decline today. RN reported that patient has been vomiting as well. When SLP entered room patient was asleep and there was vomit in her bucket - SLP emptied. It took moderate cues to wake patient. Processing was judged to be slow. Speech was slurred. Patient followed commands for oral [facility] exam. She was generally weak and right sided weakness noted when she smiled. Patient could not swallow on command. Patient given one ice chip - she was able to manipulate and swallow. Swallow appeared mistimed. Patient then began to vomit. No further trials attempted. RN present. Patient does not appear safe for PO intake at this time given neurological decline. Recommend NPO and meds via alternate route. Give meds whole with puree if they must be given orally. SLP to re-evaluate patient on Monday.

## 2020-01-11 NOTE — THERAPY EVALUATION
Patient Name: Camille Pantoja  : 1955    MRN: [id number]                              Today's Date: 2020       Admit Date: 1/10/2020    Visit Dx:     ICD-10-CM ICD-9-CM   1. Atrial fibrillation, unspecified type (CMS/HCC) I48.91 427.31     Patient Active Problem List   Diagnosis   • Deep vein thrombosis of lower extremity (CMS/HCC)   • Hyperlipidemia   • Hypertension   • Vitamin D deficiency   • Osteoarthritis   • Overweight (BMI 25.0-29.9)   • Paroxysmal atrial fibrillation with rapid ventricular response (CMS/HCC)   • Chronic atrial fibrillation with rapid ventricular response   • Atrial fibrillation (CMS/HCC)   • Metabolic encephalopathy   • Cerebral ventriculomegaly   • Acute ischemic left MCA stroke (CMS/HCC)     Past Medical History:   Diagnosis Date   • CHF (congestive heart failure) (CMS/HCC)    • Hyperlipidemia    • Hypertension    • Irregular heart beat    • Skin cancer    • Stroke (CMS/HCC)      Past Surgical History:   Procedure Laterality Date   • FINGER SURGERY     • HYSTERECTOMY     • JOINT REPLACEMENT     • ORIF SHOULDER DISLOCATION W/ HUMERAL FRACTURE     • TIBIA FRACTURE SURGERY       General Information     Row Name 20 0949          PT Evaluation Time/Intention    Document Type  evaluation  -MR     Mode of Treatment  physical therapy  -MR     Row Name 2049          General Information    Patient Profile Reviewed?  yes  -MR     Prior Level of Function  independent:;all household mobility;ADL's uses RW and cane as needed  -MR     Existing Precautions/Restrictions  fall  -MR     Row Name 2049          Relationship/Environment    Lives With  alone  -MR     Row Name 2049          Resource/Environmental Concerns    Current Living Arrangements  home/apartment/condo [address] with basement  -MR     Row Name 2049          Cognitive Assessment/Intervention- PT/OT    Orientation Status (Cognition)  oriented x 4  -MR     Cognitive Assessment/Intervention  Comment  Oriented, but demonstrating delayed processing and requiring increased time for recall.  Pt also reporting she has been up multiple times this morning and RN reporting that she has not been up yet.  -MR     Row Name 01/11/20 0949          Safety Issues, Functional Mobility    Safety Issues Affecting Function (Mobility)  awareness of need for assistance;impulsivity;insight into deficits/self awareness;safety precaution awareness  -MR     Impairments Affecting Function (Mobility)  balance;cognition;endurance/activity tolerance;muscle tone abnormal  -MR       User Key  (r) = Recorded By, (t) = Taken By, (c) = Cosigned By    Initials Name Provider Type    MR TriplettJacqueline, PT Physical Therapist        Mobility     Row Name 01/11/20 0951          Bed Mobility Assessment/Treatment    Bed Mobility Assessment/Treatment  supine-sit;sit-supine  -MR     Supine-Sit [place] (Bed Mobility)  minimum assist (75% patient effort);2 person assist;verbal cues;nonverbal cues (demo/gesture)  -MR     Sit-Supine [place] (Bed Mobility)  minimum assist (75% patient effort);1 person assist;verbal cues;nonverbal cues (demo/gesture)  -MR     Comment (Bed Mobility)  Increased time for initiation of mobility.  -MR     Row Name 01/11/20 0951          Transfer Assessment/Treatment    Comment (Transfers)  Pt transferring to EOB and demonstrating R lateral lean.  Cues to correct throughout session.  -MR     Row Name 01/11/20 0951          Sit-Stand Transfer    Sit-Stand [place] (Transfers)  minimum assist (75% patient effort);moderate assist (50% patient effort);2 person assist  -MR     Assistive Device (Sit-Stand Transfers)  -- HHA x 2  -MR     Row Name 01/11/20 0951          Gait/Stairs Assessment/Training    Gait/Stairs Assessment/Training  gait/ambulation independence;gait pattern  -MR     [place] Level (Gait)  moderate assist (50% patient effort);maximum assist (25% patient effort);2 person assist;verbal  cues;nonverbal cues (demo/gesture)  -MR     Assistive Device (Gait)  -- HHA x 2  -MR     Distance in Feet (Gait)  15  -MR     Deviations/Abnormal Patterns (Gait)  base of support, wide;huyen decreased;stride length decreased  -MR     Right Sided Gait Deviations  leans right;heel strike decreased;weight shift ability decreased  -MR     Comment (Gait/Stairs)  Pt very impulsive with mobility.  Significant R lateral lean with all mobility that pt needed significant assist to correct.  Motor planning significantly impaired.  HR increasing to 158 bpm with pt in a-fib and pt returning to bed.  -MR       User Key  (r) = Recorded By, (t) = Taken By, (c) = Cosigned By    Initials Name Provider Type    Jacqueline Baltazar, PT Physical Therapist        Obj/Interventions     Row Name 01/11/20 0954          General ROM    GENERAL ROM COMMENTS  RUE with shoulder ROM < 90 degrees and elbow extension limited actively d/t spasticity; LUE WFL; RLE with hip flexion limitations and all other WFL; LLE WFL  -MR     Row Name 01/11/20 0954          MMT (Manual Muscle Testing)    General MMT Comments  RUE grossly < 3/5 d/t ROM deficits; LUE WFL; RLE grossly 4-/5 except hip flexion 3-/5; LLE grossly 4/5  -MR     Row Name 01/11/20 0954          Static Sitting Balance    Level of [place] (Unsupported Sitting, Static Balance)  contact guard assist  -MR     Comment (Unsupported Sitting, Static Balance)  R lateral lean pt unable to correct with verbal cues only  -MR     Row Name 01/11/20 0954          Dynamic Sitting Balance    Level of [place], Reaches Outside Midline (Sitting, Dynamic Balance)  minimal assist, 75% patient effort  -MR     Row Name 01/11/20 0954          Static Standing Balance    Level of [place] (Supported Standing, Static Balance)  moderate assist, 50 to 74% patient effort  -MR     Row Name 01/11/20 0954          Dynamic Standing Balance    Level of [place], Reaches Outside Midline (Standing, Dynamic  Balance)  maximal assist, 25 to 49% patient effort  -MR     Row Name 01/11/20 0954          Sensory Assessment/Intervention    Sensory General Assessment  -- pt reporting intact, but difficulty with localization of LT  -MR       User Key  (r) = Recorded By, (t) = Taken By, (c) = Cosigned By    Initials Name Provider Type    Jacqueline Baltazar, PT Physical Therapist        Goals/Plan     Row Name 01/11/20 1000          Bed Mobility Goal 1 (PT)    Activity/Assistive Device (Bed Mobility Goal 1, PT)  bed mobility activities, all  -MR     [place] Level/Cues Needed (Bed Mobility Goal 1, PT)  contact guard assist  -MR     Time Frame (Bed Mobility Goal 1, PT)  2 weeks  -MR     Row Name 01/11/20 1000          Transfer Goal 1 (PT)    Activity/Assistive Device (Transfer Goal 1, PT)  sit-to-stand/stand-to-sit  -MR     [place] Level/Cues Needed (Transfer Goal 1, PT)  contact guard assist  -MR     Time Frame (Transfer Goal 1, PT)  2 weeks  -MR     Row Name 01/11/20 1000          Gait Training Goal 1 (PT)    Activity/Assistive Device (Gait Training Goal 1, PT)  gait (walking locomotion)  -MR     [place] Level (Gait Training Goal 1, PT)  minimum assist (75% or more patient effort)  -MR     Distance (Gait Goal 1, PT)  40  -MR     Time Frame (Gait Training Goal 1, PT)  2 weeks  -MR       User Key  (r) = Recorded By, (t) = Taken By, (c) = Cosigned By    Initials Name Provider Type    Jacqueline Baltazar, PT Physical Therapist        Clinical Impression     Row Name 01/11/20 0957          Plan of Care Review    Plan of Care Reviewed With  patient  -MR     Outcome Summary  Pt presents with significant ROM, strength, and mobility deficits following L hemisphere CVA and admission for a-fib.  Pt typically lives alone and is independent with her RW or cane.  Pt currently demonstrating R lateral lean with all mobility, slurred speech, increase in RUE tone, delayed processing and requiring mod-maxA x 2 to ambulate within  the room.  Pt will require skilled interdisciplinary therapy to facilitate return to OF.  Recommend acute inpatient rehab to maximize neuromotor recovery.  -     Row Name 01/11/20 0957          Physical Therapy Clinical Impression    Criteria for Skilled Interventions Met (PT Clinical Impression)  yes  -MR     Rehab Potential (PT Clinical Summary)  good, to achieve stated therapy goals  -MR Weiss Name 01/11/20 0957          Positioning and Restraints    Pre-Treatment Position  in bed  -MR     Post Treatment Position  bed  -MR     In Bed  notified nsg;call light within reach;exit alarm on  -MR       User Key  (r) = Recorded By, (t) = Taken By, (c) = Cosigned By    Initials Name Provider Type    Jacqueline Baltazar, PT Physical Therapist        Outcome Measures     Row Name 01/11/20 1003          How much help from another person do you currently need...    Turning from your back to your side while in flat bed without using bedrails?  3  -MR     Moving from lying on back to sitting on the side of a flat bed without bedrails?  3  -MR     Moving to and from a bed to a chair (including a wheelchair)?  2  -MR     Standing up from a chair using your arms (e.g., wheelchair, bedside chair)?  2  -MR     Climbing 3-5 steps with a railing?  1  -MR     To walk in hospital room?  2  -MR     AM-PAC 6 Clicks Score (PT)  13  -MR Weiss Name 01/11/20 1003          Modified [place] Scale    Pre-Stroke Modified [place] Scale  0 - No Symptoms at all.  -MR     Modified [place] Scale  4 - Moderately severe disability.  Unable to walk without assistance, and unable to attend to own bodily needs without assistance.  -     Row Name 01/11/20 1003          Functional Assessment    Outcome Measure Options  AM-PAC 6 Clicks Basic Mobility (PT);Modified [place]  -MR       User Key  (r) = Recorded By, (t) = Taken By, (c) = Cosigned By    Initials Name Provider Type     Jacqueline Triplett, PT Physical Therapist          PT Recommendation and  Plan     Outcome Summary/Treatment Plan (PT)  Anticipated Discharge Disposition (PT): inpatient rehabilitation facility  Plan of Care Reviewed With: patient  Outcome Summary: Pt presents with significant ROM, strength, and mobility deficits following L hemisphere CVA and admission for a-fib.  Pt typically lives alone and is independent with her RW or cane.  Pt currently demonstrating R lateral lean with all mobility, slurred speech, increase in RUE tone, delayed processing and requiring mod-maxA x 2 to ambulate within the room.  Pt will require skilled interdisciplinary therapy to facilitate return to OF.  Recommend acute inpatient rehab to maximize neuromotor recovery.     Time Calculation:   PT Charges     Row Name 01/11/20 1006             Time Calculation    Start Time  0927  -MR      Stop Time  0945  -MR      Time Calculation (min)  18 min  -MR      PT Received On  01/11/20  -MR      PT - Next Appointment  01/12/20  -MR      PT Goal Re-Cert Due Date  01/25/20  -         Time Calculation- PT    Total Timed Code Minutes- PT  10 minute(s)  -MR        User Key  (r) = Recorded By, (t) = Taken By, (c) = Cosigned By    Initials Name Provider Type    Jacqueline Baltazar, FRAN Physical Therapist        Therapy Charges for Today     Code Description Service Date Service Provider Modifiers Qty    97624584196 HC PT THER SUPP EA 15 MIN 1/11/2020 Jacqueline Trpilett, PT GP 1    42085260749 HC PT EVAL MOD COMPLEXITY 1 1/11/2020 Jacqueline Triplett, PT GP 1    18092026742 HC PT THERAPEUTIC ACT EA 15 MIN 1/11/2020 Jacqueline Triplett, PT GP 1          PT G-Codes  Outcome Measure Options: AM-PAC 6 Clicks Basic Mobility (PT), Modified [place]  AM-PAC 6 Clicks Score (PT): 13  Modified [place] Scale: 4 - Moderately severe disability.  Unable to walk without assistance, and unable to attend to own bodily needs without assistance.    Jacqueline Triplett PT  1/11/2020

## 2020-01-11 NOTE — PLAN OF CARE
Problem: Patient Care Overview  Goal: Plan of Care Review  Outcome: Ongoing (interventions implemented as appropriate)  Flowsheets  Taken 1/11/2020 1005  Plan of Care Reviewed With: patient  Taken 1/11/2020 3600  Outcome Summary: Pt presents with significant ROM, strength, and mobility deficits following L hemisphere CVA and admission for a-fib.  Pt typically lives alone and is independent with her RW or cane.  Pt currently demonstrating R lateral lean with all mobility, slurred speech, increase in RUE tone, delayed processing and requiring mod-maxA x 2 to ambulate within the room.  Pt will require skilled interdisciplinary therapy to facilitate return to PLOF.  Recommend acute inpatient rehab to maximize neuromotor recovery.

## 2020-01-11 NOTE — NURSING NOTE
Case Management Discharge Note      Final Note: Discharged to short term hospital         Destination      No service has been selected for the patient.      Durable Medical Equipment      No service has been selected for the patient.      Dialysis/Infusion      No service has been selected for the patient.      Home Medical Care      No service has been selected for the patient.      Therapy      No service has been selected for the patient.      Community Resources      No service has been selected for the patient.             Final Discharge Disposition Code: 02 - [facility] for [facility]

## 2020-01-11 NOTE — PROGRESS NOTES
01/11/20  2:35 PM    Patient seen by speech therapy for speech evaluation with noted acute stroke and neurological changes.  Patient has failed swallow study.  Recommending no oral medications.  Stopped metoprolol succinate oral and initiated Lopressor 5 mg IV every 4 hours.  Will stop apixaban and transition back to Lovenox.      JOHN Salas  [facility]

## 2020-01-12 PROCEDURE — 25010000002 ENOXAPARIN PER 10 MG: Performed by: NURSE PRACTITIONER

## 2020-01-12 PROCEDURE — 25010000002 DIGOXIN PER 500 MCG: Performed by: NURSE PRACTITIONER

## 2020-01-12 PROCEDURE — 99232 SBSQ HOSP IP/OBS MODERATE 35: CPT | Performed by: NURSE PRACTITIONER

## 2020-01-12 PROCEDURE — 97116 GAIT TRAINING THERAPY: CPT

## 2020-01-12 PROCEDURE — 97112 NEUROMUSCULAR REEDUCATION: CPT

## 2020-01-12 PROCEDURE — 99233 SBSQ HOSP IP/OBS HIGH 50: CPT | Performed by: NURSE PRACTITIONER

## 2020-01-12 RX ORDER — ASPIRIN 300 MG/1
300 SUPPOSITORY RECTAL ONCE
Status: COMPLETED | OUTPATIENT
Start: 2020-01-12 | End: 2020-01-12

## 2020-01-12 RX ADMIN — ASPIRIN 300 MG: 300 SUPPOSITORY RECTAL at 12:28

## 2020-01-12 RX ADMIN — ENOXAPARIN SODIUM 60 MG: 60 INJECTION SUBCUTANEOUS at 11:09

## 2020-01-12 RX ADMIN — ENOXAPARIN SODIUM 60 MG: 60 INJECTION SUBCUTANEOUS at 23:39

## 2020-01-12 RX ADMIN — DIGOXIN 125 MCG: 0.25 INJECTION INTRAMUSCULAR; INTRAVENOUS at 12:28

## 2020-01-12 NOTE — PLAN OF CARE
Problem: Patient Care Overview  Goal: Plan of Care Review  Outcome: Ongoing (interventions implemented as appropriate)  Flowsheets (Taken 1/12/2020 1101)  Progress: improving  Plan of Care Reviewed With: patient  Outcome Summary: Pt with significant improvement in mobility, balance and cognition.  Pt able to perform gait training in hallway walking 40' with min/modA.  Will need continued skilled PT to improve functional mobility.

## 2020-01-12 NOTE — PROGRESS NOTES
"      [facility]    Patient Name: Camille Pantoja  :1955  64 y.o.  LOS: 2  Encounter Provider: JOHN Kirkpatrick      Patient Care Team:  Nahun Ann Jr., DO as PCP - General (Family Medicine)    Chief Complaint: Follow-up syncope, new onset atrial fibrillation, cardiomyopathy, recent acute stroke in the left MCA distribution    Interval History: Patient has not had any active vomiting since yesterday.  She is currently up in a chair.  Failed swallow screen yesterday.  All meds are currently IV.    Objective   Vital Signs  Temp:  [97.3 °F (36.3 °C)-98.3 °F (36.8 °C)] 98.3 °F (36.8 °C)  Heart Rate:  [] 82  Resp:  [16-18] 16  BP: (102-130)/(76-86) 128/78    Intake/Output Summary (Last 24 hours) at 2020 0942  Last data filed at 2020 0800  Gross per 24 hour   Intake --   Output 300 ml   Net -300 ml     Flowsheet Rows      First Filed Value   Admission Height  165.1 cm (65\") Documented at 01/10/2020 1618   Admission Weight  55.6 kg (122 lb 8 oz) Documented at 01/10/2020 1618            Physical Exam   Constitutional: She is oriented to person, place, and time. Vital signs are normal. She appears well-developed and well-nourished. She is active.   Eyes: Conjunctivae are normal.   Neck: Carotid bruit is not present.   Cardiovascular: Normal rate, regular rhythm and normal heart sounds.   Pulmonary/Chest: Breath sounds normal.   Abdominal: Normal appearance.   Musculoskeletal:   No cyanosis, clubbing, edema  Normal ROM   Neurological: She is alert and oriented to person, place, and time. GCS eye subscore is 4. GCS verbal subscore is 5. GCS motor subscore is 6.   Slight right-sided pronator drift.  Right lower extremity strength 4/5.  Sensory intact.   Skin: Skin is warm, dry and intact.   Psychiatric: She has a normal mood and affect. Her speech is normal and behavior is normal. Judgment and thought content normal. Cognition and memory are normal.       Results Review:  "   Results from last 7 days   Lab Units 01/11/20  0442   SODIUM mmol/L 139   POTASSIUM mmol/L 3.6   CHLORIDE mmol/L 103   CO2 mmol/L 21.1*   BUN mg/dL 11   CREATININE mg/dL 0.61   GLUCOSE mg/dL 90   CALCIUM mg/dL 8.8     Results from last 7 days   Lab Units 01/10/20  0847 01/10/20  0549   CK TOTAL U/L  --  179   TROPONIN T ng/mL 0.070* 0.065*     Results from last 7 days   Lab Units 01/11/20  0442   WBC 10*3/mm3 4.47   HEMOGLOBIN g/dL 11.3*   HEMATOCRIT % 34.7   PLATELETS 10*3/mm3 164     Results from last 7 days   Lab Units 01/10/20  0549 01/09/20  2052   INR  1.30* 1.25*     Results from last 7 days   Lab Units 01/10/20  1323   MAGNESIUM mg/dL 2.7*     Results from last 7 days   Lab Units 01/10/20  0549   CHOLESTEROL mg/dL 106   TRIGLYCERIDES mg/dL 69   HDL CHOL mg/dL 40   LDL CHOL mg/dL 52               Medication Review:     aspirin 81 mg Oral Daily   atorvastatin 80 mg Oral Nightly   clopidogrel 75 mg Oral Daily   digoxin 125 mcg Intravenous Daily   enoxaparin 1 mg/kg Subcutaneous Q12H   losartan 50 mg Oral Nightly   pantoprazole 40 mg Oral QAM          sodium chloride 100 mL/hr       Assessment/Plan   1. Syncope  2. New onset atrial fibrillation  3. Cardiomyopathy  4. Recent acute left MCA distribution stroke on MRI 1/9/2020 at [facility], MRI/MRA brain 1/11/2020 appears similar however size of infarct is slightly larger.  5. History of DVT-was on warfarin for DVT.  Initial INR subtherapeutic at 1.25.  Patient has not been therapeutic for the past month.    -MRI obtained yesterday is similar to MRI dated 1/9/2020 at [facility] although the size of the infarct appears that it may be slightly larger.  MRA images were difficult to interpret secondary to motion artifact.  Appreciate neurology assistance.    -Patient currently not able to take oral medications secondary to acute stroke and failed swallow screen.  Patient's blood pressure was too low for IV beta-blockers.  She is currently on digoxin 125  mcg IV daily.  Hopeful that as situation improves we can transition patient over to beta-blockers as I do not want to use digoxin long-term.  She is currently receiving enoxaparin for anticoagulation.  Stroke neurologist yesterday recommended using apixaban in the outpatient setting as patient has had subtherapeutic INRs and he believes that atrial fibrillation possibly led to her stroke.    -Cardiac catheterization 1/10/2020 did not reveal any obstructive coronary disease.    -LVEF 27%.  Optimize goal-directed medical therapy.  Patient on metoprolol succinate, losartan.  Appears euvolemic and is not in need of diuretics at this time.    JOHN Kirkpatrick  [facility]  01/12/20  9:42 AM

## 2020-01-12 NOTE — THERAPY TREATMENT NOTE
Patient Name: Camille Pantoja  : 1955    MRN: [id number]                              Today's Date: 2020       Admit Date: 1/10/2020    Visit Dx:     ICD-10-CM ICD-9-CM   1. Atrial fibrillation, unspecified type (CMS/HCC) I48.91 427.31     Patient Active Problem List   Diagnosis   • Deep vein thrombosis of lower extremity (CMS/HCC)   • Hyperlipidemia   • Hypertension   • Vitamin D deficiency   • Osteoarthritis   • Overweight (BMI 25.0-29.9)   • Paroxysmal atrial fibrillation with rapid ventricular response (CMS/HCC)   • Chronic atrial fibrillation with rapid ventricular response   • Atrial fibrillation (CMS/HCC)   • Metabolic encephalopathy   • Cerebral ventriculomegaly   • Acute ischemic left MCA stroke (CMS/HCC)     Past Medical History:   Diagnosis Date   • CHF (congestive heart failure) (CMS/HCC)    • Hyperlipidemia    • Hypertension    • Irregular heart beat    • Skin cancer    • Stroke (CMS/HCC)      Past Surgical History:   Procedure Laterality Date   • FINGER SURGERY     • HYSTERECTOMY     • JOINT REPLACEMENT     • ORIF SHOULDER DISLOCATION W/ HUMERAL FRACTURE     • TIBIA FRACTURE SURGERY       General Information     Row Name 20 1053          PT Evaluation Time/Intention    Document Type  therapy note (daily note)  -MR     Mode of Treatment  physical therapy  -MR     Row Name 20 1053          General Information    Existing Precautions/Restrictions  fall  -MR     Row Name 20 1053          Cognitive Assessment/Intervention- PT/OT    Orientation Status (Cognition)  oriented x 4  -MR     Cognitive Assessment/Intervention Comment  Improved processing, but still delayed.  -MR     Row Name 20 1053          Safety Issues, Functional Mobility    Impairments Affecting Function (Mobility)  balance;cognition;endurance/activity tolerance;muscle tone abnormal  -MR       User Key  (r) = Recorded By, (t) = Taken By, (c) = Cosigned By    Initials Name Provider Type    MR Triplett  Jacqueline, FRAN Physical Therapist        Mobility     Row Name 01/12/20 1054          Bed Mobility Assessment/Treatment    Bed Mobility Assessment/Treatment  sit-supine  -MR     Supine-Sit [place] (Bed Mobility)  contact guard  -MR     Assistive Device (Bed Mobility)  bed rails  -MR     Row Name 01/12/20 1054          Transfer Assessment/Treatment    Comment (Transfers)  Performing ambulatory transfer to the restroom.  Cues for safety with transfers and pt able to perform hygiene with supervision in sitting.  Improved R lateral lean this date.  -MR     Row Name 01/12/20 1054          Sit-Stand Transfer    Sit-Stand [place] (Transfers)  minimum assist (75% patient effort);2 person assist  -MR     Assistive Device (Sit-Stand Transfers)  -- HHA  -MR     Row Name 01/12/20 1054          Gait/Stairs Assessment/Training    Gait/Stairs Assessment/Training  gait/ambulation independence;gait pattern  -MR     [place] Level (Gait)  minimum assist (75% patient effort);moderate assist (50% patient effort);2 person assist progressing to min/modA x 1  -MR     Assistive Device (Gait)  -- HHA  -MR     Distance in Feet (Gait)  20 + 40 + 40 + 20  -MR     Deviations/Abnormal Patterns (Gait)  huyen decreased;stride length decreased;base of support, narrow  -MR     Right Sided Gait Deviations  leans right;heel strike decreased;weight shift ability decreased  -MR     Comment (Gait/Stairs)  Much improved R lateral lean this date.  Cueing for normalized gait pattern throughout with emphasis on equal stance time.  -MR       User Key  (r) = Recorded By, (t) = Taken By, (c) = Cosigned By    Initials Name Provider Type    MR Triplett FRAN Phillips Physical Therapist        Obj/Interventions     Row Name 01/12/20 1100          Dynamic Standing Balance    Level of [place], Reaches Outside Midline (Standing, Dynamic Balance)  minimal assist, 75% patient effort  -MR     Comment, Reaches Outside Midline (Standing, Dynamic Balance)   Pt performing reaching outside [place]ASH[place] to challenge balance requiring Cale.  -MR       User Key  (r) = Recorded By, (t) = Taken By, (c) = Cosigned By    Initials Name Provider Type     IoanaJacqueline, PT Physical Therapist        Goals/Plan    No documentation.       Clinical Impression     Row Name 01/12/20 1101          Plan of Care Review    Plan of Care Reviewed With  patient  -MR     Progress  improving  -MR     Outcome Summary  Pt with significant improvement in mobility, balance and cognition.  Pt able to perform gait training in hallway walking 40' with min/modA.  Will need continued skilled PT to improve functional mobility.  -MR     Row Name 01/12/20 1101          Physical Therapy Clinical Impression    Criteria for Skilled Interventions Met (PT Clinical Impression)  yes  -MR     Rehab Potential (PT Clinical Summary)  good, to achieve stated therapy goals  -MR     Row Name 01/12/20 1101          Positioning and Restraints    Pre-Treatment Position  in bed  -MR     Post Treatment Position  chair  -MR     In Chair  sitting;with nsg;exit alarm on  -MR       User Key  (r) = Recorded By, (t) = Taken By, (c) = Cosigned By    Initials Name Provider Type    MR GibsonIoanaJacqueline, PT Physical Therapist        Outcome Measures     Row Name 01/12/20 1102          How much help from another person do you currently need...    Turning from your back to your side while in flat bed without using bedrails?  3  -MR     Moving from lying on back to sitting on the side of a flat bed without bedrails?  3  -MR     Moving to and from a bed to a chair (including a wheelchair)?  2  -MR     Standing up from a chair using your arms (e.g., wheelchair, bedside chair)?  3  -MR     Climbing 3-5 steps with a railing?  2  -MR     To walk in hospital room?  2  -MR     AM-PAC 6 Clicks Score (PT)  15  -MR     Row Name 01/12/20 1102          Modified [place] Scale    Modified [place] Scale  4 - Moderately severe disability.  Unable to walk  without assistance, and unable to attend to own bodily needs without assistance.  -MR       User Key  (r) = Recorded By, (t) = Taken By, (c) = Cosigned By    Initials Name Provider Type    MR Triplett FRAN Phillips Physical Therapist          PT Recommendation and Plan     Outcome Summary/Treatment Plan (PT)  Anticipated Discharge Disposition (PT): inpatient rehabilitation facility  Plan of Care Reviewed With: patient  Progress: improving  Outcome Summary: Pt with significant improvement in mobility, balance and cognition.  Pt able to perform gait training in hallway walking 40' with min/modA.  Will need continued skilled PT to improve functional mobility.     Time Calculation:   PT Charges     Row Name 01/12/20 1103             Time Calculation    Start Time  0931  -MR      Stop Time  0948  -MR      Time Calculation (min)  17 min  -MR      PT Received On  01/12/20  -MR      PT - Next Appointment  01/13/20  -MR         Time Calculation- PT    Total Timed Code Minutes- PT  17 minute(s)  -MR        User Key  (r) = Recorded By, (t) = Taken By, (c) = Cosigned By    Initials Name Provider Type    MR Triplett FRAN Phillips Physical Therapist        Therapy Charges for Today     Code Description Service Date Service Provider Modifiers Qty    90772758158 HC PT THER SUPP EA 15 MIN 1/11/2020 Jacqueline Triplett, PT GP 1    15143817456 HC PT EVAL MOD COMPLEXITY 1 1/11/2020 Jacqueline Triplett, PT GP 1    11224826959 HC PT THERAPEUTIC ACT EA 15 MIN 1/11/2020 Jacqueline Triplett, PT GP 1    87368399439 HC PT THER SUPP EA 15 MIN 1/12/2020 Jacqueline Triplett, PT GP 1    88302201746 HC PT NEUROMUSC RE EDUCATION EA 15 MIN 1/12/2020 Jacqueline Triplett, PT GP 1    20429869121 HC GAIT TRAINING EA 15 MIN 1/12/2020 Jacqueline Triplett, PT GP 1          PT G-Codes  Outcome Measure Options: AM-PAC 6 Clicks Basic Mobility (PT), Modified [place]  AM-PAC 6 Clicks Score (PT): 15  Modified Paxton Scale: 4 - Moderately severe disability.  Unable to walk  without assistance, and unable to attend to own bodily needs without assistance.    Jacqueline Triplett, PT  1/12/2020

## 2020-01-12 NOTE — PROGRESS NOTES
"DOS: 2020  NAME: Camille Pantoja   : 1955  PCP: Nahun Ann Jr., DO  Chief complaint: Weakness    Stroke    Subjective: Patient feels right-sided weakness is improving.  Denies any speech difficulties.  No headache.  No shortness of air.  Pt seen in follow up today, however the problem is new to the examiner.      Objective:  Vital signs: /78 (BP Location: Right arm, Patient Position: Sitting)   Pulse 82   Temp 98.3 °F (36.8 °C) (Oral)   Resp 16   Ht 165.1 cm (65\")   Wt 55.2 kg (121 lb 11.2 oz)   SpO2 92%   BMI 20.25 kg/m²       General appearance: Well developed, well nourished, well groomed, alert and cooperative.   HEENT: Normocephalic.   Neck and spine: Normal range of motion. Normal alignment. No mass or tenderness.    Cardiac: Irregular rhythm.  Peripheral Vasculature: Radial pulses are equal and symmetric.  Chest Exam: Clear to auscultation bilaterally, no wheezes, no rhonchi.  Extremities: Normal, no edema.   Skin: No rashes or birthmarks.     Higher integrative function: Oriented to time, place, person, intact recent and remote memory, attention span, concentration and language. Spontaneous speech, fund of vocabulary are normal. Mild dysarthria.  CN II: Normal  visual fields.   CN III IV VI: Extraocular movements are full without nystagmus. Pupils are equal, round, and reactive to light.   CN V: Normal facial sensation.  CN VII: R facial weakness.  CN VIII: Auditory acuity is normal.   CN IX & X: Symmetric palatal movement.   CN XI: Sternocleidomastoid and trapezius are normal. No weakness.   CN XII: The tongue is midline.  Motor: R arm and leg 4+/5 with drift. Left arm/legh 5/5.  No fasciculations, rigidity, spasticity or abnormal movements.   Sensation: Normal light touch.  Station and gait: N/A  Muscle stretch reflexes: Reflexes are normal and symmetric in the upper and lower extremities.   Coordination: Finger to nose test showed no dysmetria.     Scheduled " Meds:  aspirin 81 mg Oral Daily   atorvastatin 80 mg Oral Nightly   clopidogrel 75 mg Oral Daily   digoxin 125 mcg Intravenous Daily   enoxaparin 1 mg/kg Subcutaneous Q12H   losartan 50 mg Oral Nightly   pantoprazole 40 mg Oral QAM     Continuous Infusions:  sodium chloride 100 mL/hr     PRN Meds:.•  acetaminophen  •  docusate sodium  •  ondansetron **OR** ondansetron  •  senna-docusate sodium    Laboratory results:  Lab Results   Component Value Date    GLUCOSE 90 01/11/2020    CALCIUM 8.8 01/11/2020     01/11/2020    K 3.6 01/11/2020    CO2 21.1 (L) 01/11/2020     01/11/2020    BUN 11 01/11/2020    CREATININE 0.61 01/11/2020    EGFRIFAFRI 75 05/29/2019    EGFRIFNONA 98 01/11/2020    BCR 18.0 01/11/2020    ANIONGAP 14.9 01/11/2020     Lab Results   Component Value Date    WBC 4.47 01/11/2020    HGB 11.3 (L) 01/11/2020    HCT 34.7 01/11/2020    MCV 85.9 01/11/2020     01/11/2020     Lab Results   Component Value Date    CHOL 106 01/10/2020     Lab Results   Component Value Date    HDL 40 01/10/2020    HDL 51 05/29/2019    HDL 78 (H) 11/20/2017     Lab Results   Component Value Date    LDL 52 01/10/2020    LDL 61 05/29/2019     (H) 11/20/2017     Lab Results   Component Value Date    TRIG 69 01/10/2020    TRIG 83 05/29/2019    TRIG 88 11/20/2017     EEG 1/11 tracings viewed by me, shows A. fib  Review and interpretation of imaging: MRI brain images viewed by me, shows left hemisphere strokes and ventriculomegaly. No hemorrhage.  MRI Brain WO     INDICATION:   Decreased alertness. Status post fall 1/9/2020.     TECHNIQUE:   MRI of the brain without IV contrast.     COMPARISON:    Head CT 1/9/2020     FINDINGS:  Diffuse prominence of ventricles, sulci and basilar cisterns compatible with generalized atrophy. Foci of increased T2 and FLAIR signal within the left basal ganglia and left insular cortex with corresponding restricted diffusion and low signal on the  ADC map consistent with acute  infarct.     Increased T2 and FLAIR signal within the periventricular white matter which is nonspecific but may reflect chronic microvascular disease. No mass, mass effect or midline shift. No hemorrhage or abnormal extra axial fluid collections. Normal intracranial  flow voids.     Bony calvarium, skull base and mastoids unremarkable. Bilateral maxillary and left ethmoid sinus mucosal disease.     IMPRESSION:  Acute infarct left basal ganglia and left insular cortex in the distribution of the left MCA.     Ventriculomegaly with increased T2 signal within the periventricular white matter. Though this could be indicative of NPH this is most likely the sequela of chronic microvascular disease. Correlate with patient's clinical presentation. Dr. Grady relates  that the patient does not have any clinical features of NPH.     Bilateral maxillary and left ethmoid sinus mucosal disease.     Results called to Dr. Grady at [facility] at the time of this dictation.     Signer Name: DIANE Rodriguez MD   Signed: 1/10/2020 12:07 PM   Workstation Name: [id number]    [facility]  MRI BRAIN WITHOUT CONTRAST, MR ANGIOGRAM HEAD WITHOUT CONTRAST, MR  ANGIOGRAM NECK WITHOUT CONTRAST     HISTORY: Slurred speech and right-sided weakness. Fall on 01/09/2020.     COMPARISON: CT head without contrast 01/11/2020, MRI brain with and  without contrast 04/27/2012.     TECHNIQUE: Brain MRI without contrast includes sagittal T1 as well as  axial diffusion, FLAIR, T1, T2, gradient echo sequences. MR angiogram of  the head and MR angiogram of the neck were obtained without contrast  administration.  MIP reconstruction images were obtained.     FINDINGS: There is an acute infarction involving the left caudate body,  left lentiform nucleus. Lentiform nucleus infarction also extends into  the external limb of the left internal capsule and measures  approximately 3 cm in AP dimension by 1.1 cm transverse. There are  also  small foci of acute infarction within the posterior left temporal lobe  abutting the posterior margin of the sylvian fissure. Small cortical  infarcts are present involving the insular cortex on the left.  Additionally, there are small cortical infarctions within the posterior  superior left frontal lobe cortex with the larger measuring 9 mm  transverse. No right cerebral or cerebellar acute infarction is evident.     There is mild confluent periventricular cerebral white matter FLAIR  hyperintense signal that is consistent with chronic small vessel  ischemic white matter change. There is ventricular enlargement  attributed to central atrophy. There is no evidence for small cortical  infarction within the posterior to the superior left frontal lobe mass  effect or shift of midline structures. No extra-axial fluid collection  is evident. There is no MRI evidence for hemorrhagic transformation of  infarction.     Neck MRA is severely limited by the degree of motion related artifact.  The proximal great vessels are not well visualized. There is flow  related enhancement within both common carotid arteries and both  proximal cervical internal carotid arteries without evidence for any  NASCET stenosis. Both cervical common carotid arteries appear to be  patent.  The distal cervical, petrous, and cavernous segments of both  internal carotid arteries are patent. Supracavernous segments appear  patent. Both middle cerebral arteries appear patent.     The proximal vertebral arteries are not visualized. The right vertebral  artery is larger than the left. There is flow-related enhancement within  both mid to distal cervical vertebral arteries. Intracranial segment of  the left vertebral artery is very small. The basilar artery and both  posterior cerebral arteries appear patent. There appears to be a fetal  origin of the left vertebral posterior cerebral artery.     IMPRESSION:  1.  Acute left cerebral infarction  involving the left caudate, left  lentiform nucleus, external limb of left internal capsule, left insular  cortex, and left posterior frontal lobe cortex. Infarction is in the  left middle cerebral artery distribution. MRI one day prior was obtained  for comparison. Overall infarctions appear very similar to the exam one  day prior though the 9 mm infarction within the posterior-superior left  frontal lobe cortex is potentially new and not demonstrated previously.  This was discussed with Francia nurse caring for the patient on 2 S.  6:11 PM 01/11/2020.   2. Inflammatory paranasal sinus disease.  3.  MR angiogram neck is very limited by motion artifact and the great  vessel origins are not visualized. There is no evidence for NASCET  carotid stenosis.  4.  Intracranial segment of the left vertebral artery is very small.  There is a fetal origin of the left posterior cerebral artery.     This report was finalized on 1/11/2020 6:14 PM by Dr. Jae Hernández M.D.    Echocardiogram Findings     Left Ventricle Left ventricular systolic function is severely decreased. Calculated EF = 27.0%. Estimated EF was in agreement with the calculated EF. Global left ventricular wall motion appears abnormal. The left ventricular cavity is moderately dilated. Left ventricular wall thickness is consistent with mild concentric hypertrophy. Left ventricular diastolic function was indeterminate. There is no evidence of a left ventricular thrombus present.   Right Ventricle Right ventricular cavity is moderately dilated. Moderately reduced right ventricular systolic function noted.   Left Atrium Left atrial cavity size is severely dilated.   Right Atrium Right atrial cavity size is severely dilated.   Aortic Valve The aortic valve is structurally normal. The valve appears trileaflet. Trace aortic valve regurgitation is present. No aortic valve stenosis is present.   Mitral Valve The mitral valve is grossly normal in structure. Mild  mitral valve regurgitation is present. No significant mitral valve stenosis is present.   Tricuspid Valve The tricuspid valve is grossly normal. No evidence of tricuspid valve stenosis is present. Mild tricuspid valve regurgitation is present. Estimated right ventricular systolic pressure from tricuspid regurgitation is normal (<35 mmHg). No evidence of pulmonary hypertension is present.   Pulmonic Valve The pulmonic valve is grossly normal in structure. There is no significant pulmonic valve stenosis present. There is trace pulmonic valve regurgitation present.   Greater Vessels No dilation of the aortic root is present. Mild dilation of the ascending aorta is present. The inferior vena cava is dilated.   Pericardium There is no evidence of pericardial effusion.       Impression:  Patient is a 64-year-old female with hypertension, hyperlipidemia, previous alcohol abuse (quit drinking in 2018) DVT on warfarin, recently treated for left lower lobe pneumonia with Levaquin who began to have diarrhea and became progressively more weak.  Patient presented [facility] 1/9 with change in mental status.  She lives alone and her neighbor found her down on the floor.  EKG at [facility] showed new onset A. fib RVR, MRI showed acute left hemisphere subcortical stroke, she had elevated troponin and new cardiomyopathy so she was transferred here for cardiac cath.Patient was on Zocor 20 mg and warfarin prior to arrival (INR 1.25 on presentation) .  Patient had returned to normal but had worsening right-sided weakness cardiac cath so repeat MRI was completed here which showed some extension of previous stroke.    Dx:   Left hemisphere stroke secondary to decreased left MCA flow  new onset A. Fib  new cardiomyopathy  recent pneumonia  History of alcohol abuse  H/O dvt with subtherapeutic INR on admission  · MRI brain 1/10: Acute left basal ganglia left insular cortex stroke, ventriculomegaly  · MRI brain 1/11: Acute  left cerebral stroke in the left caudate, lentiform nucleus, external limb of the internal capsule, and insular cortex and left posterior frontal lobe cortex, appears similar to previous but left frontal cortex stroke is new.  · MRA head/neck 1/11: Motion degraded, decreased left MCA flow compared to right MCA but no obvious large vessel occlusion.  Intracranial left vertebral artery is small.  Fetal origin of the left PCA.  · Cardiac cath 1/10: No significant CAD  · LUDA 1/10: EF 27%, mild LVH, severely dilated left atrial cavity size  · Labs: , troponin 0 0.70, LFTs/renal function normal, hemoglobin A1c 5.50, TSH 0.99, folate 11, LDL 52, ammonia 29 B12 435, sed rate 12    Plan:  ASA 81 mg started, give 300 mg VA today if she fails bedside eval today.   Plavix 75 mg started  Lipitor 80 mg started  On Lovenox 1 mg/kg every 12 hours, recommend eliquis long term. Once patient can take pills ok, eliquis can be started and will stop asa and plavix, per Dr Martin.   Neurochecks  BP control-goal blood pressure greater than 110/80  Stroke Education  BAILEE/SCDs  PT/OT/ST  D/W Dr Martin today. Will follow.

## 2020-01-12 NOTE — DISCHARGE PLACEMENT REQUEST
"Camille Pantoja (64 y.o. Female)     Date of Birth Social Security Number Address Home Phone MRN    1955  [facility] [phone] [id number]    [facility] Mar[employer] Status          [employer]        Admission Date Admission Type Admitting Provider Attending Provider Department, Room/Bed    1/10/20 Elective Layo Haddad MD Kaebnick, Brian W, MD [address] [facility], 2212/1    Discharge Date Discharge Disposition Discharge Destination                       Attending Provider:  Cain Mcneil MD    Allergies:  Cephalexin, Citrus, Duricef [Cefadroxil], Fish Allergy    Isolation:  None   Infection:  None   Code Status:  CPR    Ht:  165.1 cm (65\")   Wt:  55.2 kg (121 lb 11.2 oz)    Admission Cmt:  None   Principal Problem:  Atrial fibrillation (CMS/HCC) [I48.91] More...                 Active Insurance as of 1/10/2020     Primary Coverage     Payor Plan Insurance Group Employer/Plan Group    HUMANA MEDICARE REPLACEMENT HUMANA MEDICARE REPLACEMENT [id number]     Payor Plan Address Payor Plan Phone Number Payor Plan Fax Number Effective Dates    [address] [address] [phone]  1/1/2018 - None Entered    [facility] [address]       Subscriber Name Subscriber Birth Date Member ID       CAMILLE PANTOJA 1955 [id number]                 Emergency Contacts      (Rel.) Home Phone Work Phone Mobile Phone    TrishJudi chaidez (Mother) [phone] -- --    JonySophia abel (Sister) -- -- [phone]    [employer] (Brother) -- -- [phone]              "

## 2020-01-12 NOTE — PROGRESS NOTES
Discharge Planning Assessment  [facility]     Patient Name: Camille Pantoja  MRN: [id number]  Today's Date: 1/12/2020    Admit Date: 1/10/2020    Discharge Needs Assessment     Row Name 01/12/20 1822       Living Environment    Lives With  alone    Current Living Arrangements  home/apartment/condo house with basement with 5 steps to enter house with handrails    Provides Primary Care For  no one    Family Caregiver if Needed  parent(s)    Family Caregiver Names  Judi Pantoja(mother)     Quality of Family Relationships  involved;supportive;helpful    Able to Return to Prior Arrangements  yes       Resource/Environmental Concerns    Resource/Environmental Concerns  none    Transportation Concerns  car, none       Transition Planning    Patient/Family Anticipates Transition to  inpatient rehabilitation facility    Patient/Family Anticipated Services at Transition  rehabilitation services    Transportation Anticipated  family or friend will provide       Discharge Needs Assessment    Readmission Within the Last 30 Days  no previous admission in last 30 days    Concerns to be Addressed  basic needs    Equipment Currently Used at Home  cane, straight;walker, rolling;other (see comments) extension/grabber    Anticipated Changes Related to Illness  inability to care for self    Equipment Needed After Discharge  none    Outpatient/Agency/Support Group Needs  inpatient rehabilitation facility    Discharge Facility/Level of Care Needs  rehabilitation facility    Provided post acute provider list?  Yes    Post Acute Provider Lists  Inpatient Rehab        Discharge Plan     Row Name 01/12/20 1827       Plan    Plan  Referral to [facility]- await evaluation     Patient/Family in Agreement with Plan  yes    Plan Comments  Introduced self and explained role of CCP, IMM checked and facesheet verified with patient at bedside and patient states her emergency contact is her mother, Judi Pantoja at [phone].   "Patient states she lives alone in a house with a basement and she has 5 steps to enter house with handrail and is independent with all ADLs and currently uses no DME, but does have a straight cane, rolling walker and a \"grabber\" and denies any DME needs at this time for discharge.  Patient states her PCP is Nahun Knutson Jr., MD and she uses [facility] in [place], [place] and denies any issues with affording or obtaining medications.  Patient states ever using home health or going to rehab and would like referral to [facility](April notified at ext. [phone])- await evaluation.  CCP explained to patient that CCP will inform her if [facility] will accept her as soon as she hears from them and she verbalized understanding... CCP will follow and assist as needed.... Sonam Reeves RN,CCP         Destination      Service Provider Request Status Selected Services Address Phone Number Fax Number    [facility] Pending - No Request Sent N/A [address] [facility] [address] [phone] --      Durable Medical Equipment      Coordination has not been started for this encounter.      Dialysis/Infusion      Coordination has not been started for this encounter.      Home Medical Care      Coordination has not been started for this encounter.      Therapy      Coordination has not been started for this encounter.      Community Resources      Coordination has not been started for this encounter.          Demographic Summary     Row Name 01/12/20 1821       General Information    Admission Type  inpatient    Arrived From  home    Required Notices Provided  Important Message from Medicare    Referral Source  nursing    Reason for Consult  discharge planning    Preferred Language  English     Used During This Interaction  no       Contact Information    Permission Granted to Share Info With          Functional Status     Row Name 01/12/20 1822       Functional Status    Usual " Activity Tolerance  good    Current Activity Tolerance  fair       Functional Status, IADL    Medications  independent    Meal Preparation  independent    Housekeeping  independent    Laundry  independent    Shopping  independent       Mental Status    General Appearance WDL  WDL       Mental Status Summary    Recent Changes in Mental Status/Cognitive Functioning  no changes        Psychosocial    No documentation.       Abuse/Neglect    No documentation.       Legal    No documentation.       Substance Abuse    No documentation.       Patient Forms    No documentation.           Sonam Reeves RN

## 2020-01-13 ENCOUNTER — APPOINTMENT (OUTPATIENT)
Dept: CT IMAGING | Facility: HOSPITAL | Age: 65
End: 2020-01-13

## 2020-01-13 ENCOUNTER — APPOINTMENT (OUTPATIENT)
Dept: MRI IMAGING | Facility: HOSPITAL | Age: 65
End: 2020-01-13

## 2020-01-13 LAB
APTT PPP: 35.6 SECONDS (ref 22.7–35.4)
BASOPHILS # BLD AUTO: 0.04 10*3/MM3 (ref 0–0.2)
BASOPHILS NFR BLD AUTO: 0.4 % (ref 0–1.5)
DEPRECATED RDW RBC AUTO: 42 FL (ref 37–54)
EOSINOPHIL # BLD AUTO: 0 10*3/MM3 (ref 0–0.4)
EOSINOPHIL NFR BLD AUTO: 0 % (ref 0.3–6.2)
ERYTHROCYTE [DISTWIDTH] IN BLOOD BY AUTOMATED COUNT: 13.8 % (ref 12.3–15.4)
GLUCOSE BLDC GLUCOMTR-MCNC: 140 MG/DL (ref 70–130)
HCT VFR BLD AUTO: 41.2 % (ref 34–46.6)
HGB BLD-MCNC: 13.7 G/DL (ref 12–15.9)
IMM GRANULOCYTES # BLD AUTO: 0.05 10*3/MM3 (ref 0–0.05)
IMM GRANULOCYTES NFR BLD AUTO: 0.5 % (ref 0–0.5)
INR PPP: 1.31 (ref 0.9–1.1)
LYMPHOCYTES # BLD AUTO: 0.49 10*3/MM3 (ref 0.7–3.1)
LYMPHOCYTES NFR BLD AUTO: 5.4 % (ref 19.6–45.3)
MCH RBC QN AUTO: 28.8 PG (ref 26.6–33)
MCHC RBC AUTO-ENTMCNC: 33.3 G/DL (ref 31.5–35.7)
MCV RBC AUTO: 86.6 FL (ref 79–97)
MONOCYTES # BLD AUTO: 0.76 10*3/MM3 (ref 0.1–0.9)
MONOCYTES NFR BLD AUTO: 8.3 % (ref 5–12)
NEUTROPHILS # BLD AUTO: 7.77 10*3/MM3 (ref 1.7–7)
NEUTROPHILS NFR BLD AUTO: 85.4 % (ref 42.7–76)
NRBC BLD AUTO-RTO: 0 /100 WBC (ref 0–0.2)
PLATELET # BLD AUTO: 181 10*3/MM3 (ref 140–450)
PMV BLD AUTO: 12.1 FL (ref 6–12)
PROTHROMBIN TIME: 16 SECONDS (ref 11.7–14.2)
RBC # BLD AUTO: 4.76 10*6/MM3 (ref 3.77–5.28)
WBC NRBC COR # BLD: 9.11 10*3/MM3 (ref 3.4–10.8)

## 2020-01-13 PROCEDURE — 99233 SBSQ HOSP IP/OBS HIGH 50: CPT | Performed by: NURSE PRACTITIONER

## 2020-01-13 PROCEDURE — 25010000002 HEPARIN (PORCINE) PER 1000 UNITS: Performed by: PSYCHIATRY & NEUROLOGY

## 2020-01-13 PROCEDURE — 70498 CT ANGIOGRAPHY NECK: CPT

## 2020-01-13 PROCEDURE — 0 IOPAMIDOL PER 1 ML: Performed by: INTERNAL MEDICINE

## 2020-01-13 PROCEDURE — 97116 GAIT TRAINING THERAPY: CPT

## 2020-01-13 PROCEDURE — 82962 GLUCOSE BLOOD TEST: CPT

## 2020-01-13 PROCEDURE — 97166 OT EVAL MOD COMPLEX 45 MIN: CPT

## 2020-01-13 PROCEDURE — 25010000002 ENOXAPARIN PER 10 MG: Performed by: NURSE PRACTITIONER

## 2020-01-13 PROCEDURE — 97110 THERAPEUTIC EXERCISES: CPT

## 2020-01-13 PROCEDURE — 99232 SBSQ HOSP IP/OBS MODERATE 35: CPT | Performed by: INTERNAL MEDICINE

## 2020-01-13 PROCEDURE — 70551 MRI BRAIN STEM W/O DYE: CPT

## 2020-01-13 PROCEDURE — 0042T HC CT CEREBRAL PERFUSION W/WO CONTRAST: CPT

## 2020-01-13 PROCEDURE — 85025 COMPLETE CBC W/AUTO DIFF WBC: CPT | Performed by: PSYCHIATRY & NEUROLOGY

## 2020-01-13 PROCEDURE — 85610 PROTHROMBIN TIME: CPT | Performed by: PSYCHIATRY & NEUROLOGY

## 2020-01-13 PROCEDURE — 70496 CT ANGIOGRAPHY HEAD: CPT

## 2020-01-13 PROCEDURE — 92610 EVALUATE SWALLOWING FUNCTION: CPT

## 2020-01-13 PROCEDURE — 85730 THROMBOPLASTIN TIME PARTIAL: CPT | Performed by: PSYCHIATRY & NEUROLOGY

## 2020-01-13 RX ORDER — HEPARIN SODIUM 10000 [USP'U]/100ML
12 INJECTION, SOLUTION INTRAVENOUS
Status: DISCONTINUED | OUTPATIENT
Start: 2020-01-13 | End: 2020-01-15

## 2020-01-13 RX ORDER — METOPROLOL TARTRATE 50 MG/1
50 TABLET, FILM COATED ORAL EVERY 12 HOURS SCHEDULED
Status: DISCONTINUED | OUTPATIENT
Start: 2020-01-13 | End: 2020-01-14

## 2020-01-13 RX ORDER — METOPROLOL SUCCINATE 25 MG/1
25 TABLET, EXTENDED RELEASE ORAL DAILY
Qty: 90 TABLET | Refills: 0 | Status: SHIPPED | OUTPATIENT
Start: 2020-01-13 | End: 2020-01-16 | Stop reason: SDUPTHER

## 2020-01-13 RX ADMIN — SODIUM CHLORIDE 100 ML/HR: 9 INJECTION, SOLUTION INTRAVENOUS at 23:46

## 2020-01-13 RX ADMIN — ENOXAPARIN SODIUM 60 MG: 60 INJECTION SUBCUTANEOUS at 10:23

## 2020-01-13 RX ADMIN — IOPAMIDOL 150 ML: 755 INJECTION, SOLUTION INTRAVENOUS at 19:05

## 2020-01-13 RX ADMIN — METOPROLOL TARTRATE 5 MG: 5 INJECTION, SOLUTION INTRAVENOUS at 17:48

## 2020-01-13 RX ADMIN — SODIUM CHLORIDE 500 ML: 9 INJECTION, SOLUTION INTRAVENOUS at 20:30

## 2020-01-13 RX ADMIN — HEPARIN SODIUM 12 UNITS/KG/HR: 10000 INJECTION, SOLUTION INTRAVENOUS at 22:06

## 2020-01-13 RX ADMIN — METOPROLOL TARTRATE 5 MG: 5 INJECTION, SOLUTION INTRAVENOUS at 17:40

## 2020-01-13 RX ADMIN — METOPROLOL TARTRATE 50 MG: 50 TABLET, FILM COATED ORAL at 14:38

## 2020-01-13 RX ADMIN — METOPROLOL TARTRATE 5 MG: 5 INJECTION, SOLUTION INTRAVENOUS at 17:53

## 2020-01-13 NOTE — PROGRESS NOTES
Pharmacy Services-Eliquis Counseling     Camille Pantoja is a new start on Eliquis for atrial fibrillation s/p L hemisphere stroke. Counseling points included the followin) Eliquis' indication, patient's need for the medication, and dosing/frequency.  2) Enforced the importance of taking Eliquis as instructed every day, and that it is a twice a day medication.  3) Explained possible side effects of Eliquis therapy, including increased risk of bleeding, s/sx of bleeding, and increase in cold intolerance. Also talked about ways to control bleeding for minor cuts and scrapes.  4) Emphasized the importance of going to the emergency room if any of the following occur: Falling and hitting your head; noticing bright red blood in urine or dark/tarry stools; vomiting up blood or vomit has a coffee-ground like texture; coughing up blood.  5) Discussed the importance of informing any physician or dentist that they have been started on Eliquis, in case they need to be taken off for a procedure.  6) Discussed all important drug interactions, including over-the-counter medications and supplements.  7) Instructed the patient not to begin or discontinue any medications without informing his physician/pharmacist.    Patient expressed understanding and had no further questions.       Alma Rosa Orosco, Pharm.D., BCPS

## 2020-01-13 NOTE — PLAN OF CARE
Problem: Patient Care Overview  Goal: Plan of Care Review  Outcome: Ongoing (interventions implemented as appropriate)  Flowsheets (Taken 1/13/2020 1122)  Progress: improving  Plan of Care Reviewed With: patient  Outcome Summary: Pt tolerated treatment well this date. Increased gait distance to 100ft w/ Rw and CGA-min A x2. Cues to widen LASHON and keep walker close. Instructed pt on a few seated LE exercises, and encouraged pt to perform on own throughout the day. PT will continue to address functional mobility deficits as tolerated.

## 2020-01-13 NOTE — THERAPY TREATMENT NOTE
Patient Name: Camille Pantoja  : 1955    MRN: [id number]                              Today's Date: 2020       Admit Date: 1/10/2020    Visit Dx:     ICD-10-CM ICD-9-CM   1. Atrial fibrillation, unspecified type (CMS/HCC) I48.91 427.31     Patient Active Problem List   Diagnosis   • Deep vein thrombosis of lower extremity (CMS/HCC)   • Hyperlipidemia   • Hypertension   • Vitamin D deficiency   • Osteoarthritis   • Overweight (BMI 25.0-29.9)   • Paroxysmal atrial fibrillation with rapid ventricular response (CMS/HCC)   • Chronic atrial fibrillation with rapid ventricular response   • Atrial fibrillation (CMS/HCC)   • Metabolic encephalopathy   • Cerebral ventriculomegaly   • Acute ischemic left MCA stroke (CMS/HCC)     Past Medical History:   Diagnosis Date   • CHF (congestive heart failure) (CMS/HCC)    • Hyperlipidemia    • Hypertension    • Irregular heart beat    • Skin cancer    • Stroke (CMS/HCC)      Past Surgical History:   Procedure Laterality Date   • CARDIAC CATHETERIZATION N/A 1/10/2020    Procedure: Left Heart Cath;  Surgeon: Cain Mcneil MD;  Location: [facility] CATH INVASIVE LOCATION;  Service: Cardiovascular   • CARDIAC CATHETERIZATION N/A 1/10/2020    Procedure: Coronary angiography;  Surgeon: Cain Mcneil MD;  Location:  [facility] CATH INVASIVE LOCATION;  Service: Cardiovascular   • FINGER SURGERY     • HYSTERECTOMY     • JOINT REPLACEMENT     • ORIF SHOULDER DISLOCATION W/ HUMERAL FRACTURE     • TIBIA FRACTURE SURGERY       General Information     Row Name 20 1117          PT Evaluation Time/Intention    Document Type  therapy note (daily note)  -     Mode of Treatment  physical therapy  -     Row Name 20 1117          General Information    Existing Precautions/Restrictions  fall  -     Row Name 20 1117          Cognitive Assessment/Intervention- PT/OT    Orientation Status (Cognition)  oriented x 3  -SM       User Key  (r) = Recorded By, (t) = Taken By,  (c) = Cosigned By    Initials Name Provider Type    Alison Mccullough PTA Physical Therapy Assistant        Mobility     Row Name 01/13/20 1118          Bed Mobility Assessment/Treatment    Bed Mobility Assessment/Treatment  supine-sit  -SM     Supine-Sit [place] (Bed Mobility)  minimum assist (75% patient effort)  -SM     Sit-Supine [place] (Bed Mobility)  not tested  -     Assistive Device (Bed Mobility)  bed rails;head of bed elevated  -     Row Name 01/13/20 1118          Sit-Stand Transfer    Sit-Stand [place] (Transfers)  minimum assist (75% patient effort);2 person assist  -SM     Assistive Device (Sit-Stand Transfers)  walker, front-wheeled  -     Row Name 01/13/20 1118          Gait/Stairs Assessment/Training    [place] Level (Gait)  contact guard;minimum assist (75% patient effort);2 person assist  -     Assistive Device (Gait)  walker, front-wheeled  -SM     Distance in Feet (Gait)  100  -SM     Pattern (Gait)  step-through  -SM     Deviations/Abnormal Patterns (Gait)  huyen decreased;stride length decreased;base of support, narrow  -SM     Bilateral Gait Deviations  forward flexed posture  -SM     Comment (Gait/Stairs)  cues to widen LASHON and keep walker close  -SM       User Key  (r) = Recorded By, (t) = Taken By, (c) = Cosigned By    Initials Name Provider Type    Alison Mccullough PTA Physical Therapy Assistant        Obj/Interventions     Row Name 01/13/20 1119          Therapeutic Exercise    Lower Extremity (Therapeutic Exercise)  LAQ (long arc quad), bilateral  -SM     Lower Extremity Range of Motion (Therapeutic Exercise)  ankle dorsiflexion/plantar flexion, bilateral  -SM     Exercise Type (Therapeutic Exercise)  AROM (active range of motion)  -SM     Position (Therapeutic Exercise)  seated  -SM     Sets/Reps (Therapeutic Exercise)  10 reps  -SM       User Key  (r) = Recorded By, (t) = Taken By, (c) = Cosigned By    Initials Name Provider Type      Alison Wyman PTA Physical Therapy Assistant        Goals/Plan    No documentation.       Clinical Impression     [facility] Name 01/13/20 1121          Pain Assessment    Additional Documentation  Pain Scale: Numbers Pre/Post-Treatment (Group)  -[facility] Name 01/13/20 1121          Pain Scale: Numbers Pre/Post-Treatment    Pain Scale: Numbers, Pretreatment  3/10  -SM     Pain Scale: Numbers, Post-Treatment  3/10  -SM     Pain Location - Side  Left  -     Pain Location  shoulder  -     Pain Intervention(s)  Repositioned;Ambulation/increased activity;Rest  -[facility] Name 01/13/20 1121          Positioning and Restraints    Pre-Treatment Position  in bed  -SM     Post Treatment Position  chair  -SM     In Chair  sitting;call light within reach;encouraged to call for assist;exit alarm on  -       User Key  (r) = Recorded By, (t) = Taken By, (c) = Cosigned By    Initials Name Provider Type    Alison Mccullough PTA Physical Therapy Assistant        Outcome Measures     Row Name 01/13/20 1122          How much help from another person do you currently need...    Turning from your back to your side while in flat bed without using bedrails?  3  -SM     Moving from lying on back to sitting on the side of a flat bed without bedrails?  2  -SM     Moving to and from a bed to a chair (including a wheelchair)?  3  -SM     Standing up from a chair using your arms (e.g., wheelchair, bedside chair)?  3  -SM     Climbing 3-5 steps with a railing?  2  -SM     To walk in hospital room?  3  -SM     AM-PAC 6 Clicks Score (PT)  16  -SM     Row Name 01/13/20 1122          Functional Assessment    Outcome Measure Options  AM-PAC 6 Clicks Basic Mobility (PT)  -       User Key  (r) = Recorded By, (t) = Taken By, (c) = Cosigned By    Initials Name Provider Type    Alison Mccullough PTA Physical Therapy Assistant          PT Recommendation and Plan     Outcome Summary/Treatment Plan (PT)  Anticipated Discharge  Disposition (PT): inpatient rehabilitation facility  Plan of Care Reviewed With: patient  Progress: improving  Outcome Summary: Pt tolerated treatment well this date. Increased gait distance to 100ft w/ Rw and CGA-min A x2. Cues to widen LASHON and keep walker close. Instructed pt on a few seated LE exercises, and encouraged pt to perform on own throughout the day. PT will continue to address functional mobility deficits as tolerated.     Time Calculation:   PT Charges     Row Name 01/13/20 1124             Time Calculation    Start Time  1053  -      Stop Time  1110  -      Time Calculation (min)  17 min  -      PT Received On  01/13/20  -      PT - Next Appointment  01/14/20  -        User Key  (r) = Recorded By, (t) = Taken By, (c) = Cosigned By    Initials Name Provider Type    Alison Mccullough PTA Physical Therapy Assistant        Therapy Charges for Today     Code Description Service Date Service Provider Modifiers Qty    97968142307 HC GAIT TRAINING EA 15 MIN 1/13/2020 Alison Wyman PTA GP 1    46334974013 HC PT THER SUPP EA 15 MIN 1/13/2020 Alison Wyman PTA GP 1          PT G-Codes  Outcome Measure Options: AM-PAC 6 Clicks Basic Mobility (PT)  AM-PAC 6 Clicks Score (PT): 16  Modified [place] Scale: 4 - Moderately severe disability.  Unable to walk without assistance, and unable to attend to own bodily needs without assistance.    Alison Wyman PTA  1/13/2020

## 2020-01-13 NOTE — PLAN OF CARE
Problem: Patient Care Overview  Goal: Plan of Care Review  Outcome: Ongoing (interventions implemented as appropriate)  Flowsheets (Taken 1/13/2020 1205)  Plan of Care Reviewed With: patient  Note:   Swallow evaluation completed. Recommend regular and thins, meds whole with puree. Recommend upright, slow rate, check for pocketing/oral residue, and alternate liquids/solids. ST to follow for diet tolerance and re-eval. ST to follow for speech/language/cognitive evaluation.

## 2020-01-13 NOTE — PROGRESS NOTES
"[facility]    Patient Name: Camille Pantoja  :1955  64 y.o.  LOS: 3  Encounter Provider: Christi Peace MD      Patient Care Team:  Nahun Ann Jr., DO as PCP - General (Family Medicine)    Chief Complaint: Follow-up syncope, new onset atrial fibrillation, cardiomyopathy, recent acute stroke in the left MCA distribution    Interval History: Frustrated that she cannot eat. Denies any new neuro symptoms. No dyspnea, CP, dizziness.     Objective   Vital Signs  Temp:  [97.9 °F (36.6 °C)-98.3 °F (36.8 °C)] 98 °F (36.7 °C)  Heart Rate:  [] 125  Resp:  [16-17] 17  BP: (112-139)/() 120/100    Intake/Output Summary (Last 24 hours) at 2020 1120  Last data filed at 2020 0640  Gross per 24 hour   Intake 0 ml   Output 1000 ml   Net -1000 ml     Flowsheet Rows      First Filed Value   Admission Height  165.1 cm (65\") Documented at 01/10/2020 1618   Admission Weight  55.6 kg (122 lb 8 oz) Documented at 01/10/2020 1618          GEN: no distress, alert and oriented, somewhat slurred speech  Eyes: normal sclerae, normal lids and lashes  HENT: moist mucous membranes,   Lungs: CTAB, no rales or wheezes  Chest: no abnormalities  Neck: no JVD or carotid bruits  CV: RRR, no murmurs, +2 DP and 2+ carotid pulses b/l  Abdomen: soft, nontender, nondistended  Extremities:trace pedal edema  Skin: no rash, warm, dry  Heme/Lymph: no bruising  Psych: organized thought, normal behavior and affect        Results Review:    Results from last 7 days   Lab Units 20  0442   SODIUM mmol/L 139   POTASSIUM mmol/L 3.6   CHLORIDE mmol/L 103   CO2 mmol/L 21.1*   BUN mg/dL 11   CREATININE mg/dL 0.61   GLUCOSE mg/dL 90   CALCIUM mg/dL 8.8     Results from last 7 days   Lab Units 01/10/20  0847 01/10/20  0549   CK TOTAL U/L  --  179   TROPONIN T ng/mL 0.070* 0.065*     Results from last 7 days   Lab Units 20  0442   WBC 10*3/mm3 4.47   HEMOGLOBIN g/dL 11.3*   HEMATOCRIT % 34.7   PLATELETS " 10*3/mm3 164     Results from last 7 days   Lab Units 01/10/20  0549 01/09/20 2052   INR  1.30* 1.25*     Results from last 7 days   Lab Units 01/10/20  1323   MAGNESIUM mg/dL 2.7*     Results from last 7 days   Lab Units 01/10/20  0549   CHOLESTEROL mg/dL 106   TRIGLYCERIDES mg/dL 69   HDL CHOL mg/dL 40   LDL CHOL mg/dL 52               Medication Review:     aspirin 81 mg Oral Daily   atorvastatin 80 mg Oral Nightly   clopidogrel 75 mg Oral Daily   digoxin 125 mcg Intravenous Daily   enoxaparin 1 mg/kg Subcutaneous Q12H   losartan 50 mg Oral Nightly   pantoprazole 40 mg Oral QAM          sodium chloride 100 mL/hr     This is a 64-year-old woman who was found down at home.  On January 10, 2020 she was found to have an acute left basal ganglia and left insular cortex/left MCA infarct.  Echocardiogram done at that time showed severe biventricular heart failure, EF of less than 30%.  Her EKG showed atrial fibrillation.  She underwent cardiac catheterization which showed normal coronary arteries with depressed EF.  Shortly thereafter she had some change in neuro status.  The brain imaging was repeated and showed some extension of the previous infarct.    Assessment/Plan   1. Syncope  2. New onset atrial fibrillation  3. Cardiomyopathy  4. Recent acute left MCA distribution stroke on MRI 1/9/2020 at [facility], MRI/MRA brain 1/11/2020 appears similar however size of infarct is slightly larger.  5. History of DVT-was on warfarin for DVT.  Initial INR subtherapeutic at 1.25.  Patient has not been therapeutic for the past month.    - AF: will need AC. Restart Eliquis when able to take PO. IV digoxin for now while awaiting her swallow eval.   - Nonischemic biventricular CM: When she can start taking meds by mouth, she will need to be started on GDMT.  - CVA: recs per neuro.     Christi Peace MD  [facility]  01/13/20  11:20 AM

## 2020-01-13 NOTE — NURSING NOTE
Reviewed our rehab program with pt and left written materials. Therapy work ups in progress. Given permission to call sister in law, Luciana. Talked with her on phone. Will await therapy evals and discuss with rehab team for most appropriate level of rehab. ThanksAlex RN rehab admission nurse [phone] Luciana, her sister in law, indicated there is no family that can provide assist at home after rehab. Luciana also expressed concerns about pt being home alone PTA. Discussed with rehab team this afternoon and rec subacute level of rehab for extended stay. BLAIRE Dangelo, updated. ThanksAlex RN

## 2020-01-13 NOTE — DISCHARGE PLACEMENT REQUEST
"Camille Pantoja (64 y.o. Female)     Date of Birth Social Security Number Address Home Phone MRN    1955  [address] [facility] [address] [phone] [id number]    Sikhism Marital Status          Taoist        Admission Date Admission Type Admitting Provider Attending Provider Department, Room/Bed    1/10/20 Elective Layo Haddad MD McFarland, Rebecca M, MD [address] CVI, 2212/1    Discharge Date Discharge Disposition Discharge Destination                       Attending Provider:  Jovita Brown MD    Allergies:  Cephalexin, Beef-derived Products, Citrus, Duricef [Cefadroxil], Fish Allergy, Garlic, Onion, Peach [Prunus Persica], Wheat    Isolation:  None   Infection:  None   Code Status:  CPR    Ht:  165.1 cm (65\")   Wt:  52 kg (114 lb 9.6 oz)    Admission Cmt:  None   Principal Problem:  Atrial fibrillation (CMS/HCC) [I48.91] More...                 Active Insurance as of 1/10/2020     Primary Coverage     Payor Plan Insurance Group Employer/Plan Group    HUMANA MEDICARE REPLACEMENT HUMANA MEDICARE REPLACEMENT [id number]     Payor Plan Address Payor Plan Phone Number Payor Plan Fax Number Effective Dates    [address] [address] [phone]  1/1/2018 - None Entered    [facility] [address]       Subscriber Name Subscriber Birth Date Member ID       CAMILLE PANTOJA 1955 [id number]                 Emergency Contacts      (Rel.) Home Phone Work Phone Mobile Phone    Sophia Pantoja (Sister) [phone] -- [phone]    TrishJudi chaidez (Mother) [phone] -- --    TrishMonster chaidez (Brother) -- -- [phone]              "

## 2020-01-13 NOTE — PROGRESS NOTES
Continued Stay Note  [facility]     Patient Name: Camille Pantoja  MRN: [id number]  Today's Date: 1/13/2020    Admit Date: 1/10/2020    Discharge Plan     Row Name 01/13/20 1535       Plan    Plan  Referral to [facility] pending.      Plan Comments  S/W Yady w/ [facility] and she advised Pt not a candidate for acute rehab.  CCP S/W Pt at bedside and with her sister-in-law Sophia Pantoja via phone to advise of denial from [facility].  Pt and Sophia SNF choice is [facility].  Referral given to ronald Morris.  Pt will require [facility] if approved.  CCP following.  DAVIDE FRIEDMAN/CCP        Discharge Codes    No documentation.             Loreto Church RN

## 2020-01-13 NOTE — PROGRESS NOTES
DOS: 2020  NAME: Camille Pantoja   : 1955  PCP: Nahun Ann Jr.,     No chief complaint on file.  CC: weakness    Stroke    Subjective: Pt seen in follow up, however the problem is new to me.  Sitting up in a recliner doing well ready to take a nap and has been having some back pain after suffering her fall at home.  Denies any new weakness, numbness, speech or visual disturbances, or headaches.  Sister-in-law at bedside.  She was cleared  by speech for a regular consistency diet and thin liquids, sister-in-law states she has a lot of food allergies and requires a special diet.    Objective:  Vital signs:      Vitals:    20 0420 20 0640 20 0715 20 1050   BP: 139/93  131/98 120/100   BP Location: Right arm  Left arm Left arm   Patient Position: Sitting  Lying Lying   Pulse: 101  99 (!) 125   Resp: 16  17 17   Temp: 98.2 °F (36.8 °C)  97.9 °F (36.6 °C) 98 °F (36.7 °C)   TempSrc: Oral  Oral Oral   SpO2: 94%  95% 94%   Weight:  52 kg (114 lb 9.6 oz)     Height:           Current Facility-Administered Medications:   •  acetaminophen (TYLENOL) tablet 650 mg, 650 mg, Oral, Q4H PRN, Cain Mcneil MD, 650 mg at 01/10/20 2117  •  aspirin chewable tablet 81 mg, 81 mg, Oral, Daily, Johny Martin MD  •  atorvastatin (LIPITOR) tablet 80 mg, 80 mg, Oral, Nightly, Johny Martin MD  •  clopidogrel (PLAVIX) tablet 75 mg, 75 mg, Oral, Daily, Johny Martin MD  •  digoxin (LANOXIN) injection 125 mcg, 125 mcg, Intravenous, Daily, Joan Soriano APRN, 125 mcg at 20 1228  •  docusate sodium (COLACE) capsule 100 mg, 100 mg, Oral, BID PRN, Cain Mcneil MD  •  enoxaparin (LOVENOX) syringe 60 mg, 1 mg/kg, Subcutaneous, Q12H, Joan Soriano APRN, 60 mg at 20 1023  •  losartan (COZAAR) tablet 50 mg, 50 mg, Oral, Nightly, Cain Mcneil MD, 50 mg at 01/10/20 2117  •  metoprolol tartrate (LOPRESSOR) tablet 50 mg, 50 mg, Oral, Q12H, Christi Peace MD  •  ondansetron  (ZOFRAN) tablet 4 mg, 4 mg, Oral, Q6H PRN **OR** ondansetron (ZOFRAN) injection 4 mg, 4 mg, Intravenous, Q6H PRN, Cain Mcneil MD, 4 mg at 01/11/20 1034  •  pantoprazole (PROTONIX) EC tablet 40 mg, 40 mg, Oral, QAM, Cain Mcneil MD, 40 mg at 01/11/20 0649  •  senna-docusate sodium (SENOKOT-S) 8.6-50 MG tablet 2 tablet, 2 tablet, Oral, BID PRN, Cain Mcneil MD  •  sodium chloride 0.9 % infusion, 100 mL/hr, Intravenous, Continuous, Cain Mcneil MD    PRN meds  •  acetaminophen  •  docusate sodium  •  ondansetron **OR** ondansetron  •  senna-docusate sodium    No current facility-administered medications on file prior to encounter.      Current Outpatient Medications on File Prior to Encounter   Medication Sig   • cholecalciferol (VITAMIN D3) 25 MCG (1000 UT) tablet Take 2 tablets by mouth Daily.   • losartan (COZAAR) 50 MG tablet Take 1 tablet by mouth Every Night.   • metoprolol succinate XL (TOPROL-XL) 50 MG 24 hr tablet Take 1 tablet by mouth Daily. (Patient taking differently: Take 25 mg by mouth Daily.)   • omeprazole (priLOSEC) 20 MG capsule TAKE ONE CAPSULE BY MOUTH DAILY   • simvastatin (ZOCOR) 20 MG tablet Take 1 tablet by mouth Every Night.   • Zinc 50 MG capsule Take 50 mg by mouth Daily.   • enoxaparin (LOVENOX) 60 MG/0.6ML solution syringe Inject 0.6 mL under the skin into the appropriate area as directed Every 12 (Twelve) Hours. Indications: Atrial Fibrillation       General appearance: NAD, alert and cooperative, sleepy  HEENT: Normocephalic, atraumatic, PERRL, no masses or tenderness  COR: RRR  Resp: Even and unlabored  Extremities: Equal pulses  Skin: warm, dry    Neurological:   MS: oriented x3, recent/remote memory intact, normal attention/concentration, language intact but slow to respond, no neglect, normal fund of knowledge  CN: visual acuity grossly normal, visual fields full, PERRL, EOMI, facial sensation equal, no facial droop, hearing symmetric, palate elevates  symmetrically, shoulder shrug equal, tongue midline  Motor: +3/5 BLE, +4/5 BUE  Sensory: light touch sensation intact in all 4 ext.  Coordination: Normal finger to nose test  Gait and station: no ataxia, very weak to stand  Rapid alternating movements: slower finger to thumb tap bilaterally    Laboratory results:  Lab Results   Component Value Date    TSH 0.991 01/10/2020     Lab Results   Component Value Date    HGBA1C 5.50 01/10/2020     Lab Results   Component Value Date    PBQITPZR98 435 01/10/2020     Lab Results   Component Value Date    CHOL 106 01/10/2020    CHLPL 129 05/29/2019    CHLPL 235 (H) 11/20/2017    CHLPL 195 05/24/2017     Lab Results   Component Value Date    TRIG 69 01/10/2020    TRIG 83 05/29/2019    TRIG 88 11/20/2017     Lab Results   Component Value Date    HDL 40 01/10/2020    HDL 51 05/29/2019    HDL 78 (H) 11/20/2017     Lab Results   Component Value Date    LDL 52 01/10/2020    LDL 61 05/29/2019     (H) 11/20/2017     Lab Results   Component Value Date    WBC 4.47 01/11/2020    HGB 11.3 (L) 01/11/2020    HCT 34.7 01/11/2020    MCV 85.9 01/11/2020     01/11/2020     Lab Results   Component Value Date    GLUCOSE 90 01/11/2020    BUN 11 01/11/2020    CREATININE 0.61 01/11/2020    EGFRIFNONA 98 01/11/2020    EGFRIFAFRI 75 05/29/2019    BCR 18.0 01/11/2020    K 3.6 01/11/2020    CO2 21.1 (L) 01/11/2020    CALCIUM 8.8 01/11/2020    PROTENTOTREF 7.5 05/29/2019    ALBUMIN 3.40 (L) 01/10/2020    LABIL2 1.5 05/29/2019    AST 25 01/10/2020    ALT 13 01/10/2020     No results found for: PTT  Lab Results   Component Value Date    INR 1.30 (H) 01/10/2020    INR 1.25 (H) 01/09/2020    INR 1.90 (A) 12/30/2019    PROTIME 16.0 (H) 01/10/2020    PROTIME 15.5 (H) 01/09/2020     Brief Urine Lab Results  (Last result in the past 365 days)      Color   Clarity   Blood   Leuk Est   Nitrite   Protein   CREAT   Urine HCG        01/09/20 2244 Yellow Clear Small (1+) Negative Negative 30 mg/dL (1+)              Folate 11.0  ESR 12      Review and interpretation of imaging:  Ct Head Without Contrast    Result Date: 1/9/2020  Senescent changes without acute abnormality. Somewhat disproportional ventriculomegaly is noted. Correlate clinically for evidence of normal pressure hydrocephalus. Signer Name: Anthony Vidal MD  Signed: 1/9/2020 9:31 PM  Workstation Name: [id number]  [facility]    Mri Angiogram Head Without Contrast    Result Date: 1/11/2020  1.  Acute left cerebral infarction involving the left caudate, left lentiform nucleus, external limb of left internal capsule, left insular cortex, and left posterior frontal lobe cortex. Infarction is in the left middle cerebral artery distribution. MRI one day prior was obtained for comparison. Overall infarctions appear very similar to the exam one day prior though the 9 mm infarction within the posterior-superior left frontal lobe cortex is potentially new and not demonstrated previously. This was discussed with nurse Francia caring for the patient on 2 S. 6:11 PM 01/11/2020. 2. Inflammatory paranasal sinus disease. 3.  MR angiogram neck is very limited by motion artifact and the great vessel origins are not visualized. There is no evidence for NASCET carotid stenosis. 4.  Intracranial segment of the left vertebral artery is very small. There is a fetal origin of the left posterior cerebral artery.  This report was finalized on 1/11/2020 6:14 PM by Dr. Jae Hernández M.D.      Mri Angiogram Neck Without Contrast    Result Date: 1/11/2020  1.  Acute left cerebral infarction involving the left caudate, left lentiform nucleus, external limb of left internal capsule, left insular cortex, and left posterior frontal lobe cortex. Infarction is in the left middle cerebral artery distribution. MRI one day prior was obtained for comparison. Overall infarctions appear very similar to the exam one day prior though the 9 mm infarction within the  posterior-superior left frontal lobe cortex is potentially new and not demonstrated previously. This was discussed with Francia nurse caring for the patient on 2 S. 6:11 PM 01/11/2020. 2. Inflammatory paranasal sinus disease. 3.  MR angiogram neck is very limited by motion artifact and the great vessel origins are not visualized. There is no evidence for NASCET carotid stenosis. 4.  Intracranial segment of the left vertebral artery is very small. There is a fetal origin of the left posterior cerebral artery.  This report was finalized on 1/11/2020 6:14 PM by Dr. Jae Hernández M.D.      Mri Brain Without Contrast    Result Date: 1/11/2020  1.  Acute left cerebral infarction involving the left caudate, left lentiform nucleus, external limb of left internal capsule, left insular cortex, and left posterior frontal lobe cortex. Infarction is in the left middle cerebral artery distribution. MRI one day prior was obtained for comparison. Overall infarctions appear very similar to the exam one day prior though the 9 mm infarction within the posterior-superior left frontal lobe cortex is potentially new and not demonstrated previously. This was discussed with Francia nurse caring for the patient on 2 S. 6:11 PM 01/11/2020. 2. Inflammatory paranasal sinus disease. 3.  MR angiogram neck is very limited by motion artifact and the great vessel origins are not visualized. There is no evidence for NASCET carotid stenosis. 4.  Intracranial segment of the left vertebral artery is very small. There is a fetal origin of the left posterior cerebral artery.  This report was finalized on 1/11/2020 6:14 PM by Dr. Jae Hernández M.D.      Mri Brain Without Contrast    Result Date: 1/10/2020  Acute infarct left basal ganglia and left insular cortex in the distribution of the left MCA. Ventriculomegaly with increased T2 signal within the periventricular white matter. Though this could be indicative of NPH this is most likely the sequela  of chronic microvascular disease. Correlate with patient's clinical presentation. Dr. Grady relates that the patient does not have any clinical features of NPH. Bilateral maxillary and left ethmoid sinus mucosal disease. Results called to Dr. Grady at [facility] at the time of this dictation. Signer Name: DIANE Rodriguez MD  Signed: 1/10/2020 12:07 PM  Workstation Name: [id number]  [facility]    Xr Chest 1 View    Result Date: 1/9/2020  Cardiomegaly with probable small bilateral pleural effusions right greater than left. Signer Name: DIANE Rodriguez MD  Signed: 1/9/2020 9:47 PM  Workstation Name: [id number]  [facility]    Results for orders placed during the hospital encounter of 01/09/20   Transthoracic Echo Complete With Contrast if Necessary Per Protocol    Narrative · The left ventricular cavity is moderately dilated.  · Left ventricular wall thickness is consistent with mild concentric   hypertrophy.  · Calculated EF = 27.0%.  · Left ventricular systolic function is severely decreased.  · The following left ventricular wall segments are hypokinetic: mid   anterior, apical anterior, basal anterolateral, mid inferolateral, basal   inferoseptal, mid inferoseptal, basal anterior and basal inferoseptal. The   following left ventricular wall segments are akinetic: mid anterolateral,   apical lateral, apical inferior, basal inferior, mid inferior, basal   anteroseptal and mid anteroseptal.  · Left atrial cavity size is severely dilated.  · Right atrial cavity size is severely dilated.  · Right ventricular cavity is moderately dilated.  · Moderately reduced right ventricular systolic function noted.  · Mild tricuspid valve regurgitation is present.  · Mild mitral valve regurgitation is present            Impression/Assessment:  This is a 64-year-old female with past medical history of hyperlipidemia, DVT on warfarin PTA, alcohol abuse with reported  cessation in 2018, CHF,skin cancer, stroke on Zocor PTA recent left lower lobe pneumonia who presented to outside hospital [facility] on 1/9 with complaints of change in mental status and feeling terrible.  She reports she started to have diarrhea after being treated with Levaquin and had became progressively more weak.  EKG at [facility] revealed new onset atrial fibrillation with RVR.  MRI brain revealed an acute left hemispheric subcortical stroke.  She also was noted to have an elevated troponin and new cardiomyopathy therefore she was transferred here for further work-up and evaluation and to undergo a cardiac cath.  She was taking warfarin PTA and her INR was noted to be 1.25 on admission.  After her cardiac cath she was noted to have worsening right-sided weakness so repeat MRI brain was obtained and revealed an extension of her stroke. BP on arrival . BS 95., Cr 0.74. Ethanol negative.    Dx: Left hemisphere stroke secondary to decreased left MCA flow  new onset A. Fib  new cardiomyopathy  recent pneumonia  History of alcohol abuse  H/O dvt with subtherapeutic INR on admission    Work up to date:  · MRI brain 1/10: Acute left basal ganglia left insular cortex stroke, ventriculomegaly  · CTH WO 1/9: No evidence of acute intracranial abnormalities, ventricular enlargement noted, suspicious of normal pressure hydrocephalus.  · CTH WO 1/11: No evidence of acute intracranial abnormalities, generalized enlargement of the ventricular system but is unchanged since prior imaging in 2012.  · Repeat MRI brain 1/11: Acute left cerebral stroke in the left caudate, lentiform nucleus, external limb of the internal capsule, and insular cortex and left posterior frontal lobe cortex, appears similar to previous but left frontal cortex stroke is new.  · MRA head/neck 1/11: Motion degraded, decreased left MCA flow compared to right MCA but no obvious large vessel occlusion. Intracranial left vertebral artery  is small.  Fetal origin of the left PCA.  · Cardiac cath 1/10: No significant CAD  · 2D Echo 1/10: Left atrial size severely dilated, right atrial size severely dilated, LV function severely decreased, EF 27%, no evidence of LV thrombus present, no AV stenosis present.    Plan:  She has passed her swallow eval today per RN although no documentation from ST yet; D/C ASA, Plavix, and Lovenox  Start Eliquis 5mg BID  Follow up with Dr. Sachin Najera in our office for ventriculomegaly and questionable NPH.  Okay to resume home dose Zocor 20mg, LDL 52  Neurochecks per stroke protocol  Normalize BP, avoid hypotension, long term goal <130/80  Stroke Education  BAILEE/SCDs  PT/OT/ST  Follow up with me in 3 months for stroke follow up.  Therapies as written. CCP for discharge planning. Call RRT for any acute neurological changes. We will sign off, will see again per request.    Case discussed with patient, sister in law, RN, and Dr. Noriega, and he agrees with plan above.     Addenda 1646: Reviewed CTH with Dr. Noriega, ventriculomegaly benign given no change in 8 years since prior MRI in 2012 and likely congenital. Will schedule her for follow up with Dr. Sachin Najera or Dr. Raji Rodriguez in our office for further evaluation. She can follow up with me in 3 months or stroke follow up. I did review ST's note, okay for regular and thins, meds whole with efrem.     JOHN Hess

## 2020-01-13 NOTE — PLAN OF CARE
Problem: Patient Care Overview  Goal: Plan of Care Review  Flowsheets (Taken 1/13/2020 1219)  Plan of Care Reviewed With: patient  Outcome Summary: Pt is a 64 year old female, presents with R sided weakness following L CVA. Pt lives alone and reports independence at baseline. Pt demos impaired RUE function, impaired functional mobility and transfers, and impaired self care skills. Recommend skilled OT services to address deficits for improved safety and independence with ADLs; anticipate rehab at next level of care.

## 2020-01-13 NOTE — CODE DOCUMENTATION
RRT called for neuro changes. RN reports approx 0520-0702 pt heart rate 120-160's. Labetolol IV x3 doses given. approx 5 mins after last dose of labetolol pt slow response, not as fluent with speech. Worse right sided weakness, facial drooping  NIH this am 1, now 9-10. Cardiac monitor afib rate 110-130's. Pt slow to respond to questions but able to answer name and age appropriately. Confused to month.

## 2020-01-13 NOTE — THERAPY EVALUATION
Acute Care - Speech Language Pathology   Swallow Initial Evaluation [facility]     Patient Name: Camille Pantoja  : 1955  MRN: [id number]  Today's Date: 2020               Admit Date: 1/10/2020    Visit Dx:     ICD-10-CM ICD-9-CM   1. Atrial fibrillation, unspecified type (CMS/HCC) I48.91 427.31     Patient Active Problem List   Diagnosis   • Deep vein thrombosis of lower extremity (CMS/HCC)   • Hyperlipidemia   • Hypertension   • Vitamin D deficiency   • Osteoarthritis   • Overweight (BMI 25.0-29.9)   • Paroxysmal atrial fibrillation with rapid ventricular response (CMS/HCC)   • Chronic atrial fibrillation with rapid ventricular response   • Atrial fibrillation (CMS/HCC)   • Metabolic encephalopathy   • Cerebral ventriculomegaly   • Acute ischemic left MCA stroke (CMS/HCC)     Past Medical History:   Diagnosis Date   • CHF (congestive heart failure) (CMS/HCC)    • Hyperlipidemia    • Hypertension    • Irregular heart beat    • Skin cancer    • Stroke (CMS/HCC)      Past Surgical History:   Procedure Laterality Date   • CARDIAC CATHETERIZATION N/A 1/10/2020    Procedure: Left Heart Cath;  Surgeon: Cain Mcneil MD;  Location:  INVASIVE LOCATION;  Service: Cardiovascular   • CARDIAC CATHETERIZATION N/A 1/10/2020    Procedure: Coronary angiography;  Surgeon: Cain Mcneil MD;  Location:  INVASIVE LOCATION;  Service: Cardiovascular   • FINGER SURGERY     • HYSTERECTOMY     • JOINT REPLACEMENT     • ORIF SHOULDER DISLOCATION W/ HUMERAL FRACTURE     • TIBIA FRACTURE SURGERY          SWALLOW EVALUATION (last 72 hours)      [facility] Adult Swallow Evaluation     Row Name 20 1205                   Rehab Evaluation    Document Type  evaluation  -OC        Subjective Information  no complaints  -OC        Patient Observations  alert;cooperative;agree to therapy  -OC        Patient Effort  good  -OC        Symptoms Noted During/After Treatment  none  -OC           General  "Information    Patient Profile Reviewed  yes  -OC        Pertinent History Of Current Problem  Pt admtara dfor a-fib, change in neuro status. per MRI report 1/9/20 , \"Acute infarct left basal ganglia and left insular cortex in the distribution of the left MCA.\"  -OC        Current Method of Nutrition  NPO  -OC        Precautions/Limitations, Vision  WFL with corrective lenses  -OC        Precautions/Limitations, Hearing  WFL  -OC        Prior Level of Function-Communication  WFL  -OC        Prior Level of Function-Swallowing  no diet consistency restrictions  -OC        Plans/Goals Discussed with  patient and family;other (see comments) Sister in Law present  -OC        Barriers to Rehab  none identified  -OC        Patient's Goals for Discharge  patient did not state  -OC        Family Goals for Discharge  family did not state  -OC           Pain Scale: Numbers Pre/Post-Treatment    Pain Scale: Numbers, Pretreatment  0/10 - no pain  -OC        Pain Scale: Numbers, Post-Treatment  0/10 - no pain  -OC           Oral Motor and Function    Dentition Assessment  natural, present and adequate  -OC        Secretion Management  WNL/WFL  -OC        Mucosal Quality  moist, healthy  -OC        Volitional Swallow  WFL  -OC        Volitional Cough  WFL  -OC           Oral Musculature and Cranial Nerve Assessment    Oral Motor General Assessment  generalized oral motor weakness;lingual impairment  -OC        Lingual Impairment, Detail. Cranial Nerves IX, XII (Glossopharyngeal and Hypoglossal)  reduced strength left  -OC           Clinical Swallow Eval    Oral Prep Phase  WFL  -OC        Oral Transit  WFL  -OC        Oral Residue  impaired  -OC        Pharyngeal Phase  no overt signs/symptoms of pharyngeal impairment  -OC        Clinical Swallow Evaluation Summary  Patient demonstrated no overt s/s aspiration with thin via cup/straw, puree, mechanical soft (X1), and regular textures. Patient took a single trial of mechancial soft " (peaches) and then reported she was allergic to them, no further trials administered. RN made aware. Patient demonstrated mild L lingual/buccal residue. Pt aware and able to clear independently.   -OC           Clinical Impression    SLP Swallowing Diagnosis  mild;oral dysfunction;functional pharyngeal phase  -OC        Functional Impact  risk of aspiration/pneumonia  -OC        Rehab Potential/Prognosis, Swallowing  good, to achieve stated therapy goals  -OC        Swallow Criteria for Skilled Therapeutic Interventions Met  demonstrates skilled criteria  -OC           Recommendations    Therapy Frequency (Swallow)  PRN  -OC        Predicted Duration Therapy Intervention (Days)  until discharge  -OC        SLP Diet Recommendation  regular textures;thin liquids  -OC        Recommended Diagnostics  reassess via clinical swallow evaluation;other (see comments) SLE/COG  -OC        Recommended Precautions and Strategies  upright posture during/after eating;small bites of food and sips of liquid  -OC        SLP Rec. for Method of Medication Administration  meds whole;with pudding or applesauce  -OC        Monitor for Signs of Aspiration  yes;notify SLP if any concerns  -OC        Anticipated Dischage Disposition  anticipate therapy at next level of care  -OC           Swallow Goals (SLP)    Oral Nutrition/Hydration Goal Selection (SLP)  oral nutrition/hydration, SLP goal 1  -OC           Oral Nutrition/Hydration Goal 1 (SLP)    Oral Nutrition/Hydration Goal 1, SLP  Tolerate least restrictive diet with no overt s/s aspiration.   -OC        Time Frame (Oral Nutrition/Hydration Goal 1, SLP)  by discharge  -OC          User Key  (r) = Recorded By, (t) = Taken By, (c) = Cosigned By    Initials Name Effective Dates    OC Card, DARRICK Wright,[facility]-SLP 06/08/18 -           EDUCATION  The patient has been educated in the following areas:   Dysphagia (Swallowing Impairment).    SLP Recommendation and Plan  SLP Swallowing Diagnosis:  mild, oral dysfunction, functional pharyngeal phase  SLP Diet Recommendation: regular textures, thin liquids  Recommended Precautions and Strategies: upright posture during/after eating, small bites of food and sips of liquid  SLP Rec. for Method of Medication Administration: meds whole, with pudding or applesauce     Monitor for Signs of Aspiration: yes, notify SLP if any concerns  Recommended Diagnostics: reassess via clinical swallow evaluation, other (see comments)(SLE/COG)  Swallow Criteria for Skilled Therapeutic Interventions Met: demonstrates skilled criteria  Anticipated Dischage Disposition: anticipate therapy at next level of care  Rehab Potential/Prognosis, Swallowing: good, to achieve stated therapy goals  Therapy Frequency (Swallow): PRN  Predicted Duration Therapy Intervention (Days): until discharge       Plan of Care Reviewed With: patient    SLP GOALS     Row Name 01/13/20 1205             Oral Nutrition/Hydration Goal 1 (SLP)    Oral Nutrition/Hydration Goal 1, SLP  Tolerate least restrictive diet with no overt s/s aspiration.   -OC      Time Frame (Oral Nutrition/Hydration Goal 1, SLP)  by discharge  -OC        User Key  (r) = Recorded By, (t) = Taken By, (c) = Cosigned By    Initials Name Provider Type    BOUCHRA Howe, DARRICK Wright CCC-SLP Speech and Language Pathologist           SLP Outcome Measures (last 72 hours)      SLP Outcome Measures     Row Name 01/13/20 1205             SLP Outcome Measures    Outcome Measure Used?  Adult NOMS  -OC         Adult FCM Scores    FCM Chosen  Swallowing  -OC      Swallowing FCM Score  6  -OC        User Key  (r) = Recorded By, (t) = Taken By, (c) = Cosigned By    Initials Name Effective Dates    Adriana Albarran MA, CCC-SLP 06/08/18 -            Time Calculation:   Time Calculation- SLP     Row Name 01/13/20 1342             Time Calculation- SLP    SLP Start Time  1205  -OC      SLP Received On  01/13/20  -OC        User Key  (r) = Recorded By, (t) = Taken By,  (c) = Cosigned By    Initials Name Provider Type    OC Adriana Howe MA,CCC-SLP Speech and Language Pathologist          Therapy Charges for Today     Code Description Service Date Service Provider Modifiers Qty    [id number]  ST EVAL ORAL PHARYNG SWALLOW 4 1/13/2020 Adriana Howe MA,CCC-SLP GN 1               Adriana Howe MA,ELY-SLP  1/13/2020

## 2020-01-14 LAB
APTT PPP: 101.5 SECONDS (ref 22.7–35.4)
APTT PPP: 47.2 SECONDS (ref 22.7–35.4)
APTT PPP: 53 SECONDS (ref 22.7–35.4)
BASOPHILS # BLD AUTO: 0.03 10*3/MM3 (ref 0–0.2)
BASOPHILS NFR BLD AUTO: 0.4 % (ref 0–1.5)
DEPRECATED RDW RBC AUTO: 44.6 FL (ref 37–54)
EOSINOPHIL # BLD AUTO: 0.01 10*3/MM3 (ref 0–0.4)
EOSINOPHIL NFR BLD AUTO: 0.1 % (ref 0.3–6.2)
ERYTHROCYTE [DISTWIDTH] IN BLOOD BY AUTOMATED COUNT: 14.2 % (ref 12.3–15.4)
HCT VFR BLD AUTO: 37.3 % (ref 34–46.6)
HGB BLD-MCNC: 12.2 G/DL (ref 12–15.9)
IMM GRANULOCYTES # BLD AUTO: 0.05 10*3/MM3 (ref 0–0.05)
IMM GRANULOCYTES NFR BLD AUTO: 0.6 % (ref 0–0.5)
LYMPHOCYTES # BLD AUTO: 0.64 10*3/MM3 (ref 0.7–3.1)
LYMPHOCYTES NFR BLD AUTO: 7.8 % (ref 19.6–45.3)
MCH RBC QN AUTO: 28.6 PG (ref 26.6–33)
MCHC RBC AUTO-ENTMCNC: 32.7 G/DL (ref 31.5–35.7)
MCV RBC AUTO: 87.4 FL (ref 79–97)
MONOCYTES # BLD AUTO: 0.8 10*3/MM3 (ref 0.1–0.9)
MONOCYTES NFR BLD AUTO: 9.8 % (ref 5–12)
NEUTROPHILS # BLD AUTO: 6.66 10*3/MM3 (ref 1.7–7)
NEUTROPHILS NFR BLD AUTO: 81.3 % (ref 42.7–76)
NRBC BLD AUTO-RTO: 0 /100 WBC (ref 0–0.2)
PLATELET # BLD AUTO: 150 10*3/MM3 (ref 140–450)
PMV BLD AUTO: 12 FL (ref 6–12)
RBC # BLD AUTO: 4.27 10*6/MM3 (ref 3.77–5.28)
WBC NRBC COR # BLD: 8.19 10*3/MM3 (ref 3.4–10.8)

## 2020-01-14 PROCEDURE — 97530 THERAPEUTIC ACTIVITIES: CPT

## 2020-01-14 PROCEDURE — 85730 THROMBOPLASTIN TIME PARTIAL: CPT | Performed by: PSYCHIATRY & NEUROLOGY

## 2020-01-14 PROCEDURE — 85025 COMPLETE CBC W/AUTO DIFF WBC: CPT | Performed by: PSYCHIATRY & NEUROLOGY

## 2020-01-14 PROCEDURE — 97162 PT EVAL MOD COMPLEX 30 MIN: CPT

## 2020-01-14 PROCEDURE — 99233 SBSQ HOSP IP/OBS HIGH 50: CPT | Performed by: NURSE PRACTITIONER

## 2020-01-14 PROCEDURE — 92610 EVALUATE SWALLOWING FUNCTION: CPT

## 2020-01-14 PROCEDURE — 25010000002 HEPARIN (PORCINE) PER 1000 UNITS: Performed by: PSYCHIATRY & NEUROLOGY

## 2020-01-14 PROCEDURE — 99232 SBSQ HOSP IP/OBS MODERATE 35: CPT | Performed by: NURSE PRACTITIONER

## 2020-01-14 PROCEDURE — 85730 THROMBOPLASTIN TIME PARTIAL: CPT | Performed by: INTERNAL MEDICINE

## 2020-01-14 RX ORDER — METOPROLOL SUCCINATE 50 MG/1
50 TABLET, EXTENDED RELEASE ORAL EVERY 12 HOURS SCHEDULED
Status: DISCONTINUED | OUTPATIENT
Start: 2020-01-14 | End: 2020-01-15

## 2020-01-14 RX ADMIN — SODIUM CHLORIDE 100 ML/HR: 9 INJECTION, SOLUTION INTRAVENOUS at 20:52

## 2020-01-14 RX ADMIN — SODIUM CHLORIDE 100 ML/HR: 9 INJECTION, SOLUTION INTRAVENOUS at 10:04

## 2020-01-14 RX ADMIN — HEPARIN SODIUM 18 UNITS/KG/HR: 10000 INJECTION, SOLUTION INTRAVENOUS at 16:08

## 2020-01-14 RX ADMIN — METOPROLOL TARTRATE 50 MG: 50 TABLET, FILM COATED ORAL at 10:04

## 2020-01-14 NOTE — PROGRESS NOTES
DOS: 2020  NAME: Camille Pantoja   : 1955  PCP: Nahun Ann Jr.,     CC: weakness    Stroke    Subjective: Overnight RRT called after patient's heart rate increased to 120s 160s she received labetalol IV x3 doses and then about 5 minutes after the last dose she was slow to respond and was noted to have worsening right-sided weakness and facial droop with an increase of her NIH from 1 to a 10.  Cardiac monitor revealed atrial fibrillation with RVR. BP appears to have been ranging from a 118//108, she received IVF bolus and a stat CT head obtained.    Objective:  Vital signs:      Vitals:    20 1948 20 1949 20 2300 20 0700   BP:  (!) 143/108 (!) 128/102 135/92   BP Location:  Right arm Right arm Right arm   Patient Position:  Lying Lying Lying   Pulse:  118  65   Resp:  18 18 18   Temp:  97.4 °F (36.3 °C) 99 °F (37.2 °C)    TempSrc:  Oral Oral Oral   SpO2: 96% 97% 99% 96%   Weight:       Height:           Current Facility-Administered Medications:   •  acetaminophen (TYLENOL) tablet 650 mg, 650 mg, Oral, Q4H PRN, Cain Mcneil MD, 650 mg at 01/10/20 2117  •  atorvastatin (LIPITOR) tablet 80 mg, 80 mg, Oral, Nightly, Johny Martin MD  •  docusate sodium (COLACE) capsule 100 mg, 100 mg, Oral, BID PRN, Cain Mcneil MD  •  heparin 01768 units/250 mL (100 units/mL) in 0.45 % NaCl infusion, 12 Units/kg/hr, Intravenous, Titrated, Socrates Noriega MD, Last Rate: 7.8 mL/hr at 20 0756, 15 Units/kg/hr at 20 075  •  losartan (COZAAR) tablet 50 mg, 50 mg, Oral, Nightly, Cain Mcneil MD, 50 mg at 01/10/20 2117  •  metoprolol tartrate (LOPRESSOR) injection 5 mg, 5 mg, Intravenous, Q6H PRN, Jovita Brown MD  •  metoprolol tartrate (LOPRESSOR) tablet 50 mg, 50 mg, Oral, Q12H, Christi Peace MD, 50 mg at 20 1004  •  ondansetron (ZOFRAN) tablet 4 mg, 4 mg, Oral, Q6H PRN **OR** ondansetron (ZOFRAN) injection 4 mg, 4 mg, Intravenous, Q6H  PRN, Cain Mcneil MD, 4 mg at 01/11/20 1034  •  pantoprazole (PROTONIX) EC tablet 40 mg, 40 mg, Oral, QAM, Cain Mcneil MD, 40 mg at 01/11/20 0649  •  senna-docusate sodium (SENOKOT-S) 8.6-50 MG tablet 2 tablet, 2 tablet, Oral, BID PRN, Cain Mcneil MD  •  sodium chloride 0.9 % infusion, 100 mL/hr, Intravenous, Continuous, Cain Mcneil MD, Last Rate: 100 mL/hr at 01/14/20 1004, 100 mL/hr at 01/14/20 1004    PRN meds  •  acetaminophen  •  docusate sodium  •  metoprolol tartrate  •  ondansetron **OR** ondansetron  •  senna-docusate sodium    No current facility-administered medications on file prior to encounter.      Current Outpatient Medications on File Prior to Encounter   Medication Sig   • cholecalciferol (VITAMIN D3) 25 MCG (1000 UT) tablet Take 2 tablets by mouth Daily.   • losartan (COZAAR) 50 MG tablet Take 1 tablet by mouth Every Night.   • omeprazole (priLOSEC) 20 MG capsule TAKE ONE CAPSULE BY MOUTH DAILY   • simvastatin (ZOCOR) 20 MG tablet Take 1 tablet by mouth Every Night.   • Zinc 50 MG capsule Take 50 mg by mouth Daily.   • enoxaparin (LOVENOX) 60 MG/0.6ML solution syringe Inject 0.6 mL under the skin into the appropriate area as directed Every 12 (Twelve) Hours. Indications: Atrial Fibrillation       General appearance: NAD, decreased attention but easily redirected, drowsy  HEENT: Normocephalic, atraumatic, PERRL, no masses or tenderness  COR: afib on bedside monitor  Resp: Even and unlabored  Extremities: Equal pulses  Skin: warm, dry    Neurological:   MS: oriented x3, recent/remote memory impaired, decreased attention/concentration, language intact but slow responses when speaking, no neglect  CN: visual acuity grossly normal, visual fields full, EOMI, facial sensation equal, mild right facial droop, hearing symmetric, palate elevates symmetrically, shoulder shrug equal decreased bilaterally worse on L  Motor: +3/5 LUE, +4/5 RUE, +1/5 RLE made minimal effort to  straighten leg out and lift off of bed she complained of some pain, +4/5 LLE  Reflexes: toes downgoing  Sensory: light touch sensation intact in all 4 ext.  Coordination: dysmetria on LUE finger to nose  Rapid alternating movements: slow finger to thumb tap bilaterally    Laboratory results:  Lab Results   Component Value Date    TSH 0.991 01/10/2020     Lab Results   Component Value Date    HGBA1C 5.50 01/10/2020     Lab Results   Component Value Date    PXUETFCQ43 435 01/10/2020     Lab Results   Component Value Date    CHOL 106 01/10/2020    CHLPL 129 05/29/2019    CHLPL 235 (H) 11/20/2017    CHLPL 195 05/24/2017     Lab Results   Component Value Date    TRIG 69 01/10/2020    TRIG 83 05/29/2019    TRIG 88 11/20/2017     Lab Results   Component Value Date    HDL 40 01/10/2020    HDL 51 05/29/2019    HDL 78 (H) 11/20/2017     Lab Results   Component Value Date    LDL 52 01/10/2020    LDL 61 05/29/2019     (H) 11/20/2017     Lab Results   Component Value Date    WBC 8.19 01/14/2020    HGB 12.2 01/14/2020    HCT 37.3 01/14/2020    MCV 87.4 01/14/2020     01/14/2020     Lab Results   Component Value Date    GLUCOSE 90 01/11/2020    BUN 11 01/11/2020    CREATININE 0.61 01/11/2020    EGFRIFNONA 98 01/11/2020    EGFRIFAFRI 75 05/29/2019    BCR 18.0 01/11/2020    K 3.6 01/11/2020    CO2 21.1 (L) 01/11/2020    CALCIUM 8.8 01/11/2020    PROTENTOTREF 7.5 05/29/2019    ALBUMIN 3.40 (L) 01/10/2020    LABIL2 1.5 05/29/2019    AST 25 01/10/2020    ALT 13 01/10/2020     Lab Results   Component Value Date    PTT 53.0 (H) 01/14/2020     Lab Results   Component Value Date    INR 1.31 (H) 01/13/2020    INR 1.30 (H) 01/10/2020    INR 1.25 (H) 01/09/2020    PROTIME 16.0 (H) 01/13/2020    PROTIME 16.0 (H) 01/10/2020    PROTIME 15.5 (H) 01/09/2020     Brief Urine Lab Results  (Last result in the past 365 days)      Color   Clarity   Blood   Leuk Est   Nitrite   Protein   CREAT   Urine HCG        01/09/20 2244 Yellow Clear  Small (1+) Negative Negative 30 mg/dL (1+)               Review and interpretation of imaging:  Ct Angiogram Head    Result Date: 1/13/2020    1. No perfusion abnormality to suggest completed or threatened acute infarct at the levels evaluated. 2. No arterial cutoff or high-grade arterial stenosis identified. 3. A new area of hypodensity in the right cerebellum suggesting subacute infarct. No acute intracranial hemorrhage or hydrocephalus. No enhancing lesions of brain.    Discussed by telephone with Dr. Noriega at 1900, 1920, 01/13/2020.  This report was finalized on 1/13/2020 7:32 PM by Dr. Sachin Damon M.D.      Ct Head Without Contrast    Result Date: 1/9/2020  Senescent changes without acute abnormality. Somewhat disproportional ventriculomegaly is noted. Correlate clinically for evidence of normal pressure hydrocephalus. Signer Name: Anthony Vidal MD  Signed: 1/9/2020 9:31 PM  Workstation Name: [facility]  [facility]    Ct Angiogram Neck    Result Date: 1/13/2020    1. No perfusion abnormality to suggest completed or threatened acute infarct at the levels evaluated. 2. No arterial cutoff or high-grade arterial stenosis identified. 3. A new area of hypodensity in the right cerebellum suggesting subacute infarct. No acute intracranial hemorrhage or hydrocephalus. No enhancing lesions of brain.    Discussed by telephone with Dr. Noriega at 1900, 1920, 01/13/2020.  This report was finalized on 1/13/2020 7:32 PM by Dr. Sachin Damon M.D.      Mri Angiogram Head Without Contrast    Result Date: 1/11/2020  1.  Acute left cerebral infarction involving the left caudate, left lentiform nucleus, external limb of left internal capsule, left insular cortex, and left posterior frontal lobe cortex. Infarction is in the left middle cerebral artery distribution. MRI one day prior was obtained for comparison. Overall infarctions appear very similar to the exam one day prior though the 9 mm  infarction within the posterior-superior left frontal lobe cortex is potentially new and not demonstrated previously. This was discussed with nurse Francia caring for the patient on 2 S. 6:11 PM 01/11/2020. 2. Inflammatory paranasal sinus disease. 3.  MR angiogram neck is very limited by motion artifact and the great vessel origins are not visualized. There is no evidence for NASCET carotid stenosis. 4.  Intracranial segment of the left vertebral artery is very small. There is a fetal origin of the left posterior cerebral artery.  This report was finalized on 1/11/2020 6:14 PM by Dr. Jae Hernández M.D.      Mri Angiogram Neck Without Contrast    Result Date: 1/11/2020  1.  Acute left cerebral infarction involving the left caudate, left lentiform nucleus, external limb of left internal capsule, left insular cortex, and left posterior frontal lobe cortex. Infarction is in the left middle cerebral artery distribution. MRI one day prior was obtained for comparison. Overall infarctions appear very similar to the exam one day prior though the 9 mm infarction within the posterior-superior left frontal lobe cortex is potentially new and not demonstrated previously. This was discussed with Francia nurse caring for the patient on 2 S. 6:11 PM 01/11/2020. 2. Inflammatory paranasal sinus disease. 3.  MR angiogram neck is very limited by motion artifact and the great vessel origins are not visualized. There is no evidence for NASCET carotid stenosis. 4.  Intracranial segment of the left vertebral artery is very small. There is a fetal origin of the left posterior cerebral artery.  This report was finalized on 1/11/2020 6:14 PM by Dr. Jae Hernández M.D.      Mri Brain Without Contrast    Result Date: 1/13/2020  1. Development of right cerebellar and upper pontine infarcts without hemorrhage or mass effect. There has been evolution of the previously described left-sided infarcts without significant change otherwise. 2.  Stable mild ventricular enlargement. 3. Chronic-appearing paranasal sinus disease.  This report was finalized on 1/13/2020 11:45 PM by Ravi Posey M.D.      Mri Brain Without Contrast    Result Date: 1/11/2020  1.  Acute left cerebral infarction involving the left caudate, left lentiform nucleus, external limb of left internal capsule, left insular cortex, and left posterior frontal lobe cortex. Infarction is in the left middle cerebral artery distribution. MRI one day prior was obtained for comparison. Overall infarctions appear very similar to the exam one day prior though the 9 mm infarction within the posterior-superior left frontal lobe cortex is potentially new and not demonstrated previously. This was discussed with Francia nurse caring for the patient on 2 S. 6:11 PM 01/11/2020. 2. Inflammatory paranasal sinus disease. 3.  MR angiogram neck is very limited by motion artifact and the great vessel origins are not visualized. There is no evidence for NASCET carotid stenosis. 4.  Intracranial segment of the left vertebral artery is very small. There is a fetal origin of the left posterior cerebral artery.  This report was finalized on 1/11/2020 6:14 PM by Dr. Jae Hernández M.D.      Mri Brain Without Contrast    Result Date: 1/10/2020  Acute infarct left basal ganglia and left insular cortex in the distribution of the left MCA. Ventriculomegaly with increased T2 signal within the periventricular white matter. Though this could be indicative of NPH this is most likely the sequela of chronic microvascular disease. Correlate with patient's clinical presentation. Dr. Grady relates that the patient does not have any clinical features of NPH. Bilateral maxillary and left ethmoid sinus mucosal disease. Results called to Dr. Grady at [facility] ICU at the time of this dictation. Signer Name: DIANE Rodriguez MD  Signed: 1/10/2020 12:07 PM  Workstation Name: [id number]  [facility]  [place]    Xr Chest 1 View    Result Date: 1/9/2020  Cardiomegaly with probable small bilateral pleural effusions right greater than left. Signer Name: DIANE Rodriguez MD  Signed: 1/9/2020 9:47 PM  Workstation [facility]: [facility]  [facility] [place]    Ct Cerebral Perfusion With & Without Contrast    Result Date: 1/13/2020    1. No perfusion abnormality to suggest completed or threatened acute infarct at the levels evaluated. 2. No arterial cutoff or high-grade arterial stenosis identified. 3. A new area of hypodensity in the right cerebellum suggesting subacute infarct. No acute intracranial hemorrhage or hydrocephalus. No enhancing lesions of brain.    Discussed by telephone with Dr. Noriega at 1900, 1920, 01/13/2020.  This report was finalized on 1/13/2020 7:32 PM by Dr. Sachin Damon M.D.      Results for orders placed during the hospital encounter of 01/09/20   Transthoracic Echo Complete With Contrast if Necessary Per Protocol    Narrative · The left ventricular cavity is moderately dilated.  · Left ventricular wall thickness is consistent with mild concentric   hypertrophy.  · Calculated EF = 27.0%.  · Left ventricular systolic function is severely decreased.  · The following left ventricular wall segments are hypokinetic: mid   anterior, apical anterior, basal anterolateral, mid inferolateral, basal   inferoseptal, mid inferoseptal, basal anterior and basal inferoseptal. The   following left ventricular wall segments are akinetic: mid anterolateral,   apical lateral, apical inferior, basal inferior, mid inferior, basal   anteroseptal and mid anteroseptal.  · Left atrial cavity size is severely dilated.  · Right atrial cavity size is severely dilated.  · Right ventricular cavity is moderately dilated.  · Moderately reduced right ventricular systolic function noted.  · Mild tricuspid valve regurgitation is present.  · Mild mitral valve regurgitation is present           Impression/Assessment:  This is a 64-year-old female with past medical history of hyperlipidemia, DVT on warfarin PTA, alcohol abuse with reported cessation in 2018, CHF,skin cancer, stroke on Zocor PTA recent left lower lobe pneumonia who presented to outside hospital [facility] on 1/9 with complaints of change in mental status and feeling terrible.  She reports she started to have diarrhea after being treated with Levaquin and had became progressively more weak.  [facility] at [facility] revealed new onset atrial fibrillation with RVR.  MRI brain revealed an acute left hemispheric subcortical stroke.  She also was noted to have an elevated troponin and new cardiomyopathy therefore she was transferred here for further work-up and evaluation and to undergo a cardiac cath.  She was taking warfarin PTA and her INR was noted to be 1.25 on admission.  After her cardiac cath she was noted to have worsening right-sided weakness so repeat MRI brain was obtained and revealed an extension of her stroke. BP on arrival . BS 95., Cr 0.74. Ethanol negative.     Dx: Acute right cerebellar infarct  Left hemisphere stroke secondary to decreased left MCA flow  New onset A. Fib  New cardiomyopathy  Recent pneumonia  History of alcohol abuse  H/O dvt with subtherapeutic INR on admission     Work up to date:  · MRI brain 1/10: Acute left basal ganglia left insular cortex stroke, ventriculomegaly  · CTH WO 1/9: No evidence of acute intracranial abnormalities, ventricular enlargement noted, suspicious of normal pressure hydrocephalus.  · CTH WO 1/11: No evidence of acute intracranial abnormalities, generalized enlargement of the ventricular system but is unchanged since prior imaging in 2012.  · Repeat MRI brain 1/11: Acute left cerebral stroke in the left caudate, lentiform nucleus, external limb of the internal capsule, and insular cortex and left posterior frontal lobe cortex, appears similar to previous but left  frontal cortex stroke is new.  · MRA head/neck 1/11: Motion degraded, decreased left MCA flow compared to right MCA but no obvious large vessel occlusion. Intracranial left vertebral artery is small.  Fetal origin of the left PCA.  · Cardiac cath 1/10: No significant CAD  · 2D Echo 1/10: Left atrial size severely dilated, right atrial size severely dilated, LV function severely decreased, EF 27%, no evidence of LV thrombus present, no AV stenosis present.  · CT/CTA/CTP 1/13: New area of hypodensity within the right cerebellum, no CBF under 30% deficit or perfusion abnormality, no high-grade stenosis, subcentimeter left thyroid hypodense nodule noted    Plan:  Hep gtt started last night in the setting of new stroke, will discuss with Dr. Noriega today about transitioning to Eliquis; per review of her MAR she was never given a dose  Lipitor 80mg, LDL 52  Neurochecks per stroke protocol  Avoid hypotension, continue IVF  Stroke Education  BAILEE/SCDs  PT/OT/ST  Follow up with me on 4/8, Follow up with Dr. Sachin Najera in our office on 3/31 for NPH eval  Therapies as written. CCP for discharge planning. Call RRT for any acute neurological changes. We will continue to follow and advise.    Case discussed with patient, sister in law, and RN.    Addenda 1643: Spoke with Dr. Noriega, will keep hep gtt on overnight and if she is stable tomorrow will transition to Eliquis 5mg BID.    JOHN Hess

## 2020-01-14 NOTE — PLAN OF CARE
Problem: Patient Care Overview  Goal: Plan of Care Review  Outcome: Ongoing (interventions implemented as appropriate)  Flowsheets (Taken 1/14/2020 1187)  Progress: no change  Plan of Care Reviewed With: patient   Pt. Vss pt seems to be A&Ox4 but will not use call button and takes off brief if it is at all wet and throw it on the floor. Pt. Is still on a heparin gtt and next ptt is due at 2200. Pt. R-side is weak due to stroke and is still NPO except crushed medications in applesauce and the water protocol.

## 2020-01-14 NOTE — PLAN OF CARE
Problem: Patient Care Overview  Goal: Plan of Care Review  Outcome: Ongoing (interventions implemented as appropriate)  Flowsheets (Taken 1/14/2020 1407)  Plan of Care Reviewed With: patient  Note:   Bedside swallow eval completed this date. Swallow appears impaired compared to previous date. Recommend NPO with med crushed with puree, water/ice ok after oral care. Recommend upright small sips, hugo. Recommend VFSS to determine least restrictive diet.

## 2020-01-14 NOTE — THERAPY RE-EVALUATION
Patient Name: Camille Pantoja  : 1955    MRN: [id number]                              Today's Date: 2020       Admit Date: 1/10/2020    Visit Dx:     ICD-10-CM ICD-9-CM   1. Atrial fibrillation, unspecified type (CMS/HCC) I48.91 427.31     Patient Active Problem List   Diagnosis   • Deep vein thrombosis of lower extremity (CMS/HCC)   • Hyperlipidemia   • Hypertension   • Vitamin D deficiency   • Osteoarthritis   • Overweight (BMI 25.0-29.9)   • Paroxysmal atrial fibrillation with rapid ventricular response (CMS/HCC)   • Chronic atrial fibrillation with rapid ventricular response   • Atrial fibrillation (CMS/HCC)   • Metabolic encephalopathy   • Cerebral ventriculomegaly   • Acute ischemic left MCA stroke (CMS/HCC)     Past Medical History:   Diagnosis Date   • CHF (congestive heart failure) (CMS/HCC)    • Hyperlipidemia    • Hypertension    • Irregular heart beat    • Skin cancer    • Stroke (CMS/HCC)      Past Surgical History:   Procedure Laterality Date   • CARDIAC CATHETERIZATION N/A 1/10/2020    Procedure: Left Heart Cath;  Surgeon: Cain Mcneil MD;  [facility];  Service: Cardiovascular   • CARDIAC CATHETERIZATION N/A 1/10/2020    Procedure: Coronary angiography;  Surgeon: Cain Mcneil MD;  Location:  [facility] CATH INVASIVE LOCATION;  Service: Cardiovascular   • FINGER SURGERY     • HYSTERECTOMY     • JOINT REPLACEMENT     • ORIF SHOULDER DISLOCATION W/ HUMERAL FRACTURE     • TIBIA FRACTURE SURGERY       General Information     Row Name 20 1249          PT Evaluation Time/Intention    Document Type  re-evaluation  -MW     Mode of Treatment  physical therapy  -MW     Row Name 20 1249          General Information    Patient Profile Reviewed?  yes  -MW     Prior Level of Function  independent:  -MW     Existing Precautions/Restrictions  fall  -MW     Barriers to Rehab  medically complex  -MW     Row Name 20 1249          Cognitive  Assessment/Intervention- PT/OT    Orientation Status (Cognition)  oriented x 3  -MW     Row Name 01/14/20 1249          Safety Issues, Functional Mobility    Impairments Affecting Function (Mobility)  balance;cognition;endurance/activity tolerance;muscle tone abnormal;pain  -MW     Comment, Safety Issues/Impairments (Mobility)  Gait belt used for safety.  -MW       User Key  (r) = Recorded By, (t) = Taken By, (c) = Cosigned By    Initials Name Provider Type    Mechelle Joaquin PT Physical Therapist        Mobility     Row Name 01/14/20 1252          Bed Mobility Assessment/Treatment    Bed Mobility Assessment/Treatment  supine-sit-supine  -MW     Supine-Sit-Supine [place] (Bed Mobility)  minimum assist (75% patient effort);2 person assist  -MW     Row Name 01/14/20 1252          Transfer Assessment/Treatment    Comment (Transfers)  Pt refused transfer to bedside chair.  -MW     Row Name 01/14/20 1252          Sit-Stand Transfer    Sit-Stand [place] (Transfers)  minimum assist (75% patient effort);verbal cues;2 person assist  -MW     Assistive Device (Sit-Stand Transfers)  walker, front-wheeled  -MW     Row Name 01/14/20 1252          Gait/Stairs Assessment/Training    Gait/Stairs Assessment/Training  gait/ambulation independence;gait pattern  -MW     [place] Level (Gait)  minimum assist (75% patient effort);moderate assist (50% patient effort);2 person assist  -MW     Assistive Device (Gait)  walker, front-wheeled  -MW     Distance in Feet (Gait)  3-4 side steps towards HOB  -MW     Pattern (Gait)  step-to  -MW     Deviations/Abnormal Patterns (Gait)  antalgic;right sided deviations  -MW     Right Sided Gait Deviations  weight shift ability decreased  -MW     Comment (Gait/Stairs)  Pt reports increased pain at R ankle during WB. Gait further limited due to pt c/o pain.  -MW       User Key  (r) = Recorded By, (t) = Taken By, (c) = Cosigned By    Initials Name Provider Type    Mechelle Joaquin PT  Physical Therapist        Obj/Interventions     Row Name 01/14/20 1257          General ROM    GENERAL ROM COMMENTS  WFL except R knee AROM limited by c/o pain and increased tone  -MW     Row Name 01/14/20 1257          MMT (Manual Muscle Testing)    General MMT Comments  RLE 2+/5, LLE WFL  -MW     Row Name 01/14/20 1257          Static Sitting Balance    Level of [place] (Unsupported Sitting, Static Balance)  minimal assist, 75% patient effort  -MW     Sitting Position (Unsupported Sitting, Static Balance)  sitting on edge of bed  -MW     Time Able to Maintain Position (Unsupported Sitting, Static Balance)  30 to 45 seconds  -MW     Comment (Unsupported Sitting, Static Balance)  Leans posteriorly  -MW     Row Name 01/14/20 1257          Dynamic Sitting Balance    Level of [place], Reaches Outside Midline (Sitting, Dynamic Balance)  minimal assist, 75% patient effort;moderate assist, 50 to 74% patient effort  -MW     Sitting Position, Reaches Outside Midline (Sitting, Dynamic Balance)  sitting on edge of bed  -MW       User Key  (r) = Recorded By, (t) = Taken By, (c) = Cosigned By    Initials Name Provider Type    MW Mechelle Salamanca, PT Physical Therapist        Goals/Plan     Row Name 01/14/20 1307          Bed Mobility Goal 1 (PT)    Activity/Assistive Device (Bed Mobility Goal 1, PT)  bed mobility activities, all  -MW     [place] Level/Cues Needed (Bed Mobility Goal 1, PT)  contact guard assist  -MW     Time Frame (Bed Mobility Goal 1, PT)  1 week  -MW     Row Name 01/14/20 1307          Transfer Goal 1 (PT)    Activity/Assistive Device (Transfer Goal 1, PT)  transfers, all  -MW     [place] Level/Cues Needed (Transfer Goal 1, PT)  contact guard assist  -MW     Time Frame (Transfer Goal 1, PT)  1 week  -MW     Row Name 01/14/20 1307          Gait Training Goal 1 (PT)    Activity/Assistive Device (Gait Training Goal 1, PT)  gait (walking locomotion)  -MW     [place] Level (Gait Training Goal  1, PT)  minimum assist (75% or more patient effort)  -MW     Distance (Gait Goal 1, PT)  50  -MW     Time Frame (Gait Training Goal 1, PT)  2 weeks  -MW       User Key  (r) = Recorded By, (t) = Taken By, (c) = Cosigned By    Initials Name Provider Type    Mechelle Joaquin, PT Physical Therapist        Clinical Impression     Row Name 01/14/20 1259          Pain Assessment    Additional Documentation  Pain Scale: FACES Pre/Post-Treatment (Group)  -     Row Name 01/14/20 1259          Pain Scale: Numbers Pre/Post-Treatment    Pain Location - Side  Right  -MW     Pain Location  knee  -MW     Pre/Post Treatment Pain Comment  Pt unable to rate her pain on VRS, c/o pain at R knee and foot  -MW     Pain Intervention(s)  Repositioned;Ambulation/increased activity;Rest  -     Row Name 01/14/20 1259          Pain Scale: FACES Pre/Post-Treatment    Pain: FACES Scale, Pretreatment  6-->hurts even more  -MW     Pain: FACES Scale, Post-Treatment  6-->hurts even more  -MW     Row Name 01/14/20 1259          Plan of Care Review    Plan of Care Reviewed With  patient;family  -     Outcome Summary  Pt is a 63 yo female admitted with left CVA, found to have new stroke 1/13 during this admission. PT Re-evaluation performed today. She currently presents with Right sided weakness, RLE pain, and impaired functional mobility. Today she required Min A x 2 for bed mobility and STS at RW. She had increased pain and difficulty WB'ing at RLE. She was able to take 3-4 side steps towards the right and towards the HOB using RW with Min to Mod A x 2. Continued skilled PT indicated to address functional deficits and maximize level of independence prior to discharge.  -     Row Name 01/14/20 1259          Physical Therapy Clinical Impression    Patient/Family Goals Statement (PT Clinical Impression)  to return home.  -MW     Criteria for Skilled Interventions Met (PT Clinical Impression)  yes;treatment indicated  -MW     Rehab Potential (PT  Clinical Summary)  good, to achieve stated therapy goals  -MW     Predicted Duration of Therapy (PT)  1 week  -MW     Row Name 01/14/20 1259          Vital Signs    Pre Systolic BP Rehab  135  -MW     Pre Treatment Diastolic BP  92  -MW     Pretreatment Heart Rate (beats/min)  93  -MW     Posttreatment Heart Rate (beats/min)  87  -MW     Pre SpO2 (%)  94  -MW     O2 Delivery Pre Treatment  supplemental O2  -MW     O2 Delivery Intra Treatment  room air  -MW     Post SpO2 (%)  96  -MW     O2 Delivery Post Treatment  supplemental O2  -MW     Pre Patient Position  Supine  -MW     Intra Patient Position  Standing  -MW     Post Patient Position  Supine  -MW     Row Name 01/14/20 1259          Positioning and Restraints    Pre-Treatment Position  in bed  -MW     Post Treatment Position  bed  -MW     In Bed  notified nsg;call light within reach;encouraged to call for assist;exit alarm on;with family/caregiver All lines intact  -MW       User Key  (r) = Recorded By, (t) = Taken By, (c) = Cosigned By    Initials Name Provider Type    Mechelle Joaquin, PT Physical Therapist        Outcome Measures     Row Name 01/14/20 130          How much help from another person do you currently need...    Turning from your back to your side while in flat bed without using bedrails?  3  -MW     Moving from lying on back to sitting on the side of a flat bed without bedrails?  2  -MW     Moving to and from a bed to a chair (including a wheelchair)?  2  -MW     Standing up from a chair using your arms (e.g., wheelchair, bedside chair)?  3  -MW     Climbing 3-5 steps with a railing?  1  -MW     To walk in hospital room?  2  -MW     AM-PAC 6 Clicks Score (PT)  13  -MW     Row Name 01/14/20 1308          Modified [place] Scale    Modified [place] Scale  4 - Moderately severe disability.  Unable to walk without assistance, and unable to attend to own bodily needs without assistance.  -MW     Row Name 01/14/20 1308          Functional Assessment     Outcome Measure Options  AM-PAC 6 Clicks Basic Mobility (PT)  -MW       User Key  (r) = Recorded By, (t) = Taken By, (c) = Cosigned By    Initials Name Provider Type    Mechelle Joaquin PT Physical Therapist          PT Recommendation and Plan  Planned Therapy Interventions (PT Eval): balance training, bed mobility training, gait training, home exercise program, neuromuscular re-education, patient/family education, strengthening, transfer training  Outcome Summary/Treatment Plan (PT)  Anticipated Equipment Needs at Discharge (PT): front wheeled walker  Anticipated Discharge Disposition (PT): inpatient rehabilitation facility, skilled nursing facility  Plan of Care Reviewed With: patient, family  Outcome Summary: Pt is a 63 yo female admitted with left CVA, found to have new stroke 1/13 during this admission. PT Re-evaluation performed today. She currently presents with Right sided weakness, RLE pain, and impaired functional mobility. Today she required Min A x 2 for bed mobility and STS at RW. She had increased pain and difficulty WB'ing at RLE. She was able to take 3-4 side steps towards the right and towards the HOB using RW with Min to Mod A x 2. Continued skilled PT indicated to address functional deficits and maximize level of independence prior to discharge.     Time Calculation:   PT Charges     Row Name 01/14/20 1310             Time Calculation    Start Time  1130  -MW      Stop Time  1150  -MW      Time Calculation (min)  20 min  -MW      PT Received On  01/14/20  -MW      PT - Next Appointment  01/15/20  -MW      PT Goal Re-Cert Due Date  01/28/20  -MW         Time Calculation- PT    Total Timed Code Minutes- PT  16 minute(s)  -MW        User Key  (r) = Recorded By, (t) = Taken By, (c) = Cosigned By    Initials Name Provider Type    Mechelle Joaquin PT Physical Therapist        Therapy Charges for Today     Code Description Service Date Service Provider Modifiers Qty    52668657671 HC PT EVAL MOD  COMPLEXITY 2 1/14/2020 Mechelle Salamanca, PT GP 1    65799239627 HC PT THER SUPP EA 15 MIN 1/14/2020 Mechelle Salamanca, PT GP 1    05300973805 HC PT THERAPEUTIC ACT EA 15 MIN 1/14/2020 Mechelle Salamanca, PT GP 1          PT G-Codes  Outcome Measure Options: AM-PAC 6 Clicks Basic Mobility (PT)  AM-PAC 6 Clicks Score (PT): 13  AM-PAC 6 Clicks Score (OT): 16  Modified [place] Scale: 4 - Moderately severe disability.  Unable to walk without assistance, and unable to attend to own bodily needs without assistance.    Mechelle Salamanca, PT  1/14/2020

## 2020-01-14 NOTE — PROGRESS NOTES
Continued Stay Note  [facility]     Patient Name: Camille Pantoja  MRN: [id number]  Today's Date: 1/14/2020    Admit Date: 1/10/2020    Discharge Plan     Row Name 01/14/20 1528       Plan    Plan  Plan is skilled bed at [facility] upon DC.  Precert received today.    Plan Comments  Spoke with Alexandra/ [employer] who states precert has been received.  Pt not ready for DC yet.  CCP will follow.          Discharge Codes    No documentation.             Cait Gillespie RN

## 2020-01-14 NOTE — THERAPY EVALUATION
Acute Care - Speech Language Pathology   Swallow Initial Evaluation [facility]     Patient Name: Camille Pantoja  : 1955  MRN: [id number]  Today's Date: 2020               Admit Date: 1/10/2020    Visit Dx:     ICD-10-CM ICD-9-CM   1. Atrial fibrillation, unspecified type (CMS/HCC) I48.91 427.31     Patient Active Problem List   Diagnosis   • Deep vein thrombosis of lower extremity (CMS/HCC)   • Hyperlipidemia   • Hypertension   • Vitamin D deficiency   • Osteoarthritis   • Overweight (BMI 25.0-29.9)   • Paroxysmal atrial fibrillation with rapid ventricular response (CMS/HCC)   • Chronic atrial fibrillation with rapid ventricular response   • Atrial fibrillation (CMS/HCC)   • Metabolic encephalopathy   • Cerebral ventriculomegaly   • Acute ischemic left MCA stroke (CMS/HCC)     Past Medical History:   Diagnosis Date   • CHF (congestive heart failure) (CMS/HCC)    • Hyperlipidemia    • Hypertension    • Irregular heart beat    • Skin cancer    • Stroke (CMS/HCC)      Past Surgical History:   Procedure Laterality Date   • CARDIAC CATHETERIZATION N/A 1/10/2020    Procedure: Left Heart Cath;  Surgeon: Cain Mcneil MD;  Location: [facility] INVASIVE LOCATION;  Service: Cardiovascular   • CARDIAC CATHETERIZATION N/A 1/10/2020    Procedure: Coronary angiography;  Surgeon: Cain Mcneil MD;  Location: [facility] INVASIVE LOCATION;  Service: Cardiovascular   • FINGER SURGERY     • HYSTERECTOMY     • JOINT REPLACEMENT     • ORIF SHOULDER DISLOCATION W/ HUMERAL FRACTURE     • TIBIA FRACTURE SURGERY          SWALLOW EVALUATION (last 72 hours)      [facility] Adult Swallow Evaluation     Row Name 20 1100 20 1205                Rehab Evaluation    Document Type  --  evaluation  -OC       Subjective Information  --  no complaints  -OC       Patient Observations  agree to therapy;cooperative;alert  -OC  alert;cooperative;agree to therapy  -OC       Patient Effort  --  good  -OC       Symptoms  "Noted During/After Treatment  --  none  -OC          General Information    Patient Profile Reviewed  --  yes  -OC       Pertinent History Of Current Problem  ST re-consulted secondary to new CVA per MRI.    -OC  Pt admitte sher humphreysfib, change in neuro status. per MRI report 1/9/20 , \"Acute infarct left basal ganglia and left insular cortex in the distribution of the left MCA.\"  -OC       Current Method of Nutrition  NPO  -OC  NPO  -OC       Precautions/Limitations, Vision  --  WFL with corrective lenses  -OC       Precautions/Limitations, Hearing  --  WFL  -OC       Prior Level of Function-Communication  WFL  -OC  WFL  -OC       Prior Level of Function-Swallowing  no diet consistency restrictions  -OC  no diet consistency restrictions  -OC       Plans/Goals Discussed with  patient;agreed upon  -OC  patient and family;other (see comments) Sister in Law present  -OC       Barriers to Rehab  none identified  -OC  none identified  -OC       Patient's Goals for Discharge  patient did not state  -OC  patient did not state  -OC       Family Goals for Discharge  --  family did not state  -OC          Pain Scale: Numbers Pre/Post-Treatment    Pain Scale: Numbers, Pretreatment  0/10 - no pain  -OC  0/10 - no pain  -OC       Pain Scale: Numbers, Post-Treatment  0/10 - no pain  -OC  0/10 - no pain  -OC          Oral Motor and Function    Dentition Assessment  natural, present and adequate  -OC  natural, present and adequate  -OC       Secretion Management  WNL/WFL  -OC  WNL/WFL  -OC       Mucosal Quality  moist, healthy  -OC  moist, healthy  -OC       Volitional Swallow  WFL  -OC  WFL  -OC       Volitional Cough  WFL  -OC  WFL  -OC          Oral Musculature and Cranial Nerve Assessment    Oral Motor General Assessment  generalized oral motor weakness;lingual impairment  -OC  generalized oral motor weakness;lingual impairment  -OC       Lingual Impairment, Detail. Cranial Nerves IX, XII (Glossopharyngeal and Hypoglossal)  " reduced strength left  -OC  reduced strength left  -OC          Clinical Swallow Eval    Oral Prep Phase  WFL  -OC  WFL  -OC       Oral Transit  WFL  -OC  WFL  -OC       Oral Residue  impaired  -OC  impaired  -OC       Pharyngeal Phase  suspected pharyngeal impairment  -OC  no overt signs/symptoms of pharyngeal impairment  -OC       Clinical Swallow Evaluation Summary  Patient demonstrated dealyed cough with ice chips x2. Pt demonstrated change in vocal quality and throat clear with thins. Patient demonstrated change in vocal quality with nectar thick and  honey thick liquids, required cued throat clear with repeat swallow to clear. No overt s/s aspiration with puree.  prolonged mastication mech soft  -OC  Patient demonstrated no overt s/s aspiration with thin via cup/straw, puree, mechanical soft (X1), and regular textures. Patient took a single trial of mechancial soft (peaches) and then reported she was allergic to them, no further trials administered. RN made aware. Patient demonstrated mild L lingual/buccal residue. Pt aware and able to clear independently.   -OC          Clinical Impression    SLP Swallowing Diagnosis  oral dysfunction;suspected pharyngeal dysfunction  -OC  mild;oral dysfunction;functional pharyngeal phase  -OC       Functional Impact  risk of aspiration/pneumonia  -OC  risk of aspiration/pneumonia  -OC       Rehab Potential/Prognosis, Swallowing  good, to achieve stated therapy goals  -OC  good, to achieve stated therapy goals  -OC       Swallow Criteria for Skilled Therapeutic Interventions Met  demonstrates skilled criteria  -OC  demonstrates skilled criteria  -OC          Recommendations    Therapy Frequency (Swallow)  PRN  -OC  PRN  -OC       Predicted Duration Therapy Intervention (Days)  until discharge  -OC  until discharge  -OC       SLP Diet Recommendation  NPO;ice chips between meals after oral care, with supervision;water between meals after oral care, with supervision  -OC   regular textures;thin liquids  -OC       Recommended Diagnostics  reassess via VFSS ([facility])  -OC  reassess via clinical swallow evaluation;other (see comments) SLE/COG  -OC       Recommended Precautions and Strategies  upright posture during/after eating;small bites of food and sips of liquid  -OC  upright posture during/after eating;small bites of food and sips of liquid  -OC       SLP Rec. for Method of Medication Administration  meds crushed;with pudding or applesauce  -OC  meds whole;with pudding or applesauce  -OC       Monitor for Signs of Aspiration  yes;notify SLP if any concerns  -OC  yes;notify SLP if any concerns  -OC       Anticipated Dischage Disposition  anticipate therapy at next level of care  -OC  anticipate therapy at next level of care  -OC          Swallow Goals (SLP)    Oral Nutrition/Hydration Goal Selection (SLP)  oral nutrition/hydration, SLP goal 1  -OC  oral nutrition/hydration, SLP goal 1  -OC          Oral Nutrition/Hydration Goal 1 (SLP)    Oral Nutrition/Hydration Goal 1, SLP  Tolerate least restrictive diet with no overt s/s aspiration.   -OC  Tolerate least restrictive diet with no overt s/s aspiration.   -OC       Time Frame (Oral Nutrition/Hydration Goal 1, SLP)  by discharge  -OC  by discharge  -OC         User Key  (r) = Recorded By, (t) = Taken By, (c) = Cosigned By    Initials Name Effective Dates    OC Adriana Howe MA,[facility]-SLP 06/08/18 -           EDUCATION  The patient has been educated in the following areas:   Dysphagia (Swallowing Impairment).    SLP Recommendation and Plan  SLP Swallowing Diagnosis: oral dysfunction, suspected pharyngeal dysfunction  SLP Diet Recommendation: NPO, ice chips between meals after oral care, with supervision, water between meals after oral care, with supervision  Recommended Precautions and Strategies: upright posture during/after eating, small bites of food and sips of liquid  SLP Rec. for Method of Medication Administration: meds crushed, with  pudding or applesauce     Monitor for Signs of Aspiration: yes, notify SLP if any concerns  Recommended Diagnostics: reassess via VFSS ([facility])  Swallow Criteria for Skilled Therapeutic Interventions Met: demonstrates skilled criteria  Anticipated Dischage Disposition: anticipate therapy at next level of care  Rehab Potential/Prognosis, Swallowing: good, to achieve stated therapy goals  Therapy Frequency (Swallow): PRN  Predicted Duration Therapy Intervention (Days): until discharge       Plan of Care Reviewed With: patient    SLP GOALS     Row Name 01/14/20 1100 01/13/20 1205          Oral Nutrition/Hydration Goal 1 (SLP)    Oral Nutrition/Hydration Goal 1, SLP  Tolerate least restrictive diet with no overt s/s aspiration.   -OC  Tolerate least restrictive diet with no overt s/s aspiration.   -OC     Time Frame (Oral Nutrition/Hydration Goal 1, SLP)  by discharge  -OC  by discharge  -OC       User Key  (r) = Recorded By, (t) = Taken By, (c) = Cosigned By    Initials Name Provider Type    Adriana Albarran MA, CCC-SLP Speech and Language Pathologist           SLP Outcome Measures (last 72 hours)      SLP Outcome Measures     Row Name 01/14/20 1100 01/13/20 1205          SLP Outcome Measures    Outcome Measure Used?  Adult NOMS  -OC  Adult NOMS  -OC        Adult FCM Scores    FCM Chosen  --  Swallowing  -OC     Swallowing FCM Score  1  -OC  6  -OC       User Key  (r) = Recorded By, (t) = Taken By, (c) = Cosigned By    Initials Name Effective Dates    Adriana Albarran MA, CCC-SLP 06/08/18 -            Time Calculation:   Time Calculation- SLP     Row Name 01/14/20 1409             Time Calculation- SLP    SLP Start Time  1100  -OC      SLP Received On  01/14/20  -OC        User Key  (r) = Recorded By, (t) = Taken By, (c) = Cosigned By    Initials Name Provider Type    Adriana Albarran MA, CCC-SLP Speech and Language Pathologist          Therapy Charges for Today     Code Description Service Date Service Provider Modifiers  Qty    21988532022  ST EVAL ORAL PHARYNG SWALLOW 4 1/13/2020 Adriana Howe MA,CCC-SLP GN 1    [id number]  ST EVAL ORAL PHARYNG SWALLOW 4 1/14/2020 Adriana Howe MA,CCC-SLP GN 1               Adriana Howe MA,CCC-SLP  1/14/2020

## 2020-01-14 NOTE — PROGRESS NOTES
"    Patient Name: Camille Pantoja  :1955  64 y.o.      Patient Care Team:  Nahun Ann Jr., DO as PCP - General (Family Medicine)    Chief Complaint: follow up atrial fibrillation, stroke, cardiomyopathy    Interval History: she is asleep. Seems a little groggy. Can't really tell me much about what happened last night. MRI with new strokes. Currently on heparin drip.       Objective   Vital Signs  Temp:  [97.4 °F (36.3 °C)-99 °F (37.2 °C)] 98.9 °F (37.2 °C)  Heart Rate:  [] 85  Resp:  [16-18] 16  BP: (118-143)/() 118/89    Intake/Output Summary (Last 24 hours) at 2020 1441  Last data filed at 2020 1349  Gross per 24 hour   Intake 1500 ml   Output 250 ml   Net 1250 ml     Flowsheet Rows      First Filed Value   Admission Height  165.1 cm (65\") Documented at 01/10/2020 1618   Admission Weight  55.6 kg (122 lb 8 oz) Documented at 01/10/2020 1618          Physical Exam:   General Appearance:    Alert, cooperative, in no acute distress   Lungs:     Clear to auscultation.  Normal respiratory effort and rate.      Heart:    irregular rhythm and normal rate, normal S1 and S2, no murmurs, gallops or rubs.     Chest Wall:    No abnormalities observed   Abdomen:     Soft, nontender, positive bowel sounds.     Extremities:   no cyanosis, clubbing or edema.  No marked joint deformities.  Adequate musculoskeletal strength.       Results Review:    Results from last 7 days   Lab Units 20  0442   SODIUM mmol/L 139   POTASSIUM mmol/L 3.6   CHLORIDE mmol/L 103   CO2 mmol/L 21.1*   BUN mg/dL 11   CREATININE mg/dL 0.61   GLUCOSE mg/dL 90   CALCIUM mg/dL 8.8     Results from last 7 days   Lab Units 01/10/20  0847 01/10/20  0549   CK TOTAL U/L  --  179   TROPONIN T ng/mL 0.070* 0.065*     Results from last 7 days   Lab Units 20  0607   WBC 10*3/mm3 8.19   HEMOGLOBIN g/dL 12.2   HEMATOCRIT % 37.3   PLATELETS 10*3/mm3 150     Results from last 7 days   Lab Units 20  0607 " 01/13/20  2003 01/10/20  0549 01/09/20 2052   INR   --  1.31* 1.30* 1.25*   APTT seconds 53.0* 35.6*  --   --      Results from last 7 days   Lab Units 01/10/20  1323   MAGNESIUM mg/dL 2.7*     Results from last 7 days   Lab Units 01/10/20  0549   CHOLESTEROL mg/dL 106   TRIGLYCERIDES mg/dL 69   HDL CHOL mg/dL 40   LDL CHOL mg/dL 52               Medication Review:     atorvastatin 80 mg Oral Nightly   losartan 50 mg Oral Nightly   metoprolol tartrate 50 mg Oral Q12H   pantoprazole 40 mg Oral QAM          heparin (porcine) 12 Units/kg/hr Last Rate: 15 Units/kg/hr (01/14/20 0756)   sodium chloride 100 mL/hr Last Rate: 100 mL/hr (01/14/20 1004)       Assessment/Plan   1. Syncope  2. Atrial fibrillation - new diagnosis. Rate controlled.   3. Cardiomyopathy - non ischemic (coronary angiography 1/10/2020). Ef 27%. Change to metoprolol succinate. Continue losartan. Volume status appears well compensated.   4. Acute stroke - 1/10- MRI brain with acute left hemispheric subcortical stroke. After cardiac cath, MRI with extension of stroke.    - 1/13 new area of infarcts in the right cerebellum. Placed on heparin drip per neuro.   5. History of DVT - was on warfarin prior to admission though initial INR was 1.2 (she had been subtherapeutic for the past month)     - New stroke on MRI last night. Now on heparin drip. Transition to apixaban per neurology.    - avoid hypotension given above. Added hold parameters to losartan    JOHN Alejandro  [facility]  01/14/20  2:41 PM

## 2020-01-14 NOTE — CODE DOCUMENTATION
Spoke with Dr Noriega, new orders rec'd. Plan to transfer pt to [id number]. Francia AUGUSTINE called and notified. Awaiting room to be clean.

## 2020-01-14 NOTE — PLAN OF CARE
Problem: Patient Care Overview  Goal: Plan of Care Review  Outcome: Ongoing (interventions implemented as appropriate)  Flowsheets  Taken 1/13/2020 1122 by Alison Wyman PTA  Progress: improving  Taken 1/14/2020 1259 by Mechelle Salamanca PT  Plan of Care Reviewed With: patient;family  Outcome Summary: Pt is a 63 yo female admitted with left CVA 1/10/2020, found to have new stroke 1/13 during this admission. PT Re-evaluation performed today. She currently presents with Right sided weakness, RLE pain, and impaired functional mobility. Today she required Min A x 2 for bed mobility and STS at RW. She had increased pain and difficulty WB'ing at RLE. She was able to take 3-4 side steps towards the right and towards the HOB using RW with Min to Mod A x 2. Continued skilled PT indicated to address functional deficits and maximize level of independence prior to discharge.

## 2020-01-15 ENCOUNTER — APPOINTMENT (OUTPATIENT)
Dept: GENERAL RADIOLOGY | Facility: HOSPITAL | Age: 65
End: 2020-01-15

## 2020-01-15 LAB
APTT PPP: 76.9 SECONDS (ref 22.7–35.4)
BASOPHILS # BLD AUTO: 0.01 10*3/MM3 (ref 0–0.2)
BASOPHILS NFR BLD AUTO: 0.1 % (ref 0–1.5)
DEPRECATED RDW RBC AUTO: 46.9 FL (ref 37–54)
EOSINOPHIL # BLD AUTO: 0.01 10*3/MM3 (ref 0–0.4)
EOSINOPHIL NFR BLD AUTO: 0.1 % (ref 0.3–6.2)
ERYTHROCYTE [DISTWIDTH] IN BLOOD BY AUTOMATED COUNT: 14.6 % (ref 12.3–15.4)
HCT VFR BLD AUTO: 37.9 % (ref 34–46.6)
HGB BLD-MCNC: 12.6 G/DL (ref 12–15.9)
IMM GRANULOCYTES # BLD AUTO: 0.01 10*3/MM3 (ref 0–0.05)
IMM GRANULOCYTES NFR BLD AUTO: 0.1 % (ref 0–0.5)
LYMPHOCYTES # BLD AUTO: 0.44 10*3/MM3 (ref 0.7–3.1)
LYMPHOCYTES NFR BLD AUTO: 6.5 % (ref 19.6–45.3)
MCH RBC QN AUTO: 29 PG (ref 26.6–33)
MCHC RBC AUTO-ENTMCNC: 33.2 G/DL (ref 31.5–35.7)
MCV RBC AUTO: 87.3 FL (ref 79–97)
MONOCYTES # BLD AUTO: 0.63 10*3/MM3 (ref 0.1–0.9)
MONOCYTES NFR BLD AUTO: 9.3 % (ref 5–12)
NEUTROPHILS # BLD AUTO: 5.68 10*3/MM3 (ref 1.7–7)
NEUTROPHILS NFR BLD AUTO: 83.9 % (ref 42.7–76)
NRBC BLD AUTO-RTO: 0.1 /100 WBC (ref 0–0.2)
PLATELET # BLD AUTO: 150 10*3/MM3 (ref 140–450)
PMV BLD AUTO: 12.7 FL (ref 6–12)
RBC # BLD AUTO: 4.34 10*6/MM3 (ref 3.77–5.28)
WBC NRBC COR # BLD: 6.78 10*3/MM3 (ref 3.4–10.8)

## 2020-01-15 PROCEDURE — 73552 X-RAY EXAM OF FEMUR 2/>: CPT

## 2020-01-15 PROCEDURE — 97110 THERAPEUTIC EXERCISES: CPT | Performed by: OCCUPATIONAL THERAPIST

## 2020-01-15 PROCEDURE — 74230 X-RAY XM SWLNG FUNCJ C+: CPT

## 2020-01-15 PROCEDURE — 85025 COMPLETE CBC W/AUTO DIFF WBC: CPT | Performed by: PSYCHIATRY & NEUROLOGY

## 2020-01-15 PROCEDURE — 99232 SBSQ HOSP IP/OBS MODERATE 35: CPT | Performed by: INTERNAL MEDICINE

## 2020-01-15 PROCEDURE — 85730 THROMBOPLASTIN TIME PARTIAL: CPT | Performed by: PSYCHIATRY & NEUROLOGY

## 2020-01-15 PROCEDURE — 25010000002 HEPARIN (PORCINE) PER 1000 UNITS: Performed by: PSYCHIATRY & NEUROLOGY

## 2020-01-15 PROCEDURE — 99232 SBSQ HOSP IP/OBS MODERATE 35: CPT | Performed by: NURSE PRACTITIONER

## 2020-01-15 PROCEDURE — 92611 MOTION FLUOROSCOPY/SWALLOW: CPT | Performed by: SPEECH-LANGUAGE PATHOLOGIST

## 2020-01-15 PROCEDURE — 73020 X-RAY EXAM OF SHOULDER: CPT

## 2020-01-15 PROCEDURE — 25010000002 DIGOXIN PER 500 MCG: Performed by: INTERNAL MEDICINE

## 2020-01-15 RX ORDER — OXYCODONE HYDROCHLORIDE AND ACETAMINOPHEN 5; 325 MG/1; MG/1
1 TABLET ORAL ONCE
Status: DISCONTINUED | OUTPATIENT
Start: 2020-01-15 | End: 2020-01-16 | Stop reason: HOSPADM

## 2020-01-15 RX ORDER — DIGOXIN 125 MCG
125 TABLET ORAL
Status: DISCONTINUED | OUTPATIENT
Start: 2020-01-15 | End: 2020-01-16 | Stop reason: HOSPADM

## 2020-01-15 RX ORDER — DIGOXIN 0.25 MG/ML
250 INJECTION INTRAMUSCULAR; INTRAVENOUS ONCE
Status: COMPLETED | OUTPATIENT
Start: 2020-01-15 | End: 2020-01-15

## 2020-01-15 RX ADMIN — METOPROLOL SUCCINATE 50 MG: 50 TABLET, FILM COATED, EXTENDED RELEASE ORAL at 04:16

## 2020-01-15 RX ADMIN — METOPROLOL TARTRATE 25 MG: 25 TABLET ORAL at 08:33

## 2020-01-15 RX ADMIN — BARIUM SULFATE 55 ML: 0.81 POWDER, FOR SUSPENSION ORAL at 10:59

## 2020-01-15 RX ADMIN — DIGOXIN 250 MCG: 0.25 INJECTION INTRAMUSCULAR; INTRAVENOUS at 11:34

## 2020-01-15 RX ADMIN — ATORVASTATIN CALCIUM 80 MG: 80 TABLET, FILM COATED ORAL at 20:01

## 2020-01-15 RX ADMIN — DIGOXIN 125 MCG: 125 TABLET ORAL at 12:42

## 2020-01-15 RX ADMIN — SODIUM CHLORIDE 100 ML/HR: 9 INJECTION, SOLUTION INTRAVENOUS at 06:25

## 2020-01-15 RX ADMIN — ACETAMINOPHEN 650 MG: 325 TABLET, FILM COATED ORAL at 08:36

## 2020-01-15 RX ADMIN — APIXABAN 5 MG: 5 TABLET, FILM COATED ORAL at 20:01

## 2020-01-15 RX ADMIN — ACETAMINOPHEN 650 MG: 325 TABLET, FILM COATED ORAL at 20:01

## 2020-01-15 RX ADMIN — METOPROLOL SUCCINATE 50 MG: 50 TABLET, FILM COATED, EXTENDED RELEASE ORAL at 08:33

## 2020-01-15 RX ADMIN — PANTOPRAZOLE SODIUM 40 MG: 40 TABLET, DELAYED RELEASE ORAL at 08:33

## 2020-01-15 RX ADMIN — METOPROLOL SUCCINATE 75 MG: 50 TABLET, FILM COATED, EXTENDED RELEASE ORAL at 20:01

## 2020-01-15 RX ADMIN — BARIUM SULFATE 4 ML: 980 POWDER, FOR SUSPENSION ORAL at 10:59

## 2020-01-15 RX ADMIN — APIXABAN 5 MG: 5 TABLET, FILM COATED ORAL at 11:34

## 2020-01-15 RX ADMIN — HEPARIN SODIUM 16 UNITS/KG/HR: 10000 INJECTION, SOLUTION INTRAVENOUS at 06:24

## 2020-01-15 NOTE — PLAN OF CARE
AFib RVR 120s-130s today, as high as 150s at shift change. Cardiology changed medications, HR has been 80s-110s. Patient never symptomatic. NIH q shift. VFSS completed this AM, PO diet ordered. XR of right shoulder and hip d/t complaints of pain, no acute fractures observed. Pt needs subacute rehab at discharge.

## 2020-01-15 NOTE — PROGRESS NOTES
LOS: 5 days   Patient Care Team:  Nahun Ann Jr., DO as PCP - General (Family Medicine)    Chief Complaint:     F/u CVA, CMP, hypotension, atrial fibrillation    Interval History:     She is complaining of some right shoulder and right arm pain today.  Outside that, she has no chest pain or difficulty breathing.  Her heart rate is fast today.  Neurology is seeing her and plans to stop heparin and restart Eliquis today.  She will need rehab    Objective   Vital Signs  Temp:  [98.1 °F (36.7 °C)-98.9 °F (37.2 °C)] 98.1 °F (36.7 °C)  Heart Rate:  [] 99  Resp:  [16-18] 18  BP: (114-127)/(81-89) 114/85    Intake/Output Summary (Last 24 hours) at 1/15/2020 0746  Last data filed at 1/15/2020 0625  Gross per 24 hour   Intake 2095 ml   Output 250 ml   Net 1845 ml       Comfortable NAD  PERRL, conjunctivae clear  Neck supple, no JVD or thyromegaly appreciated  S1/S2 irregular with tachycardia, no m/r/g  Lungs CTA B, normal effort  Abdomen S/NT/ND (+) BS, no HSM appreciated  Extremities warm, no clubbing, cyanosis, or edema  abNormal gait  No visible or palpable skin lesions  A/Ox4, mood and affect appropriate    Results Review:      Results from last 7 days   Lab Units 01/11/20  0442 01/10/20  1323 01/10/20  0847 01/10/20  0549 01/09/20 2052   SODIUM mmol/L 139  --   --  140 139   POTASSIUM mmol/L 3.6 3.6 3.3* 3.2* 3.8   CHLORIDE mmol/L 103  --   --  105 100   CO2 mmol/L 21.1*  --   --  21.7* 23.8   BUN mg/dL 11  --   --  10 14   CREATININE mg/dL 0.61  --   --  0.62 0.74   GLUCOSE mg/dL 90  --   --  70 95   CALCIUM mg/dL 8.8  --   --  9.3 10.5     Results from last 7 days   Lab Units 01/10/20  0847 01/10/20  0549   CK TOTAL U/L  --  179   TROPONIN T ng/mL 0.070* 0.065*     Results from last 7 days   Lab Units 01/15/20  0555 01/14/20  0607 01/13/20 2003   WBC 10*3/mm3 6.78 8.19 9.11   HEMOGLOBIN g/dL 12.6 12.2 13.7   HEMATOCRIT % 37.9 37.3 41.2   PLATELETS 10*3/mm3 150 150 181     Results from last 7 days    Lab Units 01/15/20  0555 01/14/20  2135 01/14/20  1507  01/13/20  2003 01/10/20  0549 01/09/20 2052   INR   --   --   --   --  1.31* 1.30* 1.25*   APTT seconds 76.9* 101.5* 47.2*   < > 35.6*  --   --     < > = values in this interval not displayed.     Results from last 7 days   Lab Units 01/10/20  0549   CHOLESTEROL mg/dL 106     Results from last 7 days   Lab Units 01/10/20  1323   MAGNESIUM mg/dL 2.7*     Results from last 7 days   Lab Units 01/10/20  0549   CHOLESTEROL mg/dL 106   TRIGLYCERIDES mg/dL 69   HDL CHOL mg/dL 40   LDL CHOL mg/dL 52       I reviewed the patient's new clinical results.  I personally viewed and interpreted the patient's EKG/Telemetry data        Medication Review:     atorvastatin 80 mg Oral Nightly   losartan 50 mg Oral Nightly   metoprolol succinate XL 50 mg Oral Q12H   pantoprazole 40 mg Oral QAM         heparin (porcine) 12 Units/kg/hr Last Rate: 16 Units/kg/hr (01/15/20 0624)   sodium chloride 100 mL/hr Last Rate: 100 mL/hr (01/15/20 0625)       Assessment/Plan       Atrial fibrillation (CMS/[facility])       1. Syncope  2. Atrial fibrillation - new diagnosis. Rate high, increase toprol and add dig, stop losartan to avoid hypotension.    3. Cardiomyopathy - non ischemic (coronary angiography 1/10/2020). Ef 27%. Change to metoprolol succinate. Continue losartan. Volume status appears well compensated.   4. Acute stroke - 1/10 and again on 1/11- MRI brain with acute left hemispheric subcortical stroke. On heparin - to go to eliquis today.   5. History of DVT - was on warfarin prior to admission though initial INR was 1.2 (she had been subtherapeutic for the past month)    Stop IV fluid.  Give p.o. digoxin and Toprol.  Hopefully DC tomorrow, trying to get heart rate under better control.  I think she would be a really good candidate for the TCU at [facility] if she qualifies because we could continue to treat her cardiac issues.  She would have to be admitted to the hospitalist  there.    Jovita Brown MD  01/15/20  7:46 AM

## 2020-01-15 NOTE — MBS/VFSS/FEES
Acute Care - Speech Language Pathology   Swallow Initial Evaluation [facility]     Patient Name: Camille Pantoja  : 1955  MRN: [id number]  Today's Date: 1/15/2020               Admit Date: 1/10/2020    Visit Dx:     ICD-10-CM ICD-9-CM   1. Atrial fibrillation, unspecified type (CMS/HCC) I48.91 427.31     Patient Active Problem List   Diagnosis   • Deep vein thrombosis of lower extremity (CMS/HCC)   • Hyperlipidemia   • Hypertension   • Vitamin D deficiency   • Osteoarthritis   • Overweight (BMI 25.0-29.9)   • Paroxysmal atrial fibrillation with rapid ventricular response (CMS/HCC)   • Chronic atrial fibrillation with rapid ventricular response   • Atrial fibrillation (CMS/HCC)   • Metabolic encephalopathy   • Cerebral ventriculomegaly   • Acute ischemic left MCA stroke (CMS/HCC)     Past Medical History:   Diagnosis Date   • CHF (congestive heart failure) (CMS/HCC)    • Hyperlipidemia    • Hypertension    • Irregular heart beat    • Skin cancer    • Stroke (CMS/HCC)      Past Surgical History:   Procedure Laterality Date   • CARDIAC CATHETERIZATION N/A 1/10/2020    Procedure: Left Heart Cath;  Surgeon: Cain Mcneil MD;  Location: [facility] INVASIVE LOCATION;  Service: Cardiovascular   • CARDIAC CATHETERIZATION N/A 1/10/2020    Procedure: Coronary angiography;  Surgeon: Cain Mcneil MD;  Location: [facility] INVASIVE LOCATION;  Service: Cardiovascular   • FINGER SURGERY     • HYSTERECTOMY     • JOINT REPLACEMENT     • ORIF SHOULDER DISLOCATION W/ HUMERAL FRACTURE     • TIBIA FRACTURE SURGERY          SWALLOW EVALUATION (last 72 hours)      [facility] Adult Swallow Evaluation     Row Name 01/15/20 1000 20 1100 20 1205             Rehab Evaluation    Document Type  evaluation vfss  -SA  --  evaluation  -OC      Subjective Information  no complaints  -SA  --  no complaints  -OC      Patient Observations  alert;cooperative  -SA  agree to therapy;cooperative;alert  -OC   "alert;cooperative;agree to therapy  -OC      Patient Effort  good  -SA  --  good  -OC      Symptoms Noted During/After Treatment  none  -SA  --  none  -OC         General Information    Patient Profile Reviewed  yes  -SA  --  yes  -OC      Pertinent History Of Current Problem  --  ST re-consulted secondary to new CVA per MRI.    -OC  Pt admitte dfor a-fib, change in neuro status. per MRI report 1/9/20 , \"Acute infarct left basal ganglia and left insular cortex in the distribution of the left MCA.\"  -OC      Current Method of Nutrition  NPO  -SA  NPO  -OC  NPO  -OC      Precautions/Limitations, Vision  WFL;for purposes of eval  -SA  --  WFL with corrective lenses  -OC      Precautions/Limitations, Hearing  WFL;for purposes of eval  -SA  --  WFL  -OC      Prior Level of Function-Communication  WFL  -SA  WFL  -OC  WFL  -OC      Prior Level of Function-Swallowing  no diet consistency restrictions  -SA  no diet consistency restrictions  -OC  no diet consistency restrictions  -OC      Plans/Goals Discussed with  --  patient;agreed upon  -OC  patient and family;other (see comments) Sister in Law present  -OC      Barriers to Rehab  none identified  -SA  none identified  -OC  none identified  -OC      Patient's Goals for Discharge  return to PO diet  -SA  patient did not state  -OC  patient did not state  -OC      Family Goals for Discharge  --  --  family did not state  -OC         Pain Assessment    Additional Documentation  Pain Scale: Numbers Pre/Post-Treatment (Group)  -SA  --  --         Pain Scale: Numbers Pre/Post-Treatment    Pain Scale: Numbers, Pretreatment  0/10 - no pain  -SA  0/10 - no pain  -OC  0/10 - no pain  -OC      Pain Scale: Numbers, Post-Treatment  0/10 - no pain  -SA  0/10 - no pain  -OC  0/10 - no pain  -OC         Oral Motor and Function    Dentition Assessment  --  natural, present and adequate  -OC  natural, present and adequate  -OC      Secretion Management  --  WNL/WFL  -OC  WNL/WFL  -OC      " Mucosal Quality  --  moist, healthy  -OC  moist, healthy  -OC      Volitional Swallow  --  WFL  -OC  WFL  -OC      Volitional Cough  --  WFL  -OC  WFL  -OC         Oral Musculature and Cranial Nerve Assessment    Oral Motor General Assessment  --  generalized oral motor weakness;lingual impairment  -OC  generalized oral motor weakness;lingual impairment  -OC      Lingual Impairment, Detail. Cranial Nerves IX, XII (Glossopharyngeal and Hypoglossal)  --  reduced strength left  -OC  reduced strength left  -OC         Clinical Swallow Eval    Oral Prep Phase  --  WFL  -OC  WFL  -OC      Oral Transit  --  WFL  -OC  WFL  -OC      Oral Residue  --  impaired  -OC  impaired  -OC      Pharyngeal Phase  --  suspected pharyngeal impairment  -OC  no overt signs/symptoms of pharyngeal impairment  -OC      Clinical Swallow Evaluation Summary  --  Patient demonstrated dealyed cough with ice chips x2. Pt demonstrated change in vocal quality and throat clear with thins. Patient demonstrated change in vocal quality with nectar thick and  honey thick liquids, required cued throat clear with repeat swallow to clear. No overt s/s aspiration with puree.  prolonged mastication mech soft  -OC  Patient demonstrated no overt s/s aspiration with thin via cup/straw, puree, mechanical soft (X1), and regular textures. Patient took a single trial of mechancial soft (peaches) and then reported she was allergic to them, no further trials administered. RN made aware. Patient demonstrated mild L lingual/buccal residue. Pt aware and able to clear independently.   -OC         MBS/VFSS    Utensils Used  spoon;cup;straw  -SA  --  --      Consistencies Trialed  regular textures;soft textures;pureed;thin liquids  -SA  --  --         MBS/VFSS Interpretation    Oral Prep Phase  WFL  -SA  --  --      Oral Transit Phase  impaired  -SA  --  --      Oral Residue  impaired  -SA  --  --         Oral Transit Phase    Impaired Oral Transit Phase  premature spillage  of liquids into pharynx  -SA  --  --      Premature Spillage of Liquids into Pharynx  thin liquids;mixed consistency;mechanical soft  -SA  --  --         Oral Residue    Impaired Oral Residue  lingual residue  -SA  --  --      Lingual Residue  regular textures  -SA  --  --      Response to Oral Residue  with liquid wash  -SA  --  --         Initiation of Pharyngeal Swallow    Pharyngeal Phase  impaired pharyngeal phase of swallowing  -SA  --  --      Pharyngeal Residue  diffuse within pharynx  -SA  --  --      Response to Residue  cleared residue with cued swallow  -SA  --  --         Esophageal Phase    Esophageal Phase  other (see comments) slow but complete esophageal clearance  -SA  --  --         SLP Communication to Radiology    Summary Statement  Patient demonstrates a mild oropharyngeal dysphagia characterized by spillage, mistiming, and reduced anterior hyoid excursion.  No aspiration observed with any consistency.  Mild pharyngeal residue which cleared with multiple swallows.  Slow, but complete esophageal clearance.  -SA  --  --         Clinical Impression    SLP Swallowing Diagnosis  mild  -SA  oral dysfunction;suspected pharyngeal dysfunction  -OC  mild;oral dysfunction;functional pharyngeal phase  -OC      Functional Impact  risk of aspiration/pneumonia  -SA  risk of aspiration/pneumonia  -OC  risk of aspiration/pneumonia  -OC      Rehab Potential/Prognosis, Swallowing  good, to achieve stated therapy goals  -SA  good, to achieve stated therapy goals  -OC  good, to achieve stated therapy goals  -OC      Swallow Criteria for Skilled Therapeutic Interventions Met  demonstrates skilled criteria  -SA  demonstrates skilled criteria  -OC  demonstrates skilled criteria  -OC         Recommendations    Therapy Frequency (Swallow)  PRN  -SA  PRN  -OC  PRN  -OC      Predicted Duration Therapy Intervention (Days)  until discharge  -SA  until discharge  -OC  until discharge  -OC      SLP Diet Recommendation  soft  textures;chopped;thin liquids  -SA  NPO;ice chips between meals after oral care, with supervision;water between meals after oral care, with supervision  -OC  regular textures;thin liquids  -OC      Recommended Diagnostics  reassess via clinical swallow evaluation  -SA  reassess via VFSS ([facility])  -OC  reassess via clinical swallow evaluation;other (see comments) SLE/COG  -OC      Recommended Precautions and Strategies  upright posture during/after eating;small bites of food and sips of liquid  -SA  upright posture during/after eating;small bites of food and sips of liquid  -OC  upright posture during/after eating;small bites of food and sips of liquid  -OC      SLP Rec. for Method of Medication Administration  as tolerated  -SA  meds crushed;with pudding or applesauce  -OC  meds whole;with pudding or applesauce  -OC      Monitor for Signs of Aspiration  notify SLP if any concerns  -SA  yes;notify SLP if any concerns  -OC  yes;notify SLP if any concerns  -OC      Anticipated Dischage Disposition  home  -SA  anticipate therapy at next level of care  -OC  anticipate therapy at next level of care  -OC         Swallow Goals (SLP)    Oral Nutrition/Hydration Goal Selection (SLP)  --  oral nutrition/hydration, SLP goal 1  -OC  oral nutrition/hydration, SLP goal 1  -OC         Oral Nutrition/Hydration Goal 1 (SLP)    Oral Nutrition/Hydration Goal 1, SLP  --  Tolerate least restrictive diet with no overt s/s aspiration.   -OC  Tolerate least restrictive diet with no overt s/s aspiration.   -OC      Time Frame (Oral Nutrition/Hydration Goal 1, SLP)  --  by discharge  -OC  by discharge  -OC        User Key  (r) = Recorded By, (t) = Taken By, (c) = Cosigned By    Initials Name Effective Dates    Cait Jolley MS [facility]-SLP 03/07/18 -     OC Adriana Howe MA,[facility]-SLP 06/08/18 -           EDUCATION  The patient has been educated in the following areas:   Dysphagia (Swallowing Impairment) Modified Diet Instruction.    SLP  Recommendation and Plan  SLP Swallowing Diagnosis: mild  SLP Diet Recommendation: soft textures, chopped, thin liquids  Recommended Precautions and Strategies: upright posture during/after eating, small bites of food and sips of liquid  SLP Rec. for Method of Medication Administration: as tolerated     Monitor for Signs of Aspiration: notify SLP if any concerns  Recommended Diagnostics: reassess via clinical swallow evaluation  Swallow Criteria for Skilled Therapeutic Interventions Met: demonstrates skilled criteria  Anticipated Dischage Disposition: home  Rehab Potential/Prognosis, Swallowing: good, to achieve stated therapy goals  Therapy Frequency (Swallow): PRN  Predicted Duration Therapy Intervention (Days): until discharge       Outcome Summary: VFSS completed.  Patient demonstrates a mild oropharyngeal dysphagia characterized by spillage, mistiming, and reduced anterior hyoid excursion.  No aspiration observed with any consistency.  Mild pharyngeal residue which cleared with multiple swallows.  Slow, but complete esophageal clearance.  REC soft, chopped diet w/ thin liquids, meds as tolerated.  Upright with small bites/sips.    SLP GOALS     Row Name 01/14/20 1100 01/13/20 1205          Oral Nutrition/Hydration Goal 1 (SLP)    Oral Nutrition/Hydration Goal 1, SLP  Tolerate least restrictive diet with no overt s/s aspiration.   -OC  Tolerate least restrictive diet with no overt s/s aspiration.   -OC     Time Frame (Oral Nutrition/Hydration Goal 1, SLP)  by discharge  -OC  by discharge  -OC       User Key  (r) = Recorded By, (t) = Taken By, (c) = Cosigned By    Initials Name Provider Type    OC Adriana Howe MA,CCC-SLP Speech and Language Pathologist           SLP Outcome Measures (last 72 hours)      SLP Outcome Measures     Row Name 01/15/20 1300 01/14/20 1100 01/13/20 1205       SLP Outcome Measures    Outcome Measure Used?  --  Adult NOMS  -OC  Adult NOMS  -OC       Adult FCM Scores    FCM Chosen  --  --   Swallowing  -OC    Swallowing FCM Score  4  -SA  1  -OC  6  -OC      User Key  (r) = Recorded By, (t) = Taken By, (c) = Cosigned By    Initials Name Effective Dates    Cait Jolley MS CCC-SLP 03/07/18 -     OC Adriana Howe MACCC-SLP 06/08/18 -            Time Calculation:   Time Calculation- SLP     Row Name 01/15/20 1301             Time Calculation- SLP    SLP Start Time  1000  -      SLP Received On  01/15/20  -        User Key  (r) = Recorded By, (t) = Taken By, (c) = Cosigned By    Initials Name Provider Type    Cait Jolley MS CCC-SLP Speech and Language Pathologist          Therapy Charges for Today     Code Description Service Date Service Provider Modifiers Qty    [id number] HC ST MOTION FLUORO EVAL SWALLOW 4 1/15/2020 Cait Soto MS CCC-SLP GN 1               Cait Soto MS CCC-RITIKA  1/15/2020

## 2020-01-15 NOTE — PROGRESS NOTES
DOS: 1/15/2020  NAME: Camille Pantoja   : 1955  PCP: Nahun Ann Jr., DO    CC: weakness     Stroke    Subjective: No acute events overnight.  She did not sleep too well last night due to having a lot of right shoulder pain.  She states that she just took Tylenol but is in a lot of pain.  She remains with right lower extremity weakness as well.  Heart rate is high this morning. She denies any new weakness, numbness, speech or visual disturbances, or headaches. VFSS planned for today, ST evaluated yesterday and made her NPO with med crushed with puree, water/ice okay after oral care.    Objective:  Vital signs:      Vitals:    01/15/20 0502 01/15/20 0700 01/15/20 0833 01/15/20 0850   BP:    131/90   BP Location:    Left arm   Patient Position:    Lying   Pulse:  (!) 124 (!) 127 113   Resp:    18   Temp:       TempSrc:    Oral   SpO2:  93%  92%   Weight: 52.4 kg (115 lb 8 oz)      Height:           Current Facility-Administered Medications:   •  acetaminophen (TYLENOL) tablet 650 mg, 650 mg, Oral, Q4H PRN, Cain Mcneil MD, 650 mg at 01/15/20 0836  •  atorvastatin (LIPITOR) tablet 80 mg, 80 mg, Oral, Nightly, Johny Martin MD  •  digoxin (LANOXIN) tablet 125 mcg, 125 mcg, Oral, Daily, Jovita Brown MD  •  docusate sodium (COLACE) capsule 100 mg, 100 mg, Oral, BID PRN, Cain Mcneil MD  •  heparin 35217 units/250 mL (100 units/mL) in 0.45 % NaCl infusion, 12 Units/kg/hr, Intravenous, Titrated, Socrates Noriega MD, Last Rate: 8.32 mL/hr at 01/15/20 0624, 16 Units/kg/hr at 01/15/20 0624  •  metoprolol succinate XL (TOPROL-XL) 24 hr tablet 75 mg, 75 mg, Oral, Q12H, Jovita Brown MD, 50 mg at 01/15/20 0833  •  metoprolol tartrate (LOPRESSOR) injection 5 mg, 5 mg, Intravenous, Q6H PRN, Jovita Brown MD  •  ondansetron (ZOFRAN) tablet 4 mg, 4 mg, Oral, Q6H PRN **OR** ondansetron (ZOFRAN) injection 4 mg, 4 mg, Intravenous, Q6H PRN, Cain Mcneil MD, 4 mg at 20  1034  •  pantoprazole (PROTONIX) EC tablet 40 mg, 40 mg, Oral, QAM, Cain Mcneil MD, 40 mg at 01/15/20 0833  •  senna-docusate sodium (SENOKOT-S) 8.6-50 MG tablet 2 tablet, 2 tablet, Oral, BID PRN, Cain Mcneil MD  •  sodium chloride 0.9 % infusion, 100 mL/hr, Intravenous, Continuous, Cain Mcneil MD, Last Rate: 100 mL/hr at 01/15/20 0625, 100 mL/hr at 01/15/20 0625    PRN meds  •  acetaminophen  •  docusate sodium  •  metoprolol tartrate  •  ondansetron **OR** ondansetron  •  senna-docusate sodium    No current facility-administered medications on file prior to encounter.      Current Outpatient Medications on File Prior to Encounter   Medication Sig   • cholecalciferol (VITAMIN D3) 25 MCG (1000 UT) tablet Take 2 tablets by mouth Daily.   • losartan (COZAAR) 50 MG tablet Take 1 tablet by mouth Every Night.   • omeprazole (priLOSEC) 20 MG capsule TAKE ONE CAPSULE BY MOUTH DAILY   • simvastatin (ZOCOR) 20 MG tablet Take 1 tablet by mouth Every Night.   • Zinc 50 MG capsule Take 50 mg by mouth Daily.   • enoxaparin (LOVENOX) 60 MG/0.6ML solution syringe Inject 0.6 mL under the skin into the appropriate area as directed Every 12 (Twelve) Hours. Indications: Atrial Fibrillation       General appearance: elderly frail female NAD, alert and cooperative  HEENT: Normocephalic, atraumatic, PERRL, no masses or tenderness  COR: atrial fibrillation on bedside monitor  Resp: Even and unlabored  Extremities: Equal pulses  Skin: warm, dry    Neurological:   MS: oriented x3, unable to tell me the day of the week or date, recent/remote memory impaired, normal attention/concentration, language intact, no neglect, normal fund of knowledge  CN: unable to read the time on the clock, visual fields full, PERRL, EOMI, facial sensation equal, no facial droop, hearing symmetric, palate elevates symmetrically, no effort for shoulder shrug on right, tongue midline  Motor: 5/5 with push and pull on LUE, no effort on right with  a week , +2/5 RLE, 5/5 LLE  Sensory: light touch sensation intact in all 4 ext.  Coordination: Normal finger to nose test on left no effort on right  Rapid alternating movements: normal finger to thumb tap on left, very slow on right with minimal effort    Laboratory results:  Lab Results   Component Value Date    TSH 0.991 01/10/2020     Lab Results   Component Value Date    HGBA1C 5.50 01/10/2020     Lab Results   Component Value Date    EZFKLNEW15 435 01/10/2020     Lab Results   Component Value Date    CHOL 106 01/10/2020    CHLPL 129 05/29/2019    CHLPL 235 (H) 11/20/2017    CHLPL 195 05/24/2017     Lab Results   Component Value Date    TRIG 69 01/10/2020    TRIG 83 05/29/2019    TRIG 88 11/20/2017     Lab Results   Component Value Date    HDL 40 01/10/2020    HDL 51 05/29/2019    HDL 78 (H) 11/20/2017     Lab Results   Component Value Date    LDL 52 01/10/2020    LDL 61 05/29/2019     (H) 11/20/2017     Lab Results   Component Value Date    WBC 6.78 01/15/2020    HGB 12.6 01/15/2020    HCT 37.9 01/15/2020    MCV 87.3 01/15/2020     01/15/2020     Lab Results   Component Value Date    GLUCOSE 90 01/11/2020    BUN 11 01/11/2020    CREATININE 0.61 01/11/2020    EGFRIFNONA 98 01/11/2020    EGFRIFAFRI 75 05/29/2019    BCR 18.0 01/11/2020    K 3.6 01/11/2020    CO2 21.1 (L) 01/11/2020    CALCIUM 8.8 01/11/2020    PROTENTOTREF 7.5 05/29/2019    ALBUMIN 3.40 (L) 01/10/2020    LABIL2 1.5 05/29/2019    AST 25 01/10/2020    ALT 13 01/10/2020     Lab Results   Component Value Date    PTT 76.9 (H) 01/15/2020     Lab Results   Component Value Date    INR 1.31 (H) 01/13/2020    INR 1.30 (H) 01/10/2020    INR 1.25 (H) 01/09/2020    PROTIME 16.0 (H) 01/13/2020    PROTIME 16.0 (H) 01/10/2020    PROTIME 15.5 (H) 01/09/2020     Brief Urine Lab Results  (Last result in the past 365 days)      Color   Clarity   Blood   Leuk Est   Nitrite   Protein   CREAT   Urine HCG        01/09/20 2244 Yellow Clear Small (1+)  Negative Negative 30 mg/dL (1+)               Review and interpretation of imaging:  Ct Angiogram Head    Result Date: 1/13/2020    1. No perfusion abnormality to suggest completed or threatened acute infarct at the levels evaluated. 2. No arterial cutoff or high-grade arterial stenosis identified. 3. A new area of hypodensity in the right cerebellum suggesting subacute infarct. No acute intracranial hemorrhage or hydrocephalus. No enhancing lesions of brain.    Discussed by telephone with Dr. Noriega at 1900, 1920, 01/13/2020.  This report was finalized on 1/13/2020 7:32 PM by Dr. Sachin Dmaon M.D.      Ct Head Without Contrast    Result Date: 1/9/2020  Senescent changes without acute abnormality. Somewhat disproportional ventriculomegaly is noted. Correlate clinically for evidence of normal pressure hydrocephalus. Signer Name: Anthony Vidal MD  Signed: 1/9/2020 9:31 PM  Workstation Name: [facility]  [facility]    Ct Angiogram Neck    Result Date: 1/13/2020    1. No perfusion abnormality to suggest completed or threatened acute infarct at the levels evaluated. 2. No arterial cutoff or high-grade arterial stenosis identified. 3. A new area of hypodensity in the right cerebellum suggesting subacute infarct. No acute intracranial hemorrhage or hydrocephalus. No enhancing lesions of brain.    Discussed by telephone with Dr. Noriega at 1900, 1920, 01/13/2020.  This report was finalized on 1/13/2020 7:32 PM by Dr. Sachin Damon M.D.      Mri Angiogram Head Without Contrast    Result Date: 1/11/2020  1.  Acute left cerebral infarction involving the left caudate, left lentiform nucleus, external limb of left internal capsule, left insular cortex, and left posterior frontal lobe cortex. Infarction is in the left middle cerebral artery distribution. MRI one day prior was obtained for comparison. Overall infarctions appear very similar to the exam one day prior though the 9 mm infarction  within the posterior-superior left frontal lobe cortex is potentially new and not demonstrated previously. This was discussed with nurse Francia caring for the patient on 2 S. 6:11 PM 01/11/2020. 2. Inflammatory paranasal sinus disease. 3.  MR angiogram neck is very limited by motion artifact and the great vessel origins are not visualized. There is no evidence for NASCET carotid stenosis. 4.  Intracranial segment of the left vertebral artery is very small. There is a fetal origin of the left posterior cerebral artery.  This report was finalized on 1/11/2020 6:14 PM by Dr. Jae Hernández M.D.      Mri Angiogram Neck Without Contrast    Result Date: 1/11/2020  1.  Acute left cerebral infarction involving the left caudate, left lentiform nucleus, external limb of left internal capsule, left insular cortex, and left posterior frontal lobe cortex. Infarction is in the left middle cerebral artery distribution. MRI one day prior was obtained for comparison. Overall infarctions appear very similar to the exam one day prior though the 9 mm infarction within the posterior-superior left frontal lobe cortex is potentially new and not demonstrated previously. This was discussed with nurse Francia caring for the patient on 2 S. 6:11 PM 01/11/2020. 2. Inflammatory paranasal sinus disease. 3.  MR angiogram neck is very limited by motion artifact and the great vessel origins are not visualized. There is no evidence for NASCET carotid stenosis. 4.  Intracranial segment of the left vertebral artery is very small. There is a fetal origin of the left posterior cerebral artery.  This report was finalized on 1/11/2020 6:14 PM by Dr. Jae Hernández M.D.      Mri Brain Without Contrast    Result Date: 1/13/2020  1. Development of right cerebellar and upper pontine infarcts without hemorrhage or mass effect. There has been evolution of the previously described left-sided infarcts without significant change otherwise. 2. Stable mild  ventricular enlargement. 3. Chronic-appearing paranasal sinus disease.  This report was finalized on 1/13/2020 11:45 PM by Ravi Posey M.D.      Mri Brain Without Contrast    Result Date: 1/11/2020  1.  Acute left cerebral infarction involving the left caudate, left lentiform nucleus, external limb of left internal capsule, left insular cortex, and left posterior frontal lobe cortex. Infarction is in the left middle cerebral artery distribution. MRI one day prior was obtained for comparison. Overall infarctions appear very similar to the exam one day prior though the 9 mm infarction within the posterior-superior left frontal lobe cortex is potentially new and not demonstrated previously. This was discussed with Francia nurse caring for the patient on 2 S. 6:11 PM 01/11/2020. 2. Inflammatory paranasal sinus disease. 3.  MR angiogram neck is very limited by motion artifact and the great vessel origins are not visualized. There is no evidence for NASCET carotid stenosis. 4.  Intracranial segment of the left vertebral artery is very small. There is a fetal origin of the left posterior cerebral artery.  This report was finalized on 1/11/2020 6:14 PM by Dr. Jae Hernández M.D.      Mri Brain Without Contrast    Result Date: 1/10/2020  Acute infarct left basal ganglia and left insular cortex in the distribution of the left MCA. Ventriculomegaly with increased T2 signal within the periventricular white matter. Though this could be indicative of NPH this is most likely the sequela of chronic microvascular disease. Correlate with patient's clinical presentation. Dr. Grady relates that the patient does not have any clinical features of NPH. Bilateral maxillary and left ethmoid sinus mucosal disease. Results called to Dr. Grady at [facility] ICU at the time of this dictation. Signer Name: DIANE Rodriguez MD  Signed: 1/10/2020 12:07 PM  Workstation Name: [id number]  [facility] [place]    Xr Chest 1  View    Result Date: 1/9/2020  Cardiomegaly with probable small bilateral pleural effusions right greater than left. Signer Name: DIANE Rodriguez MD  Signed: 1/9/2020 9:47 PM  Workstation Name: [facility]  [facility]    Ct Cerebral Perfusion With & Without Contrast    Result Date: 1/13/2020    1. No perfusion abnormality to suggest completed or threatened acute infarct at the levels evaluated. 2. No arterial cutoff or high-grade arterial stenosis identified. 3. A new area of hypodensity in the right cerebellum suggesting subacute infarct. No acute intracranial hemorrhage or hydrocephalus. No enhancing lesions of brain.    Discussed by telephone with Dr. Noriega at 1900, 1920, 01/13/2020.  This report was finalized on 1/13/2020 7:32 PM by Dr. Sachin Damon M.D.      Results for orders placed during the hospital encounter of 01/09/20   Transthoracic Echo Complete With Contrast if Necessary Per Protocol    Narrative · The left ventricular cavity is moderately dilated.  · Left ventricular wall thickness is consistent with mild concentric   hypertrophy.  · Calculated EF = 27.0%.  · Left ventricular systolic function is severely decreased.  · The following left ventricular wall segments are hypokinetic: mid   anterior, apical anterior, basal anterolateral, mid inferolateral, basal   inferoseptal, mid inferoseptal, basal anterior and basal inferoseptal. The   following left ventricular wall segments are akinetic: mid anterolateral,   apical lateral, apical inferior, basal inferior, mid inferior, basal   anteroseptal and mid anteroseptal.  · Left atrial cavity size is severely dilated.  · Right atrial cavity size is severely dilated.  · Right ventricular cavity is moderately dilated.  · Moderately reduced right ventricular systolic function noted.  · Mild tricuspid valve regurgitation is present.  · Mild mitral valve regurgitation is present          Impression/Assessment:  This is a  64-year-old female with past medical history of hyperlipidemia, DVT on warfarin PTA, alcohol abuse with reported cessation in 2018, CHF,skin cancer, stroke on Zocor PTA recent left lower lobe pneumonia who presented to outside hospital [facility] on 1/9 with complaints of change in mental status and feeling terrible.  She reports she started to have diarrhea after being treated with Levaquin and had became progressively more weak.  EKG at [facility] revealed new onset atrial fibrillation with RVR.  MRI brain revealed an acute left hemispheric subcortical stroke.  She also was noted to have an elevated troponin and new cardiomyopathy therefore she was transferred here for further work-up and evaluation and to undergo a cardiac cath.  She was taking warfarin PTA and her INR was noted to be 1.25 on admission.  After her cardiac cath she was noted to have worsening right-sided weakness so repeat MRI brain was obtained and revealed an extension of her stroke. BP on arrival . BS 95., Cr 0.74. Ethanol negative.     Dx: Acute right cerebellar infarct  Left hemisphere stroke secondary to decreased left MCA flow  New onset A. Fib  New cardiomyopathy  Recent pneumonia  History of alcohol abuse  H/O dvt with subtherapeutic INR on admission     Work up to date:  · MRI brain 1/10: Acute left basal ganglia left insular cortex stroke, ventriculomegaly  · CTH WO 1/9: No evidence of acute intracranial abnormalities, ventricular enlargement noted, suspicious of normal pressure hydrocephalus.  · CTH WO 1/11: No evidence of acute intracranial abnormalities, generalized enlargement of the ventricular system but is unchanged since prior imaging in 2012.  · Repeat MRI brain 1/11: Acute left cerebral stroke in the left caudate, lentiform nucleus, external limb of the internal capsule, and insular cortex and left posterior frontal lobe cortex, appears similar to previous but left frontal cortex stroke is new.  · MRA  head/neck 1/11: Motion degraded, decreased left MCA flow compared to right MCA but no obvious large vessel occlusion. Intracranial left vertebral artery is small.  Fetal origin of the left PCA.  · Cardiac cath 1/10: No significant CAD  · 2D Echo 1/10: Left atrial size severely dilated, right atrial size severely dilated, LV function severely decreased, EF 27%, no evidence of LV thrombus present, no AV stenosis present.  · CT/CTA/CTP 1/13: New area of hypodensity within the right cerebellum, no CBF under 30% deficit or perfusion abnormality, no high-grade stenosis, subcentimeter left thyroid hypodense nodule noted  · VFSS 1/15: pending      Plan:  Stable overnight, her main complaint is her right shoulder pain to the point to where she is not attempting to lift the arm; I will order an xray of her right shoulder as well as of her right femur as she is also complaining of pain in her right upper thigh area and did fall recently  Plans for VFSS today, ST okayed her for meds crushed with puree therefore I will stop her hep gtt and start her on Eliquis 5mg BID, I have informed RN to give first dose today after stopping heparin drip no need to hold dose  BP has been stable, avoid hypotension; long term goal <130/80 short term goal <140/90; her HR has been elevated again Dr. Brown was actually in the room and discussed adjusting her medication regimen today.  Lipitor 80mg, LDL 52; continue high dose statin x1 month and then repeat lipid panel with PCP follow up  Neurochecks per stroke protocol  Stroke Education  BAILEE/SCDs  PT/OT/ST- she did not qualify for acute rehab due to not being able to tolerate at least 3 hours of therapy daily per her POA therefore subacute rehab was recommended.  She will follow up with me on 4/8 for stroke follow up and Dr. Sachin Najera in our office on 3/31 for NPH eval.  Recommend follow up with PCP for left thyroid hypodense nodule noted on CTA H/N.  Therapies as written. CCP for discharge  planning. Call RRT for any acute neurological changes. We will continue to follow and advise.    Case discussed with patient, RN, Dr. Hillman, and Dr. Noriega, and he agrees with plan above.  JOHN Hess

## 2020-01-15 NOTE — SIGNIFICANT NOTE
01/15/20 0953   Rehab Treatment   Discipline physical therapist   Reason Treatment Not Performed other (see comments)  (pt leaving floor for xray of R shoulder and R femur due to increased complaints of pain. Pt also with elevated HR. Will follow up as time allows.)   Recommendation   PT - Next Appointment 01/16/20

## 2020-01-15 NOTE — THERAPY TREATMENT NOTE
Acute Care - Occupational Therapy Treatment Note  [facility]     Patient Name: Camille Pantoja  : 1955  MRN: [id number]  Today's Date: 1/15/2020             Admit Date: 1/10/2020       ICD-10-CM ICD-9-CM   1. Atrial fibrillation, unspecified type (CMS/HCC) I48.91 427.31     Patient Active Problem List   Diagnosis   • Deep vein thrombosis of lower extremity (CMS/HCC)   • Hyperlipidemia   • Hypertension   • Vitamin D deficiency   • Osteoarthritis   • Overweight (BMI 25.0-29.9)   • Paroxysmal atrial fibrillation with rapid ventricular response (CMS/HCC)   • Chronic atrial fibrillation with rapid ventricular response   • Atrial fibrillation (CMS/HCC)   • Metabolic encephalopathy   • Cerebral ventriculomegaly   • Acute ischemic left MCA stroke (CMS/HCC)     Past Medical History:   Diagnosis Date   • CHF (congestive heart failure) (CMS/HCC)    • Hyperlipidemia    • Hypertension    • Irregular heart beat    • Skin cancer    • Stroke (CMS/HCC)      Past Surgical History:   Procedure Laterality Date   • CARDIAC CATHETERIZATION N/A 1/10/2020    Procedure: Left Heart Cath;  Surgeon: Cain Mcneil MD;  Location: [facility] INVASIVE LOCATION;  Service: Cardiovascular   • CARDIAC CATHETERIZATION N/A 1/10/2020    Procedure: Coronary angiography;  Surgeon: Cain Mcneil MD;  Location: [facility] INVASIVE LOCATION;  Service: Cardiovascular   • FINGER SURGERY     • HYSTERECTOMY     • JOINT REPLACEMENT     • ORIF SHOULDER DISLOCATION W/ HUMERAL FRACTURE     • TIBIA FRACTURE SURGERY         Therapy Treatment    Rehabilitation Treatment Summary     Row Name 01/15/20 1521             Treatment Time/Intention    Discipline  occupational therapist  -      Document Type  therapy note (daily note)  -      Subjective Information  complains of;pain  -      Mode of Treatment  individual therapy;occupational therapy  -      Patient/Family Observations  pt supine in bed. nsg aide present.  -      Patient Effort   good  -KP      Existing Precautions/Restrictions  fall  -KP      Recorded by [KP] Sintia Guerrero, OTR 01/15/20 1525      Row Name 01/15/20 1521             Cognitive Assessment/Intervention- PT/OT    Orientation Status (Cognition)  oriented x 3  -KP      Follows Commands (Cognition)  follows one step commands;50-74% accuracy;verbal cues/prompting required;repetition of directions required  -KP      Recorded by [KP] Sintia Guerrero, OTR 01/15/20 1525      Row Name 01/15/20 1521             Bed Mobility Assessment/Treatment    Comment (Bed Mobility)  pt refused  -KP      Recorded by [KP] Sintia Guerrero, OTR 01/15/20 1525      Row Name 01/15/20 1521             Bathing Assessment/Intervention    Comment (Bathing)  already washed up  -KP      Recorded by [KP] Sintia Guerrero, OTR 01/15/20 1525      Row Name 01/15/20 1521             Therapeutic Exercise    Upper Extremity Range of Motion (Therapeutic Exercise)  shoulder flexion/extension, left;forearm supination/pronation, left;elbow flexion/extension, left;elbow flexion/extension, right  -KP      Exercise Type (Therapeutic Exercise)  AAROM (active assistive range of motion);AROM (active range of motion)  -KP      Position (Therapeutic Exercise)  supine  -KP      Sets/Reps (Therapeutic Exercise)  pt does self ROM w. R elbow due to incr pain, ed pt on keeping arm straight rather than elbow bent to help w. pain, as it will affect her elbow if remained flexed. L UE 10x2 AROM  -KP      Expected Outcome (Therapeutic Exercise)  improve performance, BADLs;improve performance, transfer skills;improve functional tolerance, self-care activity  -KP      Recorded by [KP] Sintia Guerrero, OTR 01/15/20 1525      Row Name 01/15/20 1521             Positioning and Restraints    Pre-Treatment Position  in bed  -KP      Post Treatment Position  bed  -KP      In Bed  supine;encouraged to call for assist;call light within reach;exit alarm on  -KP       Recorded by [] Sintia Guerrero, OTR 01/15/20 1525      Row Name 01/15/20 1521             Pain Scale: Numbers Pre/Post-Treatment    Pain Scale: Numbers, Pretreatment  0/10 - no pain  -KP      Pain Scale: Numbers, Post-Treatment  0/10 - no pain  -KP      Recorded by [] Sintia Guerrero, OTR 01/15/20 1525      Row Name 01/15/20 1521             Plan of Care Review    Plan of Care Reviewed With  patient  -      Progress  no change  -      Outcome Summary  pt completed some ROM L UE 10x2. R UE very painful in shoulder but xray revealed no fx. Pt completed self ROM R elbow. pt states she will try to do more tomorrow, just not up for getting up today.  -KP      Recorded by [] Sintia Guerrero, OTR 01/15/20 1525      Row Name 01/15/20 1521             Outcome Summary/Treatment Plan (OT)    Daily Summary of Progress (OT)  progress toward functional goals is gradual  -      Recorded by [] Sintia Guerrero, OTR 01/15/20 1525        User Key  (r) = Recorded By, (t) = Taken By, (c) = Cosigned By    Initials Name Effective Dates Discipline     Sintia Guerrero, OTR 06/08/18 -  OT                 OT Recommendation and Plan  Outcome Summary/Treatment Plan (OT)  Daily Summary of Progress (OT): progress toward functional goals is gradual  Daily Summary of Progress (OT): progress toward functional goals is gradual  Plan of Care Review  Plan of Care Reviewed With: patient  Plan of Care Reviewed With: patient  Outcome Summary: pt completed some ROM L UE 10x2. R UE very painful in shoulder but xray revealed no fx. Pt completed self ROM R elbow. pt states she will try to do more tomorrow, just not up for getting up today.  Outcome Measures     Row Name 01/15/20 1525 01/13/20 1200          How much help from another is currently needed...    Putting on and taking off regular lower body clothing?  3  -KP  3  -MR     Bathing (including washing, rinsing, and drying)  2  -KP  2  -MR     Toileting  (which includes using toilet bed pan or urinal)  2  -KP  2  -MR     Putting on and taking off regular upper body clothing  2  -KP  3  -MR     Taking care of personal grooming (such as brushing teeth)  3  -KP  3  -MR     Eating meals  3  -KP  3  -MR     AM-PAC 6 Clicks Score (OT)  15  -KP  16  -MR        Functional Assessment    Outcome Measure Options  AM-PAC 6 Clicks Daily Activity (OT)  -KP  AM-PAC 6 Clicks Daily Activity (OT)  -MR       User Key  (r) = Recorded By, (t) = Taken By, (c) = Cosigned By    Initials Name Provider Type    Sintia Alexander OTR Occupational Therapist    Michelle Sun, OT Occupational Therapist           Time Calculation:   Time Calculation- OT     Row Name 01/15/20 1525             Time Calculation- OT    OT Start Time  1414  -      OT Stop Time  1432  -      OT Time Calculation (min)  18 min  -      Total Timed Code Minutes- OT  18 minute(s)  -      OT Received On  01/15/20  -        User Key  (r) = Recorded By, (t) = Taken By, (c) = Cosigned By    Initials Name Provider Type    Sintia Alexander OTR Occupational Therapist        Therapy Charges for Today     Code Description Service Date Service Provider Modifiers Qty    [id number]  OT THER PROC EA 15 MIN 1/15/2020 Sintia Guerrero OTR GO 1               DALILA Cochran  1/15/2020

## 2020-01-15 NOTE — PLAN OF CARE
Problem: Patient Care Overview  Goal: Plan of Care Review  Flowsheets (Taken 1/15/2020 1521)  Progress: no change  Plan of Care Reviewed With: patient  Outcome Summary: pt completed some ROM L UE 10x2. R UE very painful in shoulder but xray revealed no fx. Pt completed self ROM R elbow. pt states she will try to do more tomorrow, just not up for getting up today.

## 2020-01-15 NOTE — PLAN OF CARE
Problem: Patient Care Overview  Goal: Plan of Care Review  Outcome: Ongoing (interventions implemented as appropriate)  Flowsheets  Taken 1/15/2020 0835 by Neli Gómez RN  Plan of Care Reviewed With: patient  Taken 1/15/2020 1259 by Cait Soto MS CCC-SLP  Outcome Summary: VFSS completed.  Patient demonstrates a mild oropharyngeal dysphagia characterized by spillage, mistiming, and reduced anterior hyoid excursion.  No aspiration observed with any consistency.  Mild pharyngeal residue which cleared with multiple swallows.  Slow, but complete esophageal clearance.  REC soft, chopped diet w/ thin liquids, meds as tolerated.  Upright with small bites/sips.

## 2020-01-15 NOTE — DISCHARGE PLACEMENT REQUEST
"Camille Pantoja (64 y.o. Female)     Date of Birth Social Security Number Address Home Phone MRN    1955  [facility] [facility] [address] [phone] [id number]    [place] Marital Status          Voodoo        Admission Date Admission Type Admitting Provider Attending Provider Department, Room/Bed    1/10/20 Elective Layo Haddad MD McFarland, Rebecca M, MD [address], [address]    Discharge Date Discharge Disposition Discharge Destination                       Attending Provider:  Jovita Brown MD    Allergies:  Cephalexin, Beef-derived Products, Citrus, Duricef [Cefadroxil], Fish Allergy, Garlic, Onion, Peach [Prunus Persica], Wheat    Isolation:  None   Infection:  None   Code Status:  CPR    Ht:  165.1 cm (65\")   Wt:  52.4 kg (115 lb 8 oz)    Admission Cmt:  None   Principal Problem:  Atrial fibrillation (CMS/HCC) [I48.91] More...                 Active Insurance as of 1/10/2020     Primary Coverage     Payor Plan Insurance Group Employer/Plan Group    HUMANA MEDICARE REPLACEMENT HU[employer]NA MEDICARE REPLACEMENT [id number]     Payor Plan Address Payor Plan Phone Number Payor Plan Fax Number Effective Dates    [address] [address] [phone]  1/1/2018 - None Entered    [facility] [id number]       Subscriber Name Subscriber Birth Date Member ID       CAMILLE PANTOJA 1955 [id number]                 Emergency Contacts      (Rel.) Home Phone Work Phone Mobile Phone    Sophia Pantoja (Sister) [phone] -- [phone]    Judi Pantoja (Mother) [phone] -- --    TrishMonster chaidez (Brother) -- -- [phone]              "

## 2020-01-16 ENCOUNTER — HOSPITAL ENCOUNTER (INPATIENT)
Facility: HOSPITAL | Age: 65
LOS: 9 days | Discharge: HOME HEALTH | End: 2020-01-25
Attending: INTERNAL MEDICINE | Admitting: INTERNAL MEDICINE

## 2020-01-16 ENCOUNTER — TELEPHONE (OUTPATIENT)
Dept: FAMILY MEDICINE CLINIC | Facility: CLINIC | Age: 65
End: 2020-01-16

## 2020-01-16 VITALS
SYSTOLIC BLOOD PRESSURE: 86 MMHG | DIASTOLIC BLOOD PRESSURE: 69 MMHG | BODY MASS INDEX: 20.19 KG/M2 | OXYGEN SATURATION: 97 % | RESPIRATION RATE: 16 BRPM | WEIGHT: 121.2 LBS | HEIGHT: 65 IN | TEMPERATURE: 98.6 F | HEART RATE: 84 BPM

## 2020-01-16 DIAGNOSIS — I82.403 DEEP VEIN THROMBOSIS (DVT) OF BOTH LOWER EXTREMITIES, UNSPECIFIED CHRONICITY, UNSPECIFIED VEIN (HCC): ICD-10-CM

## 2020-01-16 DIAGNOSIS — I48.91 ATRIAL FIBRILLATION, UNSPECIFIED TYPE (HCC): ICD-10-CM

## 2020-01-16 DIAGNOSIS — K21.00 GASTROESOPHAGEAL REFLUX DISEASE WITH ESOPHAGITIS: ICD-10-CM

## 2020-01-16 DIAGNOSIS — R13.12 OROPHARYNGEAL DYSPHAGIA: ICD-10-CM

## 2020-01-16 PROBLEM — E66.3 OVERWEIGHT (BMI 25.0-29.9): Status: RESOLVED | Noted: 2019-06-12 | Resolved: 2020-01-16

## 2020-01-16 PROBLEM — Z51.89 AFTERCARE: Status: ACTIVE | Noted: 2020-01-16

## 2020-01-16 PROBLEM — G81.91 RIGHT HEMIPARESIS: Status: ACTIVE | Noted: 2020-01-16

## 2020-01-16 LAB
APTT PPP: 47.8 SECONDS (ref 22.7–35.4)
BASOPHILS # BLD AUTO: 0.01 10*3/MM3 (ref 0–0.2)
BASOPHILS NFR BLD AUTO: 0.1 % (ref 0–1.5)
DEPRECATED RDW RBC AUTO: 44.2 FL (ref 37–54)
EOSINOPHIL # BLD AUTO: 0.01 10*3/MM3 (ref 0–0.4)
EOSINOPHIL NFR BLD AUTO: 0.1 % (ref 0.3–6.2)
ERYTHROCYTE [DISTWIDTH] IN BLOOD BY AUTOMATED COUNT: 14.3 % (ref 12.3–15.4)
HCT VFR BLD AUTO: 37 % (ref 34–46.6)
HGB BLD-MCNC: 12.1 G/DL (ref 12–15.9)
LYMPHOCYTES # BLD AUTO: 0.45 10*3/MM3 (ref 0.7–3.1)
LYMPHOCYTES NFR BLD AUTO: 6.5 % (ref 19.6–45.3)
MCH RBC QN AUTO: 27.9 PG (ref 26.6–33)
MCHC RBC AUTO-ENTMCNC: 32.7 G/DL (ref 31.5–35.7)
MCV RBC AUTO: 85.3 FL (ref 79–97)
MONOCYTES # BLD AUTO: 0.65 10*3/MM3 (ref 0.1–0.9)
MONOCYTES NFR BLD AUTO: 9.3 % (ref 5–12)
NEUTROPHILS # BLD AUTO: 5.83 10*3/MM3 (ref 1.7–7)
NEUTROPHILS NFR BLD AUTO: 83.7 % (ref 42.7–76)
PLATELET # BLD AUTO: 148 10*3/MM3 (ref 140–450)
PMV BLD AUTO: 12.7 FL (ref 6–12)
RBC # BLD AUTO: 4.34 10*6/MM3 (ref 3.77–5.28)
WBC NRBC COR # BLD: 6.97 10*3/MM3 (ref 3.4–10.8)

## 2020-01-16 PROCEDURE — 99306 1ST NF CARE HIGH MDM 50: CPT | Performed by: INTERNAL MEDICINE

## 2020-01-16 PROCEDURE — 87081 CULTURE SCREEN ONLY: CPT | Performed by: INTERNAL MEDICINE

## 2020-01-16 PROCEDURE — 85025 COMPLETE CBC W/AUTO DIFF WBC: CPT | Performed by: PSYCHIATRY & NEUROLOGY

## 2020-01-16 PROCEDURE — 85730 THROMBOPLASTIN TIME PARTIAL: CPT | Performed by: PSYCHIATRY & NEUROLOGY

## 2020-01-16 PROCEDURE — 97116 GAIT TRAINING THERAPY: CPT

## 2020-01-16 PROCEDURE — 99238 HOSP IP/OBS DSCHRG MGMT 30/<: CPT | Performed by: NURSE PRACTITIONER

## 2020-01-16 PROCEDURE — 99233 SBSQ HOSP IP/OBS HIGH 50: CPT | Performed by: NURSE PRACTITIONER

## 2020-01-16 RX ORDER — ACETAMINOPHEN 325 MG/1
650 TABLET ORAL EVERY 4 HOURS PRN
Status: DISCONTINUED | OUTPATIENT
Start: 2020-01-16 | End: 2020-01-25 | Stop reason: HOSPADM

## 2020-01-16 RX ORDER — DIGOXIN 125 MCG
125 TABLET ORAL
Status: DISCONTINUED | OUTPATIENT
Start: 2020-01-17 | End: 2020-01-25 | Stop reason: HOSPADM

## 2020-01-16 RX ORDER — ATORVASTATIN CALCIUM 80 MG/1
80 TABLET, FILM COATED ORAL NIGHTLY
Status: ON HOLD
Start: 2020-01-16 | End: 2020-01-25 | Stop reason: SDUPTHER

## 2020-01-16 RX ORDER — AMOXICILLIN 250 MG
2 CAPSULE ORAL 2 TIMES DAILY PRN
Status: DISCONTINUED | OUTPATIENT
Start: 2020-01-16 | End: 2020-01-22

## 2020-01-16 RX ORDER — ATORVASTATIN CALCIUM 40 MG/1
80 TABLET, FILM COATED ORAL NIGHTLY
Status: DISCONTINUED | OUTPATIENT
Start: 2020-01-16 | End: 2020-01-25 | Stop reason: HOSPADM

## 2020-01-16 RX ORDER — DIGOXIN 125 MCG
125 TABLET ORAL
Status: ON HOLD
Start: 2020-01-17 | End: 2020-01-25 | Stop reason: SDUPTHER

## 2020-01-16 RX ORDER — ONDANSETRON 4 MG/1
4 TABLET, FILM COATED ORAL EVERY 6 HOURS PRN
Status: DISCONTINUED | OUTPATIENT
Start: 2020-01-16 | End: 2020-01-25 | Stop reason: HOSPADM

## 2020-01-16 RX ORDER — ONDANSETRON 2 MG/ML
4 INJECTION INTRAMUSCULAR; INTRAVENOUS EVERY 6 HOURS PRN
Status: DISCONTINUED | OUTPATIENT
Start: 2020-01-16 | End: 2020-01-25 | Stop reason: HOSPADM

## 2020-01-16 RX ORDER — CHOLECALCIFEROL (VITAMIN D3) 125 MCG
500 CAPSULE ORAL DAILY
Status: DISCONTINUED | OUTPATIENT
Start: 2020-01-16 | End: 2020-01-16 | Stop reason: HOSPADM

## 2020-01-16 RX ORDER — METOPROLOL SUCCINATE 25 MG/1
75 TABLET, EXTENDED RELEASE ORAL 2 TIMES DAILY
Qty: 90 TABLET | Refills: 0 | Status: SHIPPED | OUTPATIENT
Start: 2020-01-16 | End: 2020-01-25 | Stop reason: HOSPADM

## 2020-01-16 RX ORDER — PANTOPRAZOLE SODIUM 40 MG/1
40 TABLET, DELAYED RELEASE ORAL EVERY MORNING
Status: DISCONTINUED | OUTPATIENT
Start: 2020-01-17 | End: 2020-01-25 | Stop reason: HOSPADM

## 2020-01-16 RX ADMIN — APIXABAN 5 MG: 5 TABLET, FILM COATED ORAL at 08:35

## 2020-01-16 RX ADMIN — ATORVASTATIN CALCIUM 80 MG: 40 TABLET, FILM COATED ORAL at 22:18

## 2020-01-16 RX ADMIN — TUBERCULIN PURIFIED PROTEIN DERIVATIVE 5 UNITS: 5 INJECTION INTRADERMAL at 22:20

## 2020-01-16 RX ADMIN — APIXABAN 5 MG: 2.5 TABLET, FILM COATED ORAL at 22:18

## 2020-01-16 RX ADMIN — METOPROLOL SUCCINATE 75 MG: 50 TABLET, FILM COATED, EXTENDED RELEASE ORAL at 08:35

## 2020-01-16 RX ADMIN — Medication 500 MCG: at 15:35

## 2020-01-16 RX ADMIN — PANTOPRAZOLE SODIUM 40 MG: 40 TABLET, DELAYED RELEASE ORAL at 06:33

## 2020-01-16 NOTE — PROGRESS NOTES
Continued Stay Note  [facility]     Patient Name: Camille Pantoja  MRN: [id number]  Today's Date: 1/16/2020    Admit Date: 1/10/2020    Discharge Plan     Row Name 01/16/20 1521       Plan    Plan Comments  [facility] had spoke with pt and sister in law Cortes yesterday on 1/15 @ bedside.  They had stated they still wanted [place], since pt's family was in the [place] area, but pt was also interested in rehab @ [facility].  Pt was agreeable to referral, and would cont to think about choice.  This am 1/19, pt was accepted @ [facility].  Pt has decided to go to rehab there, says her [facility] had visited last pm, and she would see them more there.  Spoke with Megan, and she approved pt.  Family will transport.  Raven Deal RN        Discharge Codes    No documentation.       Expected Discharge Date and Time     Expected Discharge Date Expected Discharge Time    Jan 16, 2020             Raven Deal RN

## 2020-01-16 NOTE — PROGRESS NOTES
"DOS: 2020  NAME: Camille Pantoja   : 1955  PCP: Nahun Ann Jr.,   Patient seen in follow-up today; new to me        Stroke    Subjective: Low-grade temp (100.3) overnight.  She denies any new complaints and/or concerns on my exam. She passed video swallow and is tolerating soft diet.     Brother and sister-in-law at bedside.       Objective:  Vital signs: /87   Pulse 98   Temp 99.1 °F (37.3 °C) (Oral)   Resp 16   Ht 165.1 cm (65\")   Wt 55 kg (121 lb 3.2 oz)   SpO2 95%   BMI 20.17 kg/m²       HEENT: Normocephalic, atraumatic   COR: AF; controlled (77)  Resp: Even and unlabored  Extremities: Equal pulses, non distal embolization    Neurological:   MS: Alert and oriented. Mild dysarthria. No obv.neglect.   CN: II-XII normal except decreased right shoulder shrug, mild right facial weakness   Motor: 5/5, normal tone on left. RUE 3-/5. Right hand 2+/5  RLE 2+/5   Reflexes: Toes down going   Sensory: Intact   Coordination: Normal on left; slowed/limited on right     Laboratory results:  Lab Results   Component Value Date    GLUCOSE 90 2020    CALCIUM 8.8 2020     2020    K 3.6 2020    CO2 21.1 (L) 2020     2020    BUN 11 2020    CREATININE 0.61 2020    EGFRIFAFRI 75 2019    EGFRIFNONA 98 2020    BCR 18.0 2020    ANIONGAP 14.9 2020     Lab Results   Component Value Date    WBC 6.97 2020    HGB 12.1 2020    HCT 37.0 2020    MCV 85.3 2020     2020     Lab Results   Component Value Date    CHOL 106 01/10/2020     Lab Results   Component Value Date    HDL 40 01/10/2020    HDL 51 2019    HDL 78 (H) 2017     Lab Results   Component Value Date    LDL 52 01/10/2020    LDL 61 2019     (H) 2017     Lab Results   Component Value Date    TRIG 69 01/10/2020    TRIG 83 2019    TRIG 88 2017     Lab Results   Component Value Date    TSH " 0.991 01/10/2020     Lab Results   Component Value Date    HKWETBYH63 435 01/10/2020     Lab Results   Component Value Date    HGBA1C 5.50 01/10/2020     Lab Results   Component Value Date    CHOL 106 01/10/2020    CHLPL 129 05/29/2019    CHLPL 235 (H) 11/20/2017    CHLPL 195 05/24/2017     Lab Results   Component Value Date    TRIG 69 01/10/2020    TRIG 83 05/29/2019    TRIG 88 11/20/2017     Lab Results   Component Value Date    HDL 40 01/10/2020    HDL 51 05/29/2019    HDL 78 (H) 11/20/2017     Lab Results   Component Value Date    LDL 52 01/10/2020    LDL 61 05/29/2019     (H) 11/20/2017         Review and interpretation of imaging:  Interpretation Summary     · The left ventricular cavity is moderately dilated.  · Left ventricular wall thickness is consistent with mild concentric hypertrophy.  · Calculated EF = 27.0%.  · Left ventricular systolic function is severely decreased.  · The following left ventricular wall segments are hypokinetic: mid anterior, apical anterior, basal anterolateral, mid inferolateral, basal inferoseptal, mid inferoseptal, basal anterior and basal inferoseptal. The following left ventricular wall segments are akinetic: mid anterolateral, apical lateral, apical inferior, basal inferior, mid inferior, basal anteroseptal and mid anteroseptal.  · Left atrial cavity size is severely dilated.  · Right atrial cavity size is severely dilated.  · Right ventricular cavity is moderately dilated.  · Moderately reduced right ventricular systolic function noted.  · Mild tricuspid valve regurgitation is present.  · Mild mitral valve regurgitation is present     BRAIN MRI WITHOUT GADOLINIUM     HISTORY: stroke; I48.91-Unspecified atrial fibrillation     COMPARISON: 01/11/2020     FINDINGS:  Multiplanar images of the head were obtained without the use  of intravenous contrast.     There has been further evolution of the previously described left  hemispheric infarcts. The degree of diffusion  signal hyperintensity has  diminished slightly but their appearance is otherwise stable. There has  been development of an acute infarct in the upper right cerebellum best  seen on diffusion images 6-9 and measuring 2.4 x 1.3 cm on diffusion  image 8. There has also been development of a tiny acute lacunar type  infarct of the upper most right kojo near the midbrain junction best  seen on diffusion image 11. It measures approximately 3.5 mm. No  significant mass effect or evidence of hemorrhage in association with  any of the new or previous infarcts.     Cortical atrophy is again noted with mild chronic-appearing white matter  changes bilaterally. There is no midline shift. The major vascular flow  voids are patent.  Midline structures corpus callosum, pituitary gland,  and brainstem are unremarkable. There is no evidence of extra axial mass  or fluid collection. Ventricles remain mildly enlarged suggesting  hydrocephalus but are stable in caliber compared to previous.  Chronic-appearing paranasal sinus disease noted, greatest in the left  maxillary. The globes and orbital soft tissues are unremarkable.        IMPRESSION:  1. Development of right cerebellar and upper pontine infarcts without  hemorrhage or mass effect. There has been evolution of the previously  described left-sided infarcts without significant change otherwise.  2. Stable mild ventricular enlargement.  3. Chronic-appearing paranasal sinus disease.      This report was finalized on 1/13/2020 11:45 PM by Ravi Posey M.D.     MRI BRAIN WITHOUT CONTRAST, MR ANGIOGRAM HEAD WITHOUT CONTRAST, MR  ANGIOGRAM NECK WITHOUT CONTRAST     HISTORY: Slurred speech and right-sided weakness. Fall on 01/09/2020.     COMPARISON: CT head without contrast 01/11/2020, MRI brain with and  without contrast 04/27/2012.     TECHNIQUE: Brain MRI without contrast includes sagittal T1 as well as  axial diffusion, FLAIR, T1, T2, gradient echo sequences. MR angiogram of  the  head and MR angiogram of the neck were obtained without contrast  administration.  MIP reconstruction images were obtained.     FINDINGS: There is an acute infarction involving the left caudate body,  left lentiform nucleus. Lentiform nucleus infarction also extends into  the external limb of the left internal capsule and measures  approximately 3 cm in AP dimension by 1.1 cm transverse. There are also  small foci of acute infarction within the posterior left temporal lobe  abutting the posterior margin of the sylvian fissure. Small cortical  infarcts are present involving the insular cortex on the left.  Additionally, there are small cortical infarctions within the posterior  superior left frontal lobe cortex with the larger measuring 9 mm  transverse. No right cerebral or cerebellar acute infarction is evident.     There is mild confluent periventricular cerebral white matter FLAIR  hyperintense signal that is consistent with chronic small vessel  ischemic white matter change. There is ventricular enlargement  attributed to central atrophy. There is no evidence for small cortical  infarction within the posterior to the superior left frontal lobe mass  effect or shift of midline structures. No extra-axial fluid collection  is evident. There is no MRI evidence for hemorrhagic transformation of  infarction.     Neck MRA is severely limited by the degree of motion related artifact.  The proximal great vessels are not well visualized. There is flow  related enhancement within both common carotid arteries and both  proximal cervical internal carotid arteries without evidence for any  NASCET stenosis. Both cervical common carotid arteries appear to be  patent.  The distal cervical, petrous, and cavernous segments of both  internal carotid arteries are patent. Supracavernous segments appear  patent. Both middle cerebral arteries appear patent.     The proximal vertebral arteries are not visualized. The right  vertebral  artery is larger than the left. There is flow-related enhancement within  both mid to distal cervical vertebral arteries. Intracranial segment of  the left vertebral artery is very small. The basilar artery and both  posterior cerebral arteries appear patent. There appears to be a fetal  origin of the left vertebral posterior cerebral artery.     IMPRESSION:  1.  Acute left cerebral infarction involving the left caudate, left  lentiform nucleus, external limb of left internal capsule, left insular  cortex, and left posterior frontal lobe cortex. Infarction is in the  left middle cerebral artery distribution. MRI one day prior was obtained  for comparison. Overall infarctions appear very similar to the exam one  day prior though the 9 mm infarction within the posterior-superior left  frontal lobe cortex is potentially new and not demonstrated previously.  This was discussed with Francia nurse caring for the patient on 2 S.  6:11 PM 01/11/2020.   2. Inflammatory paranasal sinus disease.  3.  MR angiogram neck is very limited by motion artifact and the great  vessel origins are not visualized. There is no evidence for NASCET  carotid stenosis.  4.  Intracranial segment of the left vertebral artery is very small.  There is a fetal origin of the left posterior cerebral artery.     This report was finalized on 1/11/2020 6:14 PM by Dr. Jae Hernández M.D.     MRI Brain WO     INDICATION:   Decreased alertness. Status post fall 1/9/2020.     TECHNIQUE:   MRI of the brain without IV contrast.     COMPARISON:    Head CT 1/9/2020     FINDINGS:  Diffuse prominence of ventricles, sulci and basilar cisterns compatible with generalized atrophy. Foci of increased T2 and FLAIR signal within the left basal ganglia and left insular cortex with corresponding restricted diffusion and low signal on the  ADC map consistent with acute infarct.     Increased T2 and FLAIR signal within the periventricular white matter which is  nonspecific but may reflect chronic microvascular disease. No mass, mass effect or midline shift. No hemorrhage or abnormal extra axial fluid collections. Normal intracranial  flow voids.     Bony calvarium, skull base and mastoids unremarkable. Bilateral maxillary and left ethmoid sinus mucosal disease.     IMPRESSION:  Acute infarct left basal ganglia and left insular cortex in the distribution of the left MCA.     Ventriculomegaly with increased T2 signal within the periventricular white matter. Though this could be indicative of NPH this is most likely the sequela of chronic microvascular disease. Correlate with patient's clinical presentation. Dr. Grady relates  that the patient does not have any clinical features of NPH.     Bilateral maxillary and left ethmoid sinus mucosal disease.     Results called to Dr. Grady at [facility] at the time of this dictation.     Signer Name: DIANE Rodriguez MD   Signed: 1/10/2020 12:07 PM   Workstation Name: [id number]    [facility]  CT ANGIOGRAM HEAD AND NECK AND CT PERFUSION STUDY     CLINICAL HISTORY: Right sided weakness.     Radiation dose reduction techniques were utilized, including automated  exposure control and exposure modulation based on body size. CT scan of  the head was obtained with 3 mm axial images without the use  of IV contrast. The patient underwent a CT  perfusion study with a dynamic bolus of IV contrast. Standard perfusion  maps were constructed. The patient then underwent a CT angiogram of the  head and neck with 1 mm axial images following the administration of IV  contrast. Sagittal, coronal, and 3-dimensional reconstructed images were  obtained.  Percent stenosis was assessed in accordance with NASCET  criteria.     COMPARISON: Noncontrast head CT from 01/11/2020, correlate with MRI from  01/11/2020     FINDINGS:     NONCONTRAST HEAD CT: On the noncontrast head CT, no acute hemorrhage or  hydrocephalus is  identified. A new area of hypodensity in the right  cerebellum, for example 1.9 x 0.8 cm on image 18 of series 1 suggests  subacute infarct. Mild periventricular hypodensities suggest chronic  small vessel ischemic change in a patient this age. Small old basal  ganglia lacunar infarcts there are suggested.     Likely mucous retention cysts in the maxillary sinuses, left more than  right.        CT PERFUSION STUDY:  No CBF (under 30%) deficit or perfusion abnormality  is demonstrated where the T MAX is greater than 6 seconds. The  calculated mismatch volume was 0 cc.     CTA HEAD and neck: The CT angiogram of the head and neck demonstrates no  hemodynamically significant focal stenosis, aneurysm, or dissection in  the cervical carotid or vertebral arteries, or in the arteries at the  base of the brain. The left PCA is apparently predominantly supplied by  the anterior circulation, left P1 segment being relatively diminutive.  Right vertebral artery is dominant.     Subcentimeter left thyroid hypodense nodule, follow-up evaluation could  be obtained as indicated.     Visualized lung apices appear clear.     Degenerative changes are seen in the cervical spine with neuroforaminal  narrowing related to facet and uncovertebral joint hypertrophy, more  prominent on the right at C5/6, and on the left at C3/4, C5/6, C6/7.        IMPRESSION:        1. No perfusion abnormality to suggest completed or threatened acute  infarct at the levels evaluated.  2. No arterial cutoff or high-grade arterial stenosis identified.  3. A new area of hypodensity in the right cerebellum suggesting subacute  infarct. No acute intracranial hemorrhage or hydrocephalus. No enhancing  lesions of brain.           Discussed by telephone with Dr. Noriega at 1900, 1920, 01/13/2020.     This report was finalized on 1/13/2020 7:32 PM by Dr. Sachin Damon M.D.     CT BRAIN WITHOUT CONTRAST     HISTORY: Outside MRI scan reports acute infarct in left  MCA distribution  and left insular cortex and left basal ganglia.     TECHNIQUE/FINDINGS: The CT scan was performed through the brain without  contrast and the reported recent outside MRI scan is not currently  available for direct comparison. There is an earlier MRI scan dated  04/27/2012. There is prominent diffuse atrophy as well as generalized  enlargement of the ventricular system on today's exam which is unchanged  from 2012. There is no evidence of acute intracranial hemorrhage or mass  effect. There are no CT changes to suggest acute infarct at the present  time.     There is moderate mucosal thickening at the floors of both maxillary  sinuses.                 Radiation dose reduction techniques were utilized, including automated  exposure control and exposure modulation based on body size.     This report was finalized on 1/11/2020 1:31 PM by Dr. Davidson Ribera M.D.     CT Head WO     HISTORY:   Postural hypotension after falling earlier today     TECHNIQUE:   Axial unenhanced head CT. Radiation dose reduction techniques included automated exposure control or exposure modulation based on body size. Count of known CT and cardiac nuc med studies performed in previous 12 months: 0.      Time of scan: 9:24 PM     COMPARISON:   None.     FINDINGS:   No intracranial hemorrhage, mass, or infarct. No hydrocephalus or extra-axial fluid collection. There are senescent changes, including volume loss and nonspecific white matter change, but no acute abnormality is seen. Ventricular enlargement is somewhat  out of proportion to the sulci. Consider normal pressure hydrocephalus in the appropriate clinical setting. The skull base, calvarium, and extracranial soft tissues are normal.     IMPRESSION:  Senescent changes without acute abnormality. Somewhat disproportional ventriculomegaly is noted. Correlate clinically for evidence of normal pressure hydrocephalus.           XR FEMUR 2 VW RIGHT-, XR SHOULDER 1 VW RIGHT-      INDICATIONS: Trauma     TECHNIQUE: 5 views of the right femur, one view of the right shoulder     COMPARISON: None available     FINDINGS:     No acute fracture is identified. Assessment of the right shoulder is  limited by presence of only one view, and by overpenetration; with  persistent clinical indication, additional views of the right shoulder  could be obtained. No dislocation is noted. Intact appearing plate and  screw surgical hardware at the proximal right humerus, hemiarthroplasty  hardware in the proximal right femur. Degenerative changes are seen at  the right knee.     IMPRESSION:     No acute fracture is identified in the right shoulder or right femur, as  described. Follow-up/further evaluation can be obtained as indications  persist.     This report was finalized on 1/15/2020 12:07 PM by Dr. Sachin Damon M.D.     XR FEMUR 2 VW RIGHT-, XR SHOULDER 1 VW RIGHT-     INDICATIONS: Trauma     TECHNIQUE: 5 views of the right femur, one view of the right shoulder     COMPARISON: None available     FINDINGS:     No acute fracture is identified. Assessment of the right shoulder is  limited by presence of only one view, and by overpenetration; with  persistent clinical indication, additional views of the right shoulder  could be obtained. No dislocation is noted. Intact appearing plate and  screw surgical hardware at the proximal right humerus, hemiarthroplasty  hardware in the proximal right femur. Degenerative changes are seen at  the right knee.     IMPRESSION:     No acute fracture is identified in the right shoulder or right femur, as  described. Follow-up/further evaluation can be obtained as indications  persist.     This report was finalized on 1/15/2020 12:07 PM by Dr. Sachin Damon M.D.    Impression: This is a 64-year-old female with history of DVT (on Coumadin prior to arrival), EtOH abuse; quit 2018, hyperlipidemia, CHF, skin cancer, recent left lobe pneumonia and prior stroke (on  Zocor prior to arrival) who presented to [facility] on January 9th due to change in mental status and generalized not feeling well; started having diarrhea after being initiated on pneumonia.  She reports she progressively became weak.  EKG in ED revealed evidence of new onset atrial fibrillation with RVR.  MRI of the brain showed acute left hemispheric subcortical stroke and patient had elevated troponin and evidence of new cardiomyopathy therefore she was transferred to [facility] for further evaluation of CVA and to undergo cardiac cath.  On arrival her INR was subtherapeutic; 1.25 (on Coumadin).  According to the chart after cardiac cath patient reported worsening right-sided weakness therefore MRI of the brain was repeated which showed extension of her stroke. Her cardiac cath was unremarkable; no evidence of significant coronary disease labs; LDL 52, A1C 5.50, B-12 435, TSH 0.991.  Cardiology following; Eliquis 5 mg twice daily since initiated; heparin drip discontinued yesterday.  Patient tolerating anticoagulation without any difficulty. No further neurology workup indicated. We will sign off and see again upon request.        Work up to date:  · MRI brain 1/10: Acute left basal ganglia left insular cortex stroke, ventriculomegaly  · CTH WO 1/9: No evidence of acute intracranial abnormalities, ventricular enlargement noted, suspicious of normal pressure hydrocephalus.  · CTH WO 1/11: No evidence of acute intracranial abnormalities, generalized enlargement of the ventricular system but is unchanged since prior imaging in 2012.  · Repeat MRI brain 1/11: Acute left cerebral stroke in the left caudate, lentiform nucleus, external limb of the internal capsule, and insular cortex and left posterior frontal lobe cortex, appears similar to previous but left frontal cortex stroke is new.  · MRA head/neck 1/11: Motion degraded, decreased left MCA flow compared to right MCA but no obvious large  vessel occlusion. Intracranial left vertebral artery is small.  Fetal origin of the left PCA.  · Cardiac cath 1/10: No significant CAD  · 2D Echo 1/10: Left atrial size severely dilated, right atrial size severely dilated, LV function severely decreased, EF 27%, no evidence of LV thrombus present, no AV stenosis present.  · CT/CTA/CTP 1/13: New area of hypodensity within the right cerebellum, no CBF under 30% deficit or perfusion abnormality, no high-grade stenosis, subcentimeter left thyroid hypodense nodule noted  · Right shoulder 1/15: No evidence of fracture noted  · Right femur 1/15: No evidence of fracture noted  · VFSS 1/15: pending    Dx:   1. Acute right cerebellar infarct  2. Left hemisphere stroke secondary to decreased left MCA flow  3. New onset A. Fib  4. New cardiomyopathy  4. Recent pneumonia  5. History of alcohol abuse  6. H/O dvt with subtherapeutic INR on admission         Plan:  B-12 replacement (435)   Eliquis 5mg daily   Lipitor 80mg daily x 1 month then decrease to 40mg daily (LDL 52)   Neurochecks  BP control; avoid hypotension   Stroke Education  BAILEE/SCDs  PT/OT/ST  Follow-up w/ Hollie on 4/8 for stroke follow up and Dr. Sachin Najera in our office on 3/31 for NPH eval.  Recommend follow up with PCP for left thyroid hypodense nodule noted on CTA H/N.      Case reviewed w/ Dr. Noriega and he agrees with plan above.     JOHN Dubose

## 2020-01-16 NOTE — THERAPY TREATMENT NOTE
Patient Name: Camille Pantoja  : 1955    MRN: [id number]                              Today's Date: 2020       Admit Date: 1/10/2020    Visit Dx:     ICD-10-CM ICD-9-CM   1. Atrial fibrillation, unspecified type (CMS/HCC) I48.91 427.31     Patient Active Problem List   Diagnosis   • Deep vein thrombosis of lower extremity (CMS/HCC)   • Hyperlipidemia   • Hypertension   • Vitamin D deficiency   • Osteoarthritis   • Overweight (BMI 25.0-29.9)   • Paroxysmal atrial fibrillation with rapid ventricular response (CMS/HCC)   • Chronic atrial fibrillation with rapid ventricular response   • Atrial fibrillation (CMS/HCC)   • Metabolic encephalopathy   • Cerebral ventriculomegaly   • Acute ischemic left MCA stroke (CMS/HCC)     Past Medical History:   Diagnosis Date   • CHF (congestive heart failure) (CMS/HCC)    • Hyperlipidemia    • Hypertension    • Irregular heart beat    • Skin cancer    • Stroke (CMS/HCC)      Past Surgical History:   Procedure Laterality Date   • CARDIAC CATHETERIZATION N/A 1/10/2020    Procedure: Left Heart Cath;  Surgeon: Cain Mcneil MD;  [facility];  Service: Cardiovascular   • CARDIAC CATHETERIZATION N/A 1/10/2020    Procedure: Coronary angiography;  Surgeon: Cain Mcneil MD;  Location:  [facility] CATH INVASIVE LOCATION;  Service: Cardiovascular   • FINGER SURGERY     • HYSTERECTOMY     • JOINT REPLACEMENT     • ORIF SHOULDER DISLOCATION W/ HUMERAL FRACTURE     • TIBIA FRACTURE SURGERY       General Information     Row Name 20 0938          PT Evaluation Time/Intention    Document Type  therapy note (daily note)  -MW     Mode of Treatment  physical therapy;individual therapy  -MW     Row Name 20 0938          General Information    Existing Precautions/Restrictions  fall  -MW     Row Name 2038          Cognitive Assessment/Intervention- PT/OT    Orientation Status (Cognition)  oriented x 3  -MW     Row Name 20 0938           Safety Issues, Functional Mobility    Impairments Affecting Function (Mobility)  balance;cognition;endurance/activity tolerance;muscle tone abnormal;pain  -MW     Comment, Safety Issues/Impairments (Mobility)  Gait belt used for safety.  -MW       User Key  (r) = Recorded By, (t) = Taken By, (c) = Cosigned By    Initials Name Provider Type    Mechelle Joaquin PT Physical Therapist        Mobility     Row Name 01/16/20 0940          Bed Mobility Assessment/Treatment    Bed Mobility Assessment/Treatment  supine-sit  -MW     Supine-Sit [place] (Bed Mobility)  minimum assist (75% patient effort)  -MW     Row Name 01/16/20 0940          Sit-Stand Transfer    Sit-Stand [place] (Transfers)  minimum assist (75% patient effort);verbal cues  -MW     Assistive Device (Sit-Stand Transfers)  walker, front-wheeled  -MW     Row Name 01/16/20 0940          Gait/Stairs Assessment/Training    Gait/Stairs Assessment/Training  gait/ambulation independence;gait pattern  -MW     [place] Level (Gait)  minimum assist (75% patient effort);2 person assist  -     Assistive Device (Gait)  walker, front-wheeled  -MW     Distance in Feet (Gait)  100   -MW     Pattern (Gait)  step-to  -MW     Deviations/Abnormal Patterns (Gait)  antalgic;right sided deviations;festinating/shuffling;gait speed decreased  -MW     Bilateral Gait Deviations  forward flexed posture  -MW     Right Sided Gait Deviations  weight shift ability decreased  -MW     Comment (Gait/Stairs)  Improved WB at RLE with no c/o increased pain, Min A to reposition walker and remain in RW while turning.   -MW       User Key  (r) = Recorded By, (t) = Taken By, (c) = Cosigned By    Initials Name Provider Type    Mechelle Joaquin PT Physical Therapist        Obj/Interventions     Row Name 01/16/20 0995          Static Sitting Balance    Level of [place] (Unsupported Sitting, Static Balance)  contact guard assist;minimal assist, 75% patient effort  -MW      Sitting Position (Unsupported Sitting, Static Balance)  sitting edge of mat  -     Time Able to Maintain Position (Unsupported Sitting, Static Balance)  45 to 60 seconds  -     Comment (Unsupported Sitting, Static Balance)  Leans posteriorly  -     Row Name 01/16/20 0943          Static Standing Balance    Level of [place] (Supported Standing, Static Balance)  minimal assist, 75% patient effort  -MW     Assistive Device Utilized (Supported Standing, Static Balance)  walker, rolling  -       User Key  (r) = Recorded By, (t) = Taken By, (c) = Cosigned By    Initials Name Provider Type    Mechelle Joaquin, PT Physical Therapist        Goals/Plan    No documentation.       Clinical Impression     Row Name 01/16/20 0944          Pain Assessment    Additional Documentation  Pain Scale: Numbers Pre/Post-Treatment (Group)  -[facility] Name 01/16/20 0944          Pain Scale: Numbers Pre/Post-Treatment    Pre/Post Treatment Pain Comment  Pt reports pain at right shoulder when moving it. Unable to rate on VRS.  -     Pain Intervention(s)  Repositioned;Ambulation/increased activity;Rest  -[facility] Name 01/16/20 0944          Pain Scale: FACES Pre/Post-Treatment    Pain: FACES Scale, Pretreatment  0-->no hurt  -     Pain: FACES Scale, Post-Treatment  0-->no hurt  -MW     Pre/Post Treatment Pain Comment  up to 6/10 pain at R shoulder during mobility   -[facility] Name 01/16/20 0944          Plan of Care Review    Plan of Care Reviewed With  patient  -     Outcome Summary  Pt improved with functional mobility since last PT session. She was able to ambulate x 100 feet today using RW with Min A x 2. She required Min A with bed mobility and STS at RW. Continues to have pain at R shoulder during mobility, no c/o increased pain at RLE during mobility today and improved WB at R side.   -     Row Name 01/16/20 0944          Physical Therapy Clinical Impression    Patient/Family Goals Statement (PT Clinical  "Impression)  Pt reports, \"it doesn't hurt right now\".  -MW     Row Name 01/16/20 0944          Vital Signs    Pre Systolic BP Rehab  124  -MW     Pre Treatment Diastolic BP  87  -MW     Pretreatment Heart Rate (beats/min)  105  -MW     Posttreatment Heart Rate (beats/min)  90  -MW     Pre SpO2 (%)  96  -MW     O2 Delivery Pre Treatment  room air  -MW     O2 Delivery Intra Treatment  room air  -MW     Post SpO2 (%)  98  -MW     O2 Delivery Post Treatment  room air  -MW     Pre Patient Position  Supine  -MW     Intra Patient Position  Standing  -MW     Post Patient Position  Sitting  -MW     Row Name 01/16/20 0944          Positioning and Restraints    Pre-Treatment Position  in bed  -MW     Post Treatment Position  chair  -MW     In Chair  reclined;sitting;call light within reach;exit alarm on;encouraged to call for assist  -MW       User Key  (r) = Recorded By, (t) = Taken By, (c) = Cosigned By    Initials Name Provider Type    MW Mechelle Salamanca, PT Physical Therapist        Outcome Measures     Row Name 01/16/20 0949          How much help from another person do you currently need...    Turning from your back to your side while in flat bed without using bedrails?  3  -MW     Moving from lying on back to sitting on the side of a flat bed without bedrails?  3  -MW     Moving to and from a bed to a chair (including a wheelchair)?  3  -MW     Standing up from a chair using your arms (e.g., wheelchair, bedside chair)?  3  -MW     Climbing 3-5 steps with a railing?  1  -MW     To walk in hospital room?  3  -MW     AM-PAC 6 Clicks Score (PT)  16  -MW     Row Name 01/16/20 0949          Modified [place] Scale    Modified [place] Scale  4 - Moderately severe disability.  Unable to walk without assistance, and unable to attend to own bodily needs without assistance.  -MW     Row Name 01/16/20 0949          Functional Assessment    Outcome Measure Options  AM-PAC 6 Clicks Basic Mobility (PT);Modified [place]  -       User [place]  " (r) = Recorded By, (t) = Taken By, (c) = Cosigned By    Initials Name Provider Type    Mechelle Joaquin PT Physical Therapist          PT Recommendation and Plan  Planned Therapy Interventions (PT Eval): balance training, bed mobility training, gait training, home exercise program, neuromuscular re-education, patient/family education, strengthening, transfer training  Outcome Summary/Treatment Plan (PT)  Anticipated Equipment Needs at Discharge (PT): front wheeled walker  Anticipated Discharge Disposition (PT): inpatient rehabilitation facility, skilled nursing facility  Plan of Care Reviewed With: patient  Outcome Summary: Pt improved with functional mobility since last PT session. She was able to ambulate x 100 feet today using RW with Min A x 2. She required Min A with bed mobility and STS at RW. Continues to have pain at R shoulder during mobility, no c/o increased pain at RLE during mobility today and improved WB at R side.      Time Calculation:   PT Charges     Row Name 01/16/20 0952             Time Calculation    Start Time  0922  -MW      Stop Time  0938  -MW      Time Calculation (min)  16 min  -MW      PT Received On  01/16/20  -MW      PT - Next Appointment  01/17/20  -MW         Time Calculation- PT    Total Timed Code Minutes- PT  12 minute(s)  -MW        User Key  (r) = Recorded By, (t) = Taken By, (c) = Cosigned By    Initials Name Provider Type    Mechelle Joaquin PT Physical Therapist        Therapy Charges for Today     Code Description Service Date Service Provider Modifiers Qty    81227934933 HC PT THER SUPP EA 15 MIN 1/16/2020 Mechelle Salamanca, PT GP 1    21255288426 HC GAIT TRAINING EA 15 MIN 1/16/2020 Mechelle Salamanca, PT GP 1          PT G-Codes  Outcome Measure Options: AM-PAC 6 Clicks Basic Mobility (PT), Modified [place]  AM-PAC 6 Clicks Score (PT): 16  AM-PAC 6 Clicks Score (OT): 15  Modified [place] Scale: 4 - Moderately severe disability.  Unable to walk without assistance, and unable  to attend to own bodily needs without assistance.    Mechelle Salamanca, PT  1/16/2020

## 2020-01-16 NOTE — PLAN OF CARE
Problem: Patient Care Overview  Goal: Plan of Care Review  Outcome: Ongoing (interventions implemented as appropriate)  Flowsheets (Taken 1/16/2020 3272)  Plan of Care Reviewed With: patient  Outcome Summary: Pt improved with functional mobility since last PT session. She was able to ambulate x 100 feet today using RW with Min A x 2. She required Min A with bed mobility and STS at RW. Continues to have pain at R shoulder during mobility, no c/o increased pain at RLE during mobility today and improved WB at R side.

## 2020-01-16 NOTE — DISCHARGE SUMMARY
Hospital Discharge    Patient Name: Camille Pantoja  Age/Sex: 64 y.o. female  : 1955  MRN: [id number]    Encounter Provider: JOHN Alejandro  Referring Provider: Cain Mcneil MD  Place of Service: [facility] CARDIOLOGY  Patient Care Team:  Nahun Ann Jr., DO as PCP - General (Family Medicine)         Date of Discharge:  2020   Date of Admit: 1/10/2020    Discharge Condition: Stable  Discharge Diagnosis:    Atrial fibrillation (CMS/HCC)  nonischemic cardiomyopathy  Acute right cerebellar infarct  Left hemisphere stroke secondary to decreased left MCA flow  Syncope and collapse  History of DVT - previously on warfarin with subtherapeutic INR on admission.     Hospital Course:   Camille Pantoja is a 64 y.o. female who presented to [facility] on 1/10 with altered mental status. She was found to be in rapid atrial fibrillation. She was noted to have an elevated troponin. Echocardiogram was obtained and showed new cardiomyopathy. She was transferred to [facility] for cardiac catheterization as well as further neurologic evaluation. MRI did show acute left hemisphere subcortical stroke. On 20 morning, patient had new mental status changes. MRI showed slight expansion of stroke. On the evening of  patient had decrease level of consciousness. She was sent for MRI which showed new area of infarcts in the right cerebellum. She was placed on a heparin drip. Medications were adjusted for rate control. Losartan was stopped to avoid hypotension. She was placed on digoxin. She was transitioned from heparin to apixaban. She is stable for discharge. She will follow up with JOHN Hess (neuro) on  for stroke and Dr. Sachin Najera on 3/31 for NPH eval. PCP follow up has been recommended for left thyroid hypodense nodule noted on CTA head and neck. Patient is stable for discharge to rehab. She will be discharged to [facility] We will be happy to follow along there for cardiac needs. She is being transferred to the care of the hospitialists.     Objective:  Temp:  [99.1 °F (37.3 °C)-100.3 °F (37.9 °C)] 99.1 °F (37.3 °C)  Heart Rate:  [] 95  Resp:  [16-18] 16  BP: ()/(78-93) 89/78    Intake/Output Summary (Last 24 hours) at 1/16/2020 1358  Last data filed at 1/16/2020 0742  Gross per 24 hour   Intake --   Output 500 ml   Net -500 ml     Body mass index is 20.17 kg/m².      01/13/20  0640 01/15/20  0502 01/16/20  0500   Weight: 52 kg (114 lb 9.6 oz) 52.4 kg (115 lb 8 oz) 55 kg (121 lb 3.2 oz)     Weight change: 2.586 kg (5 lb 11.2 oz)    Physical Exam:  Constitutional: She is oriented to person, place, and time. She appears well-developed. She does not appear ill.   Neck: No JVD present.  Cardiovascular: Normal rate, irregular rhythm and normal heart sounds.    Pulses:       Posterior tibial pulses are 2+ on the right side, and 2+ on the left side.   Pulmonary/Chest: Effort normal and breath sounds normal.   Abdominal: Soft. Normal appearance and bowel sounds are normal. There is no tenderness.   Musculoskeletal: Normal range of motion.        Right shoulder: She exhibits no deformity.        Left shoulder: She exhibits no deformity.   Neurological: She is alert and oriented to person, place, and time. She has normal strength.   Skin: Skin is warm, dry and intact. No rash noted.   Psychiatric: She has a normal mood and affect. Her behavior is normal. Thought content normal.   Vitals reviewed    Procedures Performed  Procedure(s):  Left Heart Cath  Coronary angiography 1/10/20    Findings:  1. Coronary Artery Anatomy:  Dominance: Right  Left Main: Angiographically normal  Left Anterior Descending: Moderate in caliber size.  Contains luminal irregularities throughout.  Gives off a large first diagonal vessel which contains luminal irregularities.  Circumflex Artery: Moderate caliber size.   Contains luminal irregularities throughout.  Supplies a high and mid marginal branch vessels contain luminal irregularities.  Right Coronary Artery: Large in caliber size.  Contains luminal irregularities throughout.  Supplies a large posterior descending and posterior lateral branch system.     2. Hemodynamics:  Left Ventricle: 121/6/12 mmHg  Aorta: 121/71/93 mmHg     Conclusions:  1. Angiographically normal coronary arteries with no significant coronary artery disease.  2. Compensated left ventricular filling pressures of 12 mmHg     Recommendations:   1. Will continue optimal medical therapy for management of congestive heart failure with continuation of beta-blocker and ARB.  2. Will continue to work on rate controlling of atrial fibrillation.  Given her recent stroke which was found incidentally on cardiac MRI would be hesitant to consider cardioversion due to risk of potentiating stroke but can reassess needs for that based upon her response to up titration of her beta-blocker.  3. Restarting Coumadin and would be reasonable to bridge with Coumadin given her recent stroke patient presented with subtherapeutic INR but her wound and has been recently discontinued due to concomitant antibiotic use.     Procedure Details:  Informed consent was obtained with an explanation of the risk and benefits of the procedure. The patient was brought to the Cardiac Catheterization Laboratory and was prepped and draped in a standard sterile fashion. Moderate sedation with Fentanyl and Versed was administered by the circulating nurse. Lidocaine 2% was used to anesthetize the right radial artery and a 5/6 Slender sheath was placed.  A TGR catheter was then advanced over a 0.035 guidewire into the ascending aorta.  This catheter was used to engage the right and left coronary arteries with diagnostic angiography obtained.  We then exchanged for an angled pigtail catheter and enter the left ventricle to measure hemodynamics.  The  catheter was then removed over a guidewire. The radial artery sheath was removed without difficulty and a TR band  was placed over the access site with excellent hemostasis.     Patient tolerated the procedure well without any complications, and transferred to the post procedure area for recovery in a stable condition.     Complications:  None.     Estimated Blood Loss:  Minimal.    Echo 1/10/20  · The left ventricular cavity is moderately dilated.  · Left ventricular wall thickness is consistent with mild concentric hypertrophy.  · Calculated EF = 27.0%.  · Left ventricular systolic function is severely decreased.  · The following left ventricular wall segments are hypokinetic: mid anterior, apical anterior, basal anterolateral, mid inferolateral, basal inferoseptal, mid inferoseptal, basal anterior and basal inferoseptal. The following left ventricular wall segments are akinetic: mid anterolateral, apical lateral, apical inferior, basal inferior, mid inferior, basal anteroseptal and mid anteroseptal.  · Left atrial cavity size is severely dilated.  · Right atrial cavity size is severely dilated.  · Right ventricular cavity is moderately dilated.  · Moderately reduced right ventricular systolic function noted.  · Mild tricuspid valve regurgitation is present.  · Mild mitral valve regurgitation is present         Consults:  Consults     Date and Time Order Name Status Description    1/15/2020 1404 Inpatient Physical Medicine Rehab Consult      1/11/2020 1019 Inpatient Neurology Consult Stroke Completed     1/10/2020 0041 Inpatient Neurology Consult General Completed     1/10/2020 0041 Inpatient Cardiology Consult Completed           Pertinent Test Results:  Results from last 7 days   Lab Units 01/11/20  0442 01/10/20  1323 01/10/20  0847 01/10/20  0549 01/09/20 2052   SODIUM mmol/L 139  --   --  140 139   POTASSIUM mmol/L 3.6 3.6 3.3* 3.2* 3.8   CHLORIDE mmol/L 103  --   --  105 100   CO2 mmol/L 21.1*  --   --   21.7* 23.8   BUN mg/dL 11  --   --  10 14   CREATININE mg/dL 0.61  --   --  0.62 0.74   GLUCOSE mg/dL 90  --   --  70 95   CALCIUM mg/dL 8.8  --   --  9.3 10.5   AST (SGOT) U/L  --   --   --  25 26   ALT (SGPT) U/L  --   --   --  13 17     Results from last 7 days   Lab Units 01/10/20  0847 01/10/20  0549   CK TOTAL U/L  --  179   TROPONIN T ng/mL 0.070* 0.065*     Results from last 7 days   Lab Units 01/16/20  0529 01/15/20  0555 01/14/20  0607 01/13/20 2003 01/11/20  0442 01/10/20  0549 01/09/20 2052   WBC 10*3/mm3 6.97 6.78 8.19 9.11 4.47 5.84 9.01   HEMOGLOBIN g/dL 12.1 12.6 12.2 13.7 11.3* 11.5* 13.9   HEMATOCRIT % 37.0 37.9 37.3 41.2 34.7 35.3 42.3   PLATELETS 10*3/mm3 148 150 150 181 164 150 188     Results from last 7 days   Lab Units 01/16/20  0529 01/15/20  0555 01/14/20  2135 01/14/20  1507 01/14/20  0607 01/13/20  2003 01/10/20  0549 01/09/20 2052   INR   --   --   --   --   --  1.31* 1.30* 1.25*   APTT seconds 47.8* 76.9* 101.5* 47.2* 53.0* 35.6*  --   --      Results from last 7 days   Lab Units 01/10/20  1323 01/10/20  0847 01/10/20  0549   MAGNESIUM mg/dL 2.7* 1.1* 1.1*     Results from last 7 days   Lab Units 01/10/20  0549   CHOLESTEROL mg/dL 106   TRIGLYCERIDES mg/dL 69   HDL CHOL mg/dL 40     Results from last 7 days   Lab Units 01/10/20  0549   PROBNP pg/mL 11,108.0*     Results from last 7 days   Lab Units 01/10/20  0549 01/09/20 2052   TSH uIU/mL 0.991 1.520       Discharge Medications     Discharge Medications      New Medications      Instructions Start Date   apixaban 5 MG tablet tablet  Commonly known as:  ELIQUIS   5 mg, Oral, Every 12 Hours Scheduled      atorvastatin 80 MG tablet  Commonly known as:  LIPITOR   80 mg, Oral, Nightly      cyanocobalamin 500 MCG tablet  Commonly known as:  VITAMIN B-12   500 mcg, Oral, Daily      digoxin 125 MCG tablet  Commonly known as:  LANOXIN   125 mcg, Oral, Daily Digoxin   Start Date:  January 17, 2020        Changes to Medications       Instructions Start Date   metoprolol succinate XL 25 MG 24 hr tablet  Commonly known as:  TOPROL-XL  What changed:    · medication strength  · how much to take  · when to take this   75 mg, Oral, 2 Times Daily         Continue These Medications      Instructions Start Date   cholecalciferol 25 MCG (1000 UT) tablet  Commonly known as:  VITAMIN D3   2,000 Units, Oral, Daily      omeprazole 20 MG capsule  Commonly known as:  priLOSEC   TAKE ONE CAPSULE BY MOUTH DAILY      Zinc 50 MG capsule   50 mg, Oral, Daily         Stop These Medications    enoxaparin 60 MG/0.6ML solution syringe  Commonly known as:  LOVENOX     losartan 50 MG tablet  Commonly known as:  COZAAR     simvastatin 20 MG tablet  Commonly known as:  ZOCOR            Discharge Diet:    Dietary Orders (From admission, onward)     Start     Ordered    01/15/20 1146  Diet Soft Texture; Chopped; Thin; Cardiac  Diet Effective Now     Question Answer Comment   Diet Texture / Consistency Soft Texture    Select Texture: Chopped    Fluid Consistency Thin    Common Modifiers Cardiac        01/15/20 1146                Activity at Discharge:   as tolerated    Discharge disposition: [facility] rehabilitation unit     Discharge Instructions and Follow ups:  Future Appointments   Date Time Provider Department Center   1/29/2020  2:30 PM Nahun Ann Jr., DO [facility]   3/31/2020  4:00 PM Raji Rodriguez MD [facility] None   4/8/2020  4:00 PM Hollie Piedra APRN [facility] None     Additional Instructions for the Follow-ups that You Need to Schedule     Discharge Follow-up with Specified Provider: Stephanie; 1 Month   As directed      To:  Stephanie    Follow Up:  1 Month            Contact information for follow-up providers     Hollie Piedra APRN Follow up.    Specialty:  Neurology  Why:  April 8th   Contact information:  [address] [place] [address] [phone]             Nahun Ann Jr., DO .       Specialties:  Family Medicine, Emergency Medicine, Urgent Care  Contact information:  [address] [place] [address]  [phone]                   Contact information for after-discharge care     Destination     [facility] .    Service:  Skilled Nursing  Contact information:  [address] [facility] [address] [phone]                             Test Results Pending at Discharge: none     Es Jaime, APRN  01/16/20  1:58 PM

## 2020-01-16 NOTE — TELEPHONE ENCOUNTER
Please call patient and check on her to see how she is doing, and schedule an appointment as soon as possible for transition of care follow-up.

## 2020-01-16 NOTE — PLAN OF CARE
Problem: Patient Care Overview  Goal: Plan of Care Review  Outcome: Ongoing (interventions implemented as appropriate)  Flowsheets (Taken 1/16/2020 0543)  Plan of Care Reviewed With: patient  Outcome Summary: Pt c/o's pain to R FA, states feels like a bruise. Pt had temp 100.3, cardio NP notified without orders. Will moitor. No other c/o's. poss transfer to rehab @ [facility].

## 2020-01-16 NOTE — DISCHARGE INSTRUCTIONS
B-12 replacement (435)   Eliquis 5mg daily   Lipitor 80mg daily x 1 month then decrease to 40mg daily (LDL 52)   BP control; avoid hypotension (low blood pressure)    Follow-up ayad Browne on 4/8 for stroke follow up and Dr. Sachin Najera in our office on 3/31 for NPH eval.  Recommend follow up with PCP for left thyroid hypodense nodule noted on CTA H/N.

## 2020-01-17 PROCEDURE — 97116 GAIT TRAINING THERAPY: CPT

## 2020-01-17 PROCEDURE — 99222 1ST HOSP IP/OBS MODERATE 55: CPT | Performed by: NURSE PRACTITIONER

## 2020-01-17 PROCEDURE — 97161 PT EVAL LOW COMPLEX 20 MIN: CPT

## 2020-01-17 PROCEDURE — 94799 UNLISTED PULMONARY SVC/PX: CPT

## 2020-01-17 PROCEDURE — 92610 EVALUATE SWALLOWING FUNCTION: CPT

## 2020-01-17 PROCEDURE — 97165 OT EVAL LOW COMPLEX 30 MIN: CPT

## 2020-01-17 PROCEDURE — 97535 SELF CARE MNGMENT TRAINING: CPT

## 2020-01-17 RX ORDER — AMMONIUM LACTATE 12 G/100G
LOTION TOPICAL 2 TIMES DAILY
Status: DISCONTINUED | OUTPATIENT
Start: 2020-01-17 | End: 2020-01-25 | Stop reason: HOSPADM

## 2020-01-17 RX ORDER — LISINOPRIL 2.5 MG/1
2.5 TABLET ORAL
Status: DISCONTINUED | OUTPATIENT
Start: 2020-01-17 | End: 2020-01-25 | Stop reason: HOSPADM

## 2020-01-17 RX ADMIN — ATORVASTATIN CALCIUM 80 MG: 40 TABLET, FILM COATED ORAL at 21:39

## 2020-01-17 RX ADMIN — METOPROLOL SUCCINATE 75 MG: 50 TABLET, EXTENDED RELEASE ORAL at 10:40

## 2020-01-17 RX ADMIN — PANTOPRAZOLE SODIUM 40 MG: 40 TABLET, DELAYED RELEASE ORAL at 06:07

## 2020-01-17 RX ADMIN — DIGOXIN 125 MCG: 125 TABLET ORAL at 12:37

## 2020-01-17 RX ADMIN — METOPROLOL SUCCINATE 75 MG: 50 TABLET, EXTENDED RELEASE ORAL at 21:40

## 2020-01-17 RX ADMIN — APIXABAN 5 MG: 2.5 TABLET, FILM COATED ORAL at 21:39

## 2020-01-17 RX ADMIN — Medication 1 APPLICATION: at 21:39

## 2020-01-17 RX ADMIN — APIXABAN 5 MG: 2.5 TABLET, FILM COATED ORAL at 10:40

## 2020-01-17 NOTE — NURSING NOTE
"Refused Lisinopril. Stated that the \"doctor\" told patient she was going to \"leave the meds as they are\". Tried to explain why the Lisinopril was added but patient started to get argumentative. I offered and actually did make attempt to contact JOHN Hassan (cardiology) to confirm, but she had already left for the day and would not be returning until Monday. Will continue to offer Lisinopril as ordered.  "

## 2020-01-17 NOTE — NURSING NOTE
Seen for initial CWON skin assessment on Rehab unit. All bony areas clear and intact. Mild bruising noted to BUE. There is a dry plaque area noted to medial right lower leg. She has requested some type of moisturizer. Will recommend Lac Hydrin to aid in moisturizing. Waffle surfaces in place and Z guard ordered for barrier protection to coccyx/buttocks. Will continue to follow.

## 2020-01-17 NOTE — H&P
[facility] HOSPITALIST     Nahun Ann Jr., DO    CHIEF COMPLAINT: Aftercare    HISTORY OF PRESENT ILLNESS:    63 yo right handed WF w/ PMH of HTN, HLD, h/o DVT & alcohol abuse (reportedly in remission since 2018)  who presented w/ Acute Change in [facility] to [facility] on 1/9/2020,  She had been being treated for a RLL pneumonia by her PCP, and was brought in by EMS due to her neighbor being concerned. initially admitted to the ICU here for New Afib w/ RVR.    On presentation her inr was subtherapeutic, though she was on warfarin for h/o DVT. Workup showed a new Cardiomyopathy and  Acute Infarct of Left Basal Ganglia and Left Insular Cortex on MRI and she was transferred to [facility] for Cardiac Cath and higher level neuro care on 1/10. On Am of 1/11/2020 She was noted to have right sided hemiparesis w/ slurred speech after the cath. Additionally she was found to have new infracts to the rt cerebellum. Stroke felt that clinical condition worsened by low blood pressures. Cardiology switched her to metoprolol tartrate dosed bid for her rate control.     Review of vitals at [place], pt's post cath blood pressures listed as 98/67. Neuro suggested goal of > 110/80. With medication changes until this AM, pt's BP's listed in the 110's-130's / 80's-90's. However this AM, her blood pressures were recorded as 80's/60's prior to her being discharged. But 127/62 on admission here.    Pt denies any worsening symptoms and feels better. Has rt facial droop, and rt hemiparesis, and Is showing some right sided neglect. On Physical exam has 3-4/5 strength in her Rt UE, but is refusing to move her arm for staff, and using her left hand to eat.     Her speech is slow, but clear. No wrong words, or phrases employed. Comprehension is good.       ROS positive for rt shoulder pain imaged at [facility] negative for frx, pt states pain is well controlled, though informed pt to notify nursing if this pain is interfering with her PT/OT  therapy      Past Medical History:   Diagnosis Date   • CHF (congestive heart failure) (CMS/HCC)    • Hyperlipidemia    • Hypertension    • Irregular heart beat    • Skin cancer    • Stroke (CMS/HCC)      Past Surgical History:   Procedure Laterality Date   • CARDIAC CATHETERIZATION N/A 1/10/2020    Procedure: Left Heart Cath;  Surgeon: Cain Mcneil MD;  Location:  [facility] CATH INVASIVE LOCATION;  Service: Cardiovascular   • CARDIAC CATHETERIZATION N/A 1/10/2020    Procedure: Coronary angiography;  Surgeon: Cain Mcneil MD;  Location:  [facility] CATH INVASIVE LOCATION;  Service: Cardiovascular   • FINGER SURGERY     • HYSTERECTOMY     • JOINT REPLACEMENT     • ORIF SHOULDER DISLOCATION W/ HUMERAL FRACTURE     • TIBIA FRACTURE SURGERY       Family History   Problem Relation Age of Onset   • Breast cancer Brother    • Macular degeneration Mother    • Heart disease Mother    • Heart disease Father    • Heart disease Maternal Uncle      Social History     Tobacco Use   • Smoking status: Never Smoker   • Smokeless tobacco: Never Used   Substance Use Topics   • Alcohol use: Yes     Comment: Reports a hx of drinking everyday for 18 years atleast. She says that she quit May 2018, but says that she drinks occasionally now.    • Drug use: No     Medications Prior to Admission   Medication Sig Dispense Refill Last Dose   • apixaban (ELIQUIS) 5 MG tablet tablet Take 1 tablet by mouth Every 12 (Twelve) Hours. Indications: Atrial Fibrillation 60 tablet 11    • atorvastatin (LIPITOR) 80 MG tablet Take 1 tablet by mouth Every Night.      • cholecalciferol (VITAMIN D3) 25 MCG (1000 UT) tablet Take 2 tablets by mouth Daily.      • [START ON 1/17/2020] digoxin (LANOXIN) 125 MCG tablet Take 1 tablet by mouth Daily.      • metoprolol succinate XL (TOPROL-XL) 25 MG 24 hr tablet Take 3 tablets by mouth 2 (Two) Times a Day. 90 tablet 0    • omeprazole (priLOSEC) 20 MG capsule TAKE ONE CAPSULE BY MOUTH DAILY 90 capsule 0 Unknown at  "Unknown time   • vitamin B-12 (VITAMIN B-12) 500 MCG tablet Take 1 tablet by mouth Daily.      • Zinc 50 MG capsule Take 50 mg by mouth Daily.   Unknown at Unknown time     Allergies:  Cephalexin; Beef-derived products; Citrus; Duricef [cefadroxil]; Fish allergy; Garlic; Green beans; Onion; Peach [prunus persica]; and Wheat  Debilities: As per HPI and Physical Exam.     REVIEW OF SYSTEMS:  Please see the above history of present illness for pertinent positives and negatives.  The remainder of the patient's systems have been reviewed and are negative.     Vital Signs  Temp:  [98 °F (36.7 °C)-100.3 °F (37.9 °C)] 98 °F (36.7 °C)  Heart Rate:  [] 111  Resp:  [16-18] 16  BP: ()/(62-93) 127/62    Flowsheet Rows      First Filed Value   Admission Height  166.4 cm (65.5\") Documented at 01/16/2020 1700   Admission Weight  54.5 kg (120 lb 1.6 oz) Documented at 01/16/2020 1700           Physical Exam:  Physical Exam   Constitutional: She is oriented to person, place, and time. No distress.   Thin well dressed well groomed woman who appears slightly older than chronological age   HENT:   Pt in the middle of eating dinner, unable to r/o oropharynal exudate   Eyes: Pupils are equal, round, and reactive to light. Conjunctivae and EOM are normal.   Neck: No JVD present. No tracheal deviation present.   Cardiovascular: Normal rate, regular rhythm and normal heart sounds. Exam reveals no friction rub.   No murmur heard.  Pulmonary/Chest: Effort normal and breath sounds normal. No stridor. No respiratory distress. She has no wheezes. She has no rales.   Abdominal: Soft. Bowel sounds are normal. She exhibits no distension. There is no tenderness. There is no guarding.   Musculoskeletal: She exhibits no edema.   Neurological: She is alert and oriented to person, place, and time. She displays no tremor. A cranial nerve deficit is present. She displays no seizure activity. GCS eye subscore is 4. GCS verbal subscore is 5. GCS " motor subscore is 6. She displays no Babinski's sign on the right side. She displays no Babinski's sign on the left side.   Rt LE str 2/5 in hip flexors, 3/5 in leg extensors  RT UE 4/5  Lft strength 5/5 in upper and lower extremities    Has chronic thaner wasting b/l, no obvious increased tone, or other atrophy   Skin: Skin is warm. She is not diaphoretic.   Psychiatric: She has a normal mood and affect. Her behavior is normal.        Results Review:    I reviewed the patient's new clinical results.  Lab Results (most recent)     None          Imaging Results (Most Recent)     None          ECG/EMG Results (most recent)     None            Assessment/Plan     Aftercare post Lft MCA territory stroke, with rt cerebellar stroke  - PT / OT, very independent lady who lives alone, recently   - Follow up with JOHN Hess (neuro) on 4/8 for stroke , with [facility] eval on 3/31 w/ Dr. Akin Najera    New Afib w/ RVR w h/o DVT  - Cath negative for CAD  - On eliquis 5mg daily  -  on admit here, on dig and 75mg Metop XL Q12, HR has been 80's-90's since yesterday afternoon at [facility]  - followed w/ [facility] at [facility], will consult here to help follow along as needed    HTN  - Prior to admission was controlled on metop 25mg daily  - Now w/ goal of BP > 110/80 per Neuro stroke  - Was hypotensive earlier today, but not since transfer, pt denies worsening symptoms, will have NH nurses keep closer eye on her overnight    HLD  - On intensified 80mg of atorvastatin for one month, then decrease to 40mg daily    Lft thyroid hypodense nodule noted on CTA neck  - tsh wnl, Follow up with PCP    I discussed the patients findings and my recommendations with patient .     Finesse Ramos MD  01/16/20  7:04 PM

## 2020-01-17 NOTE — PLAN OF CARE
Problem: Patient Care Overview  Goal: Plan of Care Review  Flowsheets (Taken 1/17/2020 0945)  Outcome Summary: Physical therapy evaluation complete.  Patient oriented x3, however easily distracted throughout session and requires cues to remain on task.  Patient performs bed mobility with SBA and gait with rolling walker x170 feet, CGA with repeated cues for proper mechanics and safety with walker.  Patient will benefit from physical therapy to address deficits in functional mobility, balance and safety.  Plan to see daily x1 week to address deficits and recommendations for discharge.

## 2020-01-17 NOTE — PLAN OF CARE
Problem: Patient Care Overview  Goal: Plan of Care Review  Outcome: Ongoing (interventions implemented as appropriate)  Flowsheets (Taken 1/17/2020 0335)  Progress: no change  Plan of Care Reviewed With: patient  Outcome Summary: VSS, new admit

## 2020-01-17 NOTE — PLAN OF CARE
Problem: Patient Care Overview  Goal: Plan of Care Review  Outcome: Ongoing (interventions implemented as appropriate)  Flowsheets (Taken 1/17/2020 2941)  Plan of Care Reviewed With: patient  Outcome Summary: SLP/Clinical Swallow Eval: Pt presents with a functional oropharyngeal swallow. No oral prep, transit deficits noted. No oral reisude after trials of regular and soft textures. No clinical s/s of aspiration noted on thins from straw. Minimal to mild left labial weakness noted but did not appear to have any ill effect on swallowing. Recommend to advance diet to soft whole textures with thins. No need for dysphagia therapy indicated at this time.

## 2020-01-17 NOTE — THERAPY EVALUATION
SNF - Physical Therapy Initial Evaluation   [place]     Patient Name: Camille Pantoja  : 1955  MRN: [id number]  Today's Date: 2020   Onset of Illness/Injury or Date of Surgery: 01/10/20  Date of Referral to PT: 20  Referring Physician: Dr Ramos      Admit Date: 2020    Visit Dx: No diagnosis found.  Patient Active Problem List   Diagnosis   • Deep vein thrombosis of lower extremity (CMS/HCC)   • Hyperlipidemia   • Hypertension   • Vitamin D deficiency   • Osteoarthritis   • Paroxysmal atrial fibrillation with rapid ventricular response (CMS/HCC)   • Chronic atrial fibrillation with rapid ventricular response   • Atrial fibrillation (CMS/HCC)   • Metabolic encephalopathy   • Cerebral ventriculomegaly   • Acute ischemic left MCA stroke (CMS/HCC)   • Aftercare   • Right hemiparesis (CMS/HCC)     Past Medical History:   Diagnosis Date   • CHF (congestive heart failure) (CMS/HCC)    • Hyperlipidemia    • Hypertension    • Irregular heart beat    • Skin cancer    • Stroke (CMS/HCC)      Past Surgical History:   Procedure Laterality Date   • CARDIAC CATHETERIZATION N/A 1/10/2020    Procedure: Left Heart Cath;  Surgeon: Cain Mcneil MD;  Location: [facility] CATH INVASIVE LOCATION;  Service: Cardiovascular   • CARDIAC CATHETERIZATION N/A 1/10/2020    Procedure: Coronary angiography;  Surgeon: Cain Mcneil MD;  Location: [facility] CATH INVASIVE LOCATION;  Service: Cardiovascular   • FINGER SURGERY     • HYSTERECTOMY     • JOINT REPLACEMENT     • ORIF SHOULDER DISLOCATION W/ HUMERAL FRACTURE     • TIBIA FRACTURE SURGERY          PT ASSESSMENT (last 12 hours)      Physical Therapy Evaluation     Row Name 20 0945          PT Evaluation Time/Intention    Subjective Information  complains of;weakness  -JW     Document Type  evaluation  -JW     Mode of Treatment  physical therapy  -JW     Patient Effort  good  -JW     Comment  pt easily distracted during session, responses occasionally off  topic  -     Row Name 01/17/20 0945          General Information    Patient Profile Reviewed?  yes  -JW     Onset of Illness/Injury or Date of Surgery  01/10/20  -     Referring Physician  Dr Ramos  -     Patient Observations  alert;agree to therapy;cooperative  -     Patient/Family Observations  pt supine, agreeable to therapy evaluation  -JW     Prior Level of Function  independent:;all household mobility;ADL's  -JW     Equipment Currently Used at Home  walker, rolling;cane, quad  -JW     Pertinent History of Current Functional Problem  Patient admitted to hospital with altered mental status on 1/10 and underwent cardiac cath.  Patient found to have acute left hemispheric subcortical CVA.  patient with subsequent mental status changes and found to have expansion of CVA and newly developed right cerebellar infarct.  Patient reports she ambulates without device independently.  -     Existing Precautions/Restrictions  fall  -JW     Benefits Reviewed  patient:;improve function;increase independence;increase strength;increase balance  -     Barriers to Rehab  none identified  -     Row Name 01/17/20 0945          Relationship/Environment    Lives With  alone  -     Row Name 01/17/20 0945          Resource/Environmental Concerns    Current Living Arrangements  home/apartment/condo 1 story house  -     Row Name 01/17/20 0945          Living Environment    Living Arrangements  house  -     Home Accessibility  stairs to enter home  -     Row Name 01/17/20 0945          Home Main Entrance    Number of Stairs, Main Entrance  five  -JW     Stair Railings, Main Entrance  railings on both sides of stairs can only reach 1 handrail at a time  -     Row Name 01/17/20 0945          Cognitive Assessment/Intervention- PT/OT    Orientation Status (Cognition)  oriented x 3  -JW     Follows Commands (Cognition)  follows one step commands;delayed response/completion;increased processing time needed  -      Personal Safety Interventions  gait belt;nonskid shoes/slippers when out of bed  -     Cognitive Assessment/Intervention Comment  increased processing time required  -     Row Name 01/17/20 0945          Bed Mobility Assessment/Treatment    Supine-Sit [place] (Bed Mobility)  supervision;verbal cues  -     Assistive Device (Bed Mobility)  bed rails;head of bed elevated  -     Row Name 01/17/20 0945          Transfer Assessment/Treatment    Comment (Transfers)  requires cues for hand placement  -     Sit-Stand [place] (Transfers)  contact guard;verbal cues  -     Stand-Sit [place] (Transfers)  contact guard;verbal cues  -     Row Name 01/17/20 0945          Sit-Stand Transfer    Assistive Device (Sit-Stand Transfers)  walker, front-wheeled  -     Row Name 01/17/20 0945          Gait/Stairs Assessment/Training    [place] Level (Gait)  contact guard;verbal cues  -     Assistive Device (Gait)  walker, front-wheeled  -     Distance in Feet (Gait)  170  -     Pattern (Gait)  swing-to  -     Deviations/Abnormal Patterns (Gait)  base of support, narrow;gait speed decreased;huyen decreased  -     Bilateral Gait Deviations  forward flexed posture  -     Right Sided Gait Deviations  heel strike decreased;weight shift ability decreased  -     Comment (Gait/Stairs)  pt requires repeated verbal cues for safety with walker and overall mechanics during direction changes.  Patient with decreased right step length and decreased right heel strike during gait.  Patient requires cues to remain on task during mobility, as patient is easily distracted.  -     Row Name 01/17/20 0945          General ROM    GENERAL ROM COMMENTS  ROM WFL bilaterally, except for right ankle limited to approximately 75%.  -     Row Name 01/17/20 0945          MMT (Manual Muscle Testing)    General MMT Comments  left LE 4+/5, right hip 3+/5, right knee 3/5, right ankle 3+/5 within available ROM  -[facility]  "Name 01/17/20 0945          Sensory Assessment/Intervention    Sensory General Assessment  -- reports sensation is intact  -     Row Name 01/17/20 0945          Pain Scale: Numbers Pre/Post-Treatment    Pain Scale: Numbers, Pretreatment  0/10 - no pain  -     Pain Scale: Numbers, Post-Treatment  0/10 - no pain  -     Pain Location - Side  Right  -     Pain Location  knee  -     Pain Intervention(s)  Repositioned;Ambulation/increased activity  -     Row Name 01/17/20 0945          Plan of Care Review    Plan of Care Reviewed With  patient  -     Outcome Summary  Physical therapy evaluation complete.  Patient oriented x3, however easily distracted throughout session and requires cues to remain on task.  Patient performs bed mobility with SBA and gait with rolling walker x170 feet, CGA with repeated cues for proper mechanics and safety with walker.  Patient will benefit from physical therapy to address deficits in functional mobility, balance and safety.  Plan to see daily x1 week to address deficits and recommendations for discharge.  -     Row Name 01/17/20 0945          Physical Therapy Clinical Impression    Date of Referral to PT  01/16/20  -     Patient/Family Goals Statement (PT Clinical Impression)  \"go home\"  -     Criteria for Skilled Interventions Met (PT Clinical Impression)  yes;treatment indicated  -     Rehab Potential (PT Clinical Summary)  good, to achieve stated therapy goals  -     Predicted Duration of Therapy (PT)  1 week  -     Care Plan Review (PT)  evaluation/treatment results reviewed;care plan/treatment goals reviewed;risks/benefits reviewed;patient/other agree to care plan  -     Row Name 01/17/20 0945          Physical Therapy Goals    Bed Mobility Goal Selection (PT)  bed mobility, PT goal 1  -     Transfer Goal Selection (PT)  transfer, PT goal 1  -     Gait Training Goal Selection (PT)  gait training, PT goal 1  -     Stairs Goal Selection (PT)  stairs, " PT goal 1  -JW     Additional Documentation  Stairs Goal Selection (PT) (Row)  -JW     Row Name 01/17/20 0945          Bed Mobility Goal 1 (PT)    Activity/Assistive Device (Bed Mobility Goal 1, PT)  bed mobility activities, all  -JW     [place] Level/Cues Needed (Bed Mobility Goal 1, PT)  conditional independence  -JW     Time Frame (Bed Mobility Goal 1, PT)  1 week  -JW     Progress/Outcomes (Bed Mobility Goal 1, PT)  goal ongoing  -JW     Row Name 01/17/20 0945          Transfer Goal 1 (PT)    Activity/Assistive Device (Transfer Goal 1, PT)  transfers, all;walker, rolling  -JW     [place] Level/Cues Needed (Transfer Goal 1, PT)  supervision required  -JW     Time Frame (Transfer Goal 1, PT)  1 week  -JW     Progress/Outcome (Transfer Goal 1, PT)  goal ongoing  -JW     Row Name 01/17/20 0945          Gait Training Goal 1 (PT)    Activity/Assistive Device (Gait Training Goal 1, PT)  gait (walking locomotion);assistive device use  -JW     [place] Level (Gait Training Goal 1, PT)  supervision required  -JW     Distance (Gait Goal 1, PT)  200  -JW     Time Frame (Gait Training Goal 1, PT)  1 week  -JW     Progress/Outcome (Gait Training Goal 1, PT)  goal ongoing  -     Row Name 01/17/20 0945          Stairs Goal 1 (PT)    Activity/Assistive Device (Stairs Goal 1, PT)  ascending stairs;descending stairs with 1 handrail  -     Row Name 01/17/20 0945          Positioning and Restraints    Pre-Treatment Position  in bed  -JW     Post Treatment Position  chair  -JW     In Chair  reclined;call light within reach;encouraged to call for assist  -JW       User Key  (r) = Recorded By, (t) = Taken By, (c) = Cosigned By    Initials Name Provider Type    Kennedi Davalos, PT Physical Therapist          PT Recommendation and Plan  Anticipated Discharge Disposition (PT): home with home health  Planned Therapy Interventions (PT Eval): balance training, bed mobility training, gait training, home exercise program,  patient/family education, strengthening, stair training, transfer training  Therapy Frequency (PT Clinical Impression): daily  Outcome Summary/Treatment Plan (PT)  Anticipated Discharge Disposition (PT): home with home health  Plan of Care Reviewed With: patient  Outcome Summary: Physical therapy evaluation complete.  Patient oriented x3, however easily distracted throughout session and requires cues to remain on task.  Patient performs bed mobility with SBA and gait with rolling walker x170 feet, CGA with repeated cues for proper mechanics and safety with walker.  Patient will benefit from physical therapy to address deficits in functional mobility, balance and safety.  Plan to see daily x1 week to address deficits and recommendations for discharge.     Time Calculation:   PT Charges     Row Name 01/17/20 1150             Time Calculation    Start Time  0945  -ANISHA      Stop Time  1020  -ANISHA      Time Calculation (min)  35 min  -ANISHA      PT Non-Billable Time (min)  15 min evaluation minutes  -ANISHA      PT Received On  01/17/20  -ANISHA      PT - Next Appointment  01/18/20  -ANISHA      PT Goal Re-Cert Due Date  01/24/20  -ANISHA         Time Calculation- PT    TCU Minutes- PT  20 min co treat with OT  -ANISHA        User Key  (r) = Recorded By, (t) = Taken By, (c) = Cosigned By    Initials Name Provider Type    Kennedi Davalos, PT Physical Therapist        Therapy Charges for Today     Code Description Service Date Service Provider Modifiers Qty    57962844880 HC PT EVAL LOW COMPLEXITY 1 1/17/2020 Kennedi Gay, PT GP 1    08494295185 HC GAIT TRAINING EA 15 MIN 1/17/2020 Kennedi Gay, PT GP 1                 Kennedi Gay PT  1/17/2020

## 2020-01-17 NOTE — PLAN OF CARE
OT evaluation completed. pt required supervision for bed mobility CGA for functional transfers from EOB and elevated commode seat with RW. ANticipate pt will require min assist for lb adls. pt would benefit from skilled OT services to address adl retraining, compensatory strategies, functional transfers, balance and education with HEP. OT will see pt 5 x week x 1 week with anticipated discharge home with home health services

## 2020-01-17 NOTE — THERAPY EVALUATION
SNF - Occupational Therapy Initial Evaluation   [place]     Patient Name: Camille Pantoja  : 1955  MRN: [id number]  Today's Date: 2020  Onset of Illness/Injury or Date of Surgery: 01/10/20  Date of Referral to OT: 20  Referring Physician: Richard    Admit Date: 2020       ICD-10-CM ICD-9-CM   1. Oropharyngeal dysphagia R13.12 787.22     Patient Active Problem List   Diagnosis   • Deep vein thrombosis of lower extremity (CMS/HCC)   • Hyperlipidemia   • Hypertension   • Vitamin D deficiency   • Osteoarthritis   • Paroxysmal atrial fibrillation with rapid ventricular response (CMS/HCC)   • Chronic atrial fibrillation with rapid ventricular response   • Atrial fibrillation (CMS/HCC)   • Metabolic encephalopathy   • Cerebral ventriculomegaly   • Acute ischemic left MCA stroke (CMS/HCC)   • Aftercare   • Right hemiparesis (CMS/HCC)     Past Medical History:   Diagnosis Date   • CHF (congestive heart failure) (CMS/HCC)    • Hyperlipidemia    • Hypertension    • Irregular heart beat    • Skin cancer    • Stroke (CMS/HCC)      Past Surgical History:   Procedure Laterality Date   • CARDIAC CATHETERIZATION N/A 1/10/2020    Procedure: Left Heart Cath;  Surgeon: Cain Mcneil MD;  Location: [facility] INVASIVE LOCATION;  Service: Cardiovascular   • CARDIAC CATHETERIZATION N/A 1/10/2020    Procedure: Coronary angiography;  Surgeon: Cain Mcneil MD;  Location: [facility] INVASIVE LOCATION;  Service: Cardiovascular   • FINGER SURGERY     • HYSTERECTOMY     • JOINT REPLACEMENT     • ORIF SHOULDER DISLOCATION W/ HUMERAL FRACTURE     • TIBIA FRACTURE SURGERY            OT ASSESSMENT FLOWSHEET (last 12 hours)      Occupational Therapy Evaluation     Row Name 20 0946                   OT Evaluation Time/Intention    Subjective Information  complains of;weakness  -JJ        Document Type  evaluation  -JJ        Mode of Treatment  occupational therapy  -JJ        Patient Effort  good  -JJ         Symptoms Noted During/After Treatment  none  -JJ        Comment  pts required redirection throughout evaluation, responses to questions occasionally off topic  -J           General Information    Patient Profile Reviewed?  yes  -J        Onset of Illness/Injury or Date of Surgery  01/10/20  -        Referring Physician  Richard  -        Patient Observations  alert;cooperative  -J        Patient/Family Observations  pt supine in bed, agreed to evaluation  -J        Prior Level of Function  independent:;all household mobility;community mobility;transfer;ADL's;home management  -J        Equipment Currently Used at Home  walker, rolling;cane, quad  -JJ        Pertinent History of Current Functional Problem  pt admitted to hospital with altered mental status on 1/10 and ddiagnosed with L basal ganglia infact and L insular cortex infarct. pt transferred to [facility] for cardiac cath and continued neuro care. pt is now admitted to SNF for further rehab and medical management.   -JJ        Existing Precautions/Restrictions  fall  -JJ        Risks Reviewed  patient:;LOB  -J        Benefits Reviewed  patient:;improve function;increase independence  -J        Barriers to Rehab  none identified  -           Relationship/Environment    Lives With  alone  -J           Resource/Environmental Concerns    Current Living Arrangements  home/apartment/condo 1 story house  -           Home Main Entrance    Number of Stairs, Main Entrance  five  -J        Stair Railings, Main Entrance  railings on both sides of stairs can only reach 1 HR at a time  -           Cognitive Assessment/Interventions    Additional Documentation  Cognitive Assessment/Intervention (Group)  -           Cognitive Assessment/Intervention- PT/OT    Orientation Status (Cognition)  oriented x 3  -JJ        Follows Commands (Cognition)  follows one step commands;delayed response/completion;increased processing time needed  -        Personal Safety  "Interventions  gait belt;nonskid shoes/slippers when out of bed  -           Bed Mobility Assessment/Treatment    Supine-Sit [place] (Bed Mobility)  verbal cues;supervision  -        Assistive Device (Bed Mobility)  bed rails;head of bed elevated  -        Comment (Bed Mobility)  pt required extended time to transfer to B  -           Functional Mobility    Functional Mobility- Ind. Level  contact guard assist;verbal cues required  -        Functional Mobility- Device  rolling walker  -        Functional Mobility-Distance (Feet)  170  -           Transfer Assessment/Treatment    Comment (Transfers)  pt requires cues for hand placement  -           Sit-Stand Transfer    Sit-Stand [place] (Transfers)  contact guard;verbal cues  -        Assistive Device (Sit-Stand Transfers)  walker, front-wheeled  -           Stand-Sit Transfer    Stand-Sit [place] (Transfers)  contact guard;verbal cues  -        Assistive Device (Stand-Sit Transfers)  walker, front-wheeled  -           Bathing Assessment/Intervention    Bathing [place] Level  bathing skills;lower body;minimum assist (75% patient effort)  -           Lower Body Dressing Assessment/Training    Lower Body Dressing [place] Level  lower body dressing skills;minimum assist (75% patient effort)  -           General ROM    GENERAL ROM COMMENTS  B UE AROM WFL with exception of R shoulder latoya 45 degrees flexion. pt states she has had a previous shoulder injury but \"it feels worse\".  -           MMT (Manual Muscle Testing)    General MMT Comments  L UE MMT 4+/5, shoulder not tested, elbow 3+/5 L  strength 25#, R  strength 20#  -           Static Sitting Balance    Level of [place] (Unsupported Sitting, Static Balance)  standby assist  -           Static Standing Balance    Level of [place] (Supported Standing, Static Balance)  contact guard assist  -        Assistive Device Utilized (Supported " Standing, Static Balance)  walker, rolling  -JJ           Dynamic Standing Balance    Level of [place], Reaches Outside Midline (Standing, Dynamic Balance)  contact guard assist;minimal assist, 75% patient effort  -JJ        Comment, Reaches Outside Midline (Standing, Dynamic Balance)  pt performing toilet hygeine with min assist to maintain balance in standing  -JJ           Positioning and Restraints    Pre-Treatment Position  in bed  -JJ        Post Treatment Position  chair  -JJ        In Chair  reclined;call light within reach;encouraged to call for assist  -JJ           Pain Scale: Numbers Pre/Post-Treatment    Pain Scale: Numbers, Pretreatment  0/10 - no pain  -JJ        Pain Scale: Numbers, Post-Treatment  0/10 - no pain  -JJ        Pain Intervention(s)  Repositioned;Ambulation/increased activity  -           Plan of Care Review    Plan of Care Reviewed With  patient  -JJ        Outcome Summary  OT evaluation completed. pt required supervision for bed mobility CGA for functional transfers from EOB and elevated commode seat with RW. ANticipate pt will require min assist for lb adls. pt would benefit from skilled OT services to address adl retraining, compensatory strategies, functional transfers, balance and education with HEP. OT will see pt 5 x week x 1 week with anticipated discharge home with home health services  -J           Clinical Impression (OT)    Date of Referral to OT  01/17/20  -J        OT Diagnosis  decreased adl sp CVA  -JJ        Prognosis (OT Eval)  good  -J        Patient/Family Goals Statement (OT Eval)  pt states plan is to return home when discharged from facility  -J        Criteria for Skilled Therapeutic Interventions Met (OT Eval)  yes;treatment indicated  -        Rehab Potential (OT Eval)  good, to achieve stated therapy goals  -JJ        Therapy Frequency (OT Eval)  5 times/wk  -J        Predicted Duration of Therapy Intervention (Therapy Eval)  x 1 week  -J         Care Plan Review (OT)  evaluation/treatment results reviewed;care plan/treatment goals reviewed;risks/benefits reviewed  -JJ        Anticipated Discharge Disposition (OT)  home with home health  -JJ           Transfer Goal 1 (OT)    Activity/Assistive Device (Transfer Goal 1, OT)  toilet;commode, 3-in-1;walker, rolling  -JJ        [place] Level/Cues Needed (Transfer Goal 1, OT)  supervision required  -JJ        Time Frame (Transfer Goal 1, OT)  short term goal (STG)  -JJ        Progress/Outcome (Transfer Goal 1, OT)  -- new goal  -JJ           Bathing Goal 1 (OT)    Activity/Assistive Device (Bathing Goal 1, OT)  lower body bathing;long-handled sponge  -JJ        [place] Level/Cues Needed (Bathing Goal 1, OT)  supervision required  -JJ        Time Frame (Bathing Goal 1, OT)  1 week  -JJ        Progress/Outcomes (Bathing Goal 1, OT)  -- new goal  -JJ           Dressing Goal 1 (OT)    Activity/Assistive Device (Dressing Goal 1, OT)  lower body dressing  -JJ        [place]/Cues Needed (Dressing Goal 1, OT)  supervision required  -JJ        Time Frame (Dressing Goal 1, OT)  1 week  -JJ        Progress/Outcome (Dressing Goal 1, OT)  -- new goal  -JJ           Patient Education Goal (OT)    Activity (Patient Education Goal, OT)  pt will demonstrate adequately B UE HEP including fine motor coordination, dexterity and hand strengthening  -JJ        [place]/Cues/Accuracy (Memory Goal 2, OT)  demonstrates adequately  -JJ        Time Frame (Patient Education Goal, OT)  1 week  -JJ        Progress/Outcome (Patient Education Goal, OT)  -- new goal  -JJ           Living Environment    Home Accessibility  stairs to enter home  -JJ          User Key  (r) = Recorded By, (t) = Taken By, (c) = Cosigned By    Initials Name Effective Dates    Pinky Cortes, OTR 06/22/16 -                OT Recommendation and Plan  Outcome Summary/Treatment Plan (OT)  Anticipated Discharge Disposition (OT): home with home  health  Therapy Frequency (OT Eval): 5 times/wk  Plan of Care Review  Plan of Care Reviewed With: patient  Plan of Care Reviewed With: patient  Outcome Summary: OT evaluation completed. pt required supervision for bed mobility CGA for functional transfers from EOB and elevated commode seat with RW. ANticipate pt will require min assist for lb adls. pt would benefit from skilled OT services to address adl retraining, compensatory strategies, functional transfers, balance and education with HEP. OT will see pt 5 x week x 1 week with anticipated discharge home with home health services        Time Calculation:   Time Calculation- OT     Row Name 01/17/20 1426             Time Calculation- OT    OT Start Time  0945  -      OT Stop Time  1020  -      OT Time Calculation (min)  35 min  -      OT Non-Billable Time (min)  -- evaluation: 15 minutes, treatment/cotreat with PT: 20 minutes  -        User Key  (r) = Recorded By, (t) = Taken By, (c) = Cosigned By    Initials Name Provider Type    Pinky Cortes OTR Occupational Therapist        Therapy Charges for Today     Code Description Service Date Service Provider Modifiers Qty    [id number]  OT EVAL LOW COMPLEXITY 1 1/17/2020 Pinky Ken OTR GO 1    [id number]  OT SELF CARE/MGMT/TRAIN EA 15 MIN 1/17/2020 Pinky Ken OTR GO 1               DALILA Martinez  1/17/2020

## 2020-01-17 NOTE — CONSULTS
Patient Name: Camille Pantoja  :1955  64 y.o.    Date of Admission: 2020  Date of Consultation:  20  Encounter Provider:  JOHN Johnson  Place of Service: [facility]  Referring Provider: Finesse Ramos MD  Patient Care Team:  Nahun Ann Jr.,  as PCP - General (Family Medicine)      Chief complaint: Cardiomyopathy, atrial fibrillation, continued cardiac care.    History of Present Illness:    Camille Pantoja is a 64 y.o. female who presented to [facility] on 1/10 with altered mental status. She was found to be in rapid atrial fibrillation. She was noted to have an elevated troponin. Echocardiogram was obtained and showed new cardiomyopathy. She was transferred to [facility] for cardiac catheterization as well as further neurologic evaluation. MRI did show acute left hemisphere subcortical stroke. On 20 morning, patient had new mental status changes. MRI showed slight expansion of stroke. On the evening of  patient had decrease level of consciousness. She was sent for MRI which showed new area of infarcts in the right cerebellum. She was placed on a heparin drip. Medications were adjusted for rate control. Losartan was stopped to avoid hypotension. She was placed on digoxin. She was transitioned from heparin to apixaban.  He was discharged from [facility] to [facility] rehabilitation.  We have been asked to follow along for her cardiac needs.     LUDA on 1/10/2020 revealed ;    · The left ventricular cavity is moderately dilated.  · Left ventricular wall thickness is consistent with mild concentric hypertrophy.  · Calculated EF = 27.0%.  · Left ventricular systolic function is severely decreased.  · The following left ventricular wall segments are hypokinetic: mid anterior, apical anterior, basal anterolateral, mid inferolateral, basal inferoseptal, mid inferoseptal, basal  anterior and basal inferoseptal. The following left ventricular wall segments are akinetic: mid anterolateral, apical lateral, apical inferior, basal inferior, mid inferior, basal anteroseptal and mid anteroseptal.  · Left atrial cavity size is severely dilated.  · Right atrial cavity size is severely dilated.  · Right ventricular cavity is moderately dilated.  · Moderately reduced right ventricular systolic function noted.  · Mild tricuspid valve regurgitation is present.  · Mild mitral valve regurgitation is present    Cardiac catheterization 1/10/2020 revealed  . Coronary Artery Anatomy:  Dominance: Right  Left Main: Angiographically normal  Left Anterior Descending: Moderate in caliber size.  Contains luminal irregularities throughout.  Gives off a large first diagonal vessel which contains luminal irregularities.  Circumflex Artery: Moderate caliber size.  Contains luminal irregularities throughout.  Supplies a high and mid marginal branch vessels contain luminal irregularities.  Right Coronary Artery: Large in caliber size.  Contains luminal irregularities throughout.  Supplies a large posterior descending and posterior lateral branch system.     2. Hemodynamics:  Left Ventricle: 121/6/12 mmHg  Aorta: 121/71/93 mmHg     Conclusions:  1. Angiographically normal coronary arteries with no significant coronary artery disease.  2. Compensated left ventricular filling pressures of 12 mmHg     Recommendations:   1. Will continue optimal medical therapy for management of congestive heart failure with continuation of beta-blocker and ARB.  2. Will continue to work on rate controlling of atrial fibrillation.  Given her recent stroke which was found incidentally on cardiac MRI would be hesitant to consider cardioversion due to risk of potentiating stroke but can reassess needs for that based upon her response to up titration of her beta-blocker.  3. Restarting Coumadin and would be reasonable to bridge with Coumadin  given her recent stroke patient presented with subtherapeutic INR but her wound and has been recently discontinued due to concomitant antibiotic use.     Coumadin was not restarted.  She was placed on apixaban 5 mg twice daily.  She is on metoprolol 75 mg once a day.  She is on digoxin for rate control.  Her afterload reduction was stopped to guard against hypotension.  Her blood pressures are stable.    Past Medical History:   Diagnosis Date   • CHF (congestive heart failure) (CMS/HCC)    • Hyperlipidemia    • Hypertension    • Irregular heart beat    • Skin cancer    • Stroke (CMS/HCC)        Past Surgical History:   Procedure Laterality Date   • CARDIAC CATHETERIZATION N/A 1/10/2020    Procedure: Left Heart Cath;  Surgeon: Cain Mcneil MD;  Location:  [facility] CATH INVASIVE LOCATION;  Service: Cardiovascular   • CARDIAC CATHETERIZATION N/A 1/10/2020    Procedure: Coronary angiography;  Surgeon: Cain Mcneil MD;  Location:  [facility] CATH INVASIVE LOCATION;  Service: Cardiovascular   • FINGER SURGERY     • HYSTERECTOMY     • JOINT REPLACEMENT     • ORIF SHOULDER DISLOCATION W/ HUMERAL FRACTURE     • TIBIA FRACTURE SURGERY           Prior to Admission medications    Medication Sig Start Date End Date Taking? Authorizing Provider   apixaban (ELIQUIS) 5 MG tablet tablet Take 1 tablet by mouth Every 12 (Twelve) Hours. Indications: Atrial Fibrillation 1/16/20   Es Jaime APRN   atorvastatin (LIPITOR) 80 MG tablet Take 1 tablet by mouth Every Night. 1/16/20   Es Jaime APRN   cholecalciferol (VITAMIN D3) 25 MCG (1000 UT) tablet Take 2 tablets by mouth Daily. 1/11/20   Patrick Anderson MD   digoxin (LANOXIN) 125 MCG tablet Take 1 tablet by mouth Daily. 1/17/20   Es Jaime APRN   metoprolol succinate XL (TOPROL-XL) 25 MG 24 hr tablet Take 3 tablets by mouth 2 (Two) Times a Day. 1/16/20   Es Jaime APRN   omeprazole (priLOSEC) 20 MG capsule TAKE ONE CAPSULE BY MOUTH DAILY 12/9/19    "Nahun Ann Jr., DO   vitamin B-12 (VITAMIN B-12) 500 MCG tablet Take 1 tablet by mouth Daily. 1/16/20   Es Jaime APRN   Zinc 50 MG capsule Take 50 mg by mouth Daily.    Provider, Trace, MD       Allergies   Allergen Reactions   • Cephalexin Other (See Comments)     Reports she doesn't remember having an allergic reaction.    • Beef-Derived Products Rash   • Costilla Rash   • Duricef [Cefadroxil] Rash   • Fish Allergy Rash   • Garlic Rash   • Green Beans Rash   • Onion Rash   • Peach [Prunus Persica] Rash   • Wheat Rash       Social History     Socioeconomic History   • Marital status:      Spouse name: Not on file   • Number of children: Not on file   • Years of education: Not on file   • Highest education level: Not on file   Tobacco Use   • Smoking status: Never Smoker   • Smokeless tobacco: Never Used   Substance and Sexual Activity   • Alcohol use: Yes     Comment: Reports a hx of drinking everyday for 18 years atleast. She says that she quit May 2018, but says that she drinks occasionally now.    • Drug use: No   • Sexual activity: Defer       Family History   Problem Relation Age of Onset   • Breast cancer Brother    • Macular degeneration Mother    • Heart disease Mother    • Heart disease Father    • Heart disease Maternal Uncle        REVIEW OF SYSTEMS:   All systems reviewed.  Pertinent positives identified in HPI.  All other systems are negative.      Objective:     Vitals:    01/16/20 1700 01/16/20 2000 01/16/20 2217 01/17/20 0746   BP: 127/62 104/67 115/66 122/72   BP Location: Right arm Left arm  Left arm   Patient Position: Lying Lying  Lying   Pulse: 111 83  95   Resp: 16 18  18   Temp: 98 °F (36.7 °C) 97.7 °F (36.5 °C)  97.6 °F (36.4 °C)   TempSrc: Oral Oral  Oral   SpO2: 97% 99%  99%   Weight: 54.5 kg (120 lb 1.6 oz)      Height: 166.4 cm (65.5\")        Body mass index is 19.68 kg/m².    General Appearance:    Alert, cooperative, in no acute distress   Head:    " Normocephalic, without obvious abnormality, atraumatic   Eyes:            Lids and lashes normal, conjunctivae and sclerae normal,    Ears:    Ears appear intact with no abnormalities noted   Throat:   No oral lesions,  oral mucosa moist   Neck:   No adenopathy, supple,no carotid bruit, no JVD   Back:     range of motion normal   Lungs:     Clear to auscultation, respirations regular, even and unlabored    Heart:    Iregular rhythm and normal rate, normal S1 and S2, no murmur, no gallop, no rub, no click   Chest Wall:    No abnormalities observed   Abdomen:     Normal bowel sounds,  soft, nontender, nondistended, no guarding, no rebound  tenderness   Extremities:   Moves all extremities well, no edema, no cyanosis, no redness   Pulses:   Pulses palpable and equal bilaterally. Normal radial, carotid, dorsalis pedis and posterior tibial pulses bilaterally.    Skin:  Psychiatric:   No bleeding, bruising or rash    Alert and oriented x 3, normal mood and affect   Lab Review:     Results from last 7 days   Lab Units 01/11/20  0442   SODIUM mmol/L 139   POTASSIUM mmol/L 3.6   CHLORIDE mmol/L 103   CO2 mmol/L 21.1*   BUN mg/dL 11   CREATININE mg/dL 0.61   CALCIUM mg/dL 8.8   GLUCOSE mg/dL 90         Results from last 7 days   Lab Units 01/16/20  0529   WBC 10*3/mm3 6.97   HEMOGLOBIN g/dL 12.1   HEMATOCRIT % 37.0   PLATELETS 10*3/mm3 148     Results from last 7 days   Lab Units 01/16/20  0529 01/15/20  0555 01/14/20  2135  01/13/20 2003   INR   --   --   --   --  1.31*   APTT seconds 47.8* 76.9* 101.5*   < > 35.6*    < > = values in this interval not displayed.     Results from last 7 days   Lab Units 01/10/20  1323   MAGNESIUM mg/dL 2.7*                     Assessment and Plan:         1. Atrial fibrillation - new diagnosis.  Her rate is controlled.  She is on a beta-blocker and digoxin.  She was also started on apixaban.  She had previously been on warfarin for multiple DVTs.       2.  Cardiomyopathy - non ischemic  (coronary angiography 1/10/2020). Ef 27%. Change to metoprolol succinate.  Volume status appears well compensated.  Her losartan had previously been discontinued with questions of hypotension.  We will add low-dose lisinopril 2.5 mg daily for afterload reduction.    3. Acute stroke - 1/10 and again on 1/11- MRI brain with acute left hemispheric subcortical stroke.   Now on Eliquis 5 mg twice daily     4. History of DVT - was on warfarin prior to admission though initial INR was 1.2 (she had been subtherapeutic for the past month)    Monitor heart rate and blood pressure daily.    We will see Monday.    Cj Luna, APRN  01/17/20  12:42 PM

## 2020-01-18 LAB — MRSA SPEC QL CULT: NORMAL

## 2020-01-18 PROCEDURE — 97110 THERAPEUTIC EXERCISES: CPT

## 2020-01-18 PROCEDURE — 97116 GAIT TRAINING THERAPY: CPT

## 2020-01-18 RX ORDER — CHOLECALCIFEROL (VITAMIN D3) 125 MCG
5 CAPSULE ORAL NIGHTLY PRN
Status: DISCONTINUED | OUTPATIENT
Start: 2020-01-18 | End: 2020-01-25 | Stop reason: HOSPADM

## 2020-01-18 RX ADMIN — Medication: at 09:42

## 2020-01-18 RX ADMIN — APIXABAN 5 MG: 2.5 TABLET, FILM COATED ORAL at 22:26

## 2020-01-18 RX ADMIN — MELATONIN TAB 5 MG 5 MG: 5 TAB at 22:28

## 2020-01-18 RX ADMIN — APIXABAN 5 MG: 2.5 TABLET, FILM COATED ORAL at 09:41

## 2020-01-18 RX ADMIN — METOPROLOL SUCCINATE 75 MG: 50 TABLET, EXTENDED RELEASE ORAL at 22:27

## 2020-01-18 RX ADMIN — PANTOPRAZOLE SODIUM 40 MG: 40 TABLET, DELAYED RELEASE ORAL at 06:36

## 2020-01-18 RX ADMIN — METOPROLOL SUCCINATE 75 MG: 50 TABLET, EXTENDED RELEASE ORAL at 09:41

## 2020-01-18 RX ADMIN — DIGOXIN 125 MCG: 125 TABLET ORAL at 13:00

## 2020-01-18 RX ADMIN — LISINOPRIL 2.5 MG: 2.5 TABLET ORAL at 09:41

## 2020-01-18 RX ADMIN — ATORVASTATIN CALCIUM 80 MG: 40 TABLET, FILM COATED ORAL at 22:27

## 2020-01-18 NOTE — PLAN OF CARE
Problem: Patient Care Overview  Goal: Plan of Care Review  Flowsheets  Taken 1/18/2020 1037  Progress: improving  Plan of Care Reviewed With: patient  Taken 1/18/2020 0945  Outcome Summary: Patient performed supine>sit and sit to stand transfers with SBA/CGA today with RW close by. Pt did require constant verbal cues for hand placement and impulsivness. Patient had BM without realizing in bed, states she is continent with urination. States this is new since stroke. Patient ambulated in hallwway and attempted gait training with kwong rail with increased abnormal stance time and antalgic gait pattern.

## 2020-01-18 NOTE — PLAN OF CARE
Problem: Patient Care Overview  Goal: Plan of Care Review  Outcome: Ongoing (interventions implemented as appropriate)  Flowsheets (Taken 1/18/2020 1037 by Sadie Ram PTA)  Progress: improving  Plan of Care Reviewed With: patient

## 2020-01-18 NOTE — THERAPY TREATMENT NOTE
Patient Name: Camille Pantoja  : 1955    MRN: [id number]                              Today's Date: 2020       Admit Date: 2020    Visit Dx:     ICD-10-CM ICD-9-CM   1. Oropharyngeal dysphagia R13.12 787.22     Patient Active Problem List   Diagnosis   • Deep vein thrombosis of lower extremity (CMS/HCC)   • Hyperlipidemia   • Hypertension   • Vitamin D deficiency   • Osteoarthritis   • Paroxysmal atrial fibrillation with rapid ventricular response (CMS/HCC)   • Chronic atrial fibrillation with rapid ventricular response   • Atrial fibrillation (CMS/HCC)   • Metabolic encephalopathy   • Cerebral ventriculomegaly   • Acute ischemic left MCA stroke (CMS/HCC)   • Aftercare   • Right hemiparesis (CMS/HCC)     Past Medical History:   Diagnosis Date   • CHF (congestive heart failure) (CMS/HCC)    • Hyperlipidemia    • Hypertension    • Irregular heart beat    • Skin cancer    • Stroke (CMS/HCC)      Past Surgical History:   Procedure Laterality Date   • CARDIAC CATHETERIZATION N/A 1/10/2020    Procedure: Left Heart Cath;  Surgeon: Cain Mcneil MD;  Location: [facility] CATH INVASIVE LOCATION;  Service: Cardiovascular   • CARDIAC CATHETERIZATION N/A 1/10/2020    Procedure: Coronary angiography;  Surgeon: Cain Mcneil MD;  Location:  [facility] CATH INVASIVE LOCATION;  Service: Cardiovascular   • FINGER SURGERY     • HYSTERECTOMY     • JOINT REPLACEMENT     • ORIF SHOULDER DISLOCATION W/ HUMERAL FRACTURE     • TIBIA FRACTURE SURGERY       General Information    No documentation.       Mobility    No documentation.       Obj/Interventions    No documentation.       Goals/Plan    No documentation.       Clinical Impression    No documentation.       Outcome Measures    No documentation.         PT Recommendation and Plan     Outcome Summary/Treatment Plan (PT)  Daily Summary of Progress (PT): progress toward functional goals is good  Barriers to Overall Progress (PT): cognition, balance  Anticipated  Discharge Disposition (PT): home with home health  Plan of Care Reviewed With: patient  Progress: improving  Outcome Summary: Patient performed supine>sit and sit to stand transfers with SBA/CGA today with RW close by. Pt did required constant cues for hand placement and impulsivness. Patient had BM without realizing in bed, states she is continent with urination. States this is new since stroke. Patient ambulated in hallwway and attempted gait training with kwong rail with increased abnormal stance time and antalgic gait pattern.      Time Calculation:   PT Charges     Row Name 20 1039             Time Calculation    Start Time  0945  -EA      Stop Time  1015  -EA      Time Calculation (min)  30 min  -EA        User Key  (r) = Recorded By, (t) = Taken By, (c) = Cosigned By    Initials Name Provider Type    Sadie Jara PTA Physical Therapy Assistant        Therapy Charges for Today     Code Description Service Date Service Provider Modifiers Qty    05311661529 HC PT THER PROC EA 15 MIN 2020 Sadie Ram PTA GP 1    55377759101 HC GAIT TRAINING EA 15 MIN 2020 Sadie Ram PTA GP 1               Sadie Ram PTA  [facility] and [facility] - Physical Therapy Treatment Note  [facility]     Patient Name: Camille Pantoja  : 1955  MRN: [id number]  Today's Date: 2020  Onset of Illness/Injury or Date of Surgery: 01/10/20  Date of Referral to PT: 20  Referring Physician: Richard    Admit Date: 2020    Visit Dx:    ICD-10-CM ICD-9-CM   1. Oropharyngeal dysphagia R13.12 787.22     Patient Active Problem List   Diagnosis   • Deep vein thrombosis of lower extremity (CMS/HCC)   • Hyperlipidemia   • Hypertension   • Vitamin D deficiency   • Osteoarthritis   • Paroxysmal atrial fibrillation with rapid ventricular response (CMS/HCC)   • Chronic atrial fibrillation with rapid ventricular response   • Atrial fibrillation (CMS/HCC)   • Metabolic encephalopathy   • Cerebral ventriculomegaly    • Acute ischemic left MCA stroke (CMS/HCC)   • Aftercare   • Right hemiparesis (CMS/HCC)       Therapy Treatment    Rehabilitation Treatment Summary     Row Name 01/18/20 0945             Treatment Time/Intention    Discipline  physical therapy assistant  -EA      Document Type  therapy note (daily note)  -EA      Subjective Information  no complaints  -EA      Mode of Treatment  physical therapy  -EA      Patient/Family Observations  Pt supine, agreeable to therapy, taking meds with nurse  -EA      Therapy Frequency (PT Clinical Impression)  daily  -EA      Patient Effort  good  -EA      Comment  patient requires cues to stay on task. Once sitting EOB, pt did have BM and had placed entire left hand in feces. CNA able to help and assist with clean up.   -EA2      Existing Precautions/Restrictions  fall  -EA      Recorded by [EA] Sadie Ram, PTA 01/18/20 1031  [EA2] Sadie Ram, PTA 01/18/20 1039      Row Name 01/18/20 0945             Cognitive Assessment/Intervention- PT/OT    Personal Safety Interventions  gait belt;nonskid shoes/slippers when out of bed  -EA      Recorded by [EA] Sadie Ram, PTA 01/18/20 1031      Row Name 01/18/20 0945             Bed Mobility Assessment/Treatment    Bed Mobility Assessment/Treatment  supine-sit  -EA      Supine-Sit [place] (Bed Mobility)  verbal cues;supervision  -EA      Assistive Device (Bed Mobility)  bed rails;head of bed elevated  -EA      Comment (Bed Mobility)  states she has elevated bed at home, no rails  -EA      Recorded by [EA] Sadie Ram, PTA 01/18/20 1031      Row Name 01/18/20 0945             Transfer Assessment/Treatment    Transfer Assessment/Treatment  sit-stand transfer;stand-sit transfer  -EA      Comment (Transfers)  cues for hand placement and increased eccentric control. Patient performed from recliner, bed, and toilet surfaces.   -EA      Recorded by [EA] Sadie Ram, PTA 01/18/20 1031      Row Name 01/18/20 0945              Sit-Stand Transfer    Sit-Stand [place] (Transfers)  contact guard;verbal cues  -EA      Assistive Device (Sit-Stand Transfers)  walker, front-wheeled  -EA      Recorded by [EA] Sadie Ram, PTA 01/18/20 1031      Row Name 01/18/20 0945             Stand-Sit Transfer    Stand-Sit [place] (Transfers)  contact guard;verbal cues  -EA      Assistive Device (Stand-Sit Transfers)  walker, front-wheeled impulsive with flopping back into recliner despite edu  -EA      Recorded by [EA] Sadie Ram, PTA 01/18/20 1031      Row Name 01/18/20 0945             Gait/Stairs Assessment/Training    Gait/Stairs Assessment/Training  gait/ambulation independence;gait pattern  -EA      [place] Level (Gait)  contact guard;verbal cues  -EA      Assistive Device (Gait)  walker, front-wheeled  -EA      Distance in Feet (Gait)  150 15ft with rail only, increaed abnormal stance time increase   -EA      Deviations/Abnormal Patterns (Gait)  base of support, narrow;gait speed decreased;huyen decreased  -EA      Bilateral Gait Deviations  forward flexed posture  -EA      Right Sided Gait Deviations  heel strike decreased;weight shift ability decreased  -EA      Comment (Gait/Stairs)  requires cues to keep walker with her and not leave it behind  -EA      Recorded by [EA] Sadie Ram, PTA 01/18/20 1031      Row Name 01/18/20 0945             Balance    Balance  static standing balance;static sitting balance;dynamic sitting balance  -EA      Recorded by [EA] Sadie Ram, PTA 01/18/20 1031      Row Name 01/18/20 0945             Static Sitting Balance    Sitting Position (Unsupported Sitting, Static Balance)  sitting on edge of bed  -EA      Time Able to Maintain Position (Unsupported Sitting, Static Balance)  2 to 3 minutes  -EA      Comment (Unsupported Sitting, Static Balance)  feet supported, improved core control  -EA      Recorded by [EA] Sadie Ram, PTA 01/18/20 1031      Row Name 01/18/20 0945              Dynamic Sitting Balance    Level of [place], Reaches Outside Midline (Sitting, Dynamic Balance)  supervision  -EA      Sitting Position, Reaches Outside Midline (Sitting, Dynamic Balance)  sitting on edge of bed  -EA      Recorded by [EA] Sadie Ram, PTA 01/18/20 1031      Row Name 01/18/20 0945             Static Standing Balance    Level of [place] (Supported Standing, Static Balance)  contact guard assist  -EA      Time Able to Maintain Position (Supported Standing, Static Balance)  1 to 2 minutes  -EA      Assistive Device Utilized (Supported Standing, Static Balance)  other (see comments) no AD  -EA      Comment (Supported Standing, Static Balance)  no LOB , but minimal sway noted  -EA      Recorded by [EA] Sadie Ram, PTA 01/18/20 1031      Row Name 01/18/20 0945             Positioning and Restraints    Pre-Treatment Position  in bed  -EA      Post Treatment Position  chair  -EA      In Chair  reclined;notified nsg;call light within reach;encouraged to call for assist;exit alarm on  -EA      Recorded by [EA] Sadie Ram, PTA 01/18/20 1031      Row Name 01/18/20 0945             Pain Scale: Numbers Pre/Post-Treatment    Pain Scale: Numbers, Pretreatment  0/10 - no pain  -EA      Recorded by [EA] Sadie Ram, PTA 01/18/20 1031      Row Name 01/18/20 0945             Plan of Care Review    Plan of Care Reviewed With  patient  -EA      Progress  improving  -EA      Outcome Summary  Patient performed supine>sit and sit to stand transfers with SBA/CGA today with RW close by. Pt did required constant cues for hand placement and impulsivness. Patient had BM without realizing in bed, states she is continent with urination. States this is new since stroke. Patient ambulated in hallwway and attempted gait training with kwong rail with increased abnormal stance time and antalgic gait pattern.   -EA      Recorded by [EA] Sadie Ram, PTA 01/18/20 1037      Row Name 01/18/20 0945              Outcome Summary/Treatment Plan (PT)    Daily Summary of Progress (PT)  progress toward functional goals is good  -EA      Barriers to Overall Progress (PT)  cognition, balance  -EA      Anticipated Discharge Disposition (PT)  home with home health  -EA      Recorded by [EA] Sadie Ram, PTA 01/18/20 1031        User Key  (r) = Recorded By, (t) = Taken By, (c) = Cosigned By    Initials Name Effective Dates Discipline    EA Sadie Ram PTA 06/12/16 -  PT                       PT Recommendation and Plan  Anticipated Discharge Disposition (PT): home with home health  Therapy Frequency (PT Clinical Impression): daily  Outcome Summary/Treatment Plan (PT)  Daily Summary of Progress (PT): progress toward functional goals is good  Barriers to Overall Progress (PT): cognition, balance  Anticipated Discharge Disposition (PT): home with home health  Plan of Care Reviewed With: patient  Progress: improving  Outcome Summary: Patient performed supine>sit and sit to stand transfers with SBA/CGA today with RW close by. Pt did required constant cues for hand placement and impulsivness. Patient had BM without realizing in bed, states she is continent with urination. States this is new since stroke. Patient ambulated in hallwway and attempted gait training with kwong rail with increased abnormal stance time and antalgic gait pattern.      Time Calculation:   PT Charges     Row Name 01/18/20 1039             Time Calculation    Start Time  0945  -EA      Stop Time  1015  -EA      Time Calculation (min)  30 min  -EA        User Key  (r) = Recorded By, (t) = Taken By, (c) = Cosigned By    Initials Name Provider Type    EA Sadie Ram PTA Physical Therapy Assistant        Therapy Charges for Today     Code Description Service Date Service Provider Modifiers Qty    78109516546 HC PT THER PROC EA 15 MIN 1/18/2020 Sadie Ram PTA GP 1    26296998632 HC GAIT TRAINING EA 15 MIN 1/18/2020 Sadie Ram PTA GP 1                Sadie Ram, PTA  1/18/2020

## 2020-01-19 LAB — VRE SPEC QL CULT: NORMAL

## 2020-01-19 PROCEDURE — 97110 THERAPEUTIC EXERCISES: CPT

## 2020-01-19 PROCEDURE — 97116 GAIT TRAINING THERAPY: CPT

## 2020-01-19 RX ADMIN — MELATONIN TAB 5 MG 5 MG: 5 TAB at 21:31

## 2020-01-19 RX ADMIN — ATORVASTATIN CALCIUM 80 MG: 40 TABLET, FILM COATED ORAL at 21:30

## 2020-01-19 RX ADMIN — ACETAMINOPHEN 650 MG: 325 TABLET, FILM COATED ORAL at 21:38

## 2020-01-19 RX ADMIN — Medication: at 09:53

## 2020-01-19 RX ADMIN — PANTOPRAZOLE SODIUM 40 MG: 40 TABLET, DELAYED RELEASE ORAL at 06:15

## 2020-01-19 RX ADMIN — Medication: at 21:31

## 2020-01-19 RX ADMIN — LISINOPRIL 2.5 MG: 2.5 TABLET ORAL at 09:49

## 2020-01-19 RX ADMIN — APIXABAN 5 MG: 2.5 TABLET, FILM COATED ORAL at 21:31

## 2020-01-19 RX ADMIN — APIXABAN 5 MG: 2.5 TABLET, FILM COATED ORAL at 09:50

## 2020-01-19 RX ADMIN — DIGOXIN 125 MCG: 125 TABLET ORAL at 13:00

## 2020-01-19 RX ADMIN — METOPROLOL SUCCINATE 75 MG: 50 TABLET, EXTENDED RELEASE ORAL at 09:50

## 2020-01-19 NOTE — THERAPY TREATMENT NOTE
SNF - Physical Therapy Treatment Note   [place]     Patient Name: Camille Pantoja  : 1955  MRN: [id number]  Today's Date: 2020  Onset of Illness/Injury or Date of Surgery: 01/10/20  Date of Referral to PT: 20  Referring Physician: Richard    Admit Date: 2020    Visit Dx:    ICD-10-CM ICD-9-CM   1. Oropharyngeal dysphagia R13.12 787.22     Patient Active Problem List   Diagnosis   • Deep vein thrombosis of lower extremity (CMS/HCC)   • Hyperlipidemia   • Hypertension   • Vitamin D deficiency   • Osteoarthritis   • Paroxysmal atrial fibrillation with rapid ventricular response (CMS/HCC)   • Chronic atrial fibrillation with rapid ventricular response   • Atrial fibrillation (CMS/HCC)   • Metabolic encephalopathy   • Cerebral ventriculomegaly   • Acute ischemic left MCA stroke (CMS/HCC)   • Aftercare   • Right hemiparesis (CMS/HCC)       Therapy Treatment    Rehabilitation Treatment Summary     Row Name 20 0910             Treatment Time/Intention    Discipline  physical therapy assistant  -EA      Document Type  therapy note (daily note)  -EA      Subjective Information  no complaints  -EA      Mode of Treatment  physical therapy  -EA      Patient/Family Observations  in bed upon arrival, states she thought she would get  off  -EA      Therapy Frequency (PT Clinical Impression)  daily  -EA      Patient Effort  good  -EA      Comment  Patient agreeable to all therapy, states she should be safe at home  -EA      Existing Precautions/Restrictions  fall  -EA      Recorded by [EA] Sadie Ram, PTA 20 1048      Row Name 20 0910             Cognitive Assessment/Intervention- PT/OT    Personal Safety Interventions  gait belt;nonskid shoes/slippers when out of bed  -EA      Recorded by [EA] Sadie Ram, PTA 20 1048      Row Name 20 0910             Safety Issues, Functional Mobility    Safety Issues Affecting Function (Mobility)  awareness of need for  assistance  -EA      Impairments Affecting Function (Mobility)  balance;cognition;endurance/activity tolerance;muscle tone abnormal;pain  -EA      Recorded by [EA] Sadie Ram, PTA 01/19/20 1048      Row Name 01/19/20 0910             Bed Mobility Assessment/Treatment    Bed Mobility Assessment/Treatment  supine-sit  -EA      Supine-Sit [place] (Bed Mobility)  verbal cues;supervision  -EA      Supine-Sit-Supine [place] (Bed Mobility)  supervision  -EA      Assistive Device (Bed Mobility)  bed rails  -EA      Comment (Bed Mobility)  improved, required decreased assist  -EA      Recorded by [EA] Sadie Ram, PTA 01/19/20 1048      Row Name 01/19/20 0910             Transfer Assessment/Treatment    Transfer Assessment/Treatment  sit-stand transfer;stand-sit transfer  -EA      Recorded by [EA] Sadie Ram, PTA 01/19/20 1048      Row Name 01/19/20 0910             Sit-Stand Transfer    Sit-Stand [place] (Transfers)  contact guard;verbal cues  -EA      Assistive Device (Sit-Stand Transfers)  walker, front-wheeled  -EA      Recorded by [EA] Sadie Ram, PTA 01/19/20 1048      Row Name 01/19/20 0910             Stand-Sit Transfer    Stand-Sit [place] (Transfers)  contact guard;verbal cues  -EA      Assistive Device (Stand-Sit Transfers)  walker, front-wheeled  -EA      Recorded by [EA] Sadie Ram, PTA 01/19/20 1048      Row Name 01/19/20 0910             Gait/Stairs Assessment/Training    Gait/Stairs Assessment/Training  gait/ambulation independence;gait pattern  -EA      [place] Level (Gait)  contact guard;verbal cues  -EA      Assistive Device (Gait)  walker, front-wheeled  -EA      Distance in Feet (Gait)  175  -EA      Deviations/Abnormal Patterns (Gait)  base of support, narrow;gait speed decreased;huyen decreased  -EA      Bilateral Gait Deviations  forward flexed posture cues to look up and scan area to prevent falls  -EA      Right Sided Gait Deviations  heel strike  decreased;weight shift ability decreased  -EA      Comment (Gait/Stairs)  Patient requires 1 short seated rest break inbetween distance, progressed to standby asst.  Patient fearful when letting go of walker. Patient also performed 5 steps of various heights with CGA, using rail .   -EA      Recorded by [EA] Sadie Ram, PTA 01/19/20 1048      Row Name 01/19/20 0910             Therapeutic Exercise    Lower Extremity (Therapeutic Exercise)  LAQ (long arc quad), bilateral;marching while standing  -EA      Recorded by [EA] Ram Sadie CHAIREZ, PTA 01/19/20 1048      Row Name 01/19/20 0910             Balance    Balance  static sitting balance;static standing balance;dynamic standing balance  -EA      Recorded by [EA] Yo Sadie MIHAELA, PTA 01/19/20 1048      Row Name 01/19/20 0910             Static Sitting Balance    Level of [place] (Unsupported Sitting, Static Balance)  supervision  -EA      Sitting Position (Unsupported Sitting, Static Balance)  sitting edge of mat  -EA      Time Able to Maintain Position (Unsupported Sitting, Static Balance)  more than 5 minutes  -EA      Comment (Unsupported Sitting, Static Balance)  feet supported/unsupported  -EA      Recorded by [EA] Sadie Ram, PTA 01/19/20 1048      Row Name 01/19/20 0910             Dynamic Sitting Balance    Level of [place], Reaches Outside Midline (Sitting, Dynamic Balance)  supervision  -EA      Sitting Position, Reaches Outside Midline (Sitting, Dynamic Balance)  sitting edge of mat  -EA      Recorded by [EA] Sadie Ram, PTA 01/19/20 1048      Row Name 01/19/20 0910             Static Standing Balance    Level of [place] (Supported Standing, Static Balance)  contact guard assist;minimal assist, 75% patient effort min a for 1 lob  -EA      Time Able to Maintain Position (Supported Standing, Static Balance)  2 to 3 minutes  -EA      Comment (Supported Standing, Static Balance)  Performed arms crossed over chest, 4 way head turns,  weight shifting   -EA      Recorded by [EA] RamSadie, PTA 01/19/20 1048      Row Name 01/19/20 0910             Dynamic Standing Balance    Level of [place], Reaches Outside Midline (Standing, Dynamic Balance)  minimal assist, 75% patient effort hand held asst  -EA      Time Able to Maintain Position, Reaches Outside Midline (Standing, Dynamic Balance)  2 to 3 minutes  -EA      Assistive Device Utilized (Supported Standing, Dynamic Balance)  other (see comments) HHA x 2  -EA      Comment, Reaches Outside Midline (Standing, Dynamic Balance)  lateral and retro steps, head up , scanning , heels raised  -EA      Recorded by [EA] Ram Sadie M, PTA 01/19/20 1048      Row Name 01/19/20 0910             Positioning and Restraints    Pre-Treatment Position  in bed  -EA      Post Treatment Position  bed  -EA      In Bed  supine;call light within reach;encouraged to call for assist exit alarm was turned off upon arrival  -EA      Recorded by [EA] Sadie Ram MIHAELA, PTA 01/19/20 1048      Row Name 01/19/20 0910             Pain Scale: Numbers Pre/Post-Treatment    Pain Scale: Numbers, Pretreatment  0/10 - no pain  -EA      Recorded by [EA] RamSadie, PTA 01/19/20 1048      Row Name 01/19/20 0910             Plan of Care Review    Plan of Care Reviewed With  patient  -EA      Progress  improving  -EA      Outcome Summary  Patient performed gait training in hallway and around obstacles to practice for home preparation with RW with sba/cga, ascended/descended 5 steps with rail, cga. All transfers performed with SBA/CGA with verbal cues for proper technique and safety awareness. Static/dynamic balance activities performed with SBA- min A depending on level of challenge.  -EA      Recorded by [EA] Sadie Ram, PTA 01/19/20 1048      Row Name 01/19/20 0910             Outcome Summary/Treatment Plan (PT)    Daily Summary of Progress (PT)  progress toward functional goals is good  -EA      Barriers to Overall Progress  (PT)  cognition/balance  -EA      Anticipated Equipment Needs at Discharge (PT)  front wheeled walker  -EA      Anticipated Discharge Disposition (PT)  home with home health  -EA      Recorded by [EA] Yo Sadie M, PTA 01/19/20 1048        User Key  (r) = Recorded By, (t) = Taken By, (c) = Cosigned By    Initials Name Effective Dates Discipline    EA Sadie Ram, PTA 06/12/16 -  PT                       PT Recommendation and Plan  Anticipated Discharge Disposition (PT): home with home health  Therapy Frequency (PT Clinical Impression): daily  Outcome Summary/Treatment Plan (PT)  Daily Summary of Progress (PT): progress toward functional goals is good  Barriers to Overall Progress (PT): cognition/balance  Anticipated Equipment Needs at Discharge (PT): front wheeled walker  Anticipated Discharge Disposition (PT): home with home health  Plan of Care Reviewed With: patient  Progress: improving  Outcome Summary: Patient performed gait training in hallway and around obstacles to practice for home preparation with RW with sba/cga, ascended/descended 5 steps with rail, cga. All transfers performed with SBA/CGA with verbal cues for proper technique and safety awareness. Static/dynamic balance activities performed with SBA- min A depending on level of challenge.     Time Calculation:   PT Charges     Row Name 01/19/20 1050             Time Calculation    Start Time  0910  -EA      Stop Time  0938  -EA      Time Calculation (min)  28 min  -EA        User Key  (r) = Recorded By, (t) = Taken By, (c) = Cosigned By    Initials Name Provider Type    EA Sadie Ram PTA Physical Therapy Assistant        Therapy Charges for Today     Code Description Service Date Service Provider Modifiers Qty    22105046148 HC PT THER PROC EA 15 MIN 1/18/2020 Sadie Ram PTA GP 1    05865768635 HC GAIT TRAINING EA 15 MIN 1/18/2020 Sadie Ram, PTA GP 1    59343872445 HC PT THER PROC EA 15 MIN 1/19/2020 Sadie Ram, GUSTAVO GP 1     [id number]  GAIT TRAINING EA 15 MIN 1/19/2020 Sadie Ram, PTA GP 1               Sadie Ram, GUSTAVO  1/19/2020

## 2020-01-19 NOTE — PLAN OF CARE
Problem: Patient Care Overview  Goal: Plan of Care Review  Outcome: Ongoing (interventions implemented as appropriate)  Goal: Individualization and Mutuality  Outcome: Ongoing (interventions implemented as appropriate)  Goal: Discharge Needs Assessment  Outcome: Ongoing (interventions implemented as appropriate)  Goal: Interprofessional Rounds/Family Conf  Outcome: Ongoing (interventions implemented as appropriate)     Problem: Skin Injury Risk (Adult)  Goal: Skin Health and Integrity  Outcome: Ongoing (interventions implemented as appropriate)     Problem: Fall Risk (Adult)  Goal: Absence of Fall  Outcome: Ongoing (interventions implemented as appropriate)

## 2020-01-19 NOTE — PLAN OF CARE
Problem: Patient Care Overview  Goal: Plan of Care Review  Outcome: Ongoing (interventions implemented as appropriate)  Flowsheets  Taken 1/19/2020 1551 by Elmer Reyes LPN  Progress: improving  Taken 1/19/2020 0910 by Sadie Ram PTA  Plan of Care Reviewed With: patient     Problem: Skin Injury Risk (Adult)  Goal: Skin Health and Integrity  Outcome: Ongoing (interventions implemented as appropriate)  Flowsheets (Taken 1/19/2020 1551)  Skin Health and Integrity: making progress toward outcome

## 2020-01-19 NOTE — PLAN OF CARE
Problem: Patient Care Overview  Goal: Plan of Care Review  Flowsheets (Taken 1/19/2020 0910)  Progress: improving  Plan of Care Reviewed With: patient  Outcome Summary: Patient performed gait training in hallway and around obstacles to practice for home preparation with RW with sba/cga, ascended/descended 5 steps with rail, cga. All transfers performed with SBA/CGA with verbal cues for proper technique and safety awareness. Static/dynamic balance activities performed with SBA- min A depending on level of challenge.

## 2020-01-20 PROCEDURE — 97535 SELF CARE MNGMENT TRAINING: CPT

## 2020-01-20 PROCEDURE — 97530 THERAPEUTIC ACTIVITIES: CPT

## 2020-01-20 PROCEDURE — 99232 SBSQ HOSP IP/OBS MODERATE 35: CPT | Performed by: NURSE PRACTITIONER

## 2020-01-20 RX ADMIN — ATORVASTATIN CALCIUM 80 MG: 40 TABLET, FILM COATED ORAL at 21:31

## 2020-01-20 RX ADMIN — PANTOPRAZOLE SODIUM 40 MG: 40 TABLET, DELAYED RELEASE ORAL at 06:27

## 2020-01-20 RX ADMIN — LISINOPRIL 2.5 MG: 2.5 TABLET ORAL at 10:00

## 2020-01-20 RX ADMIN — DIGOXIN 125 MCG: 125 TABLET ORAL at 12:20

## 2020-01-20 RX ADMIN — Medication: at 21:32

## 2020-01-20 RX ADMIN — METOPROLOL SUCCINATE 75 MG: 50 TABLET, EXTENDED RELEASE ORAL at 10:00

## 2020-01-20 RX ADMIN — APIXABAN 5 MG: 2.5 TABLET, FILM COATED ORAL at 21:31

## 2020-01-20 RX ADMIN — Medication: at 10:02

## 2020-01-20 RX ADMIN — MELATONIN TAB 5 MG 5 MG: 5 TAB at 21:31

## 2020-01-20 RX ADMIN — APIXABAN 5 MG: 2.5 TABLET, FILM COATED ORAL at 10:00

## 2020-01-20 NOTE — THERAPY TREATMENT NOTE
SNF - Occupational Therapy Treatment Note   Rebeca Cochran     Patient Name: Camille Pantoja  : 1955  MRN: [id number]  Today's Date: 2020  Onset of Illness/Injury or Date of Surgery: 01/10/20  Date of Referral to OT: 20  Referring Physician: Richard    Admit Date: 2020       ICD-10-CM ICD-9-CM   1. Oropharyngeal dysphagia R13.12 787.22     Patient Active Problem List   Diagnosis   • Deep vein thrombosis of lower extremity (CMS/HCC)   • Hyperlipidemia   • Hypertension   • Vitamin D deficiency   • Osteoarthritis   • Paroxysmal atrial fibrillation with rapid ventricular response (CMS/HCC)   • Chronic atrial fibrillation with rapid ventricular response   • Atrial fibrillation (CMS/HCC)   • Metabolic encephalopathy   • Cerebral ventriculomegaly   • Acute ischemic left MCA stroke (CMS/HCC)   • Aftercare   • Right hemiparesis (CMS/HCC)     Past Medical History:   Diagnosis Date   • CHF (congestive heart failure) (CMS/HCC)    • Hyperlipidemia    • Hypertension    • Irregular heart beat    • Skin cancer    • Stroke (CMS/HCC)      Past Surgical History:   Procedure Laterality Date   • CARDIAC CATHETERIZATION N/A 1/10/2020    Procedure: Left Heart Cath;  Surgeon: Cain Mcneil MD;  Location: [facility] INVASIVE LOCATION;  Service: Cardiovascular   • CARDIAC CATHETERIZATION N/A 1/10/2020    Procedure: Coronary angiography;  Surgeon: Cain Mcneil MD;  Location: [facility] INVASIVE LOCATION;  Service: Cardiovascular   • FINGER SURGERY     • HYSTERECTOMY     • JOINT REPLACEMENT     • ORIF SHOULDER DISLOCATION W/ HUMERAL FRACTURE     • TIBIA FRACTURE SURGERY         Therapy Treatment    Rehabilitation Treatment Summary     Row Name 20 0935             Treatment Time/Intention    Discipline  occupational therapist  -EN      Document Type  therapy note (daily note)  -EN      Subjective Information  no complaints  -EN      Mode of Treatment  occupational therapy  -EN      Patient/Family  Observations  Patient reclined in chair, agreed to OT>  -EN      Care Plan Review  care plan/treatment goals reviewed  -EN      Patient Effort  good  -EN      Comment  Patient reported she bathed self at sink and donned gown and sweatshirt without assistance.  Declined wearing pants, did agree to change socks.  -EN      Recorded by [EN] Bonny Kinney, OTR 01/20/20 1149      Row Name 01/20/20 0935             Cognitive Assessment/Intervention- PT/OT    Personal Safety Interventions  gait belt;nonskid shoes/slippers when out of bed  -EN      Recorded by [EN] Bonny Kinney, OTR 01/20/20 1149      Row Name 01/20/20 0935             Functional Mobility    Functional Mobility- Ind. Level  contact guard assist  -EN      Functional Mobility- Device  rolling walker  -EN      Functional Mobility-Distance (Feet)  180  -EN      Recorded by [EN] Bonny Kinney, OTR 01/20/20 1149      Row Name 01/20/20 0935             Transfer Assessment/Treatment    Transfer Assessment/Treatment  sit-stand transfer;stand-sit transfer  -EN      Recorded by [EN] Bonny Kinney, OTR 01/20/20 1149      Row Name 01/20/20 0935             Sit-Stand Transfer    Sit-Stand [place] (Transfers)  stand by assist  -EN      Assistive Device (Sit-Stand Transfers)  walker, front-wheeled  -EN      Recorded by [EN] Bonny Kinney, OTR 01/20/20 1149      Row Name 01/20/20 0935             Stand-Sit Transfer    Stand-Sit [place] (Transfers)  stand by assist  -EN      Assistive Device (Stand-Sit Transfers)  walker, front-wheeled  -EN      Recorded by [EN] Bonny Kinney, OTR 01/20/20 1149      Row Name 01/20/20 0935             Lower Body Dressing Assessment/Training    Lower Body Dressing [place] Level  doff;don;socks;independent  -EN      Recorded by [EN] Bonny Kinney, OTR 01/20/20 1149      Row Name 01/20/20 0935             MMT (Manual Muscle Testing)    General MMT Comments  R elbow, wrist and hand  strength 4+/5.  -EN      Recorded by [EN] Bonny Kinnye, OTR 01/20/20 1149      Row Name 01/20/20 0935             Therapeutic Exercise    Comment (Therapeutic Exercise)  Patient participated in fine motor activites.  Hand gripper, playing cards, and paper and pen left in room for patient to add to HEP.  -EN      Recorded by [EN] Bonny Kinney, OTR 01/20/20 1149      Row Name 01/20/20 0935             Positioning and Restraints    Pre-Treatment Position  sitting in chair/recliner  -EN      Post Treatment Position  chair  -EN      In Chair  call light within reach;encouraged to call for assist;with nsg;reclined  -EN      Recorded by [EN] Bonny Kinney, OTR 01/20/20 1149      Row Name 01/20/20 0935             Pain Scale: Numbers Pre/Post-Treatment    Pain Scale: Numbers, Pretreatment  0/10 - no pain  -EN      Pain Scale: Numbers, Post-Treatment  0/10 - no pain  -EN      Recorded by [EN] Bonny Kinney, OTR 01/20/20 1149      Row Name 01/20/20 0935             Plan of Care Review    Plan of Care Reviewed With  patient  -EN      Progress  improving  -EN      Outcome Summary  OT- Patient reported she performed sink side bathing and dressing earlier this am without assist.  Patient doffed and donned socks independently.  Patient performed sit to stand transfers with sba and mobility with rolling walker and CGA.  Patient performed UE exercises and hand gripper, pen and paper, and playing cards left in room to add to HEP.  Patient's right hand, wrist and elbow strength have improved to 4+/5.    -EN      Recorded by [EN] Bonny Kinney, OTR 01/20/20 1149      Row Name 01/20/20 0935             Outcome Summary/Treatment Plan (OT)    Daily Summary of Progress (OT)  progress toward functional goals is good  -EN      Recorded by [EN] Bonny Kinney, OTR 01/20/20 1149        User Key  (r) = Recorded By, (t) = Taken By, (c) = Cosigned By    Initials Name Effective Dates Discipline    EN  Bonny Kinney OTR 06/22/16 -  OT                 OT Recommendation and Plan  Outcome Summary/Treatment Plan (OT)  Daily Summary of Progress (OT): progress toward functional goals is good  Daily Summary of Progress (OT): progress toward functional goals is good  Plan of Care Review  Plan of Care Reviewed With: patient  Plan of Care Reviewed With: patient  Outcome Summary: OT- Patient reported she performed sink side bathing and dressing earlier this am without assist.  Patient doffed and donned socks independently.  Patient performed sit to stand transfers with sba and mobility with rolling walker and CGA.  Patient performed UE exercises and hand gripper, pen and paper, and playing cards left in room to add to HEP.  Patient's right hand, wrist and elbow strength have improved to 4+/5.         Time Calculation:   Time Calculation- OT     Row Name 01/20/20 1150             Time Calculation- OT    OT Start Time  0935  -EN      OT Stop Time  1000  -EN      OT Time Calculation (min)  25 min  -EN      TCU Minutes- OT  25 min  -EN         Timed Charges    14428 - OT Self Care/Mgmt Minutes  25  -EN        User Key  (r) = Recorded By, (t) = Taken By, (c) = Cosigned By    Initials Name Provider Type    EN Bonny Kinney OTR Occupational Therapist        Therapy Charges for Today     Code Description Service Date Service Provider Modifiers Qty    72851877167 HC OT SELF CARE/MGMT/TRAIN EA 15 MIN 1/20/2020 Bonny Kinney OTR GO 2               DALILA Herndon  1/20/2020

## 2020-01-20 NOTE — PLAN OF CARE
Problem: Patient Care Overview  Goal: Plan of Care Review  Flowsheets (Taken 1/20/2020 1201)  Outcome Summary: PT tx: PT worked on standing ex with pt to improve her balance and strength and dynamic standing balance. Pt walked 150 ft in hallway with rwx and required verbal cues for heel strike on RLE

## 2020-01-20 NOTE — THERAPY TREATMENT NOTE
SNF - Physical Therapy Treatment Note   [place]     Patient Name: Camille Pantoja  : 1955  MRN: [id number]  Today's Date: 2020  Onset of Illness/Injury or Date of Surgery: 01/10/20  Date of Referral to PT: 20  Referring Physician: Richard    Admit Date: 2020    Visit Dx:    ICD-10-CM ICD-9-CM   1. Oropharyngeal dysphagia R13.12 787.22     Patient Active Problem List   Diagnosis   • Deep vein thrombosis of lower extremity (CMS/HCC)   • Hyperlipidemia   • Hypertension   • Vitamin D deficiency   • Osteoarthritis   • Paroxysmal atrial fibrillation with rapid ventricular response (CMS/HCC)   • Chronic atrial fibrillation with rapid ventricular response   • Atrial fibrillation (CMS/HCC)   • Metabolic encephalopathy   • Cerebral ventriculomegaly   • Acute ischemic left MCA stroke (CMS/HCC)   • Aftercare   • Right hemiparesis (CMS/HCC)       Therapy Treatment    Rehabilitation Treatment Summary     Row Name 20 1201 20 0935          Treatment Time/Intention    Discipline  physical therapist  -JW  occupational therapist  -EN     Document Type  therapy note (daily note)  -JW  therapy note (daily note)  -EN     Subjective Information  no complaints  -JW  no complaints  -EN     Mode of Treatment  physical therapy  -JW  occupational therapy  -EN     Patient/Family Observations  pt up in chair with alarm on  -JW  Patient reclined in chair, agreed to OT>  -EN     Care Plan Review  care plan/treatment goals reviewed  -JW  care plan/treatment goals reviewed  -EN     Therapy Frequency (PT Clinical Impression)  daily  -JW  --     Patient Effort  good  -JW  good  -EN     Comment  --  Patient reported she bathed self at sink and donned gown and sweatshirt without assistance.  Declined wearing pants, did agree to change socks.  -EN     Existing Precautions/Restrictions  fall  -JW  --     Recorded by [JW] Pallavi Huff, PT 20 1220 [EN] Bonny Kinney, OTR 20 1149     Row Name 20  1201 01/20/20 0935          Cognitive Assessment/Intervention- PT/OT    Orientation Status (Cognition)  oriented x 3  -JW  --     Follows Commands (Cognition)  follows one step commands;delayed response/completion;increased processing time needed  -JW  --     Personal Safety Interventions  gait belt;nonskid shoes/slippers when out of bed  -JW  gait belt;nonskid shoes/slippers when out of bed  -EN     Recorded by [JW] Pallavi Huff, PT 01/20/20 1220 [EN] Bonny Kinney, OTR 01/20/20 1149     Row Name 01/20/20 1201             Safety Issues, Functional Mobility    Safety Issues Affecting Function (Mobility)  insight into deficits/self awareness;problem solving  -JW      Impairments Affecting Function (Mobility)  balance;cognition;endurance/activity tolerance;muscle tone abnormal;pain  -JW      Recorded by [JW] Pallavi Huff, PT 01/20/20 1220      Row Name 01/20/20 0935             Functional Mobility    Functional Mobility- Ind. Level  contact guard assist  -EN      Functional Mobility- Device  rolling walker  -EN      Functional Mobility-Distance (Feet)  180  -EN      Recorded by [EN] Bonny Kinney, OTR 01/20/20 1149      Row Name 01/20/20 0935             Transfer Assessment/Treatment    Transfer Assessment/Treatment  sit-stand transfer;stand-sit transfer  -EN      Recorded by [EN] Bonny Kinney, OTR 01/20/20 1149      Row Name 01/20/20 1201 01/20/20 0935          Sit-Stand Transfer    Sit-Stand [place] (Transfers)  stand by assist  -JW  stand by assist  -EN     Assistive Device (Sit-Stand Transfers)  walker, front-wheeled  -ANISHA  walker, front-wheeled  -EN     Recorded by [JW] Pallavi Huff, PT 01/20/20 1220 [EN] Bonny Kinney, OTR 01/20/20 1149     Row Name 01/20/20 1201 01/20/20 0935          Stand-Sit Transfer    Stand-Sit [place] (Transfers)  stand by assist  -JW  stand by assist  -EN     Assistive Device (Stand-Sit Transfers)  walker, front-wheeled  -ANISHA  walker, front-wheeled  -EN      Recorded by [JW] Pallavi Huff, PT 01/20/20 1220 [EN] Bonny Kinney, OTR 01/20/20 1149     Row Name 01/20/20 1201             Gait/Stairs Assessment/Training    Gait/Stairs Assessment/Training  gait/ambulation assistive device  -JW      [place] Level (Gait)  contact guard;verbal cues  -JW      Assistive Device (Gait)  walker, front-wheeled  -JW      Distance in Feet (Gait)  150  -JW      Deviations/Abnormal Patterns (Gait)  base of support, narrow;right sided deviations  -JW      Bilateral Gait Deviations  forward flexed posture  -JW      Right Sided Gait Deviations  heel strike decreased vc for DF and heel strike  -JW      Recorded by [JW] Pallavi Huff, PT 01/20/20 1220      Row Name 01/20/20 0935             Lower Body Dressing Assessment/Training    Lower Body Dressing [place] Level  doff;don;socks;independent  -EN      Recorded by [EN] Bonny Kinney, OTR 01/20/20 1149      Row Name 01/20/20 0935             MMT (Manual Muscle Testing)    General MMT Comments  R elbow, wrist and hand strength 4+/5.  -EN      Recorded by [EN] Bonny Kinney, OTR 01/20/20 1149      Row Name 01/20/20 1201             Motor Skills Assessment/Interventions    Additional Documentation  Therapeutic Exercise (Group)  -JW      Recorded by [JW] Pallavi Huff, PT 01/20/20 1220      Row Name 01/20/20 1201             Therapeutic Exercise    Therapeutic Exercise  seated, lower extremities;standing, lower extremities  -JW      Additional Documentation  Therapeutic Exercise (Row)  -JW      Recorded by [JW] Pallavi Huff, PT 01/20/20 1220      Row Name 01/20/20 1201             Lower Extremity Standing Therapeutic Exercise    Performed, Standing Lower Extremity (Therapeutic Exercise)  heel raises;shallow squats;hip abduction/adduction;hip flexion/extension  -JW      Exercise Type, Standing Lower Extremity (Therapeutic Exercise)  AROM (active range of motion)  -JW      Expected Outcomes, Standing Lower Extremity  (Therapeutic Exercise)  improve motor control  -JW      Sets/Reps Detail, Standing Lower Extremity (Therapeutic Exercise)  1/10  -JW      Comment, Standing Lower Extremity (Therapeutic Exercise)  used rwx and CGA  -JW      Recorded by [JW] Pallavi Huff, PT 01/20/20 1220      Row Name 01/20/20 1201             Lower Extremity Seated Therapeutic Exercise    Performed, Seated Lower Extremity (Therapeutic Exercise)  LAQ (long arc quad), knee extension;ankle dorsiflexion/plantarflexion  -JW      Exercise Type, Seated Lower Extremity (Therapeutic Exercise)  AROM (active range of motion)  -JW      Expected Outcomes, Seated Lower Extremity (Therapeutic Exercise)  improve motor control  -JW      Sets/Reps Detail, Seated Lower Extremity (Therapeutic Exercise)  1/10  -JW      Recorded by [JW] Pallavi Huff, PT 01/20/20 1220      Row Name 01/20/20 0935             Therapeutic Exercise    Comment (Therapeutic Exercise)  Patient participated in fine motor activites.  Hand gripper, playing cards, and paper and pen left in room for patient to add to HEP.  -EN      Recorded by [EN] Bonny Kinney OTR 01/20/20 1149      Row Name 01/20/20 1201 01/20/20 0935          Positioning and Restraints    Pre-Treatment Position  sitting in chair/recliner  -JW  sitting in chair/recliner  -EN     Post Treatment Position  chair  -JW  chair  -EN     In Chair  reclined;sitting;call light within reach;encouraged to call for assist;exit alarm on  -JW  call light within reach;encouraged to call for assist;with nsg;reclined  -EN     Recorded by [JW] Pallavi Huff, PT 01/20/20 1220 [EN] Bonny Kinney OTR 01/20/20 1149     Row Name 01/20/20 1201 01/20/20 0935          Pain Scale: Numbers Pre/Post-Treatment    Pain Scale: Numbers, Pretreatment  0/10 - no pain  -ANISHA  0/10 - no pain  -EN     Pain Scale: Numbers, Post-Treatment  0/10 - no pain  -JW  0/10 - no pain  -EN     Recorded by [JW] Pallavi Huff, PT 01/20/20 1220 [EN] Bonny Kinney, OTR  01/20/20 1149     Row Name 01/20/20 1201             Coping    Observed Emotional State  accepting  -JW      Verbalized Emotional State  acceptance  -JW      Recorded by [JW] Pallavi Huff, PT 01/20/20 1220      Row Name 01/20/20 1201 01/20/20 0935          Plan of Care Review    Plan of Care Reviewed With  patient  -JW  patient  -EN     Progress  improving  -JW  improving  -EN     Outcome Summary  PT tx: PT worked on standing ex with pt to improve her balance and strength and dynamic standing balance. Pt walked 150 ft in hallway with rwx and required verbal cues for heel strike on RLE  -JW  OT- Patient reported she performed sink side bathing and dressing earlier this am without assist.  Patient doffed and donned socks independently.  Patient performed sit to stand transfers with sba and mobility with rolling walker and CGA.  Patient performed UE exercises and hand gripper, pen and paper, and playing cards left in room to add to HEP.  Patient's right hand, wrist and elbow strength have improved to 4+/5.    -EN     Recorded by [JW] Pallavi Huff, PT 01/20/20 1220 [EN] Bonny Kinney, OTR 01/20/20 1149     Row Name 01/20/20 0935             Outcome Summary/Treatment Plan (OT)    Daily Summary of Progress (OT)  progress toward functional goals is good  -EN      Recorded by [EN] Bonny Kinney, OTR 01/20/20 1149      Row Name 01/20/20 1201             Outcome Summary/Treatment Plan (PT)    Daily Summary of Progress (PT)  progress toward functional goals is good  -JW      Barriers to Overall Progress (PT)  safety awareness  -ANISHA      Plan for Continued Treatment (PT)  improve mobility  -JW      Anticipated Equipment Needs at Discharge (PT)  front wheeled walker  -ANISHA      Anticipated Discharge Disposition (PT)  home with home health;home with assist  -ANISHA      Recorded by [JW] Pallavi Huff, PT 01/20/20 1220        User Key  (r) = Recorded By, (t) = Taken By, (c) = Cosigned By    Initials Name Effective Dates  Discipline    EN Courtney Kinneyzaregan BURCH, OTR 06/22/16 -  OT    Pallavi Aguilera, PT 06/20/18 -  PT                       PT Recommendation and Plan  Anticipated Discharge Disposition (PT): home with home health, home with assist  Therapy Frequency (PT Clinical Impression): daily  Outcome Summary/Treatment Plan (PT)  Daily Summary of Progress (PT): progress toward functional goals is good  Barriers to Overall Progress (PT): safety awareness  Plan for Continued Treatment (PT): improve mobility  Anticipated Equipment Needs at Discharge (PT): front wheeled walker  Anticipated Discharge Disposition (PT): home with home health, home with assist  Plan of Care Reviewed With: patient  Progress: improving  Outcome Summary: PT tx: PT worked on standing ex with pt to improve her balance and strength and dynamic standing balance. Pt walked 150 ft in hallway with rwx and required verbal cues for heel strike on RLE     Time Calculation:   PT Charges     Row Name 01/20/20 1221             Time Calculation    Start Time  1200  -JW      Stop Time  1215  -JW      Time Calculation (min)  15 min  -JW         Time Calculation- PT    TCU Minutes- PT  15 min  -JW         Timed Charges    95212 - PT Therapeutic Activity Minutes  15  -JW        User Key  (r) = Recorded By, (t) = Taken By, (c) = Cosigned By    Initials Name Provider Type    Pallavi Aguilera PT Physical Therapist        Therapy Charges for Today     Code Description Service Date Service Provider Modifiers Qty    40642147948 HC PT THERAPEUTIC ACT EA 15 MIN 1/20/2020 Pallavi Huff, FRAN GP 1               Pallavi Rausch, PT  1/20/2020

## 2020-01-20 NOTE — PLAN OF CARE
Problem: Patient Care Overview  Goal: Plan of Care Review  Flowsheets (Taken 1/20/2020 0935)  Outcome Summary: OT- Patient reported she performed sink side bathing and dressing earlier this am without assist.  Patient doffed and donned socks independently.  Patient performed sit to stand transfers with sba and mobility with rolling walker and CGA.  Patient performed UE exercises and hand gripper, pen and paper, and playing cards left in room to add to HEP.  Patient's right hand, wrist and elbow strength have improved to 4+/5.

## 2020-01-20 NOTE — PROGRESS NOTES
"    Patient Name: Camille Pantoja  :1955  64 y.o.      Patient Care Team:  Nahun Ann Jr., DO as PCP - General (Family Medicine)    Chief Complaint: Cardiomyopathy, atrial fibrillation, continued cardiac care    Interval History: Sitting up in bed eating breakfast denies complaints of shortness of breath or chest pain.  Awake alert and oriented x3       Objective   Vital Signs  Temp:  [97.2 °F (36.2 °C)-97.4 °F (36.3 °C)] 97.2 °F (36.2 °C)  Heart Rate:  [67-89] 67  Resp:  [16] 16  BP: (112-114)/(67-73) 112/73    Intake/Output Summary (Last 24 hours) at 2020 0804  Last data filed at 2020 1800  Gross per 24 hour   Intake 660 ml   Output --   Net 660 ml     Flowsheet Rows      First Filed Value   Admission Height  166.4 cm (65.5\") Documented at 2020 1700   Admission Weight  54.5 kg (120 lb 1.6 oz) Documented at 2020 1700          Physical Exam:   General Appearance:    Alert, cooperative, in no acute distress   Lungs:     Clear to auscultation.  Normal respiratory effort and rate.      Heart:    Irregular rhythm and normal rate, normal S1 and S2, no murmurs, gallops or rubs.     Chest Wall:    No abnormalities observed   Abdomen:     Soft, nontender, positive bowel sounds.     Extremities:   no cyanosis, clubbing or edema.  No marked joint deformities.  Adequate musculoskeletal strength.       Results Review:            Results from last 7 days   Lab Units 20   WBC 10*3/mm3 6.97   HEMOGLOBIN g/dL 12.1   HEMATOCRIT % 37.0   PLATELETS 10*3/mm3 148     Results from last 7 days   Lab Units 20  0529 01/15/20  0555 20  2135  20   INR   --   --   --   --  1.31*   APTT seconds 47.8* 76.9* 101.5*   < > 35.6*    < > = values in this interval not displayed.                       Medication Review:     ammonium lactate  Topical BID   apixaban 5 mg Oral Q12H   atorvastatin 80 mg Oral Nightly   digoxin 125 mcg Oral Daily   lisinopril 2.5 mg Oral Q24H "   metoprolol succinate XL 75 mg Oral Q12H   pantoprazole 40 mg Oral QAM             Assessment/Plan   1. Atrial fibrillation - new diagnosis.  Her rate is controlled.  She is on a beta-blocker and digoxin.  Now on apixaban.  She had previously been on warfarin for multiple DVTs.        2.  Cardiomyopathy - non ischemic (coronary angiography 1/10/2020). Ef 27%. Change to metoprolol succinate.  Volume status appears well compensated.  Her losartan had previously been discontinued with questions of hypotension.  Tolerating low-dose lisinopril 2.5 without blood pressure issues.     3. Acute stroke - 1/10 and again on 1/11- MRI brain with acute left hemispheric subcortical stroke.   Now on Eliquis 5 mg twice daily      4. History of DVT - was on warfarin prior to admission though initial INR was 1.2 (she had been subtherapeutic for the past month)     Monitor heart rate and blood pressure daily.     We will see Friday    JOHN Johnson  [facility]  01/20/20  8:04 AM

## 2020-01-21 LAB
ALBUMIN SERPL-MCNC: 2.9 G/DL (ref 3.5–5.2)
ANION GAP SERPL CALCULATED.3IONS-SCNC: 9.5 MMOL/L (ref 5–15)
BACTERIA UR QL AUTO: ABNORMAL /HPF
BASOPHILS # BLD AUTO: 0.03 10*3/MM3 (ref 0–0.2)
BASOPHILS NFR BLD AUTO: 0.7 % (ref 0–1.5)
BILIRUB UR QL STRIP: NEGATIVE
BUN BLD-MCNC: 11 MG/DL (ref 8–23)
BUN/CREAT SERPL: 22 (ref 7–25)
CALCIUM SPEC-SCNC: 8.9 MG/DL (ref 8.6–10.5)
CHLORIDE SERPL-SCNC: 101 MMOL/L (ref 98–107)
CLARITY UR: CLEAR
CO2 SERPL-SCNC: 27.5 MMOL/L (ref 22–29)
COLOR UR: YELLOW
CREAT BLD-MCNC: 0.5 MG/DL (ref 0.57–1)
DEPRECATED RDW RBC AUTO: 50.2 FL (ref 37–54)
EOSINOPHIL # BLD AUTO: 0.23 10*3/MM3 (ref 0–0.4)
EOSINOPHIL NFR BLD AUTO: 5.2 % (ref 0.3–6.2)
ERYTHROCYTE [DISTWIDTH] IN BLOOD BY AUTOMATED COUNT: 15.6 % (ref 12.3–15.4)
GFR SERPL CREATININE-BSD FRML MDRD: 123 ML/MIN/1.73
GLUCOSE BLD-MCNC: 86 MG/DL (ref 65–99)
GLUCOSE UR STRIP-MCNC: NEGATIVE MG/DL
HCT VFR BLD AUTO: 35.3 % (ref 34–46.6)
HGB BLD-MCNC: 11.2 G/DL (ref 12–15.9)
HGB UR QL STRIP.AUTO: ABNORMAL
HYALINE CASTS UR QL AUTO: ABNORMAL /LPF
IMM GRANULOCYTES # BLD AUTO: 0.01 10*3/MM3 (ref 0–0.05)
IMM GRANULOCYTES NFR BLD AUTO: 0.2 % (ref 0–0.5)
KETONES UR QL STRIP: NEGATIVE
LEUKOCYTE ESTERASE UR QL STRIP.AUTO: NEGATIVE
LYMPHOCYTES # BLD AUTO: 0.77 10*3/MM3 (ref 0.7–3.1)
LYMPHOCYTES NFR BLD AUTO: 17.3 % (ref 19.6–45.3)
MCH RBC QN AUTO: 27.9 PG (ref 26.6–33)
MCHC RBC AUTO-ENTMCNC: 31.7 G/DL (ref 31.5–35.7)
MCV RBC AUTO: 88 FL (ref 79–97)
MONOCYTES # BLD AUTO: 0.39 10*3/MM3 (ref 0.1–0.9)
MONOCYTES NFR BLD AUTO: 8.7 % (ref 5–12)
NEUTROPHILS # BLD AUTO: 3.03 10*3/MM3 (ref 1.7–7)
NEUTROPHILS NFR BLD AUTO: 67.9 % (ref 42.7–76)
NITRITE UR QL STRIP: NEGATIVE
NRBC BLD AUTO-RTO: 0 /100 WBC (ref 0–0.2)
PH UR STRIP.AUTO: 7 [PH] (ref 4.5–8)
PHOSPHATE SERPL-MCNC: 3.4 MG/DL (ref 2.5–4.5)
PLATELET # BLD AUTO: 252 10*3/MM3 (ref 140–450)
PMV BLD AUTO: 11.2 FL (ref 6–12)
POTASSIUM BLD-SCNC: 3.9 MMOL/L (ref 3.5–5.2)
PROT UR QL STRIP: NEGATIVE
RBC # BLD AUTO: 4.01 10*6/MM3 (ref 3.77–5.28)
RBC # UR: ABNORMAL /HPF
REF LAB TEST METHOD: ABNORMAL
SODIUM BLD-SCNC: 138 MMOL/L (ref 136–145)
SP GR UR STRIP: 1.01 (ref 1–1.03)
SQUAMOUS #/AREA URNS HPF: ABNORMAL /HPF
UROBILINOGEN UR QL STRIP: ABNORMAL
WBC NRBC COR # BLD: 4.46 10*3/MM3 (ref 3.4–10.8)
WBC UR QL AUTO: ABNORMAL /HPF

## 2020-01-21 PROCEDURE — 81001 URINALYSIS AUTO W/SCOPE: CPT | Performed by: INTERNAL MEDICINE

## 2020-01-21 PROCEDURE — 80069 RENAL FUNCTION PANEL: CPT | Performed by: INTERNAL MEDICINE

## 2020-01-21 PROCEDURE — 85025 COMPLETE CBC W/AUTO DIFF WBC: CPT | Performed by: INTERNAL MEDICINE

## 2020-01-21 PROCEDURE — 97116 GAIT TRAINING THERAPY: CPT

## 2020-01-21 PROCEDURE — 97535 SELF CARE MNGMENT TRAINING: CPT

## 2020-01-21 RX ORDER — OXYBUTYNIN CHLORIDE 5 MG/1
2.5 TABLET ORAL 2 TIMES DAILY
Status: DISCONTINUED | OUTPATIENT
Start: 2020-01-21 | End: 2020-01-21

## 2020-01-21 RX ORDER — OXYBUTYNIN CHLORIDE 5 MG/1
2.5 TABLET ORAL 2 TIMES DAILY
Status: DISCONTINUED | OUTPATIENT
Start: 2020-01-21 | End: 2020-01-22

## 2020-01-21 RX ADMIN — Medication: at 21:34

## 2020-01-21 RX ADMIN — OXYBUTYNIN CHLORIDE 2.5 MG: 5 TABLET ORAL at 06:07

## 2020-01-21 RX ADMIN — PANTOPRAZOLE SODIUM 40 MG: 40 TABLET, DELAYED RELEASE ORAL at 06:07

## 2020-01-21 RX ADMIN — OXYBUTYNIN CHLORIDE 2.5 MG: 5 TABLET ORAL at 21:34

## 2020-01-21 RX ADMIN — MELATONIN TAB 5 MG 5 MG: 5 TAB at 21:34

## 2020-01-21 RX ADMIN — LISINOPRIL 2.5 MG: 2.5 TABLET ORAL at 08:29

## 2020-01-21 RX ADMIN — Medication: at 08:30

## 2020-01-21 RX ADMIN — APIXABAN 5 MG: 2.5 TABLET, FILM COATED ORAL at 08:30

## 2020-01-21 RX ADMIN — METOPROLOL SUCCINATE 75 MG: 50 TABLET, EXTENDED RELEASE ORAL at 08:29

## 2020-01-21 RX ADMIN — DIGOXIN 125 MCG: 125 TABLET ORAL at 11:46

## 2020-01-21 RX ADMIN — ATORVASTATIN CALCIUM 80 MG: 40 TABLET, FILM COATED ORAL at 21:34

## 2020-01-21 RX ADMIN — METOPROLOL SUCCINATE 75 MG: 50 TABLET, EXTENDED RELEASE ORAL at 21:34

## 2020-01-21 RX ADMIN — APIXABAN 5 MG: 2.5 TABLET, FILM COATED ORAL at 21:34

## 2020-01-21 NOTE — PLAN OF CARE
"  Problem: Patient Care Overview  Goal: Plan of Care Review  Flowsheets (Taken 1/21/2020 0921)  Outcome Summary: PT: Patient performs sit to/from stand with SBA and gait x200 feet with rolling walker, SBA.  Patient requires verbal cues for upright posture and equal step length bilaterally.  Patient able to manage device around obstacles and direction changes without difficulty.  Patient completes LE exercises with rest breaks and cues for technique.  Discussed current progress and mobility status with patient.  Anticipate discharge from PT services on 1/24/20.  Patient reports \"I thought I would be here at least 2 or 3 more weeks\".  Discussed levels of care and therapy within different settings.  ENcouraged patient to speak with discharge planner for additional options at d/c.     "

## 2020-01-21 NOTE — PROGRESS NOTES
"Adult Nutrition  Assessment/PES    Patient Name:  Camille Pantoja  YOB: 1955  MRN: [id number]  Admit Date:  1/16/2020    Assessment Date:  1/21/2020    Comments:  Good po intake. Will cont to follow    Reason for Assessment     Row Name 01/21/20 1340          Reason for Assessment    Reason For Assessment  follow-up protocol     Diagnosis  neurologic conditions L MCA CVA, Afib, pNA         Nutrition/Diet History     Row Name 01/21/20 1340          Nutrition/Diet History    Typical Food/Fluid Intake  Pt in chair at visit, lunch plate cleared. Reports uncertain usual wt but feels she has lost with this stroke. Uncertain how much. Reports loves florecita. States 65.5 in          Anthropometrics     Row Name 01/21/20 1342          Anthropometrics    Height Method  measured armspan     Height  162.6 cm (64\")     Weight  54.5 kg (120 lb 2.4 oz)        Ideal Body Weight (IBW)    Ideal Body Weight (IBW) (kg)  55     % Ideal Body Weight  99.08        Body Mass Index (BMI)    BMI (kg/m2)  20.67     BMI Assessment  BMI 18.5-24.9: normal         Labs/Tests/Procedures/Meds     Row Name 01/21/20 1343          Labs/Procedures/Meds    Lab Results Reviewed  reviewed        Diagnostic Tests/Procedures    Diagnostic Test/Procedure Reviewed  reviewed        Medications    Pertinent Medications Reviewed  reviewed           Estimated/Assessed Needs     Row Name 01/21/20 1342          Calculation Measurements    Height  162.6 cm (64\")         Nutrition Prescription Ordered     Row Name 01/21/20 1343          Nutrition Prescription PO    Current PO Diet  Soft Texture     Texture  Whole foods     Common Modifiers  Cardiac         Evaluation of Received Nutrient/Fluid Intake     Row Name 01/21/20 1343          Fluid Intake Evaluation    Oral Fluid (mL)  820 ave x 3, 62%        PO Evaluation    Number of Meals  9     % PO Intake  94               Problem/Interventions:  Problem 1     Row Name 01/21/20 1344          Nutrition " Diagnoses Problem 1    Problem 1  Nutrition Appropriate for Condition at this Time               Intervention Goal     Row Name 01/21/20 1344          Intervention Goal    General  Meet nutritional needs for age/condition     PO  Maintain intake;PO intake (%)     PO Intake %  80 % or greater     Weight  Maintain weight         Nutrition Intervention     Row Name 01/21/20 1344          Nutrition Intervention    RD/Tech Action  Interview for preference;Encourage intake;Follow Tx progress           Education/Evaluation     Row Name 01/21/20 1345          Education    Education  Other (comment) encourage cont good po        Monitor/Evaluation    Monitor  Per protocol;I&O;PO intake;Pertinent labs;Weight;Symptoms           Electronically signed by:  Susannah Valle RD  01/21/20 1:45 PM

## 2020-01-21 NOTE — PROGRESS NOTES
Contacted by nurses for new onset urinary frequency without dysuria.  Patient has been afebrile.  Will check UA

## 2020-01-21 NOTE — PLAN OF CARE
Problem: Patient Care Overview  Goal: Plan of Care Review  Flowsheets (Taken 1/21/2020 1000)  Outcome Summary: OT- Patient performed sit to stand transfers and mobility with rolling walker and supervision. Patient performed sink side bathing and dressing with supervision.  Patient doing well, anticipate discharge by the end of the week.

## 2020-01-21 NOTE — CONSULTS
FIRST UROLOGY CONSULT      Patient Identification:  NAME:  Camille Pantoja  Age:  64 y.o.   Sex:  female   :  1955   MRN:  [id number]       Chief complaint: frequency to void    History of present illness:  63yo CF with recent basal ganglia stroke and MI. On coumadin but was subtherapeutic. Now voiding every hour. Occasional UI. No hematuria.      Past medical history:  Past Medical History:   Diagnosis Date   • CHF (congestive heart failure) (CMS/[facility])    • Hyperlipidemia    • Hypertension    • Irregular heart beat    • Skin cancer    • Stroke (CMS/HCC)        Past surgical history:  Past Surgical History:   Procedure Laterality Date   • CARDIAC CATHETERIZATION N/A 1/10/2020    Procedure: Left Heart Cath;  Surgeon: Cain Mcneil MD;  Location:  [facility];  Service: Cardiovascular   • CARDIAC CATHETERIZATION N/A 1/10/2020    Procedure: Coronary angiography;  Surgeon: Cain Mcneil MD;  Location:  [facility];  Service: Cardiovascular   • FINGER SURGERY     • HYSTERECTOMY     • JOINT REPLACEMENT     • ORIF SHOULDER DISLOCATION W/ HUMERAL FRACTURE     • TIBIA FRACTURE SURGERY         Allergies:  Cephalexin; Beef-derived products; Citrus; Duricef [cefadroxil]; Fish allergy; Garlic; Green beans; Onion; Peach [prunus persica]; and Wheat    Home medications:  Medications Prior to Admission   Medication Sig Dispense Refill Last Dose   • apixaban (ELIQUIS) 5 MG tablet tablet Take 1 tablet by mouth Every 12 (Twelve) Hours. Indications: Atrial Fibrillation 60 tablet 11    • atorvastatin (LIPITOR) 80 MG tablet Take 1 tablet by mouth Every Night.      • cholecalciferol (VITAMIN D3) 25 MCG (1000 UT) tablet Take 2 tablets by mouth Daily.      • digoxin (LANOXIN) 125 MCG tablet Take 1 tablet by mouth Daily.      • metoprolol succinate XL (TOPROL-XL) 25 MG 24 hr tablet Take 3 tablets by mouth 2 (Two) Times a Day. 90 tablet 0    • omeprazole (priLOSEC) 20 MG capsule  TAKE ONE CAPSULE BY MOUTH DAILY 90 capsule 0 Unknown at Unknown time   • vitamin B-12 (VITAMIN B-12) 500 MCG tablet Take 1 tablet by mouth Daily.      • Zinc 50 MG capsule Take 50 mg by mouth Daily.   Unknown at Unknown time       Hospital medications:    ammonium lactate  Topical BID   apixaban 5 mg Oral Q12H   atorvastatin 80 mg Oral Nightly   digoxin 125 mcg Oral Daily   lisinopril 2.5 mg Oral Q24H   metoprolol succinate XL 75 mg Oral Q12H   oxybutynin 2.5 mg Oral BID   pantoprazole 40 mg Oral QAM        •  acetaminophen  •  melatonin  •  ondansetron **OR** ondansetron  •  sennosides-docusate    Family history:  Family History   Problem Relation Age of Onset   • Breast cancer Brother    • Macular degeneration Mother    • Heart disease Mother    • Heart disease Father    • Heart disease Maternal Uncle        Social history:  Social History     Tobacco Use   • Smoking status: Never Smoker   • Smokeless tobacco: Never Used   Substance Use Topics   • Alcohol use: Yes     Comment: Reports a hx of drinking everyday for 18 years atleast. She says that she quit May 2018, but says that she drinks occasionally now.    • Drug use: No       Review of systems:    Negative 12-system ROS except for the following:  No fevers and chills. No dysuria. No hematuria.      Objective:  TMax 24 hours:   Temp (24hrs), Av.8 °F (36.6 °C), Min:96.8 °F (36 °C), Max:98.7 °F (37.1 °C)      Vitals Ranges:   Temp:  [96.8 °F (36 °C)-98.7 °F (37.1 °C)] 98.7 °F (37.1 °C)  Heart Rate:  [80-99] 80  Resp:  [16-18] 18  BP: (114-130)/(70-84) 115/84    Intake/Output Last 3 shifts:  I/O last 3 completed shifts:  In: 1320 [P.O.:1320]  Out: 750 [Urine:750]     Physical Exam:       General Appearance:    Alert, cooperative, in no acute distress   Head:    Normocephalic, without obvious abnormality, atraumatic   Eyes:          PERRL, conjunctivae and corneas clear   Ears:    Normal external inspection   Throat:   No oral lesions, oral mucosa moist   Neck:    Supple, no LAD, trachea midline   Back:     No CVA tenderness   Lungs:     Respirations unlabored, symmetric excursion    Heart:    RRR, intact peripheral pulses   Abdomen:     Soft benign   :    def   Extremities:   No edema, no deformity   Skin:   No bleeding, bruising or rashes   Neuro/Psych:   Orientation intact, mood/affect pleasant, no focal findings       Results review:   I reviewed the patient's new clinical results.    Data review:  Lab Results (last 24 hours)     Procedure Component Value Units Date/Time    Renal Function Panel [[id number]]  (Abnormal) Collected:  01/21/20 0533    Specimen:  Blood Updated:  01/21/20 0632     Glucose 86 mg/dL      BUN 11 mg/dL      Creatinine 0.50 mg/dL      Sodium 138 mmol/L      Potassium 3.9 mmol/L      Chloride 101 mmol/L      CO2 27.5 mmol/L      Calcium 8.9 mg/dL      Albumin 2.90 g/dL      Phosphorus 3.4 mg/dL      Anion Gap 9.5 mmol/L      BUN/Creatinine Ratio 22.0     eGFR Non African Amer 123 mL/min/1.73     Narrative:       GFR Normal >60  Chronic Kidney Disease <60  Kidney Failure <15      CBC & Differential [[id number]] Collected:  01/21/20 0533    Specimen:  Blood Updated:  01/21/20 0539    Narrative:       The following orders were created for panel order CBC & Differential.  Procedure                               Abnormality         Status                     ---------                               -----------         ------                     CBC Auto Differential[[id number]]        Abnormal            Final result                 Please view results for these tests on the individual orders.    CBC Auto Differential [[id number]]  (Abnormal) Collected:  01/21/20 0533    Specimen:  Blood Updated:  01/21/20 0539     WBC 4.46 10*3/mm3      RBC 4.01 10*6/mm3      Hemoglobin 11.2 g/dL      Hematocrit 35.3 %      MCV 88.0 fL      MCH 27.9 pg      MCHC 31.7 g/dL      RDW 15.6 %      RDW-SD 50.2 fl      MPV 11.2 fL      Platelets 252 10*3/mm3      Neutrophil %  67.9 %      Lymphocyte % 17.3 %      Monocyte % 8.7 %      Eosinophil % 5.2 %      Basophil % 0.7 %      Immature Grans % 0.2 %      Neutrophils, Absolute 3.03 10*3/mm3      Lymphocytes, Absolute 0.77 10*3/mm3      Monocytes, Absolute 0.39 10*3/mm3      Eosinophils, Absolute 0.23 10*3/mm3      Basophils, Absolute 0.03 10*3/mm3      Immature Grans, Absolute 0.01 10*3/mm3      nRBC 0.0 /100 WBC     Urinalysis, Microscopic Only - Urine, Clean Catch [[id number]]  (Abnormal) Collected:  01/21/20 0002    Specimen:  Urine, Clean Catch Updated:  01/21/20 0035     RBC, UA None Seen /HPF      WBC, UA 0-2 /HPF      Bacteria, UA Trace /HPF      Squamous Epithelial Cells, UA 3-6 /HPF      Hyaline Casts, UA None Seen /LPF      Methodology Manual Light Microscopy    Urinalysis With Culture If Indicated - Urine, Clean Catch [[id number]]  (Abnormal) Collected:  01/21/20 0002    Specimen:  Urine, Clean Catch Updated:  01/21/20 0024     Color, UA Yellow     Appearance, UA Clear     pH, UA 7.0     Specific Gravity, UA 1.015     Glucose, UA Negative     Ketones, UA Negative     Bilirubin, UA Negative     Blood, UA Small (1+)     Protein, UA Negative     Leuk Esterase, UA Negative     Nitrite, UA Negative     Urobilinogen, UA 0.2 E.U./dL           Imaging:  Imaging Results (Last 24 Hours)     ** No results found for the last 24 hours. **             Assessment:       Hyperlipidemia    Hypertension    Paroxysmal atrial fibrillation with rapid ventricular response (CMS/HCC)    Chronic atrial fibrillation with rapid ventricular response    Atrial fibrillation (CMS/HCC)    Cerebral ventriculomegaly    Acute ischemic left MCA stroke (CMS/HCC)    Aftercare    Right hemiparesis (CMS/HCC)    Detrusor Instability    Plan:     Agree with oxybutynin.    Americo Rodriguez MD  01/21/20  8:54 AM

## 2020-01-21 NOTE — THERAPY TREATMENT NOTE
SNF - Physical Therapy Treatment Note   Rebeca Cochran     Patient Name: Camille Pantoja  : 1955  MRN: [id number]  Today's Date: 2020  Onset of Illness/Injury or Date of Surgery: 01/10/20  Date of Referral to PT: 20  Referring Physician: Richard    Admit Date: 2020    Visit Dx:    ICD-10-CM ICD-9-CM   1. Oropharyngeal dysphagia R13.12 787.22     Patient Active Problem List   Diagnosis   • Deep vein thrombosis of lower extremity (CMS/HCC)   • Hyperlipidemia   • Hypertension   • Vitamin D deficiency   • Osteoarthritis   • Paroxysmal atrial fibrillation with rapid ventricular response (CMS/HCC)   • Chronic atrial fibrillation with rapid ventricular response   • Atrial fibrillation (CMS/HCC)   • Metabolic encephalopathy   • Cerebral ventriculomegaly   • Acute ischemic left MCA stroke (CMS/HCC)   • Aftercare   • Right hemiparesis (CMS/HCC)       Therapy Treatment    Rehabilitation Treatment Summary     Row Name 2021             Treatment Time/Intention    Discipline  physical therapist  -JW      Document Type  therapy note (daily note)  -JW      Subjective Information  no complaints  -JW      Mode of Treatment  physical therapy  -JW      Patient/Family Observations  pt sitting in recliner, agreeable to therapy   -JW2      Care Plan Review  care plan/treatment goals reviewed;risks/benefits reviewed;patient/other agree to care plan  -JW2      Patient Effort  good  -JW2      Existing Precautions/Restrictions  fall  -JW2      Recorded by [JW] Kennedi Gay, PT 20 1300  [JW2] Kennedi Gay, PT 20 1311      Row Name 2021             Cognitive Assessment/Intervention- PT/OT    Personal Safety Interventions  gait belt;nonskid shoes/slippers when out of bed  -JW      Cognitive Assessment/Intervention Comment  easily distracted during session, requires cues to remain on task  -JW      Recorded by [JW] Kennedi Gay, PT 20 1311      Row Name 20 09              Bed Mobility Assessment/Treatment    Comment (Bed Mobility)  deferred-up in chair  -JW      Recorded by [JW] Kennedi Gay, PT 01/21/20 1311      Row Name 01/21/20 0921             Transfer Assessment/Treatment    Transfer Assessment/Treatment  sit-stand transfer;stand-sit transfer  -JW      Recorded by [JW] Kennedi Gay, PT 01/21/20 1311      Row Name 01/21/20 0921             Sit-Stand Transfer    Sit-Stand Spink (Transfers)  supervision  -JW      Assistive Device (Sit-Stand Transfers)  walker, front-wheeled  -JW      Recorded by [JW] Kennedi Gay, PT 01/21/20 1311      Row Name 01/21/20 0921             Stand-Sit Transfer    Stand-Sit Spink (Transfers)  supervision  -JW      Assistive Device (Stand-Sit Transfers)  walker, front-wheeled  -JW      Recorded by [JW] Kennedi Gay, PT 01/21/20 1311      Row Name 01/21/20 0921             Gait/Stairs Assessment/Training    Spink Level (Gait)  supervision;verbal cues  -JW      Assistive Device (Gait)  walker, front-wheeled  -JW      Distance in Feet (Gait)  200  -JW      Pattern (Gait)  swing-through  -JW      Deviations/Abnormal Patterns (Gait)  base of support, narrow;gait speed decreased  -JW      Bilateral Gait Deviations  forward flexed posture  -JW      Right Sided Gait Deviations  heel strike decreased  -JW      Comment (Gait/Stairs)  Patient requires verbal cues for upright posture and equal step length bilaterally.  Patient requires frequent cues for increased base of support.  -JW      Recorded by [JW] Kennedi Gay, PT 01/21/20 1311      Row Name 01/21/20 0921             Motor Skills Assessment/Interventions    Additional Documentation  Therapeutic Exercise (Group);Therapeutic Exercise Interventions (Group)  -JW      Recorded by [JW] Kennedi Gay, PT 01/21/20 1311      Row Name 01/21/20 0921             Therapeutic Exercise    Therapeutic Exercise  supine, lower extremities;seated, lower extremities  -JW      Recorded by  [JW] Kennedi Gay, PT 01/21/20 1311      Row Name 01/21/20 0921             Lower Extremity Seated Therapeutic Exercise    Performed, Seated Lower Extremity (Therapeutic Exercise)  hip abduction/adduction;LAQ (long arc quad), knee extension;hip flexion/extension  -JW      Exercise Type, Seated Lower Extremity (Therapeutic Exercise)  AROM (active range of motion)  -JW      Sets/Reps Detail, Seated Lower Extremity (Therapeutic Exercise)  1/10  -JW      Comment, Seated Lower Extremity (Therapeutic Exercise)  requires cues for technique  -JW      Recorded by [JW] Kennedi Gay, PT 01/21/20 1311      Row Name 01/21/20 0921             Lower Extremity Supine Therapeutic Exercise    Performed, Supine Lower Extremity (Therapeutic Exercise)  hip abduction/adduction;SLR (straight leg raise);quadriceps sets;gluteal sets;ankle pumps;heel slides  -JW      Sets/Reps Detail, Supine Lower Extremity (Therapeutic Exercise)  1/10  -JW      Comment, Supine Lower Extremity (Therapeutic Exercise)  requires rest breaks and cues for sequencing  -JW      Recorded by [JW] Kennedi Gay, PT 01/21/20 1311      Row Name 01/21/20 0921             Positioning and Restraints    Pre-Treatment Position  sitting in chair/recliner  -JW      Post Treatment Position  chair  -JW      In Chair  reclined;call light within reach;encouraged to call for assist;exit alarm on  -JW      Recorded by [JW] Kennedi Gay, PT 01/21/20 1311      Row Name 01/21/20 0921             Pain Scale: Numbers Pre/Post-Treatment    Pre/Post Treatment Pain Comment  no c/o pain  -JW      Recorded by [JW] Kennedi Gay, PT 01/21/20 1311      Row Name 01/21/20 0921             Plan of Care Review    Plan of Care Reviewed With  patient  -JW      Outcome Summary  PT: Patient performs sit to/from stand with SBA and gait x200 feet with rolling walker, SBA.  Patient requires verbal cues for upright posture and equal step length bilaterally.  Patient able to manage device  "around obstacles and direction changes without difficulty.  Patient completes LE exercises with rest breaks and cues for technique.  Discussed current progress and mobility status with patient.  Anticipate discharge from PT services on 1/24/20.  Patient reports \"I thought I would be here at least 2 or 3 more weeks\".  Discussed levels of care and therapy within different settings.  ENcouraged patient to speak with discharge planner for additional options at d/c.  -JW      Recorded by [JW] Kennedi Gay, PT 01/21/20 1311      Row Name 01/21/20 0921             Outcome Summary/Treatment Plan (PT)    Daily Summary of Progress (PT)  progress toward functional goals is good  -ANISHA      Barriers to Overall Progress (PT)  easily distracted  -ANISHA      Anticipated Equipment Needs at Discharge (PT)  front wheeled walker  -ANISHA      Anticipated Discharge Disposition (PT)  home with home health  -      Recorded by [JW] Kennedi Gay, PT 01/21/20 1311        User Key  (r) = Recorded By, (t) = Taken By, (c) = Cosigned By    Initials Name Effective Dates Discipline     Kennedi Gay, PT 04/03/18 -  PT                       PT Recommendation and Plan  Anticipated Discharge Disposition (PT): home with home health  Planned Therapy Interventions (PT Eval): balance training, bed mobility training, gait training, home exercise program, patient/family education, strengthening, stair training, transfer training  Therapy Frequency (PT Clinical Impression): daily  Outcome Summary/Treatment Plan (PT)  Daily Summary of Progress (PT): progress toward functional goals is good  Barriers to Overall Progress (PT): easily distracted  Anticipated Equipment Needs at Discharge (PT): front wheeled walker  Anticipated Discharge Disposition (PT): home with home health  Plan of Care Reviewed With: patient  Outcome Summary: PT: Patient performs sit to/from stand with SBA and gait x200 feet with rolling walker, SBA.  Patient requires verbal cues for " "upright posture and equal step length bilaterally.  Patient able to manage device around obstacles and direction changes without difficulty.  Patient completes LE exercises with rest breaks and cues for technique.  Discussed current progress and mobility status with patient.  Anticipate discharge from PT services on 1/24/20.  Patient reports \"I thought I would be here at least 2 or 3 more weeks\".  Discussed levels of care and therapy within different settings.  ENcouraged patient to speak with discharge planner for additional options at d/c.     Time Calculation:   PT Charges     Row Name 01/21/20 1312             Time Calculation    Start Time  0921  -      Stop Time  0940  -      Time Calculation (min)  19 min  -ANISHA      PT Received On  01/21/20  -ANISHA      PT - Next Appointment  01/22/20  -ANISHA         Timed Charges    11714 - Gait Training Minutes   19  -        User Key  (r) = Recorded By, (t) = Taken By, (c) = Cosigned By    Initials Name Provider Type    Kennedi Davalos, PT Physical Therapist        Therapy Charges for Today     Code Description Service Date Service Provider Modifiers Qty    [id number] HC GAIT TRAINING EA 15 MIN 1/21/2020 Kennedi Gay, PT GP 1               Kennedi Gay PT  1/21/2020       "

## 2020-01-21 NOTE — THERAPY TREATMENT NOTE
SNF - Occupational Therapy Treatment Note   Rebeca Cochran     Patient Name: Camille Pantoja  : 1955  MRN: [id number]  Today's Date: 2020  Onset of Illness/Injury or Date of Surgery: 01/10/20  Date of Referral to OT: 20  Referring Physician: Richard    Admit Date: 2020       ICD-10-CM ICD-9-CM   1. Oropharyngeal dysphagia R13.12 787.22     Patient Active Problem List   Diagnosis   • Deep vein thrombosis of lower extremity (CMS/HCC)   • Hyperlipidemia   • Hypertension   • Vitamin D deficiency   • Osteoarthritis   • Paroxysmal atrial fibrillation with rapid ventricular response (CMS/HCC)   • Chronic atrial fibrillation with rapid ventricular response   • Atrial fibrillation (CMS/HCC)   • Metabolic encephalopathy   • Cerebral ventriculomegaly   • Acute ischemic left MCA stroke (CMS/HCC)   • Aftercare   • Right hemiparesis (CMS/HCC)     Past Medical History:   Diagnosis Date   • CHF (congestive heart failure) (CMS/HCC)    • Hyperlipidemia    • Hypertension    • Irregular heart beat    • Skin cancer    • Stroke (CMS/HCC)      Past Surgical History:   Procedure Laterality Date   • CARDIAC CATHETERIZATION N/A 1/10/2020    Procedure: Left Heart Cath;  Surgeon: Cain Mcneil MD;  Location: [facility] INVASIVE LOCATION;  Service: Cardiovascular   • CARDIAC CATHETERIZATION N/A 1/10/2020    Procedure: Coronary angiography;  Surgeon: Cain Mcneil MD;  Location: [facility] INVASIVE LOCATION;  Service: Cardiovascular   • FINGER SURGERY     • HYSTERECTOMY     • JOINT REPLACEMENT     • ORIF SHOULDER DISLOCATION W/ HUMERAL FRACTURE     • TIBIA FRACTURE SURGERY         Therapy Treatment    Rehabilitation Treatment Summary     Row Name 20 1000 20 0921          Treatment Time/Intention    Discipline  occupational therapist  -EN  physical therapist  -JW     Document Type  therapy note (daily note)  -EN  therapy note (daily note)  -JW     Subjective Information  no complaints  -EN  no  complaints  -JW     Mode of Treatment  occupational therapy  -EN  physical therapy  -JW     Patient/Family Observations  Patient sitting in recliner, agreed to OT.  -EN  pt sitting in recliner, agreeable to therapy   -JW2     Care Plan Review  care plan/treatment goals reviewed;risks/benefits reviewed  -EN  care plan/treatment goals reviewed;risks/benefits reviewed;patient/other agree to care plan  -JW2     Patient Effort  good  -EN  good  -JW2     Existing Precautions/Restrictions  fall  -EN  fall  -JW2     Recorded by [EN] Bonny Kinney, OTR 01/21/20 1518 [JW] Kennedi Gay, PT 01/21/20 1300  [JW2] Kennedi Gay, PT 01/21/20 1311     Row Name 01/21/20 1000 01/21/20 0921          Cognitive Assessment/Intervention- PT/OT    Personal Safety Interventions  gait belt;nonskid shoes/slippers when out of bed  -EN  gait belt;nonskid shoes/slippers when out of bed  -JW     Cognitive Assessment/Intervention Comment  --  easily distracted during session, requires cues to remain on task  -JW     Recorded by [EN] Bonny Kinney, OTR 01/21/20 1518 [JW] Kennedi Gay, PT 01/21/20 1311     Row Name 01/21/20 0921             Bed Mobility Assessment/Treatment    Comment (Bed Mobility)  deferred-up in chair  -JW      Recorded by [JW] Kennedi Gay, PT 01/21/20 1311      Row Name 01/21/20 1000             Functional Mobility    Functional Mobility- Ind. Level  supervision required  -EN      Functional Mobility- Device  rolling walker  -EN      Functional Mobility-Distance (Feet)  30  -EN      Recorded by [EN] Bonny Kinney, OTR 01/21/20 1518      Row Name 01/21/20 1000 01/21/20 0921          Transfer Assessment/Treatment    Transfer Assessment/Treatment  sit-stand transfer;stand-sit transfer  -EN  sit-stand transfer;stand-sit transfer  -JW     Recorded by [EN] Bonny Kinney, OTR 01/21/20 1518 [JW] Kennedi Gay, PT 01/21/20 1311     Row Name 01/21/20 1000 01/21/20 0921          Sit-Stand Transfer     Sit-Stand [place] (Transfers)  supervision  -EN  supervision  -JW     Assistive Device (Sit-Stand Transfers)  walker, front-wheeled  -EN  walker, front-wheeled  -JW     Recorded by [EN] Bonny Kinney, OTR 01/21/20 1518 [JW] Kennedi Gay, PT 01/21/20 1311     Row Name 01/21/20 1000 01/21/20 0921          Stand-Sit Transfer    Stand-Sit [place] (Transfers)  supervision  -EN  supervision  -JW     Assistive Device (Stand-Sit Transfers)  walker, front-wheeled  -EN  walker, front-wheeled  -JW     Recorded by [EN] Bonny Kinney, OTR 01/21/20 1518 [JW] Kennedi Gay, PT 01/21/20 1311     Row Name 01/21/20 0921             Gait/Stairs Assessment/Training    [place] Level (Gait)  supervision;verbal cues  -JW      Assistive Device (Gait)  walker, front-wheeled  -JW      Distance in Feet (Gait)  200  -JW      Pattern (Gait)  swing-through  -JW      Deviations/Abnormal Patterns (Gait)  base of support, narrow;gait speed decreased  -JW      Bilateral Gait Deviations  forward flexed posture  -JW      Right Sided Gait Deviations  heel strike decreased  -JW      Comment (Gait/Stairs)  Patient requires verbal cues for upright posture and equal step length bilaterally.  Patient requires frequent cues for increased base of support.  -JW      Recorded by [JW] Kennedi Gay, PT 01/21/20 1311      Row Name 01/21/20 1000             Bathing Assessment/Intervention    Bathing [place] Level  bathing skills;lower body;upper body;supervision  -EN      Recorded by [EN] Bonny Kinney, OTR 01/21/20 1518      Row Name 01/21/20 1000             Lower Body Dressing Assessment/Training    Lower Body Dressing [place] Level  lower body dressing skills;supervision  -EN      Recorded by [EN] Bonny Kinney, OTR 01/21/20 1518      Row Name 01/21/20 0921             Motor Skills Assessment/Interventions    Additional Documentation  Therapeutic Exercise (Group);Therapeutic Exercise Interventions  (Group)  -JW      Recorded by [JW] Kennedi Gay, PT 01/21/20 1311      Row Name 01/21/20 0921             Therapeutic Exercise    Therapeutic Exercise  supine, lower extremities;seated, lower extremities  -JW      Recorded by [JW] Kennedi Gay, PT 01/21/20 1311      Row Name 01/21/20 0921             Lower Extremity Seated Therapeutic Exercise    Performed, Seated Lower Extremity (Therapeutic Exercise)  hip abduction/adduction;LAQ (long arc quad), knee extension;hip flexion/extension  -JW      Exercise Type, Seated Lower Extremity (Therapeutic Exercise)  AROM (active range of motion)  -JW      Sets/Reps Detail, Seated Lower Extremity (Therapeutic Exercise)  1/10  -JW      Comment, Seated Lower Extremity (Therapeutic Exercise)  requires cues for technique  -JW      Recorded by [JW] Kennedi Gay, PT 01/21/20 1311      Row Name 01/21/20 0921             Lower Extremity Supine Therapeutic Exercise    Performed, Supine Lower Extremity (Therapeutic Exercise)  hip abduction/adduction;SLR (straight leg raise);quadriceps sets;gluteal sets;ankle pumps;heel slides  -JW      Sets/Reps Detail, Supine Lower Extremity (Therapeutic Exercise)  1/10  -JW      Comment, Supine Lower Extremity (Therapeutic Exercise)  requires rest breaks and cues for sequencing  -JW      Recorded by [JW] Kennedi Gay, PT 01/21/20 1311      Row Name 01/21/20 1000             Static Standing Balance    Level of [place] (Supported Standing, Static Balance)  supervision  -EN      Recorded by [EN] Bonny Kinney, OTR 01/21/20 1518      Row Name 01/21/20 1000 01/21/20 0921          Positioning and Restraints    Pre-Treatment Position  sitting in chair/recliner  -EN  sitting in chair/recliner  -JW     Post Treatment Position  chair  -EN  chair  -JW     In Chair  reclined;call light within reach;encouraged to call for assist  -EN  reclined;call light within reach;encouraged to call for assist;exit alarm on  -JW     Recorded by [EN]  "Bonny Kinney, OTR 01/21/20 1518 [JW] Victor HugoLindyny, PT 01/21/20 1311     Row Name 01/21/20 1000 01/21/20 0921          Pain Scale: Numbers Pre/Post-Treatment    Pain Scale: Numbers, Pretreatment  0/10 - no pain  -EN  --     Pain Scale: Numbers, Post-Treatment  0/10 - no pain  -EN  --     Pre/Post Treatment Pain Comment  --  no c/o pain  -JW     Recorded by [EN] Bonny Kinney, OTR 01/21/20 1518 [JW] Victor HugoLindyny, PT 01/21/20 1311     Row Name 01/21/20 1000 01/21/20 0921          Plan of Care Review    Plan of Care Reviewed With  patient  -EN  patient  -JW     Outcome Summary  OT- Patient performed sit to stand transfers and mobility with rolling walker and supervision. Patient performed sink side bathing and dressing with supervision.  Patient doing well, anticipate discharge by the end of the week.  -EN  PT: Patient performs sit to/from stand with SBA and gait x200 feet with rolling walker, SBA.  Patient requires verbal cues for upright posture and equal step length bilaterally.  Patient able to manage device around obstacles and direction changes without difficulty.  Patient completes LE exercises with rest breaks and cues for technique.  Discussed current progress and mobility status with patient.  Anticipate discharge from PT services on 1/24/20.  Patient reports \"I thought I would be here at least 2 or 3 more weeks\".  Discussed levels of care and therapy within different settings.  ENcouraged patient to speak with discharge planner for additional options at d/c.  -JW     Recorded by [EN] Bonny Kinney, OTR 01/21/20 1518 [JW] Victor Hugo Kennedi, PT 01/21/20 1311     Row Name 01/21/20 1000             Outcome Summary/Treatment Plan (OT)    Daily Summary of Progress (OT)  progress toward functional goals is good  -EN      Recorded by [EN] Bonny Kinney, OTR 01/21/20 1518      Row Name 01/21/20 0921             Outcome Summary/Treatment Plan (PT)    Daily Summary of Progress (PT)  " progress toward functional goals is good  -JW      Barriers to Overall Progress (PT)  easily distracted  -ANISHA      Anticipated Equipment Needs at Discharge (PT)  front wheeled walker  -ANISHA      Anticipated Discharge Disposition (PT)  home with home health  -JW      Recorded by [ANISHA] Kennedi Gay, PT 01/21/20 1311        User Key  (r) = Recorded By, (t) = Taken By, (c) = Cosigned By    Initials Name Effective Dates Discipline    EN Bonny Kinney OTR 06/22/16 -  OT    Kennedi Davalos, PT 04/03/18 -  PT                 OT Recommendation and Plan  Outcome Summary/Treatment Plan (OT)  Daily Summary of Progress (OT): progress toward functional goals is good  Daily Summary of Progress (OT): progress toward functional goals is good  Plan of Care Review  Plan of Care Reviewed With: patient  Plan of Care Reviewed With: patient  Outcome Summary: OT- Patient performed sit to stand transfers and mobility with rolling walker and supervision. Patient performed sink side bathing and dressing with supervision.  Patient doing well, anticipate discharge by the end of the week.       Time Calculation:   Time Calculation- OT     Row Name 01/21/20 1519 01/21/20 1312          Time Calculation- OT    OT Start Time  1000  -EN  --     OT Stop Time  1030  -EN  --     OT Time Calculation (min)  30 min  -EN  --     TCU Minutes- OT  30 min  -EN  --        Timed Charges    58139 - Gait Training Minutes   --  19  -JW     94572 - OT Self Care/Mgmt Minutes  30  -EN  --       User Key  (r) = Recorded By, (t) = Taken By, (c) = Cosigned By    Initials Name Provider Type    Bonny Fofana OTR Occupational Therapist    Kennedi Davalos, PT Physical Therapist        Therapy Charges for Today     Code Description Service Date Service Provider Modifiers Qty    86468250644 HC OT SELF CARE/MGMT/TRAIN EA 15 MIN 1/20/2020 Bonny Kinney OTR GO 2    79378505624 HC OT SELF CARE/MGMT/TRAIN EA 15 MIN 1/21/2020 Bonny Kinney OTR  GO 2               Bonny Kinney, OTR  1/21/2020

## 2020-01-21 NOTE — NURSING NOTE
"Contacted Dr. Ramos via secure chat in regards to patients nocturia, UA was ordered earlier on tonight but showed nothing substantial. Richard and I discussed patients frustration with urinating every hour, not on diuretics and excess fluid intake is not an issue. Patient is not resting well and stated \"this is getting very old.\" She has not had this issue before, Oxybutynin 2.5 BID was suggested and approved by Dr. Ramos. Orders placed, first dose today at 0545.  "

## 2020-01-21 NOTE — PROGRESS NOTES
U/A reviewed, Spec grav normal, no signs of infection. Unclear why pt is urinating hourly.     Is due for weekly labs. Will check CBC and renal panel in AM.    Will order oxybutynin to help with the pts frustration.    Would remove all caffeine intake by pt      Would try oxybutinin for now, given recent stroke, believe pt would be contraindicated for vaginal estrogen.    Given distress caused to the pt, will consult urology to help diagnosis and treat. Though pt likely needs behavioral therapies and bladder training.

## 2020-01-22 PROCEDURE — 99308 SBSQ NF CARE LOW MDM 20: CPT | Performed by: NURSE PRACTITIONER

## 2020-01-22 PROCEDURE — 97530 THERAPEUTIC ACTIVITIES: CPT

## 2020-01-22 PROCEDURE — 97110 THERAPEUTIC EXERCISES: CPT

## 2020-01-22 RX ORDER — OXYBUTYNIN CHLORIDE 5 MG/1
5 TABLET ORAL 2 TIMES DAILY
Status: DISCONTINUED | OUTPATIENT
Start: 2020-01-22 | End: 2020-01-25 | Stop reason: HOSPADM

## 2020-01-22 RX ORDER — L.ACID,PARA/B.BIFIDUM/S.THERM 8B CELL
1 CAPSULE ORAL DAILY
Status: DISCONTINUED | OUTPATIENT
Start: 2020-01-22 | End: 2020-01-25 | Stop reason: HOSPADM

## 2020-01-22 RX ADMIN — METOPROLOL SUCCINATE 75 MG: 50 TABLET, EXTENDED RELEASE ORAL at 09:46

## 2020-01-22 RX ADMIN — DIGOXIN 125 MCG: 125 TABLET ORAL at 11:59

## 2020-01-22 RX ADMIN — Medication 1 CAPSULE: at 16:02

## 2020-01-22 RX ADMIN — Medication: at 09:47

## 2020-01-22 RX ADMIN — METOPROLOL SUCCINATE 75 MG: 50 TABLET, EXTENDED RELEASE ORAL at 20:46

## 2020-01-22 RX ADMIN — APIXABAN 5 MG: 2.5 TABLET, FILM COATED ORAL at 20:46

## 2020-01-22 RX ADMIN — OXYBUTYNIN CHLORIDE 5 MG: 5 TABLET ORAL at 20:46

## 2020-01-22 RX ADMIN — OXYBUTYNIN CHLORIDE 2.5 MG: 5 TABLET ORAL at 09:46

## 2020-01-22 RX ADMIN — LISINOPRIL 2.5 MG: 2.5 TABLET ORAL at 09:46

## 2020-01-22 RX ADMIN — PANTOPRAZOLE SODIUM 40 MG: 40 TABLET, DELAYED RELEASE ORAL at 06:00

## 2020-01-22 RX ADMIN — Medication: at 20:52

## 2020-01-22 RX ADMIN — ATORVASTATIN CALCIUM 80 MG: 40 TABLET, FILM COATED ORAL at 20:46

## 2020-01-22 RX ADMIN — APIXABAN 5 MG: 2.5 TABLET, FILM COATED ORAL at 09:46

## 2020-01-22 NOTE — NURSING NOTE
Patient has had 12 loose bowel movements and has urinated 17 times tonight within a 12 hour period. Patient requests something to help her stop having loose bowel movements. Will notify MD.

## 2020-01-22 NOTE — PLAN OF CARE
Problem: Patient Care Overview  Goal: Plan of Care Review  Flowsheets (Taken 1/22/2020 1006)  Outcome Summary: PT: Patient performs transfers with supervision and [place]ati x250 feet with supervision and intermittent cues for posture and mechanics.  patient with no episodes of balance loss and manages walker without difficulty.  Discussed plan to discharge from PT services on 1/24/20.  Patient in agreement with plan.

## 2020-01-22 NOTE — THERAPY TREATMENT NOTE
SNF - Occupational Therapy Treatment Note   Rebeca Cochran     Patient Name: Camille Pantoja  : 1955  MRN: [id number]  Today's Date: 2020  Onset of Illness/Injury or Date of Surgery: 01/10/20  Date of Referral to OT: 20  Referring Physician: Richard    Admit Date: 2020       ICD-10-CM ICD-9-CM   1. Oropharyngeal dysphagia R13.12 787.22     Patient Active Problem List   Diagnosis   • Deep vein thrombosis of lower extremity (CMS/HCC)   • Hyperlipidemia   • Hypertension   • Vitamin D deficiency   • Osteoarthritis   • Paroxysmal atrial fibrillation with rapid ventricular response (CMS/HCC)   • Chronic atrial fibrillation with rapid ventricular response   • Atrial fibrillation (CMS/HCC)   • Metabolic encephalopathy   • Cerebral ventriculomegaly   • Acute ischemic left MCA stroke (CMS/HCC)   • Aftercare   • Right hemiparesis (CMS/HCC)     Past Medical History:   Diagnosis Date   • CHF (congestive heart failure) (CMS/HCC)    • Hyperlipidemia    • Hypertension    • Irregular heart beat    • Skin cancer    • Stroke (CMS/HCC)      Past Surgical History:   Procedure Laterality Date   • CARDIAC CATHETERIZATION N/A 1/10/2020    Procedure: Left Heart Cath;  Surgeon: Cain Mcneil MD;  Location: [facility] INVASIVE LOCATION;  Service: Cardiovascular   • CARDIAC CATHETERIZATION N/A 1/10/2020    Procedure: Coronary angiography;  Surgeon: Cain Mcneil MD;  Location: [facility] INVASIVE LOCATION;  Service: Cardiovascular   • FINGER SURGERY     • HYSTERECTOMY     • JOINT REPLACEMENT     • ORIF SHOULDER DISLOCATION W/ HUMERAL FRACTURE     • TIBIA FRACTURE SURGERY         Therapy Treatment    Rehabilitation Treatment Summary     Row Name 20 0855             Treatment Time/Intention    Discipline  occupational therapist  -EN      Document Type  therapy note (daily note)  -EN      Subjective Information  no complaints  -EN      Mode of Treatment  occupational therapy  -EN      Patient/Family  Observations  Patient reclined in bed, RN present.  -EN      Care Plan Review  care plan/treatment goals reviewed;patient/other agree to care plan  -EN      Patient Effort  good  -EN      Comment  Discussed discharge from OT on Friday, 1/24.  Patient interested in home health OT at discharge.  -EN      Recorded by [EN] Bonny Kinney, OTR 01/22/20 1155      Row Name 01/22/20 0855             Cognitive Assessment/Intervention- PT/OT    Personal Safety Interventions  gait belt;nonskid shoes/slippers when out of bed  -EN      Recorded by [EN] Bonny Kinney, OTR 01/22/20 1155      Row Name 01/22/20 0855             Bed Mobility Assessment/Treatment    Bed Mobility Assessment/Treatment  supine-sit  -EN      Supine-Sit [place] (Bed Mobility)  conditional independence  -EN      Recorded by [EN] Bonny Kinney, OTR 01/22/20 1155      Row Name 01/22/20 0855             Functional Mobility    Functional Mobility- Ind. Level  supervision required  -EN      Functional Mobility- Device  rolling walker  -EN      Functional Mobility- Comment  Patient performed functional mobility from room to gym.  Patient reported she had to return to room to go to the restroom.  Patient then returned to the gym.  Patient with no LOB and used rolling walker with supervision.  -EN      Recorded by [EN] Bonny Kinney, OTR 01/22/20 1155      Row Name 01/22/20 0855             Sit-Stand Transfer    Sit-Stand [place] (Transfers)  supervision  -EN      Assistive Device (Sit-Stand Transfers)  walker, front-wheeled  -EN      Recorded by [EN] Bonny Kinney, OTR 01/22/20 1155      Row Name 01/22/20 0855             Stand-Sit Transfer    Stand-Sit [place] (Transfers)  supervision  -EN      Assistive Device (Stand-Sit Transfers)  walker, front-wheeled  -EN      Recorded by [EN] Bonny Kinney, OTR 01/22/20 1155      Row Name 01/22/20 0855             Toilet Transfer    Type (Toilet Transfer)  stand  "pivot/stand step  -EN      [place] Level (Toilet Transfer)  supervision  -EN      Assistive Device (Toilet Transfer)  commode, bedside without drop arms;walker, front-wheeled  -EN      Recorded by [EN] Bonny Kinney, OTR 01/22/20 1155      Row Name 01/22/20 0855             Upper Body Dressing Assessment/Training    Upper Body Dressing [place] Level  don;pull-over garment  -EN      Comment (Upper Body Dressing)  donned sweatshirt independently, declined pant due to \"bathroom issues\"  -EN      Recorded by [EN] Bonny Kinney, OTR 01/22/20 1155      Row Name 01/22/20 0855             Dynamic Standing Balance    Level of [place], Reaches Outside Midline (Standing, Dynamic Balance)  supervision  -EN      Time Able to Maintain Position, Reaches Outside Midline (Standing, Dynamic Balance)  more than 5 minutes  -EN      Assistive Device Utilized (Supported Standing, Dynamic Balance)  walker, rolling  -EN      Comment, Resists Mild Perturbations (Sitting, Dynamic Balance)  Patient stood and performed arm arc X 2 without LOB.  -EN      Recorded by [EN] Bonny Kinney, OTR 01/22/20 1155      Row Name 01/22/20 0855             Positioning and Restraints    Pre-Treatment Position  in bed  -EN      Post Treatment Position  chair  -EN      In Chair  reclined;encouraged to call for assist;call light within reach;exit alarm on  -EN      Recorded by [EN] Bonny Kinney, OTR 01/22/20 1155      Row Name 01/22/20 0855             Pain Scale: Numbers Pre/Post-Treatment    Pain Scale: Numbers, Pretreatment  0/10 - no pain  -EN      Pain Scale: Numbers, Post-Treatment  0/10 - no pain  -EN      Recorded by [EN] Bonny Kinney, OTR 01/22/20 1155      Row Name 01/22/20 0855             Plan of Care Review    Plan of Care Reviewed With  patient  -EN      Outcome Summary  OT- Patient performed functional transfers, mobility and dynamic standing activites with supervision. Patient had no LOB.  " Patient participated in hand exercises (shuffled cards).  Encouraged to continue to work on fine motor exercises and hand strengthening.  -EN      Recorded by [EN] Bonny Kinney, OTR 01/22/20 1155      Row Name 01/22/20 0855             Outcome Summary/Treatment Plan (OT)    Daily Summary of Progress (OT)  progress toward functional goals as expected  -EN      Recorded by [EN] Bonny Kinney, OTR 01/22/20 1155        User Key  (r) = Recorded By, (t) = Taken By, (c) = Cosigned By    Initials Name Effective Dates Discipline    EN Bonny Kinney, OTR 06/22/16 -  OT                 OT Recommendation and Plan  Outcome Summary/Treatment Plan (OT)  Daily Summary of Progress (OT): progress toward functional goals as expected  Daily Summary of Progress (OT): progress toward functional goals as expected  Plan of Care Review  Plan of Care Reviewed With: patient  Plan of Care Reviewed With: patient  Outcome Summary: OT- Patient performed functional transfers, mobility and dynamic standing activites with supervision. Patient had no LOB.  Patient participated in hand exercises (shuffled cards).  Encouraged to continue to work on fine motor exercises and hand strengthening.       Time Calculation:   Time Calculation- OT     Row Name 01/22/20 1158             Time Calculation- OT    OT Start Time  0855  -EN      OT Stop Time  0937  -EN      OT Time Calculation (min)  42 min  -EN      TCU Minutes- OT  42 min  -EN         Timed Charges    26372 - OT Therapeutic Activity Minutes  42  -EN        User Key  (r) = Recorded By, (t) = Taken By, (c) = Cosigned By    Initials Name Provider Type    EN Bonny Kinney OTR Occupational Therapist        Therapy Charges for Today     Code Description Service Date Service Provider Modifiers Qty    50110293796  OT SELF CARE/MGMT/TRAIN EA 15 MIN 1/21/2020 Bonny Kinney OTR GO 2    31933982902  OT THERAPEUTIC ACT EA 15 MIN 1/22/2020 Bonny Kinney OTR GO 3                Bonny Kinney, OTR  1/22/2020

## 2020-01-22 NOTE — PLAN OF CARE
Problem: Patient Care Overview  Goal: Plan of Care Review  Flowsheets (Taken 1/22/2020 0855)  Outcome Summary: OT- Patient performed functional transfers, mobility and dynamic standing activites with supervision. Patient had no LOB.  Patient participated in hand exercises (shuffled cards).  Encouraged to continue to work on fine motor exercises and hand strengthening.  Discussed discharge from OT on 1/24/19.  Patient interested in home health OT.

## 2020-01-22 NOTE — THERAPY TREATMENT NOTE
SNF - Physical Therapy Treatment Note   Rebeca Cochran     Patient Name: Camille Pantoja  : 1955  MRN: [id number]  Today's Date: 2020  Onset of Illness/Injury or Date of Surgery: 01/10/20  Date of Referral to PT: 20  Referring Physician: Richard    Admit Date: 2020    Visit Dx:    ICD-10-CM ICD-9-CM   1. Oropharyngeal dysphagia R13.12 787.22     Patient Active Problem List   Diagnosis   • Deep vein thrombosis of lower extremity (CMS/HCC)   • Hyperlipidemia   • Hypertension   • Vitamin D deficiency   • Osteoarthritis   • Paroxysmal atrial fibrillation with rapid ventricular response (CMS/HCC)   • Chronic atrial fibrillation with rapid ventricular response   • Atrial fibrillation (CMS/HCC)   • Metabolic encephalopathy   • Cerebral ventriculomegaly   • Acute ischemic left MCA stroke (CMS/HCC)   • Aftercare   • Right hemiparesis (CMS/HCC)       Therapy Treatment    Rehabilitation Treatment Summary     Row Name 20 1006 20 0855          Treatment Time/Intention    Discipline  physical therapist  -JW  occupational therapist  -EN     Document Type  therapy note (daily note)  -JW  therapy note (daily note)  -EN     Subjective Information  no complaints  -JW  no complaints  -EN     Mode of Treatment  physical therapy  -JW  occupational therapy  -EN     Patient/Family Observations  pt in recliner, agreeable to therapy  -  Patient reclined in bed, RN present.  -EN     Care Plan Review  care plan/treatment goals reviewed;risks/benefits reviewed;patient/other agree to care plan  -  care plan/treatment goals reviewed;patient/other agree to care plan  -EN     Patient Effort  adequate  -  good  -EN     Comment  --  Discussed discharge from OT on Friday, .  Patient interested in home health OT at discharge.  -EN     Existing Precautions/Restrictions  fall  -  --     Recorded by [JW] Kennedi Gay, PT 20 1223 [EN] Bonny Kinney, OTR 20 1155     Row Name 20 6164              Cognitive Assessment/Intervention- PT/OT    Personal Safety Interventions  gait belt;nonskid shoes/slippers when out of bed  -EN      Recorded by [EN] Bonny Kinney, OTR 01/22/20 1155      Row Name 01/22/20 1006 01/22/20 0855          Bed Mobility Assessment/Treatment    Bed Mobility Assessment/Treatment  --  supine-sit  -EN     Supine-Sit [place] (Bed Mobility)  --  conditional independence  -EN     Comment (Bed Mobility)  deferred-up in chair  -JW  --     Recorded by [JW] Kennedi aGy, PT 01/22/20 1223 [EN] Bonny Kinney, OTR 01/22/20 1155     Row Name 01/22/20 0855             Functional Mobility    Functional Mobility- Ind. Level  supervision required  -EN      Functional Mobility- Device  rolling walker  -EN      Functional Mobility- Comment  Patient performed functional mobility from room to gym.  Patient reported she had to return to room to go to the restroom.  Patient then returned to the gym.  Patient with no LOB and used rolling walker with supervision.  -EN      Recorded by [EN] Bonny Kinney, OTR 01/22/20 1155      Row Name 01/22/20 1006 01/22/20 0855          Sit-Stand Transfer    Sit-Stand [place] (Transfers)  supervision  -  supervision  -EN     Assistive Device (Sit-Stand Transfers)  walker, front-wheeled  -  walker, front-wheeled  -EN     Recorded by [JW] Kennedi Gay, PT 01/22/20 1223 [EN] Bonny Kinney, OTR 01/22/20 1155     Row Name 01/22/20 1006 01/22/20 0855          Stand-Sit Transfer    Stand-Sit [place] (Transfers)  supervision  -  supervision  -EN     Assistive Device (Stand-Sit Transfers)  walker, front-wheeled  -  walker, front-wheeled  -EN     Recorded by [JW] Kennedi Gay, PT 01/22/20 1223 [EN] Bonny Kinney, OTR 01/22/20 1155     Row Name 01/22/20 0855             Toilet Transfer    Type (Toilet Transfer)  stand pivot/stand step  -EN      [place] Level (Toilet Transfer)  supervision  -EN      Assistive  "Device (Toilet Transfer)  commode, bedside without drop arms;walker, front-wheeled  -EN      Recorded by [EN] Bonny Kinney, OTR 01/22/20 1155      Row Name 01/22/20 1006             Gait/Stairs Assessment/Training    [place] Level (Gait)  supervision;verbal cues  -JW      Assistive Device (Gait)  walker, front-wheeled  -JW      Distance in Feet (Gait)  250  -JW      Pattern (Gait)  swing-through  -JW      Deviations/Abnormal Patterns (Gait)  ataxic;huyen decreased  -JW      Bilateral Gait Deviations  forward flexed posture  -JW      Right Sided Gait Deviations  heel strike decreased  -JW      Comment (Gait/Stairs)  pt requires verbal cues for upright posture, increased base of support and proper distance from walker.  Patient able to maintain for 15-20 feet before requiring additional cues.  pt manages direction changes without difficulty.  -JW      Recorded by [JW] Kennedi Gay, PT 01/22/20 1223      Row Name 01/22/20 0855             Upper Body Dressing Assessment/Training    Upper Body Dressing [place] Level  don;pull-over garment  -EN      Comment (Upper Body Dressing)  donned sweatshirt independently, declined pant due to \"bathroom issues\"  -EN      Recorded by [EN] Bonny Kinney, OTR 01/22/20 1155      Row Name 01/22/20 1006             Therapeutic Exercise    Therapeutic Exercise  supine, lower extremities  -JW      Recorded by [JW] Kennedi Gay, PT 01/22/20 1223      Row Name 01/22/20 1006             Lower Extremity Seated Therapeutic Exercise    Performed, Seated Lower Extremity (Therapeutic Exercise)  LAQ (long arc quad), knee extension;knee flexion/extension  -JW      Exercise Type, Seated Lower Extremity (Therapeutic Exercise)  AROM (active range of motion)  -JW      Sets/Reps Detail, Seated Lower Extremity (Therapeutic Exercise)  1/15  -JW      Recorded by [JW] Kennedi Gay, PT 01/22/20 1223      Row Name 01/22/20 1006             Lower Extremity Supine Therapeutic " Exercise    Performed, Supine Lower Extremity (Therapeutic Exercise)  hip abduction/adduction;SLR (straight leg raise);quadriceps sets;gluteal sets;ankle pumps;heel slides  -JW      Sets/Reps Detail, Supine Lower Extremity (Therapeutic Exercise)  1/15  -JW      Recorded by [JW] Kennedi Gay, PT 01/22/20 1223      Row Name 01/22/20 0855             Dynamic Standing Balance    Level of [place], Reaches Outside Midline (Standing, Dynamic Balance)  supervision  -EN      Time Able to Maintain Position, Reaches Outside Midline (Standing, Dynamic Balance)  more than 5 minutes  -EN      Assistive Device Utilized (Supported Standing, Dynamic Balance)  walker, rolling  -EN      Comment, Resists Mild Perturbations (Sitting, Dynamic Balance)  Patient stood and performed arm arc X 2 without LOB.  -EN      Recorded by [EN] Bonny Kinney OTR 01/22/20 1155      Row Name 01/22/20 1006 01/22/20 0855          Positioning and Restraints    Pre-Treatment Position  sitting in chair/recliner  -JW  in bed  -EN     Post Treatment Position  chair  -JW  chair  -EN     In Chair  reclined;call light within reach;encouraged to call for assist;exit alarm on  -JW  reclined;encouraged to call for assist;call light within reach;exit alarm on  -EN     Recorded by [JW] Kennedi Gay, PT 01/22/20 1223 [EN] Bonny Kinney, OTR 01/22/20 1155     Row Name 01/22/20 1006 01/22/20 0855          Pain Scale: Numbers Pre/Post-Treatment    Pain Scale: Numbers, Pretreatment  0/10 - no pain  -  0/10 - no pain  -EN     Pain Scale: Numbers, Post-Treatment  0/10 - no pain  -JW  0/10 - no pain  -EN     Recorded by [JW] Kennedi Gay, PT 01/22/20 1223 [EN] Bonny Kinney, OTR 01/22/20 1155     Row Name 01/22/20 1006 01/22/20 0855          Plan of Care Review    Plan of Care Reviewed With  patient  -JW  patient  -EN     Outcome Summary  PT: Patient performs transfers with supervision and gati x250 feet with supervision and intermittent  cues for posture and mechanics.  patient with no episodes of balance loss and manages walker without difficulty.  Discussed plan to discharge from PT services on 1/24/20.  Patient in agreement with plan.  -ANISHA  OT- Patient performed functional transfers, mobility and dynamic standing activites with supervision. Patient had no LOB.  Patient participated in hand exercises (shuffled cards).  Encouraged to continue to work on fine motor exercises and hand strengthening.  -EN     Recorded by [JW] Kennedi Gay, PT 01/22/20 1223 [EN] Bonny Kinney, OTR 01/22/20 1155     Row Name 01/22/20 0855             Outcome Summary/Treatment Plan (OT)    Daily Summary of Progress (OT)  progress toward functional goals as expected  -EN      Recorded by [EN] Bonny Kinney, OTR 01/22/20 1155      Row Name 01/22/20 1006             Outcome Summary/Treatment Plan (PT)    Daily Summary of Progress (PT)  progress toward functional goals is good  -ANISHA      Anticipated Discharge Disposition (PT)  home with home health  -JW      Recorded by [JW] Kennedi Gay, PT 01/22/20 1223        User Key  (r) = Recorded By, (t) = Taken By, (c) = Cosigned By    Initials Name Effective Dates Discipline    Bonny Fofana, OTR 06/22/16 -  OT    Kennedi Davalos, PT 04/03/18 -  PT                       PT Recommendation and Plan  Anticipated Discharge Disposition (PT): home with home health  Planned Therapy Interventions (PT Eval): balance training, bed mobility training, gait training, home exercise program, patient/family education, strengthening, stair training, transfer training  Therapy Frequency (PT Clinical Impression): daily  Outcome Summary/Treatment Plan (PT)  Daily Summary of Progress (PT): progress toward functional goals is good  Barriers to Overall Progress (PT): easily distracted  Anticipated Equipment Needs at Discharge (PT): front wheeled walker  Anticipated Discharge Disposition (PT): home with home health  Plan of  Care Reviewed With: patient  Outcome Summary: PT: Patient performs transfers with supervision and gati x250 feet with supervision and intermittent cues for posture and mechanics.  patient with no episodes of balance loss and manages walker without difficulty.  Discussed plan to discharge from PT services on 1/24/20.  Patient in agreement with plan.     Time Calculation:   PT Charges     Row Name 01/22/20 1224             Time Calculation    Start Time  1006  -      Stop Time  1023  -      Time Calculation (min)  17 min  -      PT Received On  01/22/20  -ANISHA      PT - Next Appointment  01/23/20  -         Timed Charges    63852 - PT Therapeutic Exercise Minutes  17  -        User Key  (r) = Recorded By, (t) = Taken By, (c) = Cosigned By    Initials Name Provider Type    Kennedi Davalos, PT Physical Therapist        Therapy Charges for Today     Code Description Service Date Service Provider Modifiers Qty    [id number]  GAIT TRAINING EA 15 MIN 1/21/2020 Kennedi Gay, PT GP 1    [id number]  PT THER PROC EA 15 MIN 1/22/2020 Kennedi Gay, PT GP 1               Kennedi Gay PT  1/22/2020

## 2020-01-22 NOTE — PROGRESS NOTES
"SERVICE: [facility] HOSPITALIST    CONSULTANTS:    CHIEF COMPLAINT: Follow-up aftercare, diarrhea    SUBJECTIVE: Staff notes patient had over 12 episodes of explosive watery diarrhea overnight.  They also note that she urinated 17 times throughout the night.  The patient notes that she has a history of multiple allergies that at times cause diarrhea but not this much.  She notes she is progressing with PT/OT and otherwise denies f/c/cough/soa/chest pain/n/v/d/abdominal pain or other new concerns.    OBJECTIVE:    /72 (BP Location: Left arm, Patient Position: Sitting)   Pulse 92   Temp 97.9 °F (36.6 °C) (Oral)   Resp 16   Ht 162.6 cm (64\")   Wt 54.5 kg (120 lb 2.4 oz)   SpO2 98%   BMI 20.62 kg/m²     MEDS/LABS REVIEWED AND ORDERED    ammonium lactate  Topical BID   apixaban 5 mg Oral Q12H   atorvastatin 80 mg Oral Nightly   digoxin 125 mcg Oral Daily   lisinopril 2.5 mg Oral Q24H   metoprolol succinate XL 75 mg Oral Q12H   oxybutynin 5 mg Oral BID   pantoprazole 40 mg Oral QAM       Physical Exam   Constitutional: She is oriented to person, place, and time.   Thin, appears much older than stated age   HENT:   Head: Normocephalic and atraumatic.   Eyes: Pupils are equal, round, and reactive to light. EOM are normal.   Cardiovascular: Normal rate.   Irregular rhythm   Pulmonary/Chest: Effort normal and breath sounds normal. No stridor. No respiratory distress. She has no wheezes.   Abdominal: Soft. Bowel sounds are normal. She exhibits no distension. There is no tenderness. There is no guarding.   Musculoskeletal: She exhibits no edema.   Neurological: She is alert and oriented to person, place, and time.   Strength 3/5 RUE/RLE  Strength 5/5 LUE/LLE   Skin: Skin is warm and dry. No erythema.   Psychiatric: She has a normal mood and affect. Her behavior is normal.   Vitals reviewed.    LAB/DIAGNOSTICS:    Lab Results (last 24 hours)     ** No results found for the last 24 hours. **    "     No orders to display     Results for orders placed during the hospital encounter of 01/09/20   Transthoracic Echo Complete With Contrast if Necessary Per Protocol    Narrative · The left ventricular cavity is moderately dilated.  · Left ventricular wall thickness is consistent with mild concentric   hypertrophy.  · Calculated EF = 27.0%.  · Left ventricular systolic function is severely decreased.  · The following left ventricular wall segments are hypokinetic: mid   anterior, apical anterior, basal anterolateral, mid inferolateral, basal   inferoseptal, mid inferoseptal, basal anterior and basal inferoseptal. The   following left ventricular wall segments are akinetic: mid anterolateral,   apical lateral, apical inferior, basal inferior, mid inferior, basal   anteroseptal and mid anteroseptal.  · Left atrial cavity size is severely dilated.  · Right atrial cavity size is severely dilated.  · Right ventricular cavity is moderately dilated.  · Moderately reduced right ventricular systolic function noted.  · Mild tricuspid valve regurgitation is present.  · Mild mitral valve regurgitation is present        No radiology results for the last day    ASSESSMENT/PLAN:  Diarrhea:   With recent hospitalization will check GI panel and C. Difficile  Add probiotic  Await results  Patient of Dr. Lynn outpatient in past    Aftercare 2/2 recent left hemispheric subcortical stroke:  Cerebral ventriculomegaly:  remains on Eliquis 5 mg twice daily  PT/OT ongoing  Monitor     Chronic A. Fib:   NICM:   Hypertension:   CAD/HLD D: Cardiology following  EF 27%  Continues Eliquis 5 mg twice daily, Lipitor 80 mg nightly Heart rate/BP at goal on digoxin 125 mcg daily, lisinopril 2.5 mg daily, metoprolol succinate 75 mg every 12 hours    Urinary frequency/detrussor instability: Evaluated by urology  Increase oxybutynin to 5 mg every 12 hours, UA C&S done yesterday does not meet criteria for culture  Monitor    History of DVTs:  "Already on Eliquis as above    Left thyroid nodule incidental finding:  TSH normal on 1/10/2020, plan continued monitoring with PCP after discharge    Body mass index is 20.62 kg/m².     PLAN FOR DISPOSITION: Home when able`    JOHN Puri  Hospitalist, [facility]  01/22/20  11:58 AM    \"Dictated utilizing Dragon dictation\"    "

## 2020-01-22 NOTE — PLAN OF CARE
Problem: Patient Care Overview  Goal: Plan of Care Review  Outcome: Ongoing (interventions implemented as appropriate)  Flowsheets (Taken 1/22/2020 7427)  Progress: no change  Plan of Care Reviewed With: patient  Note:   Continues with therapy     Polite and cooperative with care.  New orders obtained today.  Patient has not had another bowel movement since this morning. Safety measures in place.

## 2020-01-23 PROCEDURE — 94799 UNLISTED PULMONARY SVC/PX: CPT

## 2020-01-23 PROCEDURE — 97530 THERAPEUTIC ACTIVITIES: CPT

## 2020-01-23 PROCEDURE — 97116 GAIT TRAINING THERAPY: CPT

## 2020-01-23 RX ADMIN — OXYBUTYNIN CHLORIDE 5 MG: 5 TABLET ORAL at 20:16

## 2020-01-23 RX ADMIN — APIXABAN 5 MG: 2.5 TABLET, FILM COATED ORAL at 11:15

## 2020-01-23 RX ADMIN — METOPROLOL SUCCINATE 75 MG: 50 TABLET, EXTENDED RELEASE ORAL at 11:15

## 2020-01-23 RX ADMIN — ATORVASTATIN CALCIUM 80 MG: 40 TABLET, FILM COATED ORAL at 20:15

## 2020-01-23 RX ADMIN — DIGOXIN 125 MCG: 125 TABLET ORAL at 11:15

## 2020-01-23 RX ADMIN — MELATONIN TAB 5 MG 5 MG: 5 TAB at 20:16

## 2020-01-23 RX ADMIN — Medication: at 11:18

## 2020-01-23 RX ADMIN — OXYBUTYNIN CHLORIDE 5 MG: 5 TABLET ORAL at 11:16

## 2020-01-23 RX ADMIN — METOPROLOL SUCCINATE 75 MG: 50 TABLET, EXTENDED RELEASE ORAL at 20:15

## 2020-01-23 RX ADMIN — LISINOPRIL 2.5 MG: 2.5 TABLET ORAL at 11:15

## 2020-01-23 RX ADMIN — PANTOPRAZOLE SODIUM 40 MG: 40 TABLET, DELAYED RELEASE ORAL at 06:03

## 2020-01-23 RX ADMIN — Medication 1 CAPSULE: at 11:15

## 2020-01-23 RX ADMIN — APIXABAN 5 MG: 2.5 TABLET, FILM COATED ORAL at 20:15

## 2020-01-23 NOTE — PLAN OF CARE
Problem: Patient Care Overview  Goal: Plan of Care Review  Flowsheets (Taken 1/23/2020 1000)  Outcome Summary: OT: pt performed hand strengthening and fine motor activites from EOB. pts  strength improved and pt improved scores on 9 hole peg test. pt requires supervision for functional transfers and mobility in the room. pts plan is to return home this weekend with home health therapies

## 2020-01-23 NOTE — PLAN OF CARE
PT: Patient performs bed mobility with conditional independence.  Patient performs transfers with conditional independence and gait x225 feet with rolling walker with supervision.  Patient requires verbal cues for upright posture, increased base of support and equal step length bilaterally.  Patient able to correct gait mechanics for approximately 10-15 feet before requiring additional cues.   Plan to discharge from PT services on 1/24/20, pt in agreement with plan.

## 2020-01-23 NOTE — THERAPY TREATMENT NOTE
SNF - Physical Therapy Treatment Note   Rebeca Cochran     Patient Name: Camille Pantoja  : 1955  MRN: [id number]  Today's Date: 2020  Onset of Illness/Injury or Date of Surgery: 01/10/20  Date of Referral to PT: 20  Referring Physician: Richard    Admit Date: 2020    Visit Dx:    ICD-10-CM ICD-9-CM   1. Oropharyngeal dysphagia R13.12 787.22     Patient Active Problem List   Diagnosis   • Deep vein thrombosis of lower extremity (CMS/HCC)   • Hyperlipidemia   • Hypertension   • Vitamin D deficiency   • Osteoarthritis   • Paroxysmal atrial fibrillation with rapid ventricular response (CMS/HCC)   • Chronic atrial fibrillation with rapid ventricular response   • Atrial fibrillation (CMS/HCC)   • Metabolic encephalopathy   • Cerebral ventriculomegaly   • Acute ischemic left MCA stroke (CMS/HCC)   • Aftercare   • Right hemiparesis (CMS/HCC)       Therapy Treatment    Rehabilitation Treatment Summary     Row Name 20 1000 20 0905          Treatment Time/Intention    Discipline  occupational therapist  -J  physical therapist  -     Document Type  therapy note (daily note)  -  therapy note (daily note)  -     Subjective Information  no complaints  -  no complaints  -     Mode of Treatment  occupational therapy  -  physical therapy  -     Patient/Family Observations  pt in bed agreed to treatment  -  pt supine, agreeable to therapy  -     Care Plan Review  care plan/treatment goals reviewed  -  care plan/treatment goals reviewed;risks/benefits reviewed;patient/other agree to care plan  -     Patient Effort  adequate  -  adequate  -     Existing Precautions/Restrictions  fall  -  fall  -     Patient Response to Treatment  --  plan to discharge from PT services on 20.  -JW     Recorded by [JJ] Pinky Ken, OTR 20 1156 [JW] Kennedi Gay, PT 20 1331     Row Name 20 0905             Cognitive Assessment/Intervention- PT/OT     Personal Safety Interventions  gait belt;nonskid shoes/slippers when out of bed  -JW      Recorded by [JW] Kennedi Gay, PT 01/23/20 1331      Row Name 01/23/20 1000 01/23/20 0905          Bed Mobility Assessment/Treatment    Supine-Sit [place] (Bed Mobility)  conditional independence  -  conditional independence  -JW     Sit-Supine [place] (Bed Mobility)  --  conditional independence  -     Assistive Device (Bed Mobility)  bed rails  -  bed rails  -JW     Recorded by [JJ] Pinky Ken, OTR 01/23/20 1156 [JW] Kennedi Gay, PT 01/23/20 1331     Row Name 01/23/20 1000 01/23/20 0905          Sit-Stand Transfer    Sit-Stand [place] (Transfers)  supervision  -  conditional independence  -     Assistive Device (Sit-Stand Transfers)  walker, front-wheeled  -  walker, front-wheeled  -JW     Recorded by [JJ] Pinky Ken, OTR 01/23/20 1156 [JW] Kennedi Gay, PT 01/23/20 1331     Row Name 01/23/20 1000 01/23/20 0905          Stand-Sit Transfer    Stand-Sit [place] (Transfers)  supervision  -  conditional independence  -     Assistive Device (Stand-Sit Transfers)  walker, front-wheeled  -  walker, front-wheeled  -JW     Recorded by [JJ] Pinky Ken, OTR 01/23/20 1156 [JW] Kennedi Gay, PT 01/23/20 1331     Row Name 01/23/20 0905             Gait/Stairs Assessment/Training    [place] Level (Gait)  supervision  -      Assistive Device (Gait)  walker, front-wheeled  -      Distance in Feet (Gait)  225  -JW      Pattern (Gait)  swing-through  -JW      Deviations/Abnormal Patterns (Gait)  base of support, narrow;antalgic;huyen decreased  -JW      Bilateral Gait Deviations  forward flexed posture  -JW      Right Sided Gait Deviations  heel strike decreased  -JW      Comment (Gait/Stairs)  patient requires verbal cues for upright posture and equal step length bilaterally, cues for increased base of support.  Patient able to correct for 10-15  feet before requiring additional cues from therapist.  -JW      Recorded by [JW] Kennedi Gay, PT 01/23/20 1331      Row Name 01/23/20 1000 01/23/20 0905          Therapeutic Exercise    Comment (Therapeutic Exercise)  pt completed hand strengthening and fine motor activites from EOB. pt completed 9 hole peg test L 44.54 seconds and R 47.59 and L  strength 29# and R  strength 22#.  -JJ  reviewed LE HEP, pt declines concerns  -JW     Recorded by [JJ] Pinky Ken, OTR 01/23/20 1156 [JW] Kennedi Gay, PT 01/23/20 1331     Row Name 01/23/20 1000 01/23/20 0905          Positioning and Restraints    Pre-Treatment Position  in bed  -  in bed  -JW     Post Treatment Position  bed  -  bed  -JW     In Bed  sitting EOB;call light within reach  -  supine;call light within reach;encouraged to call for assist;notified nsg  -JW     Recorded by [JJ] Pinky Ken, OTR 01/23/20 1308 [JW] Kennedi Gay, PT 01/23/20 1331     Row Name 01/23/20 1000 01/23/20 0905          Pain Scale: Numbers Pre/Post-Treatment    Pain Scale: Numbers, Pretreatment  0/10 - no pain  -  3/10  -     Pain Scale: Numbers, Post-Treatment  0/10 - no pain  -  4/10  -     Pre/Post Treatment Pain Comment  --  generalized pain  -JW     Recorded by [JJ] Pinky Ken, OTR 01/23/20 1308 [JW] Kennedi Gay, PT 01/23/20 1331     Row Name 01/23/20 1000 01/23/20 0905          Plan of Care Review    Plan of Care Reviewed With  patient  -KIMBERLEE  patient  -     Outcome Summary  OT: pt performed hand strengthening and fine motor activites from EOB. pts  strength improved and pt improved scores on 9 hole peg test. pt requires supervision for functional transfers and mobility in the room. pts plan is to return home this weekend with home health therapies  -  PT: Patient performs bed mobility with conditional independence.  Patient performs transfers with conditional independence and gait x225 feet with rolling  walker with supervision.  Patient requires verbal cues for upright posture, increased base of support and equal step length bilaterally.  Patient able to correct gait mechanics for approximately 10-15 feet before requiring additional cues.   Plan to discharge from PT services on 1/24/20, pt in agreement with plan.  -ANISHA     Recorded by [KIMBERLEE] Pinky Ken, OTR 01/23/20 1308 [JW] Kennedi Gay, PT 01/23/20 1331     Row Name 01/23/20 1000             Outcome Summary/Treatment Plan (OT)    Daily Summary of Progress (OT)  progress toward functional goals as expected  -KIMBERLEE      Plan for Continued Treatment (OT)  cont poc  -DIANEJ      Recorded by [JJ] Pinky Ken, OTR 01/23/20 1308      Row Name 01/23/20 0905             Outcome Summary/Treatment Plan (PT)    Daily Summary of Progress (PT)  progress toward functional goals as expected  -ANISHA      Anticipated Equipment Needs at Discharge (PT)  front wheeled walker  -ANISHA      Anticipated Discharge Disposition (PT)  home with home health  -ANISHA      Recorded by [JW] Kennedi Gay, PT 01/23/20 1331        User Key  (r) = Recorded By, (t) = Taken By, (c) = Cosigned By    Initials Name Effective Dates Discipline    Pinky Cortes, OTR 06/22/16 -  OT    Kennedi Davalos, PT 04/03/18 -  PT                       PT Recommendation and Plan  Anticipated Discharge Disposition (PT): home with home health  Planned Therapy Interventions (PT Eval): balance training, bed mobility training, gait training, home exercise program, patient/family education, strengthening, stair training, transfer training  Therapy Frequency (PT Clinical Impression): daily  Outcome Summary/Treatment Plan (PT)  Daily Summary of Progress (PT): progress toward functional goals as expected  Barriers to Overall Progress (PT): easily distracted  Anticipated Equipment Needs at Discharge (PT): front wheeled walker  Anticipated Discharge Disposition (PT): home with home health  Plan of Care  Reviewed With: patient  Outcome Summary: PT: Patient performs bed mobility with conditional independence.  Patient performs transfers with conditional independence and gait x225 feet with rolling walker with supervision.  Patient requires verbal cues for upright posture, increased base of support and equal step length bilaterally.  Patient able to correct gait mechanics for approximately 10-15 feet before requiring additional cues.   Plan to discharge from PT services on 1/24/20, pt in agreement with plan.     Time Calculation:   PT Charges     Row Name 01/23/20 1332             Time Calculation    Start Time  0905  -      Stop Time  0923  -      Time Calculation (min)  18 min  -      PT Received On  01/23/20  -ANISHA      PT - Next Appointment  01/24/20  -ANISHA         Time Calculation- PT    TCU Minutes- PT  18 min  -         Timed Charges    05143 - Gait Training Minutes   18  -        User Key  (r) = Recorded By, (t) = Taken By, (c) = Cosigned By    Initials Name Provider Type    Kennedi Davalos PT Physical Therapist        Therapy Charges for Today     Code Description Service Date Service Provider Modifiers Qty    71877593761 HC PT THER PROC EA 15 MIN 1/22/2020 Kennedi Gay, PT GP 1    10627394468 HC GAIT TRAINING EA 15 MIN 1/23/2020 Kennedi Gay, PT GP 1          PT G-Codes  AM-PAC 6 Clicks Score (PT): 16    Kennedi Gay PT  1/23/2020

## 2020-01-23 NOTE — THERAPY TREATMENT NOTE
SNF - Occupational Therapy Treatment Note   Rebeca Cochran     Patient Name: Camille Pantoja  : 1955  MRN: [id number]  Today's Date: 2020  Onset of Illness/Injury or Date of Surgery: 01/10/20  Date of Referral to OT: 20  Referring Physician: Richard    Admit Date: 2020       ICD-10-CM ICD-9-CM   1. Oropharyngeal dysphagia R13.12 787.22     Patient Active Problem List   Diagnosis   • Deep vein thrombosis of lower extremity (CMS/HCC)   • Hyperlipidemia   • Hypertension   • Vitamin D deficiency   • Osteoarthritis   • Paroxysmal atrial fibrillation with rapid ventricular response (CMS/HCC)   • Chronic atrial fibrillation with rapid ventricular response   • Atrial fibrillation (CMS/HCC)   • Metabolic encephalopathy   • Cerebral ventriculomegaly   • Acute ischemic left MCA stroke (CMS/HCC)   • Aftercare   • Right hemiparesis (CMS/HCC)     Past Medical History:   Diagnosis Date   • CHF (congestive heart failure) (CMS/HCC)    • Hyperlipidemia    • Hypertension    • Irregular heart beat    • Skin cancer    • Stroke (CMS/HCC)      Past Surgical History:   Procedure Laterality Date   • CARDIAC CATHETERIZATION N/A 1/10/2020    Procedure: Left Heart Cath;  Surgeon: Cain Mcneil MD;  Location: [facility] INVASIVE LOCATION;  Service: Cardiovascular   • CARDIAC CATHETERIZATION N/A 1/10/2020    Procedure: Coronary angiography;  Surgeon: Cain Mcneil MD;  Location: [facility] INVASIVE LOCATION;  Service: Cardiovascular   • FINGER SURGERY     • HYSTERECTOMY     • JOINT REPLACEMENT     • ORIF SHOULDER DISLOCATION W/ HUMERAL FRACTURE     • TIBIA FRACTURE SURGERY         Therapy Treatment    Rehabilitation Treatment Summary     Row Name 20 1000             Treatment Time/Intention    Discipline  occupational therapist  -JJ      Document Type  therapy note (daily note)  -JJ      Subjective Information  no complaints  -JJ      Mode of Treatment  occupational therapy  -JJ      Patient/Family  Observations  pt in bed agreed to treatment  -JJ      Care Plan Review  care plan/treatment goals reviewed  -JJ      Patient Effort  adequate  -JJ      Existing Precautions/Restrictions  fall  -JJ      Recorded by [JJ] Pinky Ken, OTR 01/23/20 1156      Row Name 01/23/20 1000             Bed Mobility Assessment/Treatment    Supine-Sit [place] (Bed Mobility)  conditional independence  -JJ      Assistive Device (Bed Mobility)  bed rails  -JJ      Recorded by [JJ] Pinky Ken, OTR 01/23/20 1156      Row Name 01/23/20 1000             Sit-Stand Transfer    Sit-Stand [place] (Transfers)  supervision  -JJ      Assistive Device (Sit-Stand Transfers)  walker, front-wheeled  -JJ      Recorded by [JJ] Pinky Ken, OTR 01/23/20 1156      Row Name 01/23/20 1000             Stand-Sit Transfer    Stand-Sit [place] (Transfers)  supervision  -JJ      Assistive Device (Stand-Sit Transfers)  walker, front-wheeled  -JJ      Recorded by [JJ] Pinky Ken, OTR 01/23/20 1156      Row Name 01/23/20 1000             Therapeutic Exercise    Comment (Therapeutic Exercise)  pt completed hand strengthening and fine motor activites from EOB. pt completed 9 hole peg test L 44.54 seconds and R 47.59 and L  strength 29# and R  strength 22#.  -JJ      Recorded by [JJ] Pinky Ken, OTR 01/23/20 1156      Row Name 01/23/20 1000             Positioning and Restraints    Pre-Treatment Position  in bed  -JJ      Post Treatment Position  bed  -JJ      In Bed  sitting EOB;call light within reach  -JJ      Recorded by [JJ] Pinky Ken, OTR 01/23/20 1308      Row Name 01/23/20 1000             Pain Scale: Numbers Pre/Post-Treatment    Pain Scale: Numbers, Pretreatment  0/10 - no pain  -JJ      Pain Scale: Numbers, Post-Treatment  0/10 - no pain  -JJ      Recorded by [JJ] Pinky Ken, OTR 01/23/20 1308      Row Name 01/23/20 1000             Plan of Care  Review    Plan of Care Reviewed With  patient  -      Outcome Summary  OT: pt performed hand strengthening and fine motor activites from EOB. pts  strength improved and pt improved scores on 9 hole peg test. pt requires supervision for functional transfers and mobility in the room. pts plan is to return home this weekend with home health therapies  -JJ      Recorded by [J] Pinky Kenn, OTR 01/23/20 1308      Row Name 01/23/20 1000             Outcome Summary/Treatment Plan (OT)    Daily Summary of Progress (OT)  progress toward functional goals as expected  -      Plan for Continued Treatment (OT)  cont poc  -JJ      Recorded by [JJ] Ken Pinkyblanca Cook, OTR 01/23/20 1308        User Key  (r) = Recorded By, (t) = Taken By, (c) = Cosigned By    Initials Name Effective Dates Discipline     Pinky Kenn, OTR 06/22/16 -  OT                 OT Recommendation and Plan  Outcome Summary/Treatment Plan (OT)  Daily Summary of Progress (OT): progress toward functional goals as expected  Plan for Continued Treatment (OT): cont poc  Anticipated Discharge Disposition (OT): home with home health  Therapy Frequency (OT Eval): 5 times/wk  Daily Summary of Progress (OT): progress toward functional goals as expected  Plan of Care Review  Plan of Care Reviewed With: patient  Plan of Care Reviewed With: patient  Outcome Summary: OT: pt performed hand strengthening and fine motor activites from EOB. pts  strength improved and pt improved scores on 9 hole peg test. pt requires supervision for functional transfers and mobility in the room. pts plan is to return home this weekend with home health therapies       Time Calculation:   Time Calculation- OT     Row Name 01/23/20 1309             Time Calculation- OT    OT Start Time  1000  -JJ      OT Stop Time  1017  -J      OT Time Calculation (min)  17 min  -      TCU Minutes- OT  17 min  -        User Key  (r) = Recorded By, (t) = Taken By, (c) =  Cosigned By    Initials Name Provider Type    Pinky Cortes, OTR Occupational Therapist        Therapy Charges for Today     Code Description Service Date Service Provider Modifiers Qty    19015877394  OT THERAPEUTIC ACT EA 15 MIN 1/23/2020 Pinky Ken, DALILA GO 1               DALILA Martinez  1/23/2020

## 2020-01-23 NOTE — PLAN OF CARE
Problem: Patient Care Overview  Goal: Plan of Care Review  Outcome: Ongoing (interventions implemented as appropriate)  Flowsheets  Taken 1/22/2020 1657 by Skylar Rush LPN  Progress: no change  Taken 1/23/2020 1115 by Patrizia Conn RN  Plan of Care Reviewed With: patient  Taken 1/23/2020 1651 by Patrizia Conn RN  Outcome Summary: Monitor for loose stool not able to collect today     Problem: Skin Injury Risk (Adult)  Goal: Skin Health and Integrity  Outcome: Ongoing (interventions implemented as appropriate)     Problem: Fall Risk (Adult)  Goal: Absence of Fall  Outcome: Ongoing (interventions implemented as appropriate)

## 2020-01-24 LAB
ADV 40+41 DNA STL QL NAA+NON-PROBE: NOT DETECTED
ASTRO TYP 1-8 RNA STL QL NAA+NON-PROBE: NOT DETECTED
C CAYETANENSIS DNA STL QL NAA+NON-PROBE: NOT DETECTED
C DIFF GDH STL QL: NEGATIVE
CAMPY SP DNA.DIARRHEA STL QL NAA+PROBE: NOT DETECTED
CRYPTOSP STL CULT: NOT DETECTED
E COLI DNA SPEC QL NAA+PROBE: NOT DETECTED
E HISTOLYT AG STL-ACNC: NOT DETECTED
EAEC PAA PLAS AGGR+AATA ST NAA+NON-PRB: NOT DETECTED
EC STX1 + STX2 GENES STL NAA+PROBE: NOT DETECTED
EPEC EAE GENE STL QL NAA+NON-PROBE: NOT DETECTED
ETEC LTA+ST1A+ST1B TOX ST NAA+NON-PROBE: NOT DETECTED
G LAMBLIA DNA SPEC QL NAA+PROBE: NOT DETECTED
NOROVIRUS GI+II RNA STL QL NAA+NON-PROBE: NOT DETECTED
P SHIGELLOIDES DNA STL QL NAA+PROBE: NOT DETECTED
RV RNA STL NAA+PROBE: NOT DETECTED
SALMONELLA DNA SPEC QL NAA+PROBE: NOT DETECTED
SAPO I+II+IV+V RNA STL QL NAA+NON-PROBE: NOT DETECTED
SHIGELLA SP+EIEC IPAH STL QL NAA+PROBE: NOT DETECTED
V CHOLERAE DNA SPEC QL NAA+PROBE: NOT DETECTED
VIBRIO DNA SPEC NAA+PROBE: NOT DETECTED
YERSINIA STL CULT: NOT DETECTED

## 2020-01-24 PROCEDURE — 99232 SBSQ HOSP IP/OBS MODERATE 35: CPT | Performed by: NURSE PRACTITIONER

## 2020-01-24 PROCEDURE — 87449 NOS EACH ORGANISM AG IA: CPT | Performed by: NURSE PRACTITIONER

## 2020-01-24 PROCEDURE — 0097U HC BIOFIRE FILMARRAY GI PANEL: CPT | Performed by: NURSE PRACTITIONER

## 2020-01-24 PROCEDURE — 87324 CLOSTRIDIUM AG IA: CPT | Performed by: NURSE PRACTITIONER

## 2020-01-24 RX ADMIN — LISINOPRIL 2.5 MG: 2.5 TABLET ORAL at 08:11

## 2020-01-24 RX ADMIN — APIXABAN 5 MG: 2.5 TABLET, FILM COATED ORAL at 20:41

## 2020-01-24 RX ADMIN — PANTOPRAZOLE SODIUM 40 MG: 40 TABLET, DELAYED RELEASE ORAL at 08:11

## 2020-01-24 RX ADMIN — METOPROLOL SUCCINATE 75 MG: 50 TABLET, EXTENDED RELEASE ORAL at 08:11

## 2020-01-24 RX ADMIN — ATORVASTATIN CALCIUM 80 MG: 40 TABLET, FILM COATED ORAL at 20:41

## 2020-01-24 RX ADMIN — OXYBUTYNIN CHLORIDE 5 MG: 5 TABLET ORAL at 20:41

## 2020-01-24 RX ADMIN — OXYBUTYNIN CHLORIDE 5 MG: 5 TABLET ORAL at 08:12

## 2020-01-24 RX ADMIN — Medication 1 CAPSULE: at 08:12

## 2020-01-24 RX ADMIN — APIXABAN 5 MG: 2.5 TABLET, FILM COATED ORAL at 08:12

## 2020-01-24 RX ADMIN — Medication: at 08:13

## 2020-01-24 RX ADMIN — DIGOXIN 125 MCG: 125 TABLET ORAL at 13:44

## 2020-01-24 NOTE — THERAPY DISCHARGE NOTE
SNF - Physical Therapy Discharge Summary   Rebeca Cochran       Patient Name: Camille Pantoja  : 1955  MRN: [id number]    Today's Date: 2020  Onset of Illness/Injury or Date of Surgery: 01/10/20    Date of Referral to PT: 20  Referring Physician: Richard      Admit Date: 2020      PT Recommendation and Plan    Visit Dx:    ICD-10-CM ICD-9-CM   1. Oropharyngeal dysphagia R13.12 787.22               Rehab Goal Summary     Row Name 20 0918             Bed Mobility Goal 1 (PT)    Activity/Assistive Device (Bed Mobility Goal 1, PT)  bed mobility activities, all  -JW      [place] Level/Cues Needed (Bed Mobility Goal 1, PT)  conditional independence  -JW      Time Frame (Bed Mobility Goal 1, PT)  1 week  -JW      Progress/Outcomes (Bed Mobility Goal 1, PT)  goal met  -JW         Transfer Goal 1 (PT)    Activity/Assistive Device (Transfer Goal 1, PT)  transfers, all;walker, rolling  -JW      [place] Level/Cues Needed (Transfer Goal 1, PT)  supervision required  -JW      Time Frame (Transfer Goal 1, PT)  1 week  -JW      Progress/Outcome (Transfer Goal 1, PT)  goal met  -JW         Gait Training Goal 1 (PT)    Activity/Assistive Device (Gait Training Goal 1, PT)  gait (walking locomotion);assistive device use  -JW      [place] Level (Gait Training Goal 1, PT)  supervision required  -JW      Distance (Gait Goal 1, PT)  200  -JW      Time Frame (Gait Training Goal 1, PT)  1 week  -JW      Progress/Outcome (Gait Training Goal 1, PT)  goal met  -JW         Stairs Goal 1 (PT)    Activity/Assistive Device (Stairs Goal 1, PT)  ascending stairs;descending stairs with 1 handrail  -JW      [place] Level/Cues Needed (Stairs Goal 1, PT)  contact guard assist  -JW      Number of Stairs (Stairs Goal 1, PT)  5  -JW      Time Frame (Stairs Goal 1, PT)  1 week  -JW      Progress/Outcome (Stairs Goal 1, PT)  goal not met;other (see comments) pt refuses to perform  -JW        User Key  (r) =  Recorded By, (t) = Taken By, (c) = Cosigned By    Initials Name Provider Type Discipline    Kennedi Davalos, PT Physical Therapist PT          Therapy Charges for Today     Code Description Service Date Service Provider Modifiers Finn    08089153021 HC GAIT TRAINING EA 15 MIN 1/23/2020 Kennedi Gay, PT GP 1          PT Discharge Summary  Anticipated Discharge Disposition (PT): home with home health    Patient refuses therapy session today despite encouragement and education from therapist.  Patient reports no concerns regarding return home at this time, declines stair training.  Patient has been seen once daily to address deficits with functional mobility, strength and activity tolerance.  Patient currently performs bed mobility with conditional independence and sit to/from stand with conditional independence. Patient performs gait with rolling walker with repeated cues for posture and gait mechanics.  Patient demonstrates consistent gait pattern over the past week, however is unable to maintain proper mechanics/posture for duration of single session. Patient independent with LE HEP, declines written handout.  Patient declines concerns regarding return home and states current balance is at baseline.  Patient will benefit from home health PT and use of rolling walker upon return home.      Kennedi Gay, PT   1/24/2020

## 2020-01-24 NOTE — PLAN OF CARE
Problem: Patient Care Overview  Goal: Individualization and Mutuality  Outcome: Ongoing (interventions implemented as appropriate)     Problem: Patient Care Overview  Goal: Plan of Care Review  Outcome: Ongoing (interventions implemented as appropriate)     Problem: Fall Risk (Adult)  Goal: Absence of Fall  Outcome: Ongoing (interventions implemented as appropriate)

## 2020-01-24 NOTE — PROGRESS NOTES
"    Patient Name: Camille Pantoja  :1955  64 y.o.      Patient Care Team:  Nahun Ann Jr., DO as PCP - General (Family Medicine)    Chief Complaint: f/u aftercare, a fib, CVA    Interval History: Denies SOA, Chest pain       Objective   Vital Signs  Temp:  [97.4 °F (36.3 °C)-99.4 °F (37.4 °C)] 99.4 °F (37.4 °C)  Heart Rate:  [61-77] 61  Resp:  [17-18] 17  BP: (114-117)/(61-70) 117/61    Intake/Output Summary (Last 24 hours) at 2020 0749  Last data filed at 2020 0530  Gross per 24 hour   Intake 1320 ml   Output --   Net 1320 ml     Flowsheet Rows      First Filed Value   Admission Height  166.4 cm (65.5\") Documented at 2020 1700   Admission Weight  54.5 kg (120 lb 1.6 oz) Documented at 2020 1700          Physical Exam:   General Appearance:    Alert, cooperative, in no acute distress   Lungs:     Clear to auscultation.  Normal respiratory effort and rate.      Heart:    Irregular rhythm and normal rate, normal S1 and S2, no murmurs, gallops or rubs.     Chest Wall:    No abnormalities observed   Abdomen:     Soft, nontender, positive bowel sounds.     Extremities:   no cyanosis, clubbing or edema.  No marked joint deformities.  Walker for ambulation      Results Review:    Results from last 7 days   Lab Units 20  0533   SODIUM mmol/L 138   POTASSIUM mmol/L 3.9   CHLORIDE mmol/L 101   CO2 mmol/L 27.5   BUN mg/dL 11   CREATININE mg/dL 0.50*   GLUCOSE mg/dL 86   CALCIUM mg/dL 8.9         Results from last 7 days   Lab Units 20  0533   WBC 10*3/mm3 4.46   HEMOGLOBIN g/dL 11.2*   HEMATOCRIT % 35.3   PLATELETS 10*3/mm3 252                           Medication Review:     ammonium lactate  Topical BID   apixaban 5 mg Oral Q12H   atorvastatin 80 mg Oral Nightly   digoxin 125 mcg Oral Daily   lactobacillus acidophilus 1 capsule Oral Daily   lisinopril 2.5 mg Oral Q24H   metoprolol succinate XL 75 mg Oral Q12H   oxybutynin 5 mg Oral BID   pantoprazole 40 mg Oral QAM         "     Assessment/Plan      1. Atrial fibrillation - new diagnosis.  Her rate is controlled.  She is on a beta-blocker and digoxin, apixaban.  She had previously been on warfarin for multiple DVTs.        2.  Cardiomyopathy - non ischemic (coronary angiography 1/10/2020). Ef 27%. Change to metoprolol succinate.  Tolerating low-dose lisinopril 2.5 without blood pressure issues.      3. Acute stroke - 1/10 and again on 1/11- MRI brain with acute left hemispheric subcortical stroke.   Now on Eliquis 5 mg twice daily      4. History of DVT - was on warfarin prior to admission though initial INR was 1.2 (she had been subtherapeutic for the past month)       Will follow, see JOHN Cristobal  [facility]  01/24/20  7:49 AM

## 2020-01-24 NOTE — THERAPY DISCHARGE NOTE
SNF - Occupational Therapy Discharge Summary   Rebeca Cochran/Weekly Summary       Patient Name: Camille Pantoja  : 1955  MRN: [id number]    Today's Date: 2020  Onset of Illness/Injury or Date of Surgery: 01/10/20    Date of Referral to OT: 20  Referring Physician: Richard      Admit Date: 2020        OT Recommendation and Plan    Visit Dx:    ICD-10-CM ICD-9-CM   1. Oropharyngeal dysphagia R13.12 787.22               Rehab Goal Summary     Row Name 20 0918 20 0900          Bed Mobility Goal 1 (PT)    Activity/Assistive Device (Bed Mobility Goal 1, PT)  bed mobility activities, all  -JW  --     [place] Level/Cues Needed (Bed Mobility Goal 1, PT)  conditional independence  -JW  --     Time Frame (Bed Mobility Goal 1, PT)  1 week  -JW  --     Progress/Outcomes (Bed Mobility Goal 1, PT)  goal met  -JW  --        Transfer Goal 1 (PT)    Activity/Assistive Device (Transfer Goal 1, PT)  transfers, all;walker, rolling  -JW  --     [place] Level/Cues Needed (Transfer Goal 1, PT)  supervision required  -JW  --     Time Frame (Transfer Goal 1, PT)  1 week  -JW  --     Progress/Outcome (Transfer Goal 1, PT)  goal met  -JW  --        Gait Training Goal 1 (PT)    Activity/Assistive Device (Gait Training Goal 1, PT)  gait (walking locomotion);assistive device use  -JW  --     [place] Level (Gait Training Goal 1, PT)  supervision required  -JW  --     Distance (Gait Goal 1, PT)  200  -JW  --     Time Frame (Gait Training Goal 1, PT)  1 week  -JW  --     Progress/Outcome (Gait Training Goal 1, PT)  goal met  -JW  --        Stairs Goal 1 (PT)    Activity/Assistive Device (Stairs Goal 1, PT)  ascending stairs;descending stairs with 1 handrail  -JW  --     [place] Level/Cues Needed (Stairs Goal 1, PT)  contact guard assist  -JW  --     Number of Stairs (Stairs Goal 1, PT)  5  -JW  --     Time Frame (Stairs Goal 1, PT)  1 week  -JW  --     Progress/Outcome (Stairs Goal 1, PT)  goal  not met;other (see comments) pt refuses to perform  -JW  --        Transfer Goal 1 (OT)    Activity/Assistive Device (Transfer Goal 1, OT)  --  toilet;commode, 3-in-1;walker, rolling  -EN     [place] Level/Cues Needed (Transfer Goal 1, OT)  --  supervision required  -EN     Time Frame (Transfer Goal 1, OT)  --  short term goal (STG)  -EN     Progress/Outcome (Transfer Goal 1, OT)  --  goal met  -EN        Bathing Goal 1 (OT)    Activity/Assistive Device (Bathing Goal 1, OT)  --  lower body bathing;long-handled sponge  -EN     [place] Level/Cues Needed (Bathing Goal 1, OT)  --  supervision required  -EN     Time Frame (Bathing Goal 1, OT)  --  1 week  -EN     Progress/Outcomes (Bathing Goal 1, OT)  --  goal met  -EN        Dressing Goal 1 (OT)    Activity/Assistive Device (Dressing Goal 1, OT)  --  lower body dressing  -EN     [place]/Cues Needed (Dressing Goal 1, OT)  --  supervision required  -EN     Time Frame (Dressing Goal 1, OT)  --  1 week  -EN     Progress/Outcome (Dressing Goal 1, OT)  --  goal met  -EN       User Key  (r) = Recorded By, (t) = Taken By, (c) = Cosigned By    Initials Name Provider Type Discipline    Bonny Fofana, OTR Occupational Therapist OT    Kennedi Davalos, PT Physical Therapist PT        S: Patient reports no concerns regarding return home, is interested in OT home health.    O:  Patient seen 5 X the past week in OT to address R UE functional use, ADLs, transfers, balance, and overall safety.  Patient performed sink side bathing and dressing with supervision, functional tranfers/mobiiity with supervision and rolling walker, and dynamic standing balance activities with rolling walker and supervision without loss of balance.  Patient's initial 9 Hole Peg time 4 minutes 57 seconds, on 1/23/20 was 47.59 seconds.  R  improved by 2 pounds since evaluation (R  is now 22#, L  is 29#).       A:  Patient has improved with right UE coordination and   significantly since evaluation.  Patient has also shown progress with mobility and ADLs.  Patient would benefit from home health OT at discharge.    P:  Discharge from OT.      Therapy Suggested Charges     Code   Minutes Charges    60684 (CPT®) Hc Ot Neuromusc Re Education Ea 15 Min      77424 (CPT®) Hc Ot Ther Proc Ea 15 Min      54573 (CPT®) Hc Ot Therapeutic Act Ea 15 Min 42 3    31700 (CPT®) Hc Ot Manual Therapy Ea 15 Min      45532 (CPT®) Hc Ot Iontophoresis Ea 15 Min      28451 (CPT®) Hc Ot Elec Stim Ea-Per 15 Min      77305 (CPT®) Hc Ot Ultrasound Ea 15 Min      68267 (CPT®) Hc Ot Self Care/Mgmt/Train Ea 15 Min      Total  42 3              OT Discharge Summary  Anticipated Discharge Disposition (OT): home with home health  Reason for Discharge: All goals achieved  Outcomes Achieved: Able to achieve all goals within established timeline  Discharge Destination: Home with home health      Bonny Kinney, OTR  1/24/2020

## 2020-01-24 NOTE — PROGRESS NOTES
To be discharged home tomorrow where she lives alone. [facility] will be seeing for PT, OT, and nursing. Follow up with PCP,  on January 29, 2020 at 2:30PM. Follow up with neurosurgeon, Dr.Greg Rodriguez on March 31, 2020 at 4PM. Karen is providing her with a rolling walker. She already has a straight cane and a grabber. She fills her prescriptions at [facility] in [place]. Her sister Xochitl will transport her home upon discharge.

## 2020-01-25 VITALS
RESPIRATION RATE: 18 BRPM | DIASTOLIC BLOOD PRESSURE: 85 MMHG | HEIGHT: 64 IN | WEIGHT: 120.15 LBS | SYSTOLIC BLOOD PRESSURE: 139 MMHG | TEMPERATURE: 98.5 F | BODY MASS INDEX: 20.51 KG/M2 | OXYGEN SATURATION: 97 % | HEART RATE: 72 BPM

## 2020-01-25 PROBLEM — N32.89 BLADDER SPASMS: Status: ACTIVE | Noted: 2020-01-25

## 2020-01-25 PROBLEM — K59.1 FUNCTIONAL DIARRHEA: Status: ACTIVE | Noted: 2020-01-25

## 2020-01-25 LAB
ALBUMIN SERPL-MCNC: 3.1 G/DL (ref 3.5–5.2)
ANION GAP SERPL CALCULATED.3IONS-SCNC: 8.9 MMOL/L (ref 5–15)
BASOPHILS # BLD AUTO: 0.03 10*3/MM3 (ref 0–0.2)
BASOPHILS NFR BLD AUTO: 0.7 % (ref 0–1.5)
BUN BLD-MCNC: 10 MG/DL (ref 8–23)
BUN/CREAT SERPL: 16.4 (ref 7–25)
CALCIUM SPEC-SCNC: 9.5 MG/DL (ref 8.6–10.5)
CHLORIDE SERPL-SCNC: 102 MMOL/L (ref 98–107)
CO2 SERPL-SCNC: 29.1 MMOL/L (ref 22–29)
CREAT BLD-MCNC: 0.61 MG/DL (ref 0.57–1)
DEPRECATED RDW RBC AUTO: 50.2 FL (ref 37–54)
EOSINOPHIL # BLD AUTO: 0.17 10*3/MM3 (ref 0–0.4)
EOSINOPHIL NFR BLD AUTO: 3.8 % (ref 0.3–6.2)
ERYTHROCYTE [DISTWIDTH] IN BLOOD BY AUTOMATED COUNT: 15.2 % (ref 12.3–15.4)
GFR SERPL CREATININE-BSD FRML MDRD: 98 ML/MIN/1.73
GLUCOSE BLD-MCNC: 87 MG/DL (ref 65–99)
HCT VFR BLD AUTO: 35.5 % (ref 34–46.6)
HGB BLD-MCNC: 11.2 G/DL (ref 12–15.9)
IMM GRANULOCYTES # BLD AUTO: 0 10*3/MM3 (ref 0–0.05)
IMM GRANULOCYTES NFR BLD AUTO: 0 % (ref 0–0.5)
LYMPHOCYTES # BLD AUTO: 0.82 10*3/MM3 (ref 0.7–3.1)
LYMPHOCYTES NFR BLD AUTO: 18.1 % (ref 19.6–45.3)
MCH RBC QN AUTO: 28.4 PG (ref 26.6–33)
MCHC RBC AUTO-ENTMCNC: 31.5 G/DL (ref 31.5–35.7)
MCV RBC AUTO: 90.1 FL (ref 79–97)
MONOCYTES # BLD AUTO: 0.38 10*3/MM3 (ref 0.1–0.9)
MONOCYTES NFR BLD AUTO: 8.4 % (ref 5–12)
NEUTROPHILS # BLD AUTO: 3.12 10*3/MM3 (ref 1.7–7)
NEUTROPHILS NFR BLD AUTO: 69 % (ref 42.7–76)
PHOSPHATE SERPL-MCNC: 3.1 MG/DL (ref 2.5–4.5)
PLATELET # BLD AUTO: 271 10*3/MM3 (ref 140–450)
PMV BLD AUTO: 10.3 FL (ref 6–12)
POTASSIUM BLD-SCNC: 4.6 MMOL/L (ref 3.5–5.2)
RBC # BLD AUTO: 3.94 10*6/MM3 (ref 3.77–5.28)
SODIUM BLD-SCNC: 140 MMOL/L (ref 136–145)
WBC NRBC COR # BLD: 4.52 10*3/MM3 (ref 3.4–10.8)

## 2020-01-25 PROCEDURE — 85025 COMPLETE CBC W/AUTO DIFF WBC: CPT | Performed by: INTERNAL MEDICINE

## 2020-01-25 PROCEDURE — 80069 RENAL FUNCTION PANEL: CPT | Performed by: INTERNAL MEDICINE

## 2020-01-25 PROCEDURE — 99315 NF DSCHRG MGMT 30 MIN/LESS: CPT | Performed by: INTERNAL MEDICINE

## 2020-01-25 RX ORDER — DIGOXIN 125 MCG
125 TABLET ORAL
Qty: 30 TABLET | Refills: 0 | Status: SHIPPED | OUTPATIENT
Start: 2020-01-25 | End: 2020-02-24

## 2020-01-25 RX ORDER — OXYBUTYNIN CHLORIDE 5 MG/1
5 TABLET ORAL 2 TIMES DAILY
Qty: 30 TABLET | Refills: 0 | Status: SHIPPED | OUTPATIENT
Start: 2020-01-25 | End: 2020-02-10 | Stop reason: SDUPTHER

## 2020-01-25 RX ORDER — OMEPRAZOLE 20 MG/1
20 CAPSULE, DELAYED RELEASE ORAL DAILY
Qty: 30 CAPSULE | Refills: 0 | Status: SHIPPED | OUTPATIENT
Start: 2020-01-25 | End: 2020-02-24

## 2020-01-25 RX ORDER — METOPROLOL SUCCINATE 25 MG/1
75 TABLET, EXTENDED RELEASE ORAL EVERY 12 HOURS SCHEDULED
Qty: 180 TABLET | Refills: 0 | Status: SHIPPED | OUTPATIENT
Start: 2020-01-25 | End: 2020-03-09 | Stop reason: SDUPTHER

## 2020-01-25 RX ORDER — CHOLECALCIFEROL (VITAMIN D3) 125 MCG
5 CAPSULE ORAL NIGHTLY
Qty: 30 TABLET | Refills: 0 | Status: SHIPPED | OUTPATIENT
Start: 2020-01-25 | End: 2020-02-20 | Stop reason: SDUPTHER

## 2020-01-25 RX ORDER — ATORVASTATIN CALCIUM 80 MG/1
80 TABLET, FILM COATED ORAL NIGHTLY
Qty: 30 TABLET | Refills: 0 | Status: SHIPPED | OUTPATIENT
Start: 2020-01-25 | End: 2020-02-26 | Stop reason: SDUPTHER

## 2020-01-25 RX ORDER — LISINOPRIL 2.5 MG/1
2.5 TABLET ORAL
Qty: 30 TABLET | Refills: 0 | Status: SHIPPED | OUTPATIENT
Start: 2020-01-26 | End: 2020-02-20 | Stop reason: SDUPTHER

## 2020-01-25 RX ADMIN — PANTOPRAZOLE SODIUM 40 MG: 40 TABLET, DELAYED RELEASE ORAL at 06:16

## 2020-01-25 RX ADMIN — OXYBUTYNIN CHLORIDE 5 MG: 5 TABLET ORAL at 08:21

## 2020-01-25 RX ADMIN — METOPROLOL SUCCINATE 75 MG: 50 TABLET, EXTENDED RELEASE ORAL at 08:21

## 2020-01-25 RX ADMIN — Medication: at 08:20

## 2020-01-25 RX ADMIN — LISINOPRIL 2.5 MG: 2.5 TABLET ORAL at 08:21

## 2020-01-25 RX ADMIN — Medication 1 CAPSULE: at 08:23

## 2020-01-25 RX ADMIN — APIXABAN 5 MG: 2.5 TABLET, FILM COATED ORAL at 08:21

## 2020-01-25 RX ADMIN — DIGOXIN 125 MCG: 125 TABLET ORAL at 13:10

## 2020-01-25 NOTE — PLAN OF CARE
Patient up and down several times to urinate. Getting better at using call light to ask for assistance. C-diff results negative. Looking forward to going home. Safety measures in place.

## 2020-01-25 NOTE — PLAN OF CARE
Problem: Patient Care Overview  Goal: Plan of Care Review  Outcome: Ongoing (interventions implemented as appropriate)  Flowsheets  Taken 1/25/2020 1155  Progress: improving  Outcome Summary: may discharge today home with walker  Taken 1/25/2020 0869  Plan of Care Reviewed With: patient     Problem: Skin Injury Risk (Adult)  Goal: Skin Health and Integrity  Outcome: Ongoing (interventions implemented as appropriate)

## 2020-01-25 NOTE — DISCHARGE INSTRUCTIONS
Weakness  Weakness is a lack of strength. You may feel weak all over your body (generalized), or you may feel weak in one specific part of your body (focal). Common causes of weakness include:  · Infection and immune system disorders.  · Physical exhaustion.  · Internal bleeding or other blood loss that results in a lack of red blood cells (anemia).  · Dehydration.  · An imbalance in mineral (electrolyte) levels, such as potassium.  · Heart disease, circulation problems, or stroke.  Other causes include:  · Some medicines or cancer treatment.  · Stress, anxiety, or depression.  · Nervous system disorders.  · Thyroid disorders.  · Loss of muscle strength because of age or inactivity.  · Poor sleep quality or sleep disorders.  The cause of your weakness may not be known. Some causes of weakness can be serious, so it is important to see your health care provider.  Follow these instructions at home:  Activity  · Rest as needed.  · Try to get enough sleep. Most adults need 7-8 hours of quality sleep each night. Talk to your health care provider about how much sleep you need each night.  · Do exercises, such as arm curls and leg raises, for 30 minutes at least 2 days a week or as told by your health care provider. This helps build muscle strength.  · Consider working with a physical therapist or  who can develop an exercise plan to help you gain muscle strength.  General instructions    · Take over-the-counter and prescription medicines only as told by your health care provider.  · Eat a healthy, well-balanced diet. This includes:  ? Proteins to build muscles, such as lean meats and fish.  ? Fresh fruits and vegetables.  ? Carbohydrates to boost energy, such as whole grains.  · Drink enough fluid to keep your urine pale yellow.  · Keep all follow-up visits as told by your health care provider. This is important.  Contact a health care provider if your weakness:  · Does not improve or gets worse.  · Affects your  ability to think clearly.  · Affects your ability to do your normal daily activities.  Get help right away if you:  · Develop sudden weakness, especially on one side of your face or body.  · Have chest pain.  · Have trouble breathing or shortness of breath.  · Have problems with your vision.  · Have trouble talking or swallowing.  · Have trouble standing or walking.  · Are light-headed or lose consciousness.  Summary  · Weakness is a lack of strength. You may feel weak all over your body or just in one specific part of your body.  · Weakness can be caused by a variety of things. In some cases, the cause may be unknown.  · Rest as needed, and try to get enough sleep. Most adults need 7-8 hours of quality sleep each night.  · Eat a healthy, well-balanced diet.  This information is not intended to replace advice given to you by your health care provider. Make sure you discuss any questions you have with your health care provider.  Document Released: 12/18/2006 Document Revised: 07/24/2019 Document Reviewed: 07/24/2019  [employer] Interactive Patient Education © 2019 [employer]  Stroke Prevention  Some medical conditions and behaviors are associated with a higher chance of having a stroke. You can help prevent a stroke by making nutrition, lifestyle, and other changes, including managing any medical conditions you may have.  What nutrition changes can be made?    · Eat healthy foods. You can do this by:  ? Choosing foods high in fiber, such as fresh fruits and vegetables and whole grains.  ? Eating at least 5 or more servings of fruits and vegetables a day. Try to fill half of your plate at each meal with fruits and vegetables.  ? Choosing lean protein foods, such as lean cuts of meat, poultry without skin, fish, tofu, beans, and nuts.  ? Eating low-fat dairy products.  ? Avoiding foods that are high in salt (sodium). This can help lower blood pressure.  ? Avoiding foods that have saturated fat, trans fat, and  cholesterol. This can help prevent high cholesterol.  ? Avoiding processed and premade foods.  · Follow your health care provider's specific guidelines for losing weight, controlling high blood pressure (hypertension), lowering high cholesterol, and managing diabetes. These may include:  ? Reducing your daily calorie intake.  ? Limiting your daily sodium intake to 1,500 milligrams (mg).  ? Using only healthy fats for cooking, such as olive oil, canola oil, or sunflower oil.  ? Counting your daily carbohydrate intake.  What lifestyle changes can be made?  · Maintain a healthy weight. Talk to your health care provider about your ideal weight.  · Get at least 30 minutes of moderate physical activity at least 5 days a week. Moderate activity includes brisk walking, biking, and swimming.  · Do not use any products that contain nicotine or tobacco, such as cigarettes and e-cigarettes. If you need help quitting, ask your health care provider. It may also be helpful to avoid exposure to secondhand smoke.  · Limit alcohol intake to no more than 1 drink a day for nonpregnant women and 2 drinks a day for men. One drink equals 12 oz of beer, 5 oz of wine, or 1½ oz of hard liquor.  · Stop any illegal drug use.  · Avoid taking birth control pills. Talk to your health care provider about the risks of taking birth control pills if:  ? You are over 35 years old.  ? You smoke.  ? You get migraines.  ? You have ever had a blood clot.  What other changes can be made?  · Manage your cholesterol levels.  ? Eating a healthy diet is important for preventing high cholesterol. If cholesterol cannot be managed through diet alone, you may also need to take medicines.  ? Take any prescribed medicines to control your cholesterol as told by your health care provider.  · Manage your diabetes.  ? Eating a healthy diet and exercising regularly are important parts of managing your blood sugar. If your blood sugar cannot be managed through diet and  exercise, you may need to take medicines.  ? Take any prescribed medicines to control your diabetes as told by your health care provider.  · Control your hypertension.  ? To reduce your risk of stroke, try to keep your blood pressure below 130/80.  ? Eating a healthy diet and exercising regularly are an important part of controlling your blood pressure. If your blood pressure cannot be managed through diet and exercise, you may need to take medicines.  ? Take any prescribed medicines to control hypertension as told by your health care provider.  ? Ask your health care provider if you should monitor your blood pressure at home.  ? Have your blood pressure checked every year, even if your blood pressure is normal. Blood pressure increases with age and some medical conditions.  · Get evaluated for sleep disorders (sleep apnea). Talk to your health care provider about getting a sleep evaluation if you snore a lot or have excessive sleepiness.  · Take over-the-counter and prescription medicines only as told by your health care provider. Aspirin or blood thinners (antiplatelets or anticoagulants) may be recommended to reduce your risk of forming blood clots that can lead to stroke.  · Make sure that any other medical conditions you have, such as atrial fibrillation or atherosclerosis, are managed.  What are the warning signs of a stroke?  The warning signs of a stroke can be easily remembered as BEFAST.  · B is for balance. Signs include:  ? Dizziness.  ? Loss of balance or coordination.  ? Sudden trouble walking.  · E is for eyes. Signs include:  ? A sudden change in vision.  ? Trouble seeing.  · F is for face. Signs include:  ? Sudden weakness or numbness of the face.  ? The face or eyelid drooping to one side.  · A is for arms. Signs include:  ? Sudden weakness or numbness of the arm, usually on one side of the body.  · S is for speech. Signs include:  ? Trouble speaking (aphasia).  ? Trouble understanding.  · T is for  time.  ? These symptoms may represent a serious problem that is an emergency. Do not wait to see if the symptoms will go away. Get medical help right away. Call your local emergency services (911 in the [place] Do not drive yourself to the hospital.  · Other signs of stroke may include:  ? A sudden, severe headache with no known cause.  ? Nausea or vomiting.  ? Seizure.  Where to find more information  For more information, visit:  · American Stroke Association: www.strokeassociation.org  · National Stroke Association: www.stroke.org  Summary  · You can prevent a stroke by eating healthy, exercising, not smoking, limiting alcohol intake, and managing any medical conditions you may have.  · Do not use any products that contain nicotine or tobacco, such as cigarettes and e-cigarettes. If you need help quitting, ask your health care provider. It may also be helpful to avoid exposure to secondhand smoke.  · Remember BEFAST for warning signs of stroke. Get help right away if you or a loved one has any of these signs.  This information is not intended to replace advice given to you by your health care provider. Make sure you discuss any questions you have with your health care provider.  Document Released: 01/25/2006 Document Revised: 01/23/2018 Document Reviewed: 01/23/2018  [employer] Interactive Patient Education © 2019 [employer]

## 2020-01-25 NOTE — DISCHARGE SUMMARY
"  Camille Pantoja  1955  [id number]      Hospitalists Discharge Summary    Date of Admission: 1/16/2020  Date of Discharge:  1/25/2020    Primary Discharge Diagnoses: Acute Physical Debility after Hospitalization for Stroke and Afib w/ RVR    Secondary Discharge Diagnoses:   Left MCA and rt Cerebellar ischemic stroke w/ rt sided hemiparesis  New Afib w/ h/o DVT  Essential HTN  HLD  Detrusor Instability  Subacute non-infectious diarrhea  Possible Normal Pressure Hydrocephalus  Lft Thyroid Hypodense Nodule noted on CTA    PCP  Patient Care Team:  Nahun Ann Jr., DO as PCP - General (Family Medicine)    Consults:   Consults     Date and Time Order Name Status Description    1/21/2020 0823 Inpatient Urology Consult Completed     1/17/2020 0030 Inpatient Cardiology Consult Completed     1/11/2020 1019 Inpatient Neurology Consult Stroke Completed     1/10/2020 0041 Inpatient Neurology Consult General Completed     1/10/2020 0041 Inpatient Cardiology Consult Completed           CC:  Aftercare    History of Present Illness:  Per H&P from [facility] admission: \"65 yo right handed WF w/ PMH of HTN, HLD, h/o DVT & alcohol abuse (reportedly in remission since 2018)  who presented w/ Acute Change in MS to LAG on 1/9/2020,  She had been being treated for a RLL pneumonia by her PCP, and was brought in by EMS due to her neighbor being concerned. initially admitted to the ICU here for New Afib w/ RVR.     On presentation her inr was subtherapeutic, though she was on warfarin for h/o DVT. Workup showed a new Cardiomyopathy and  Acute Infarct of Left Basal Ganglia and Left Insular Cortex on MRI and she was transferred to [facility] for Cardiac Cath and higher level neuro care on 1/10. On Am of 1/11/2020 She was noted to have right sided hemiparesis w/ slurred speech after the cath. Additionally she was found to have new infracts to the rt cerebellum. Stroke felt that clinical condition worsened by low blood pressures. " "Cardiology switched her to metoprolol tartrate dosed bid for her rate control.      Review of vitals at [place], pt's post cath blood pressures listed as 98/67. Neuro suggested goal of > 110/80. With medication changes until this AM, pt's BP's listed in the 110's-130's / 80's-90's. However this AM, her blood pressures were recorded as 80's/60's prior to her being discharged. But 127/62 on admission here.     Pt denies any worsening symptoms and feels better. Has rt facial droop, and rt hemiparesis, and Is showing some right sided neglect. On Physical exam has 3-4/5 strength in her Rt UE, but is refusing to move her arm for staff, and using her left hand to eat.      Her speech is slow, but clear. No wrong words, or phrases employed. Comprehension is good.         ROS positive for rt shoulder pain imaged at [facility] negative for frx, pt states pain is well controlled, though informed pt to notify nursing if this pain is interfering with her PT/OT therapy\"    Hospital Course     New Afib: Pt was followed here by the same cardiology group following her at [facility], [place], operated by [facility]. She is now on a beta-blocker and dig for rate control of her afib. She has been taken off warfarin (had been on for dvt's) and placed on apixaban.     Non-ischemic Cardiomyopathy w/ EF of 27%: (coronary angiography 1/10/2020), had some challenges w/ Losartan, but has been stable on low dose of Lisinopril, and betablocker as above for afib, and Statin as below for stroke.    Lft MCA Stroke w/ rt Cerebellar stroke:  As per Last physical therapy note: \"Patient refuses therapy session today despite encouragement and education from therapist.  Patient reports no concerns regarding return home at this time, declines stair training.  Patient has been seen once daily to address deficits with functional mobility, strength and activity tolerance.  Patient currently performs bed mobility with conditional independence and sit to/from stand with conditional " "independence. Patient performs gait with rolling walker with repeated cues for posture and gait mechanics.  Patient demonstrates consistent gait pattern over the past week, however is unable to maintain proper mechanics/posture for duration of single session. Patient independent with LE HEP, declines written handout.  Patient declines concerns regarding return home and states current balance is at baseline.  Patient will benefit from home health PT and use of rolling walker upon return home.  \"    Follow up with JOHN Hess (neuro) on 4/8 for stroke , with [facility] eval on 3/31 w/ Dr. Akin Najera  On high dose statin for one month, then decrease to 40mg daily.     Detrusor Instability: Complaint of every hour needing to void. Urinalysis negative, Urology consulted, felt oxybutynin was a good choice, may need pelvic floor exercises     Subacute/chronic Diarrhea: Frequent watery stools, but only one today, c Diff, and gastrointestinal diarrhea panel neg, ok for lomotil, pt described life long symptoms similar to IBS    Lft Thyroid Hypodense Nodule noted on CTA: TSH 0.99 on 1/10/2020, as per cta report \"Subcentimeter left thyroid hypodense nodule, follow-up evaluation could be obtained as indicated.\"    Discharging today back to home w/ Home health PT / OT.    Has been reinforced with patient that she is not to drive until she is cleared by Stroke team on 4/8/2020.       Physical Exam at Discharge  Vital Signs  Temp:  [98.1 °F (36.7 °C)-98.5 °F (36.9 °C)] 98.5 °F (36.9 °C)  Heart Rate:  [60-77] 77  Resp:  [17-18] 18  BP: ()/(50-85) 139/85    Physical Exam:  Physical Exam   Constitutional: She is oriented to person, place, and time. No distress.   Thin   Eyes: Pupils are equal, round, and reactive to light. Conjunctivae and EOM are normal.   Neck: No JVD present.   Cardiovascular: Normal rate and regular rhythm.   Pulmonary/Chest: Effort normal and breath sounds normal. No stridor. No respiratory distress. She " has no wheezes. She has no rales.   Abdominal: Soft. She exhibits no distension. There is no tenderness. There is no guarding.   Hyperactive bowel sounds   Musculoskeletal: She exhibits no edema.   Neurological: She is alert and oriented to person, place, and time.   Rt sided facial droop improved since admission  Continued rt sided hemiparesis   Skin: Skin is warm and dry. She is not diaphoretic. No erythema.   Psychiatric: She has a normal mood and affect. Her behavior is normal.   Nursing note and vitals reviewed.      Operations and Procedures Performed:        Allergies:  is allergic to cephalexin; beef-derived products; citrus; duricef [cefadroxil]; fish allergy; garlic; green beans; onion; peach [prunus persica]; and wheat.    Kj  reviewed    Discharge Medications:     Discharge Medications      New Medications      Instructions Start Date   lisinopril 2.5 MG tablet  Commonly known as:  PRINIVIL,ZESTRIL   2.5 mg, Oral, Every 24 Hours Scheduled   Start Date:  January 26, 2020     melatonin 5 MG tablet tablet   5 mg, Oral, Nightly      oxybutynin 5 MG tablet  Commonly known as:  DITROPAN   5 mg, Oral, 2 Times Daily         Changes to Medications      Instructions Start Date   metoprolol succinate XL 25 MG 24 hr tablet  Commonly known as:  TOPROL-XL  What changed:  when to take this   75 mg, Oral, Every 12 Hours Scheduled         Continue These Medications      Instructions Start Date   apixaban 5 MG tablet tablet  Commonly known as:  ELIQUIS   5 mg, Oral, Every 12 Hours Scheduled      atorvastatin 80 MG tablet  Commonly known as:  LIPITOR   80 mg, Oral, Nightly      digoxin 125 MCG tablet  Commonly known as:  LANOXIN   125 mcg, Oral, Daily Digoxin      omeprazole 20 MG capsule  Commonly known as:  priLOSEC   20 mg, Oral, Daily         Stop These Medications    cholecalciferol 25 MCG (1000 UT) tablet  Commonly known as:  VITAMIN D3     cyanocobalamin 500 MCG tablet  Commonly known as:  VITAMIN B-12     Zinc  50 MG capsule            Last Lab Results:   Lab Results (last 72 hours)     Procedure Component Value Units Date/Time    Renal Function Panel [[id number]]  (Abnormal) Collected:  01/25/20 0606    Specimen:  Blood Updated:  01/25/20 0636     Glucose 87 mg/dL      BUN 10 mg/dL      Creatinine 0.61 mg/dL      Sodium 140 mmol/L      Potassium 4.6 mmol/L      Chloride 102 mmol/L      CO2 29.1 mmol/L      Calcium 9.5 mg/dL      Albumin 3.10 g/dL      Phosphorus 3.1 mg/dL      Anion Gap 8.9 mmol/L      BUN/Creatinine Ratio 16.4     eGFR Non African Amer 98 mL/min/1.73     Narrative:       GFR Normal >60  Chronic Kidney Disease <60  Kidney Failure <15      CBC & Differential [[id number]] Collected:  01/25/20 0606    Specimen:  Blood Updated:  01/25/20 0616    Narrative:       The following orders were created for panel order CBC & Differential.  Procedure                               Abnormality         Status                     ---------                               -----------         ------                     CBC Auto Differential[[id number]]        Abnormal            Final result                 Please view results for these tests on the individual orders.    CBC Auto Differential [[id number]]  (Abnormal) Collected:  01/25/20 0606    Specimen:  Blood Updated:  01/25/20 0616     WBC 4.52 10*3/mm3      RBC 3.94 10*6/mm3      Hemoglobin 11.2 g/dL      Hematocrit 35.5 %      MCV 90.1 fL      MCH 28.4 pg      MCHC 31.5 g/dL      RDW 15.2 %      RDW-SD 50.2 fl      MPV 10.3 fL      Platelets 271 10*3/mm3      Neutrophil % 69.0 %      Lymphocyte % 18.1 %      Monocyte % 8.4 %      Eosinophil % 3.8 %      Basophil % 0.7 %      Immature Grans % 0.0 %      Neutrophils, Absolute 3.12 10*3/mm3      Lymphocytes, Absolute 0.82 10*3/mm3      Monocytes, Absolute 0.38 10*3/mm3      Eosinophils, Absolute 0.17 10*3/mm3      Basophils, Absolute 0.03 10*3/mm3      Immature Grans, Absolute 0.00 10*3/mm3     Gastrointestinal Panel, PCR -  Stool, Per Rectum [[id number]]  (Normal) Collected:  01/24/20 1043    Specimen:  Stool from Per Rectum Updated:  01/24/20 1804     Campylobacter Not Detected     Plesiomonas shigelloides Not Detected     Salmonella Not Detected     Vibrio Not Detected     Vibrio cholerae Not Detected     Yersinia enterocolitica Not Detected     Enteroaggregative E. coli (EAEC) Not Detected     Enteropathogenic E. coli (EPEC) Not Detected     Enterotoxigenic E. coli (ETEC) lt/st Not Detected     Shiga-like toxin-producing E. coli (STEC) stx1/stx2 Not Detected     E. coli O157 Not Detected     Shigella/Enteroinvasive E. coli (EIEC) Not Detected     Cryptosporidium Not Detected     Cyclospora cayetanensis Not Detected     Entamoeba histolytica Not Detected     Giardia lamblia Not Detected     Adenovirus F40/41 Not Detected     Astrovirus Not Detected     Norovirus GI/GII Not Detected     Rotavirus A Not Detected     Sapovirus (I, II, IV or V) Not Detected    Narrative:       If Aeromonas, Staphylococcus aureus or Bacillus cereus are suspected, please order [id number]: Stool Culture, Aeromonas, S aureus, B Cereus.    Clostridium Difficile Toxin - Stool, Per Rectum [[id number]] Collected:  01/24/20 1043    Specimen:  Stool from Per Rectum Updated:  01/24/20 1157    Narrative:       The following orders were created for panel order Clostridium Difficile Toxin - Stool, Per Rectum.  Procedure                               Abnormality         Status                     ---------                               -----------         ------                     Clostridium Difficile EI...[[id number]]  Normal              Final result                 Please view results for these tests on the individual orders.    Clostridium Difficile EIA - Stool, Per Rectum [[id number]]  (Normal) Collected:  01/24/20 1043    Specimen:  Stool from Per Rectum Updated:  01/24/20 1157     C Diff GDH / Toxin Negative        Ct Angiogram Head    Result Date:  1/13/2020  Narrative: CT ANGIOGRAM HEAD AND NECK AND CT PERFUSION STUDY  CLINICAL HISTORY: Right sided weakness.  Radiation dose reduction techniques were utilized, including automated exposure control and exposure modulation based on body size. CT scan of the head was obtained with 3 mm axial images without the use of IV contrast. The patient underwent a CT perfusion study with a dynamic bolus of IV contrast. Standard perfusion maps were constructed. The patient then underwent a CT angiogram of the head and neck with 1 mm axial images following the administration of IV contrast. Sagittal, coronal, and 3-dimensional reconstructed images were obtained.  Percent stenosis was assessed in accordance with NASCET criteria.  COMPARISON: Noncontrast head CT from 01/11/2020, correlate with MRI from 01/11/2020  FINDINGS:  NONCONTRAST HEAD CT: On the noncontrast head CT, no acute hemorrhage or hydrocephalus is identified. A new area of hypodensity in the right cerebellum, for example 1.9 x 0.8 cm on image 18 of series 1 suggests subacute infarct. Mild periventricular hypodensities suggest chronic small vessel ischemic change in a patient this age. Small old basal ganglia lacunar infarcts there are suggested.  Likely mucous retention cysts in the maxillary sinuses, left more than right.   CT PERFUSION STUDY:  No CBF (under 30%) deficit or perfusion abnormality is demonstrated where the T MAX is greater than 6 seconds. The calculated mismatch volume was 0 cc.  CTA HEAD and neck: The CT angiogram of the head and neck demonstrates no hemodynamically significant focal stenosis, aneurysm, or dissection in the cervical carotid or vertebral arteries, or in the arteries at the base of the brain. The left PCA is apparently predominantly supplied by the anterior circulation, left P1 segment being relatively diminutive. Right vertebral artery is dominant.  Subcentimeter left thyroid hypodense nodule, follow-up evaluation could be obtained  as indicated.  Visualized lung apices appear clear.  Degenerative changes are seen in the cervical spine with neuroforaminal narrowing related to facet and uncovertebral joint hypertrophy, more prominent on the right at C5/6, and on the left at C3/4, C5/6, C6/7.       Impression:   1. No perfusion abnormality to suggest completed or threatened acute infarct at the levels evaluated. 2. No arterial cutoff or high-grade arterial stenosis identified. 3. A new area of hypodensity in the right cerebellum suggesting subacute infarct. No acute intracranial hemorrhage or hydrocephalus. No enhancing lesions of brain.    Discussed by telephone with Dr. Noriega at 1900, 1920, 01/13/2020.  This report was finalized on 1/13/2020 7:32 PM by Dr. Sachin Damon M.D.      Xr Shoulder 1 View Right    Result Date: 1/15/2020  Narrative: XR FEMUR 2 VW RIGHT-, XR SHOULDER 1 VW RIGHT-  INDICATIONS: Trauma  TECHNIQUE: 5 views of the right femur, one view of the right shoulder  COMPARISON: None available  FINDINGS:  No acute fracture is identified. Assessment of the right shoulder is limited by presence of only one view, and by overpenetration; with persistent clinical indication, additional views of the right shoulder could be obtained. No dislocation is noted. Intact appearing plate and screw surgical hardware at the proximal right humerus, hemiarthroplasty hardware in the proximal right femur. Degenerative changes are seen at the right knee.      Impression:  No acute fracture is identified in the right shoulder or right femur, as described. Follow-up/further evaluation can be obtained as indications persist.  This report was finalized on 1/15/2020 12:07 PM by Dr. Sachin Damon M.D.      Xr Femur 2 View Right    Result Date: 1/15/2020  Narrative: XR FEMUR 2 VW RIGHT-, XR SHOULDER 1 VW RIGHT-  INDICATIONS: Trauma  TECHNIQUE: 5 views of the right femur, one view of the right shoulder  COMPARISON: None available  FINDINGS:  No  acute fracture is identified. Assessment of the right shoulder is limited by presence of only one view, and by overpenetration; with persistent clinical indication, additional views of the right shoulder could be obtained. No dislocation is noted. Intact appearing plate and screw surgical hardware at the proximal right humerus, hemiarthroplasty hardware in the proximal right femur. Degenerative changes are seen at the right knee.      Impression:  No acute fracture is identified in the right shoulder or right femur, as described. Follow-up/further evaluation can be obtained as indications persist.  This report was finalized on 1/15/2020 12:07 PM by Dr. Sachin Damon M.D.      Ct Head Without Contrast    Result Date: 1/9/2020  Narrative: CT Head WO HISTORY: Postural hypotension after falling earlier today TECHNIQUE: Axial unenhanced head CT. Radiation dose reduction techniques included automated exposure control or exposure modulation based on body size. Count of known CT and cardiac nuc med studies performed in previous 12 months: 0. Time of scan: 9:24 PM COMPARISON: None. FINDINGS: No intracranial hemorrhage, mass, or infarct. No hydrocephalus or extra-axial fluid collection. There are senescent changes, including volume loss and nonspecific white matter change, but no acute abnormality is seen. Ventricular enlargement is somewhat out of proportion to the sulci. Consider normal pressure hydrocephalus in the appropriate clinical setting. The skull base, calvarium, and extracranial soft tissues are normal.     Impression: Senescent changes without acute abnormality. Somewhat disproportional ventriculomegaly is noted. Correlate clinically for evidence of normal pressure hydrocephalus. Signer Name: Anthony Vidal MD  Signed: 1/9/2020 9:31 PM  Workstation Name: [id number]  [facility]    Ct Angiogram Neck    Result Date: 1/13/2020  Narrative: CT ANGIOGRAM HEAD AND NECK AND CT PERFUSION STUDY   CLINICAL HISTORY: Right sided weakness.  Radiation dose reduction techniques were utilized, including automated exposure control and exposure modulation based on body size. CT scan of the head was obtained with 3 mm axial images without the use of IV contrast. The patient underwent a CT perfusion study with a dynamic bolus of IV contrast. Standard perfusion maps were constructed. The patient then underwent a CT angiogram of the head and neck with 1 mm axial images following the administration of IV contrast. Sagittal, coronal, and 3-dimensional reconstructed images were obtained.  Percent stenosis was assessed in accordance with NASCET criteria.  COMPARISON: Noncontrast head CT from 01/11/2020, correlate with MRI from 01/11/2020  FINDINGS:  NONCONTRAST HEAD CT: On the noncontrast head CT, no acute hemorrhage or hydrocephalus is identified. A new area of hypodensity in the right cerebellum, for example 1.9 x 0.8 cm on image 18 of series 1 suggests subacute infarct. Mild periventricular hypodensities suggest chronic small vessel ischemic change in a patient this age. Small old basal ganglia lacunar infarcts there are suggested.  Likely mucous retention cysts in the maxillary sinuses, left more than right.   CT PERFUSION STUDY:  No CBF (under 30%) deficit or perfusion abnormality is demonstrated where the T MAX is greater than 6 seconds. The calculated mismatch volume was 0 cc.  CTA HEAD and neck: The CT angiogram of the head and neck demonstrates no hemodynamically significant focal stenosis, aneurysm, or dissection in the cervical carotid or vertebral arteries, or in the arteries at the base of the brain. The left PCA is apparently predominantly supplied by the anterior circulation, left P1 segment being relatively diminutive. Right vertebral artery is dominant.  Subcentimeter left thyroid hypodense nodule, follow-up evaluation could be obtained as indicated.  Visualized lung apices appear clear.  Degenerative  changes are seen in the cervical spine with neuroforaminal narrowing related to facet and uncovertebral joint hypertrophy, more prominent on the right at C5/6, and on the left at C3/4, C5/6, C6/7.       Impression:   1. No perfusion abnormality to suggest completed or threatened acute infarct at the levels evaluated. 2. No arterial cutoff or high-grade arterial stenosis identified. 3. A new area of hypodensity in the right cerebellum suggesting subacute infarct. No acute intracranial hemorrhage or hydrocephalus. No enhancing lesions of brain.    Discussed by telephone with Dr. Noriega at 1900, 1920, 01/13/2020.  This report was finalized on 1/13/2020 7:32 PM by Dr. Sachin Damon M.D.      Mri Angiogram Head Without Contrast    Result Date: 1/11/2020  Narrative: MRI BRAIN WITHOUT CONTRAST, MR ANGIOGRAM HEAD WITHOUT CONTRAST, MR ANGIOGRAM NECK WITHOUT CONTRAST  HISTORY: Slurred speech and right-sided weakness. Fall on 01/09/2020.  COMPARISON: CT head without contrast 01/11/2020, MRI brain with and without contrast 04/27/2012.  TECHNIQUE: Brain MRI without contrast includes sagittal T1 as well as axial diffusion, FLAIR, T1, T2, gradient echo sequences. MR angiogram of the head and MR angiogram of the neck were obtained without contrast administration.  MIP reconstruction images were obtained.  FINDINGS: There is an acute infarction involving the left caudate body, left lentiform nucleus. Lentiform nucleus infarction also extends into the external limb of the left internal capsule and measures approximately 3 cm in AP dimension by 1.1 cm transverse. There are also small foci of acute infarction within the posterior left temporal lobe abutting the posterior margin of the sylvian fissure. Small cortical infarcts are present involving the insular cortex on the left. Additionally, there are small cortical infarctions within the posterior superior left frontal lobe cortex with the larger measuring 9 mm transverse. No  right cerebral or cerebellar acute infarction is evident.  There is mild confluent periventricular cerebral white matter FLAIR hyperintense signal that is consistent with chronic small vessel ischemic white matter change. There is ventricular enlargement attributed to central atrophy. There is no evidence for small cortical infarction within the posterior to the superior left frontal lobe mass effect or shift of midline structures. No extra-axial fluid collection is evident. There is no MRI evidence for hemorrhagic transformation of infarction.  Neck MRA is severely limited by the degree of motion related artifact. The proximal great vessels are not well visualized. There is flow related enhancement within both common carotid arteries and both proximal cervical internal carotid arteries without evidence for any NASCET stenosis. Both cervical common carotid arteries appear to be patent.  The distal cervical, petrous, and cavernous segments of both internal carotid arteries are patent. Supracavernous segments appear patent. Both middle cerebral arteries appear patent.  The proximal vertebral arteries are not visualized. The right vertebral artery is larger than the left. There is flow-related enhancement within both mid to distal cervical vertebral arteries. Intracranial segment of the left vertebral artery is very small. The basilar artery and both posterior cerebral arteries appear patent. There appears to be a fetal origin of the left vertebral posterior cerebral artery.      Impression: 1.  Acute left cerebral infarction involving the left caudate, left lentiform nucleus, external limb of left internal capsule, left insular cortex, and left posterior frontal lobe cortex. Infarction is in the left middle cerebral artery distribution. MRI one day prior was obtained for comparison. Overall infarctions appear very similar to the exam one day prior though the 9 mm infarction within the posterior-superior left frontal  lobe cortex is potentially new and not demonstrated previously. This was discussed with Francia nurse caring for the patient on 2 S. 6:11 PM 01/11/2020. 2. Inflammatory paranasal sinus disease. 3.  MR angiogram neck is very limited by motion artifact and the great vessel origins are not visualized. There is no evidence for NASCET carotid stenosis. 4.  Intracranial segment of the left vertebral artery is very small. There is a fetal origin of the left posterior cerebral artery.  This report was finalized on 1/11/2020 6:14 PM by Dr. Jae Hernández M.D.      Mri Angiogram Neck Without Contrast    Result Date: 1/11/2020  Narrative: MRI BRAIN WITHOUT CONTRAST, MR ANGIOGRAM HEAD WITHOUT CONTRAST, MR ANGIOGRAM NECK WITHOUT CONTRAST  HISTORY: Slurred speech and right-sided weakness. Fall on 01/09/2020.  COMPARISON: CT head without contrast 01/11/2020, MRI brain with and without contrast 04/27/2012.  TECHNIQUE: Brain MRI without contrast includes sagittal T1 as well as axial diffusion, FLAIR, T1, T2, gradient echo sequences. MR angiogram of the head and MR angiogram of the neck were obtained without contrast administration.  MIP reconstruction images were obtained.  FINDINGS: There is an acute infarction involving the left caudate body, left lentiform nucleus. Lentiform nucleus infarction also extends into the external limb of the left internal capsule and measures approximately 3 cm in AP dimension by 1.1 cm transverse. There are also small foci of acute infarction within the posterior left temporal lobe abutting the posterior margin of the sylvian fissure. Small cortical infarcts are present involving the insular cortex on the left. Additionally, there are small cortical infarctions within the posterior superior left frontal lobe cortex with the larger measuring 9 mm transverse. No right cerebral or cerebellar acute infarction is evident.  There is mild confluent periventricular cerebral white matter FLAIR hyperintense  signal that is consistent with chronic small vessel ischemic white matter change. There is ventricular enlargement attributed to central atrophy. There is no evidence for small cortical infarction within the posterior to the superior left frontal lobe mass effect or shift of midline structures. No extra-axial fluid collection is evident. There is no MRI evidence for hemorrhagic transformation of infarction.  Neck MRA is severely limited by the degree of motion related artifact. The proximal great vessels are not well visualized. There is flow related enhancement within both common carotid arteries and both proximal cervical internal carotid arteries without evidence for any NASCET stenosis. Both cervical common carotid arteries appear to be patent.  The distal cervical, petrous, and cavernous segments of both internal carotid arteries are patent. Supracavernous segments appear patent. Both middle cerebral arteries appear patent.  The proximal vertebral arteries are not visualized. The right vertebral artery is larger than the left. There is flow-related enhancement within both mid to distal cervical vertebral arteries. Intracranial segment of the left vertebral artery is very small. The basilar artery and both posterior cerebral arteries appear patent. There appears to be a fetal origin of the left vertebral posterior cerebral artery.      Impression: 1.  Acute left cerebral infarction involving the left caudate, left lentiform nucleus, external limb of left internal capsule, left insular cortex, and left posterior frontal lobe cortex. Infarction is in the left middle cerebral artery distribution. MRI one day prior was obtained for comparison. Overall infarctions appear very similar to the exam one day prior though the 9 mm infarction within the posterior-superior left frontal lobe cortex is potentially new and not demonstrated previously. This was discussed with nurse Francia caring for the patient on 2 S. 6:11 PM  01/11/2020. 2. Inflammatory paranasal sinus disease. 3.  MR angiogram neck is very limited by motion artifact and the great vessel origins are not visualized. There is no evidence for NASCET carotid stenosis. 4.  Intracranial segment of the left vertebral artery is very small. There is a fetal origin of the left posterior cerebral artery.  This report was finalized on 1/11/2020 6:14 PM by Dr. Jae Hernández M.D.      Mri Brain Without Contrast    Result Date: 1/13/2020  Narrative: BRAIN MRI WITHOUT GADOLINIUM  HISTORY: stroke; I48.91-Unspecified atrial fibrillation  COMPARISON: 01/11/2020  FINDINGS:  Multiplanar images of the head were obtained without the use of intravenous contrast.  There has been further evolution of the previously described left hemispheric infarcts. The degree of diffusion signal hyperintensity has diminished slightly but their appearance is otherwise stable. There has been development of an acute infarct in the upper right cerebellum best seen on diffusion images 6-9 and measuring 2.4 x 1.3 cm on diffusion image 8. There has also been development of a tiny acute lacunar type infarct of the upper most right kojo near the midbrain junction best seen on diffusion image 11. It measures approximately 3.5 mm. No significant mass effect or evidence of hemorrhage in association with any of the new or previous infarcts.  Cortical atrophy is again noted with mild chronic-appearing white matter changes bilaterally. There is no midline shift. The major vascular flow voids are patent.  Midline structures corpus callosum, pituitary gland, and brainstem are unremarkable. There is no evidence of extra axial mass or fluid collection. Ventricles remain mildly enlarged suggesting hydrocephalus but are stable in caliber compared to previous. Chronic-appearing paranasal sinus disease noted, greatest in the left maxillary. The globes and orbital soft tissues are unremarkable.       Impression: 1. Development of  right cerebellar and upper pontine infarcts without hemorrhage or mass effect. There has been evolution of the previously described left-sided infarcts without significant change otherwise. 2. Stable mild ventricular enlargement. 3. Chronic-appearing paranasal sinus disease.  This report was finalized on 1/13/2020 11:45 PM by Ravi Posey M.D.      Mri Brain Without Contrast    Result Date: 1/11/2020  Narrative: MRI BRAIN WITHOUT CONTRAST, MR ANGIOGRAM HEAD WITHOUT CONTRAST, MR ANGIOGRAM NECK WITHOUT CONTRAST  HISTORY: Slurred speech and right-sided weakness. Fall on 01/09/2020.  COMPARISON: CT head without contrast 01/11/2020, MRI brain with and without contrast 04/27/2012.  TECHNIQUE: Brain MRI without contrast includes sagittal T1 as well as axial diffusion, FLAIR, T1, T2, gradient echo sequences. MR angiogram of the head and MR angiogram of the neck were obtained without contrast administration.  MIP reconstruction images were obtained.  FINDINGS: There is an acute infarction involving the left caudate body, left lentiform nucleus. Lentiform nucleus infarction also extends into the external limb of the left internal capsule and measures approximately 3 cm in AP dimension by 1.1 cm transverse. There are also small foci of acute infarction within the posterior left temporal lobe abutting the posterior margin of the sylvian fissure. Small cortical infarcts are present involving the insular cortex on the left. Additionally, there are small cortical infarctions within the posterior superior left frontal lobe cortex with the larger measuring 9 mm transverse. No right cerebral or cerebellar acute infarction is evident.  There is mild confluent periventricular cerebral white matter FLAIR hyperintense signal that is consistent with chronic small vessel ischemic white matter change. There is ventricular enlargement attributed to central atrophy. There is no evidence for small cortical infarction within the posterior to  the superior left frontal lobe mass effect or shift of midline structures. No extra-axial fluid collection is evident. There is no MRI evidence for hemorrhagic transformation of infarction.  Neck MRA is severely limited by the degree of motion related artifact. The proximal great vessels are not well visualized. There is flow related enhancement within both common carotid arteries and both proximal cervical internal carotid arteries without evidence for any NASCET stenosis. Both cervical common carotid arteries appear to be patent.  The distal cervical, petrous, and cavernous segments of both internal carotid arteries are patent. Supracavernous segments appear patent. Both middle cerebral arteries appear patent.  The proximal vertebral arteries are not visualized. The right vertebral artery is larger than the left. There is flow-related enhancement within both mid to distal cervical vertebral arteries. Intracranial segment of the left vertebral artery is very small. The basilar artery and both posterior cerebral arteries appear patent. There appears to be a fetal origin of the left vertebral posterior cerebral artery.      Impression: 1.  Acute left cerebral infarction involving the left caudate, left lentiform nucleus, external limb of left internal capsule, left insular cortex, and left posterior frontal lobe cortex. Infarction is in the left middle cerebral artery distribution. MRI one day prior was obtained for comparison. Overall infarctions appear very similar to the exam one day prior though the 9 mm infarction within the posterior-superior left frontal lobe cortex is potentially new and not demonstrated previously. This was discussed with nurse Francia caring for the patient on 2 S. 6:11 PM 01/11/2020. 2. Inflammatory paranasal sinus disease. 3.  MR angiogram neck is very limited by motion artifact and the great vessel origins are not visualized. There is no evidence for NASCET carotid stenosis. 4.   Intracranial segment of the left vertebral artery is very small. There is a fetal origin of the left posterior cerebral artery.  This report was finalized on 1/11/2020 6:14 PM by Dr. Jae Hernández M.D.      Mri Brain Without Contrast    Result Date: 1/10/2020  Narrative: MRI Brain WO INDICATION: Decreased alertness. Status post fall 1/9/2020. TECHNIQUE: MRI of the brain without IV contrast. COMPARISON:  Head CT 1/9/2020 FINDINGS: Diffuse prominence of ventricles, sulci and basilar cisterns compatible with generalized atrophy. Foci of increased T2 and FLAIR signal within the left basal ganglia and left insular cortex with corresponding restricted diffusion and low signal on the ADC map consistent with acute infarct. Increased T2 and FLAIR signal within the periventricular white matter which is nonspecific but may reflect chronic microvascular disease. No mass, mass effect or midline shift. No hemorrhage or abnormal extra axial fluid collections. Normal intracranial flow voids. Bony calvarium, skull base and mastoids unremarkable. Bilateral maxillary and left ethmoid sinus mucosal disease.     Impression: Acute infarct left basal ganglia and left insular cortex in the distribution of the left MCA. Ventriculomegaly with increased T2 signal within the periventricular white matter. Though this could be indicative of NPH this is most likely the sequela of chronic microvascular disease. Correlate with patient's clinical presentation. Dr. Grady relates that the patient does not have any clinical features of NPH. Bilateral maxillary and left ethmoid sinus mucosal disease. Results called to Dr. Grady at [facility] ICU at the time of this dictation. Signer Name: DIANE Rodriguez MD  Signed: 1/10/2020 12:07 PM  Workstation Name: [id number]  [facility] Video Swallow With Speech Single Contrast    Result Date: 1/16/2020  Narrative: VIDEO ESOPHAGRAM 01/15/2020  HISTORY: Dysphagia.   Fluoroscopy was used during performance of the video esophagram by speech pathology.  Patient demonstrates a mild oropharyngeal dysphagia characterized by spillage, mistiming, and reduced anterior hyoid excursion. ?No aspiration observed with any consistency. ?Mild pharyngeal residue which cleared with multiple swallows. ?Slow, but complete esophageal clearance. Please see full speech pathology report.  FLUOROSCOPY TIME: 3 minutes 14 seconds. 4,307 images.  This report was finalized on 1/16/2020 10:09 AM by Dr. Jonny Otto M.D.      Xr Chest 1 View    Result Date: 1/9/2020  Narrative: CR Chest 1 Vw INDICATION: Low blood pressure. Patient fell today. COMPARISON:  12/31/2019 FINDINGS: Single portable AP view(s) of the chest.  Cardiomegaly without failure. Blunting of both CP angles may indicate small amount pleural fluid right greater than left. Postsurgical changes right proximal humerus from prior ORIF. No focal consolidation. Old left rib fractures.     Impression: Cardiomegaly with probable small bilateral pleural effusions right greater than left. Signer Name: DIANE Rodriguez MD  Signed: 1/9/2020 9:47 PM  Workstation Name: [facility]  [facility]    Ct Head Without Contrast Stroke Protocol    Result Date: 1/11/2020  Narrative: CT BRAIN WITHOUT CONTRAST  HISTORY: Outside MRI scan reports acute infarct in left MCA distribution and left insular cortex and left basal ganglia.  TECHNIQUE/FINDINGS: The CT scan was performed through the brain without contrast and the reported recent outside MRI scan is not currently available for direct comparison. There is an earlier MRI scan dated 04/27/2012. There is prominent diffuse atrophy as well as generalized enlargement of the ventricular system on today's exam which is unchanged from 2012. There is no evidence of acute intracranial hemorrhage or mass effect. There are no CT changes to suggest acute infarct at the present time.  There is  moderate mucosal thickening at the floors of both maxillary sinuses.      Radiation dose reduction techniques were utilized, including automated exposure control and exposure modulation based on body size.  This report was finalized on 1/11/2020 1:31 PM by Dr. Davidson Ribera M.D.      Xr Chest Pa & Lateral    Result Date: 12/31/2019  Narrative: XR CHEST PA AND LATERAL-: 12/31/2019 11:35 AM  INDICATION:  Shortness of air that began 2 weeks ago. Cough.  COMPARISON:  06/28/2018.  FINDINGS: PA and lateral views of the chest.  Interval development of cardiomegaly. There is mild indistinctness and prominence of interstitium suggestive of faint vascular congestion. There is new bandlike atelectasis in the left lung base. There is a small right-sided effusion with right basilar atelectasis or pneumonia. No pneumothorax. Chronic left rib deformities. Sequela of prior right shoulder surgery. There is thoracic spondylosis and kyphosis.      Impression:  1. New cardiomegaly and probable subtle mild vascular congestion. 2. New small right-sided effusion with right basilar atelectasis or pneumonia. Follow-up to clearing recommended. 3. Probable left base atelectasis.  This report was finalized on 12/31/2019 11:55 AM by Dr. Keith Dickerson MD.      Ct Cerebral Perfusion With & Without Contrast    Result Date: 1/13/2020  Narrative: CT ANGIOGRAM HEAD AND NECK AND CT PERFUSION STUDY  CLINICAL HISTORY: Right sided weakness.  Radiation dose reduction techniques were utilized, including automated exposure control and exposure modulation based on body size. CT scan of the head was obtained with 3 mm axial images without the use of IV contrast. The patient underwent a CT perfusion study with a dynamic bolus of IV contrast. Standard perfusion maps were constructed. The patient then underwent a CT angiogram of the head and neck with 1 mm axial images following the administration of IV contrast. Sagittal, coronal, and 3-dimensional  reconstructed images were obtained.  Percent stenosis was assessed in accordance with NASCET criteria.  COMPARISON: Noncontrast head CT from 01/11/2020, correlate with MRI from 01/11/2020  FINDINGS:  NONCONTRAST HEAD CT: On the noncontrast head CT, no acute hemorrhage or hydrocephalus is identified. A new area of hypodensity in the right cerebellum, for example 1.9 x 0.8 cm on image 18 of series 1 suggests subacute infarct. Mild periventricular hypodensities suggest chronic small vessel ischemic change in a patient this age. Small old basal ganglia lacunar infarcts there are suggested.  Likely mucous retention cysts in the maxillary sinuses, left more than right.   CT PERFUSION STUDY:  No CBF (under 30%) deficit or perfusion abnormality is demonstrated where the T MAX is greater than 6 seconds. The calculated mismatch volume was 0 cc.  CTA HEAD and neck: The CT angiogram of the head and neck demonstrates no hemodynamically significant focal stenosis, aneurysm, or dissection in the cervical carotid or vertebral arteries, or in the arteries at the base of the brain. The left PCA is apparently predominantly supplied by the anterior circulation, left P1 segment being relatively diminutive. Right vertebral artery is dominant.  Subcentimeter left thyroid hypodense nodule, follow-up evaluation could be obtained as indicated.  Visualized lung apices appear clear.  Degenerative changes are seen in the cervical spine with neuroforaminal narrowing related to facet and uncovertebral joint hypertrophy, more prominent on the right at C5/6, and on the left at C3/4, C5/6, C6/7.       Impression:   1. No perfusion abnormality to suggest completed or threatened acute infarct at the levels evaluated. 2. No arterial cutoff or high-grade arterial stenosis identified. 3. A new area of hypodensity in the right cerebellum suggesting subacute infarct. No acute intracranial hemorrhage or hydrocephalus. No enhancing lesions of brain.     Discussed by telephone with Dr. Noriega at 1900, 1920, 01/13/2020.  This report was finalized on 1/13/2020 7:32 PM by Dr. Sachin Damon M.D.        Condition on Discharge:  Good    Discharge Disposition  To home w/ Home health    Visiting Nurse:    Yes     Home PT/OT:  Yes     Home Safety Evaluation:  No     DME  [employer], already has cane and grabber at home    Discharge Diet:      Dietary Orders (From admission, onward)     Start     Ordered    01/17/20 1507  Diet Soft Texture; [employer]; Thin; Cardiac  Diet Effective Now     Question Answer Comment   Diet Texture / Consistency Soft Texture    Select Texture: [employer]    Fluid Consistency Thin    Common Modifiers Cardiac        01/17/20 1507                Activity at Discharge:  no driving until sees physician      Pre-discharge education  Driving restriction    Follow-up Appointments  Future Appointments   Date Time Provider Department Center   1/29/2020  2:30 PM Nahun Ann Jr., DO [facility]   3/31/2020  4:00 PM Raji Rodriguez MD [facility] None   4/8/2020  4:00 PM Hollie Piedra APRN [facility] None     Additional Instructions for the Follow-ups that You Need to Schedule     Discharge Follow-up with PCP   As directed       Currently Documented PCP:    Nahun Ann Jr., DO    PCP Phone Number:    [phone]     Follow Up Details:   on January 29, 2020 at 2:30PM as already scheduled         Discharge Follow-up with Specified Provider: Dr.Greg Rodriguez on March 31, 2020 at 4PM Neurology as already scheduled   As directed      To:  Dr.Greg Rodriguez on March 31, 2020 at 4PM Neurology as already scheduled         Discharge Follow-up with Specified Provider: JOHN Hess (neuro) on 4/8 for stroke as already scheduled   As directed      To:  JOHN Hess (neuro) on 4/8 for stroke as already scheduled               Test Results Pending at Discharge       Finesse Ramos MD  01/25/20  11:47 AM    Time:  Discharge > 30 min (if over 30 minutes give explanation as to why it took greater than 30 minutes) review of records, medication reconciliation/prescribing of dc meds over both admissions, discussion with pt

## 2020-01-27 ENCOUNTER — TELEPHONE (OUTPATIENT)
Dept: FAMILY MEDICINE CLINIC | Facility: CLINIC | Age: 65
End: 2020-01-27

## 2020-01-27 DIAGNOSIS — G81.91 RIGHT HEMIPARESIS (HCC): ICD-10-CM

## 2020-01-27 DIAGNOSIS — G93.89 CEREBRAL VENTRICULOMEGALY: ICD-10-CM

## 2020-01-27 DIAGNOSIS — I63.512 ACUTE ISCHEMIC LEFT MCA STROKE (HCC): ICD-10-CM

## 2020-01-27 DIAGNOSIS — I48.91 ATRIAL FIBRILLATION, UNSPECIFIED TYPE (HCC): Primary | ICD-10-CM

## 2020-01-27 NOTE — TELEPHONE ENCOUNTER
Pt called to reschedule her upcoming medicare wellness exam to next month. I made pt aware that she needs to follow up with her hospital visit, pt stated that she is too weak and unable to drive at this time and that she would not be able to come until her rescheduled visit on 2/26/20 @ 2pm

## 2020-01-28 ENCOUNTER — TELEPHONE (OUTPATIENT)
Dept: FAMILY MEDICINE CLINIC | Facility: CLINIC | Age: 65
End: 2020-01-28

## 2020-01-28 DIAGNOSIS — I48.91 ATRIAL FIBRILLATION, UNSPECIFIED TYPE (HCC): ICD-10-CM

## 2020-01-28 DIAGNOSIS — G81.91 RIGHT HEMIPARESIS (HCC): ICD-10-CM

## 2020-01-28 DIAGNOSIS — I48.20 CHRONIC ATRIAL FIBRILLATION WITH RAPID VENTRICULAR RESPONSE (HCC): Primary | ICD-10-CM

## 2020-01-28 NOTE — TELEPHONE ENCOUNTER
I called Bessie Do with [facility] to give verbal orders 2 times per week for 2 weeks for them to take care of the patient.  Pt was discharged on Eliquis but the pharmacy did not have an order for it.  She states she was switched to Eliquis 5 mg BID during her recent hospitalization and the warfarin was discontinued.  I ePrescribed the Eliquis to the pharmacy and told Bessie.  Bessie said the patient thought her appointmetn with me tomorrow was for coumadin INR check. I told Bessie her appointment with me needs to be for hospital follow-up. Bessie said she would let the patient know she needs to come to our office for hospital follow-up.

## 2020-01-28 NOTE — TELEPHONE ENCOUNTER
Bessie with [facility] called the office requesting orders for Home Health. Her # is [phone]

## 2020-01-28 NOTE — TELEPHONE ENCOUNTER
Savannah with [facility] received order for Home Health but when starting their process discovered that the [facility]  had set patient up with [facility]

## 2020-01-31 ENCOUNTER — TREATMENT (OUTPATIENT)
Dept: FAMILY MEDICINE CLINIC | Facility: CLINIC | Age: 65
End: 2020-01-31

## 2020-01-31 ENCOUNTER — TELEPHONE (OUTPATIENT)
Dept: FAMILY MEDICINE CLINIC | Facility: CLINIC | Age: 65
End: 2020-01-31

## 2020-01-31 DIAGNOSIS — I48.91 ATRIAL FIBRILLATION, UNSPECIFIED TYPE (HCC): ICD-10-CM

## 2020-01-31 DIAGNOSIS — G81.91 RIGHT HEMIPARESIS (HCC): ICD-10-CM

## 2020-01-31 NOTE — TELEPHONE ENCOUNTER
[facility] called and stated she needs to have PT 2 times a week for the next four weeks.  Please send an order In.  Juancarlos the PT from [facility] [place] can be reached at [phone].  ROM Melton CMA

## 2020-02-05 ENCOUNTER — OFFICE VISIT (OUTPATIENT)
Dept: FAMILY MEDICINE CLINIC | Facility: CLINIC | Age: 65
End: 2020-02-05

## 2020-02-05 VITALS
HEIGHT: 64 IN | HEART RATE: 60 BPM | DIASTOLIC BLOOD PRESSURE: 80 MMHG | SYSTOLIC BLOOD PRESSURE: 122 MMHG | TEMPERATURE: 98.6 F | WEIGHT: 116 LBS | OXYGEN SATURATION: 96 % | BODY MASS INDEX: 19.81 KG/M2 | RESPIRATION RATE: 16 BRPM

## 2020-02-05 DIAGNOSIS — I63.512 ACUTE ISCHEMIC LEFT MCA STROKE (HCC): ICD-10-CM

## 2020-02-05 DIAGNOSIS — I48.0 PAROXYSMAL ATRIAL FIBRILLATION WITH RAPID VENTRICULAR RESPONSE (HCC): Primary | ICD-10-CM

## 2020-02-05 DIAGNOSIS — G93.89 CEREBRAL VENTRICULOMEGALY: ICD-10-CM

## 2020-02-05 DIAGNOSIS — G93.41 METABOLIC ENCEPHALOPATHY: ICD-10-CM

## 2020-02-05 DIAGNOSIS — I48.21 PERMANENT ATRIAL FIBRILLATION (HCC): ICD-10-CM

## 2020-02-05 DIAGNOSIS — G81.91 RIGHT HEMIPARESIS (HCC): ICD-10-CM

## 2020-02-05 PROBLEM — I48.20 CHRONIC ATRIAL FIBRILLATION WITH RAPID VENTRICULAR RESPONSE: Status: RESOLVED | Noted: 2020-01-09 | Resolved: 2020-02-05

## 2020-02-05 PROBLEM — I48.91 ATRIAL FIBRILLATION (HCC): Status: RESOLVED | Noted: 2020-01-10 | Resolved: 2020-02-05

## 2020-02-05 PROCEDURE — 99214 OFFICE O/P EST MOD 30 MIN: CPT | Performed by: FAMILY MEDICINE

## 2020-02-05 NOTE — PATIENT INSTRUCTIONS
Rehabilitation After a Stroke, Adult  A stroke causes damage to the brain cells, which can affect your ability to walk, talk, or remember things. The impact of a stroke is different for everyone, and so is recovery. Some people have progress during the first few days after treatment. Others may take weeks or longer to make progress. Stroke rehabilitation includes a variety of treatments to help you recover and promote your independence after a stroke. You may not be able do everything that you did before the stroke, but you can learn ways to manage your lifestyle and be as independent as possible. Rehabilitation will start as soon as you are able to participate after your stroke, and it involves care from a team that may include:  · Family and friends. Your loved ones know you best and can be very helpful in your recovery.  · Physicians.  · Nurses.  · Physical and occupational therapists.  · Speech-language therapists.  · A nutritionist.  · A psychologist.  · A .  Keep open communication with all members of your care team. Share your medical records if needed, and take notes about each provider's recommendations.  What is physical therapy?  Physical therapists (PTs) help you to improve your coordination, balance, and muscle strength. Physical therapy may involve:  · Range of motion exercises.  · Help to move between lying, sitting, and standing positions.  · Walking with a cane or walker, if needed.  · Help using stairs.  What is occupational therapy?  Occupational therapists (OTs) help you rebuild your ability to do everyday tasks, such as brushing your teeth, going to the bathroom, eating, and getting dressed. Occupational therapy may also help with:  · Vision. Visual scanning is a technique that is used to prevent falls.  · Memory and cognitive training. This therapy includes problem-solving techniques and relearning tasks like making a phone call.  · Fine muscle movements such as buttoning a shirt  or picking up small objects.  What is speech therapy?  Speech-language therapists help you communicate. After a stroke, you may have problems understanding what people are saying, or you may have trouble writing, speaking, or finding the right word for what you want to say. You may also need speech therapy if you have difficulty swallowing while eating and drinking. Examples of speech-language therapies include:  · Techniques to strengthen muscles used in swallowing.  · Naming objects or describing pictures. This helps retrain the brain to recognize and remember words.  · Exercises to strengthen the muscles involved in talking, including your tongue and lips.  · Exercises to retrain your brain in understanding what you read and hear.  How often will I need therapy?  Therapy will begin as soon as you are able to participate, which is often within the first few days after a stroke. Sessions will be frequent at first. For example, you may have therapy 2-3 hours a day on most days of the week during the first few months. The intensity depends on the type and severity of your stroke. You may need therapy for several months. Therapy may take place in the hospital, at a rehabilitation center, or in your home.  Are there any side effects of therapy?  Therapy is safe and is usually well-tolerated. You may feel physically and mentally tired after therapy, especially during the first few weeks. Rest before therapy sessions if you need to so you can get the most out of your rehabilitation.  Follow these instructions at home:  · Involve your family and friends in your recovery, if possible. Having another person to encourage you is beneficial.  · Follow instructions from your speech-language therapist, nutritionist, or health care provider about what you can safely eat and drink. Eat healthy foods. If your ability to swallow was affected by the stroke, you may need to take steps to avoid choking, such as:  ? Taking small bites  when eating.  ? Eating foods that are soft or pureed.  ? Drinking liquids that have been thickened.  · Maintain social connections and interactions with friends, family, and community groups. This is an important part of your recovery. Communication challenges and physical challenges may cause you to feel isolated after a stroke.  · Consider joining a support group that allows you to talk about the impact of stroke on your life. A psychologist or counselor may be recommended. Your emotional recovery from stroke is just as important as your physical recovery.  · Keep all follow-up visits as told by your health care providers. This is important.  Summary  · Stroke rehabilitation includes a variety of treatments to help you recover and promote your independence after a stroke.  · Rehabilitation will start as soon as you are able to participate after your stroke, and it includes care from a team of experts.  · The intensity of therapy depends on the type and severity of your stroke. You may need therapy for several months.  This information is not intended to replace advice given to you by your health care provider. Make sure you discuss any questions you have with your health care provider.  Document Released: 01/06/2009 Document Revised: 09/07/2019 Document Reviewed: 12/19/2017  [employer] Interactive Patient Education © 2019 [employer]

## 2020-02-05 NOTE — PROGRESS NOTES
Subjective   Camille Pantoja is a 64 y.o. female is here for   Chief Complaint   Patient presents with   • Follow-up     hospital follow up a-fib and stroke X3       History of Present Illness     She is here with her Sister-in-law Julia Pantoja [phone], who says that she has had few falls this year.    Ms. Pantoja is here for hospital follow-up.  She states that went to [facility] on January 9, 2020 and was found to be new onset atrial fibrillation with rapid ventricular response.  She was admitted to the hospital and stayed the night.  On January 10 she was transferred to [facility] where she had cardiac catheterization.  About 2 days after her cardiac catheterization, she had a stroke.  The next day she had another stroke.  She was diagnosed with a cardiomyopathy.  Her heart rate was in the 120-130 range.  There is believe that the atrial fibrillation is what caused the stroke.  After the stroke she has had some right arm and right leg weakness.  Patient states that she is getting most of her strength back in the right arm and leg.  She did not qualify for acute inpatient rehab so she went to [facility] outpatient rehab [place].  She had subacute rehab for 1 week and was discharged home.  She is her Coumadin was discontinued and she was started on Eliquis.  She is using a walker to ambulate.  She states she has lost some of her memory. She states she has trouble thinking of some of the words she wants to say.  She has a home health nurse that comes to her house two times per week.  PT/OT come to her house weekly.  She states she has gotten most of the strength back in her right arm.      The following portions of the patient's history were reviewed and updated as appropriate: allergies, current medications, past family history, past medical history, past social history, past surgical history and problem list.     reports that she has never smoked. She has never used  "smokeless tobacco. She reports that she drinks alcohol. She reports that she does not use drugs.    Review of Systems   Constitutional: Positive for unexpected weight change. Negative for activity change.   HENT: Negative for congestion.    Respiratory: Negative for shortness of breath and wheezing.    Cardiovascular: Negative for chest pain and palpitations.   Gastrointestinal: Negative for abdominal pain, blood in stool and constipation.   Genitourinary: Negative for difficulty urinating and hematuria.   Musculoskeletal: Positive for gait problem.   Skin: Negative for color change and rash.        PHQ-9 Depression Screening  Little interest or pleasure in doing things?     Feeling down, depressed, or hopeless?     Trouble falling or staying asleep, or sleeping too much?     Feeling tired or having little energy?     Poor appetite or overeating?     Feeling bad about yourself - or that you are a failure or have let yourself or your family down?     Trouble concentrating on things, such as reading the newspaper or watching television?     Moving or speaking so slowly that other people could have noticed? Or the opposite - being so fidgety or restless that you have been moving around a lot more than usual?     Thoughts that you would be better off dead, or of hurting yourself in some way?     PHQ-9 Total Score     If you checked off any problems, how difficult have these problems made it for you to do your work, take care of things at home, or get along with other people?           Objective   /80 (BP Location: Left arm, Patient Position: Sitting, Cuff Size: Adult)   Pulse 60   Temp 98.6 °F (37 °C) (Oral)   Resp 16   Ht 162.6 cm (64\")   Wt 52.6 kg (116 lb)   SpO2 96%   BMI 19.91 kg/m²   Physical Exam   Constitutional: She is oriented to person, place, and time. She appears well-developed and well-nourished. No distress.   HENT:   Head: Normocephalic and atraumatic.   Right Ear: External ear normal. "   Left Ear: External ear normal.   Nose: Nose normal.   Mouth/Throat: Oropharynx is clear and moist. No oropharyngeal exudate.   Eyes: Lids are normal. Right eye exhibits no discharge. Left eye exhibits no discharge. No scleral icterus.   Neck: Trachea normal, normal range of motion and full passive range of motion without pain. Neck supple. No tracheal deviation and no edema present. No thyromegaly present.   Cardiovascular: Normal rate, normal heart sounds and intact distal pulses. An irregularly irregular rhythm present. Exam reveals no gallop and no friction rub.   No murmur heard.  Pulmonary/Chest: Effort normal and breath sounds normal. No stridor. No tachypnea and no bradypnea. No respiratory distress. She has no decreased breath sounds. She has no wheezes. She has no rales. She exhibits no tenderness.   Abdominal: Normal appearance. There is no hepatosplenomegaly.   Musculoskeletal: She exhibits no edema.   Lymphadenopathy:        Head (right side): No submental, no submandibular, no tonsillar, no preauricular, no posterior auricular and no occipital adenopathy present.        Head (left side): No submental, no submandibular, no tonsillar, no preauricular, no posterior auricular and no occipital adenopathy present.     She has no cervical adenopathy.        Right cervical: No superficial cervical, no deep cervical and no posterior cervical adenopathy present.       Left cervical: No superficial cervical, no deep cervical and no posterior cervical adenopathy present.   Neurological: She is alert and oriented to person, place, and time. She has normal strength and normal reflexes. She is not disoriented.   Skin: Skin is warm, dry and intact. Capillary refill takes less than 2 seconds. No rash noted. She is not diaphoretic. No cyanosis or erythema. No pallor. Nails show no clubbing.   Psychiatric: She has a normal mood and affect. Her behavior is normal. Cognition and memory are normal.   Nursing note and  vitals reviewed.      Procedures    Assessment/Plan   Diagnoses and all orders for this visit:    1. Paroxysmal atrial fibrillation with rapid ventricular response (CMS/HCC) (Primary)  Assessment & Plan:  Currently on Eliquis 5 mg twice daily.  Rate controlled with metoprolol XL 25 mg 3 tablets twice daily. Currently on Eliquis 5 mg twice daily.  Rate controlled with metoprolol XL 25 mg 3 tablets twice daily.  Also taking digoxin 125 mcg once daily.  She has an appointment with cardiology scheduled for follow-up.      Orders:  -     Ambulatory Referral to Neurology    2. Permanent atrial fibrillation  -     Ambulatory Referral to Neurology    3. Right hemiparesis (CMS/HCC)  -     Ambulatory Referral to Neurology    4. Cerebral ventriculomegaly  -     Ambulatory Referral to Neurology    5. Metabolic encephalopathy  -     Ambulatory Referral to Neurology    6. Acute ischemic left MCA stroke (CMS/HCC)  -     Ambulatory Referral to Neurology

## 2020-02-05 NOTE — ASSESSMENT & PLAN NOTE
Currently on Eliquis 5 mg twice daily.  Rate controlled with metoprolol XL 25 mg 3 tablets twice daily. Currently on Eliquis 5 mg twice daily.  Rate controlled with metoprolol XL 25 mg 3 tablets twice daily.  Also taking digoxin 125 mcg once daily.  She has an appointment with cardiology scheduled for follow-up.

## 2020-02-10 RX ORDER — OXYBUTYNIN CHLORIDE 5 MG/1
5 TABLET ORAL 2 TIMES DAILY
Qty: 60 TABLET | Refills: 5 | Status: SHIPPED | OUTPATIENT
Start: 2020-02-10 | End: 2020-08-05

## 2020-02-17 ENCOUNTER — TELEPHONE (OUTPATIENT)
Dept: NEUROLOGY | Facility: CLINIC | Age: 65
End: 2020-02-17

## 2020-02-20 RX ORDER — CHOLECALCIFEROL (VITAMIN D3) 125 MCG
5 CAPSULE ORAL NIGHTLY
Qty: 30 TABLET | Refills: 0 | Status: SHIPPED | OUTPATIENT
Start: 2020-02-20 | End: 2020-03-23

## 2020-02-20 RX ORDER — LISINOPRIL 2.5 MG/1
2.5 TABLET ORAL
Qty: 30 TABLET | Refills: 0 | Status: SHIPPED | OUTPATIENT
Start: 2020-02-20 | End: 2020-03-23

## 2020-02-26 RX ORDER — ATORVASTATIN CALCIUM 80 MG/1
80 TABLET, FILM COATED ORAL NIGHTLY
Qty: 90 TABLET | Refills: 1 | Status: SHIPPED | OUTPATIENT
Start: 2020-02-26 | End: 2020-04-10

## 2020-02-26 RX ORDER — DIGOXIN 125 MCG
125 TABLET ORAL
Qty: 90 TABLET | Refills: 1 | Status: SHIPPED | OUTPATIENT
Start: 2020-02-26 | End: 2020-08-27 | Stop reason: SDUPTHER

## 2020-02-26 RX ORDER — DIGOXIN 125 MCG
125 TABLET ORAL
COMMUNITY
End: 2020-02-26 | Stop reason: SDUPTHER

## 2020-02-27 ENCOUNTER — TELEPHONE (OUTPATIENT)
Dept: FAMILY MEDICINE CLINIC | Facility: CLINIC | Age: 65
End: 2020-02-27

## 2020-02-28 NOTE — TELEPHONE ENCOUNTER
Patient doesn't want to reschedule the [employer] that was down for 2/26 at this time with everything that has gone on  and the fact she isnt driving. She was not aware that she had this appointment and says she just saw us for the htn, heartburn follow with her hospital follow up. Says she will keep the next appointment that is on file

## 2020-02-28 NOTE — TELEPHONE ENCOUNTER
Please call Ms. Kit and ask her to schedule an appointment with the same reason for visit of the appointment she no showed for on February 26.

## 2020-03-09 ENCOUNTER — TELEPHONE (OUTPATIENT)
Dept: FAMILY MEDICINE CLINIC | Facility: CLINIC | Age: 65
End: 2020-03-09

## 2020-03-09 DIAGNOSIS — I10 ESSENTIAL HYPERTENSION: Primary | ICD-10-CM

## 2020-03-09 RX ORDER — METOPROLOL SUCCINATE 25 MG/1
75 TABLET, EXTENDED RELEASE ORAL EVERY 12 HOURS SCHEDULED
Qty: 180 TABLET | Refills: 5 | Status: SHIPPED | OUTPATIENT
Start: 2020-03-09 | End: 2020-03-27 | Stop reason: DRUGHIGH

## 2020-03-09 NOTE — TELEPHONE ENCOUNTER
Patient called stating she had lost her script for Metoprolol 25 3pills am, 3 pills pm. She called pharmacy and they stated insurance would not pay for it a 2nd time. They can fill it with PCP approval and she will pay out of pocket. Can you please send in script for her?

## 2020-03-09 NOTE — TELEPHONE ENCOUNTER
I called patient to clarify her metoprolol.  She says that prior to her recent hospitalization was taken metoprolol 25 mg 1 pill twice daily.  She says while she was in the hospital they increased it to 25 mg 3 tablets twice daily.  She says she accidentally lost her last prescription of metoprolol and needs another prescription sent to the pharmacy.  I sent the prescription to the pharmacy via E prescription and let the patient know.

## 2020-03-11 ENCOUNTER — TELEPHONE (OUTPATIENT)
Dept: FAMILY MEDICINE CLINIC | Facility: CLINIC | Age: 65
End: 2020-03-11

## 2020-03-11 NOTE — TELEPHONE ENCOUNTER
Ortiz with [facility] has been seeing Camille for Occupational therapy with shoulder and requesting 5 more visits please for continued treatment. [phone]

## 2020-03-23 RX ORDER — LISINOPRIL 2.5 MG/1
TABLET ORAL
Qty: 30 TABLET | Refills: 5 | Status: SHIPPED | OUTPATIENT
Start: 2020-03-23 | End: 2020-03-27

## 2020-03-23 RX ORDER — CHOLECALCIFEROL (VITAMIN D3) 125 MCG
CAPSULE ORAL
Qty: 30 TABLET | Refills: 5 | Status: SHIPPED | OUTPATIENT
Start: 2020-03-23 | End: 2020-07-22

## 2020-03-27 ENCOUNTER — TELEPHONE (OUTPATIENT)
Dept: FAMILY MEDICINE CLINIC | Facility: CLINIC | Age: 65
End: 2020-03-27

## 2020-03-27 DIAGNOSIS — I10 ESSENTIAL HYPERTENSION: Primary | ICD-10-CM

## 2020-03-27 RX ORDER — METOPROLOL SUCCINATE 50 MG/1
50 TABLET, EXTENDED RELEASE ORAL DAILY
Qty: 90 TABLET | Refills: 1 | Status: SHIPPED | OUTPATIENT
Start: 2020-03-27 | End: 2020-04-01

## 2020-03-27 NOTE — TELEPHONE ENCOUNTER
Ortiz Carias with [facility] called requesting OT for her right shoulder and PT to help her progress to a cane.Her BP this morning was 88/60 and her pulse was 52. She has been asymptomatic. No falls, dizziness, or light-headedness.  Discontinue Lisinopril 2.5 and decrease Metoprolol form 75 to 50 mg per day.  Home health is checking her BP two times per week.  I requested and home health agreed to call us with her next BP measurement result so we can decide whether to make any further adjustments to her BP medicine.

## 2020-03-30 ENCOUNTER — TELEPHONE (OUTPATIENT)
Dept: FAMILY MEDICINE CLINIC | Facility: CLINIC | Age: 65
End: 2020-03-30

## 2020-03-30 DIAGNOSIS — I10 ESSENTIAL HYPERTENSION: ICD-10-CM

## 2020-04-01 RX ORDER — METOPROLOL SUCCINATE 100 MG/1
100 TABLET, EXTENDED RELEASE ORAL DAILY
Qty: 30 TABLET | Refills: 3 | Status: SHIPPED | OUTPATIENT
Start: 2020-04-01 | End: 2020-04-01 | Stop reason: SDUPTHER

## 2020-04-01 RX ORDER — METOPROLOL SUCCINATE 100 MG/1
100 TABLET, EXTENDED RELEASE ORAL DAILY
Qty: 30 TABLET | Refills: 3 | Status: SHIPPED | OUTPATIENT
Start: 2020-04-01 | End: 2020-04-08 | Stop reason: ALTCHOICE

## 2020-04-01 NOTE — TELEPHONE ENCOUNTER
Per patient she does not feel this is correct. From 75 mg bid to 50mg qd is a big adjustment to her and she does not feel comfortable with this and would like to speak with Dr Ann. Niesha HIRSCH MA

## 2020-04-01 NOTE — TELEPHONE ENCOUNTER
I called the patient and discussed her Metoprolol. She said when she was discharged from the hospital she was taking Metoprolol 75 mg twice daily. I advised her to now take 50 mg two times daily and let her know I was calling in a 100 mg tablet, XL, so that when she ran out of her current prescription she could get the Metoprolol  mg and take it once daily.  She understands and agrees.  I updated her medicine list and sent the Rx to the pharmacy.

## 2020-04-08 ENCOUNTER — OFFICE VISIT (OUTPATIENT)
Dept: FAMILY MEDICINE CLINIC | Facility: CLINIC | Age: 65
End: 2020-04-08

## 2020-04-08 DIAGNOSIS — I10 ESSENTIAL HYPERTENSION: Primary | ICD-10-CM

## 2020-04-08 DIAGNOSIS — K21.9 GASTROESOPHAGEAL REFLUX DISEASE, ESOPHAGITIS PRESENCE NOT SPECIFIED: ICD-10-CM

## 2020-04-08 PROCEDURE — 99443 PR PHYS/QHP TELEPHONE EVALUATION 21-30 MIN: CPT | Performed by: FAMILY MEDICINE

## 2020-04-08 RX ORDER — OMEPRAZOLE 20 MG/1
20 CAPSULE, DELAYED RELEASE ORAL DAILY
COMMUNITY
Start: 2020-04-01 | End: 2020-04-08 | Stop reason: SDUPTHER

## 2020-04-08 RX ORDER — METOPROLOL SUCCINATE 100 MG/1
100 TABLET, EXTENDED RELEASE ORAL DAILY
Qty: 90 TABLET | Refills: 1 | Status: SHIPPED | OUTPATIENT
Start: 2020-04-08 | End: 2020-04-17 | Stop reason: SDUPTHER

## 2020-04-08 RX ORDER — OMEPRAZOLE 20 MG/1
20 CAPSULE, DELAYED RELEASE ORAL DAILY
Qty: 30 CAPSULE | Refills: 3 | Status: SHIPPED | OUTPATIENT
Start: 2020-04-08 | End: 2020-07-22

## 2020-04-08 NOTE — PATIENT INSTRUCTIONS
Exercising to Stay Healthy  To become healthy and stay healthy, it is recommended that you do moderate-intensity and vigorous-intensity exercise. You can tell that you are exercising at a moderate intensity if your heart starts beating faster and you start breathing faster but can still hold a conversation. You can tell that you are exercising at a vigorous intensity if you are breathing much harder and faster and cannot hold a conversation while exercising.  Exercising regularly is important. It has many health benefits, such as:  · Improving overall fitness, flexibility, and endurance.  · Increasing bone density.  · Helping with weight control.  · Decreasing body fat.  · Increasing muscle strength.  · Reducing stress and tension.  · Improving overall health.  How often should I exercise?  Choose an activity that you enjoy, and set realistic goals. Your health care provider can help you make an activity plan that works for you.  Exercise regularly as told by your health care provider. This may include:  · Doing strength training two times a week, such as:  ? Lifting weights.  ? Using resistance bands.  ? Push-ups.  ? Sit-ups.  ? Yoga.  · Doing a certain intensity of exercise for a given amount of time. Choose from these options:  ? A total of 150 minutes of moderate-intensity exercise every week.  ? A total of 75 minutes of vigorous-intensity exercise every week.  ? A mix of moderate-intensity and vigorous-intensity exercise every week.  Children, pregnant women, people who have not exercised regularly, people who are overweight, and older adults may need to talk with a health care provider about what activities are safe to do. If you have a medical condition, be sure to talk with your health care provider before you start a new exercise program.  What are some exercise ideas?  Moderate-intensity exercise ideas include:  · Walking 1 mile (1.6 km) in about 15  minutes.  · Biking.  · Hiking.  · Golfing.  · Dancing.  · Water aerobics.  Vigorous-intensity exercise ideas include:  · Walking 4.5 miles (7.2 km) or more in about 1 hour.  · Jogging or running 5 miles (8 km) in about 1 hour.  · Biking 10 miles (16.1 km) or more in about 1 hour.  · Lap swimming.  · Roller-skating or in-line skating.  · Cross-country skiing.  · Vigorous competitive sports, such as football, basketball, and soccer.  · Jumping rope.  · Aerobic dancing.  What are some everyday activities that can help me to get exercise?  · Yard work, such as:  ? Pushing a .  ? Raking and bagging leaves.  · Washing your car.  · Pushing a stroller.  · Shoveling snow.  · Gardening.  · Washing windows or floors.  How can I be more active in my day-to-day activities?  · Use stairs instead of an elevator.  · Take a walk during your lunch break.  · If you drive, park your car farther away from your work or school.  · If you take public transportation, get off one stop early and walk the rest of the way.  · Stand up or walk around during all of your indoor phone calls.  · Get up, stretch, and walk around every 30 minutes throughout the day.  · Enjoy exercise with a friend. Support to continue exercising will help you keep a regular routine of activity.  What guidelines can I follow while exercising?  · Before you start a new exercise program, talk with your health care provider.  · Do not exercise so much that you hurt yourself, feel dizzy, or get very short of breath.  · Wear comfortable clothes and wear shoes with good support.  · Drink plenty of water while you exercise to prevent dehydration or heat stroke.  · Work out until your breathing and your heartbeat get faster.  Where to find more information  · [place] Department of Health and Human Services: www.hhs.gov  · Centers for Disease Control and Prevention (CDC): www.cdc.gov  Summary  · Exercising regularly is important. It will improve your overall fitness,  flexibility, and endurance.  · Regular exercise also will improve your overall health. It can help you control your weight, reduce stress, and improve your bone density.  · Do not exercise so much that you hurt yourself, feel dizzy, or get very short of breath.  · Before you start a new exercise program, talk with your health care provider.  This information is not intended to replace advice given to you by your health care provider. Make sure you discuss any questions you have with your health care provider.  Document Released: 01/20/2012 Document Revised: 11/08/2018 Document Reviewed: 11/08/2018  [employer] Interactive Patient Education © 2020 [employer]

## 2020-04-08 NOTE — PROGRESS NOTES
Subjective   Camille Pantoja is a 64 y.o. female is here for No chief complaint on file.      History of Present Illness     You have chosen to receive care through a telephone visit today. Do you consent to use a telephone visit for your medical care today? Yes    Pt has HTN.  She has been checking her BP at home.      3/30 103/76  4/2 149/74  4/3 111/82  4/7 110/70    She has GERD that is controlled with Omeprazole.    The following portions of the patient's history were reviewed and updated as appropriate: allergies, current medications, past family history, past medical history, past social history, past surgical history and problem list.     reports that she has never smoked. She has never used smokeless tobacco. She reports that she drinks alcohol. She reports that she does not use drugs.    Review of Systems   Constitutional: Negative for activity change and unexpected weight change.   Respiratory: Negative for shortness of breath and wheezing.    Cardiovascular: Negative for chest pain and palpitations.   Gastrointestinal: Negative for abdominal pain, blood in stool and constipation.   Genitourinary: Negative for difficulty urinating and hematuria.   Musculoskeletal: Negative for gait problem.   Skin: Negative for color change and rash.        PHQ-9 Depression Screening  Little interest or pleasure in doing things?     Feeling down, depressed, or hopeless?     Trouble falling or staying asleep, or sleeping too much?     Feeling tired or having little energy?     Poor appetite or overeating?     Feeling bad about yourself - or that you are a failure or have let yourself or your family down?     Trouble concentrating on things, such as reading the newspaper or watching television?     Moving or speaking so slowly that other people could have noticed? Or the opposite - being so fidgety or restless that you have been moving around a lot more than usual?     Thoughts that you would be better off dead, or of  hurting yourself in some way?     PHQ-9 Total Score     If you checked off any problems, how difficult have these problems made it for you to do your work, take care of things at home, or get along with other people?           Objective   There were no vitals taken for this visit.  Physical Exam    Procedures    Assessment/Plan   Diagnoses and all orders for this visit:    1. Essential hypertension (Primary)  Assessment & Plan:   Pt prefers BID Metoprolol.  D/C Metoprpolol-XL 100mg Daily and start regular release 50 mg BID      Orders:  -     metoprolol succinate XL (Toprol XL) 100 MG 24 hr tablet; Take 1 tablet by mouth Daily.  Dispense: 90 tablet; Refill: 1    2. Gastroesophageal reflux disease, esophagitis presence not specified  -     omeprazole (priLOSEC) 20 MG capsule; Take 1 capsule by mouth Daily.  Dispense: 30 capsule; Refill: 3    Pt prefers Metoprolol BID instead of once daily, D/C XL and start regular release    I spent 25 minutes with the patient, of which more than 50% was counseling. The patient was also counseled on diet and exercise to minimize or eliminate concentrated sweets and sweetened beverages, as well as cutting back on breads and pastas.

## 2020-04-10 ENCOUNTER — OFFICE VISIT (OUTPATIENT)
Dept: NEUROLOGY | Facility: CLINIC | Age: 65
End: 2020-04-10

## 2020-04-10 DIAGNOSIS — I63.512 ACUTE ISCHEMIC LEFT MCA STROKE (HCC): ICD-10-CM

## 2020-04-10 DIAGNOSIS — E78.2 MIXED HYPERLIPIDEMIA: ICD-10-CM

## 2020-04-10 DIAGNOSIS — I48.0 PAROXYSMAL ATRIAL FIBRILLATION WITH RAPID VENTRICULAR RESPONSE (HCC): Primary | ICD-10-CM

## 2020-04-10 PROCEDURE — 99213 OFFICE O/P EST LOW 20 MIN: CPT | Performed by: NURSE PRACTITIONER

## 2020-04-10 RX ORDER — ATORVASTATIN CALCIUM 80 MG/1
40 TABLET, FILM COATED ORAL NIGHTLY
Qty: 90 TABLET | Refills: 1 | Status: SHIPPED | OUTPATIENT
Start: 2020-04-10 | End: 2021-07-01 | Stop reason: SDUPTHER

## 2020-04-10 NOTE — PROGRESS NOTES
"DOS: 4/10/2020  NAME: Camille Pantoja   : 1955  PCP: Nahun Ann Jr., DO    Chief Complaint   Patient presents with   • Stroke      Referring MD: Nahun Ann Jr.*    Neurological Problem:  64 y.o.  female with a Hx of hypertension, atrial fibrillation, hyperlipidemia who presents today via telephone visit for stroke follow-up.  History divided by the patient review of records as summarized below.    Interval History:  Ms. Pantoja presented to [facility] on  with complaints of change in mental status and feeling \"terrible\".  She reported prior to admission she developed diarrhea after being treated with Levaquin for pneumonia and had become progressively more weak.  MRI brain obtained and revealed an acute left basal ganglia left insular cortex stroke, ventriculomegaly also noted.  EKG revealed new onset atrial fibrillation with RVR.  She was also noted to have an elevated troponin and new cardiomyopathy therefore she was transferred here for further work-up and evaluation to undergo a cardiac cath.  Cardiac cath showed no significant CAD she was noted to be on warfarin PTA for a DVT and her INR was 1.25 on admission.  After her cardiac cath she was found to have worsening right-sided weakness therefore a repeat MRI brain was obtained and revealed an acute left cerebral stroke in the left caudate, lentiform nucleus, external limb of the internal capsule, insular cortex, and left posterior frontal lobe cortex, appears similar to previous but left frontal cortex stroke is new.  CT head did not reveal any evidence of acute intracranial abnormalities, ventricular enlargement noted, suspicion of normal pressure hydrocephalus.  Repeat CT head on  revealed no evidence of acute intracranial abnormalities, generalized enlargement of the ventricular system but unchanged since prior imaging in .  MRA head/neck was motion degraded but revealed decreased left MCA flow compared to " right MCA but no obvious large vessel occlusion, intracranial left vertebral artery small, fetal origin of the left PCA.  2D echo revealed a severely dilated LA size, RA size severely dilated, LV function severely decreased, EF 27%, no evidence of LV thrombus present, no AV stenosis present.  CT/CTA/CTP revealed a new area of hypodensity within the right cerebellum, no CBF under 30% deficit or perfusion abnormality, no high-grade stenosis, subcentimeter left thyroid hypodense nodule noted. VFSS also performed due to dysphagia and that revealed no aspiration observed with any consistency, mild oropharyngeal dysphagia. She was started on a heparin drip due to her new found atrial fibrillation and was eventually transitioned to Eliquis 5 mg twice daily once she passed her video swallow.  We recommended that she be discharged home on Eliquis and obtain a repeat lipid panel with her PCP as well as a follow-up for a NPH evaluation in our office and a follow-up for her left thyroid nodule noted on her CTA head neck with a referral from her PCP. Per review of her discharge summary she was discharged back to [facility] for there rehab facility.    She presents today via telephone visit and has been doing very well since being discharged from the hospital in January.  She has been maintained on Eliquis and Lipitor with no new stroke/TIA symptoms since being discharged in January.  She did receive acute rehab after initial discharge and is now receiving outpatient PT/OT a couple times a week.  She states she thinks that it will be ending within the next couple of weeks.  Her right-sided weakness has greatly improved and she does use a walker and cane for assist devices for long distances.  She does live alone but recently signed up for a life alert necklace.  Her BP has been running on the lower sides with her systolic being in the low 100s therefore she has had recent adjustments of her Lopressor and her lisinopril was  "discontinued per her PCP.  She feels her short and long-term memory has improved since leaving the hospital.  She has no signs and symptoms of depression.  She reports she does not really get great sleep because she is a light sleeper and this has been going on since prior to the stroke.  She has been getting physical activity with PT and OT and yesterday she walked to her porch.    Review of Systems:        Review of Systems   HENT: Negative.    Eyes: Negative.    Respiratory: Negative.    Cardiovascular: Negative.    Gastrointestinal: Negative.    Endocrine: Negative.    Genitourinary: Negative.    Musculoskeletal: Positive for gait problem.   Skin: Negative.    Allergic/Immunologic: Negative.    Neurological: Positive for weakness.   Hematological: Negative.    Psychiatric/Behavioral: Positive for sleep disturbance.       \"The following portions of the patient's history were reviewed and updated as appropriate: allergies, current medications, past family history, past medical history, past social history, past surgical history and problem list.\"    Review and Interpretation of Imaging as described in interval history.    Laboratory Results:             Lab Results   Component Value Date    HGBA1C 5.50 01/10/2020     Lab Results   Component Value Date    CHOL 106 01/10/2020     Lab Results   Component Value Date    HDL 40 01/10/2020    HDL 51 05/29/2019    HDL 78 (H) 11/20/2017     Lab Results   Component Value Date    LDL 52 01/10/2020    LDL 61 05/29/2019     (H) 11/20/2017     Lab Results   Component Value Date    TRIG 69 01/10/2020    TRIG 83 05/29/2019    TRIG 88 11/20/2017     No components found for: CHOLHDL  No results found for: RPR  Lab Results   Component Value Date    TSH 0.991 01/10/2020     Lab Results   Component Value Date    KXMWGAKD19 435 01/10/2020     Lab Results   Component Value Date    GLUCOSE 87 01/25/2020    BUN 10 01/25/2020    CREATININE 0.61 01/25/2020    EGFRIFNONA 98 01/25/2020 "    EGFRIFAFRI 75 05/29/2019    BCR 16.4 01/25/2020    K 4.6 01/25/2020    CO2 29.1 (H) 01/25/2020    CALCIUM 9.5 01/25/2020    PROTENTOTREF 7.5 05/29/2019    ALBUMIN 3.10 (L) 01/25/2020    LABIL2 1.5 05/29/2019    AST 25 01/10/2020    ALT 13 01/10/2020     Lab Results   Component Value Date    WBC 4.52 01/25/2020    HGB 11.2 (L) 01/25/2020    HCT 35.5 01/25/2020    MCV 90.1 01/25/2020     01/25/2020     Lab Results   Component Value Date    INR 1.31 (H) 01/13/2020    INR 1.30 (H) 01/10/2020    INR 1.25 (H) 01/09/2020    PROTIME 16.0 (H) 01/13/2020    PROTIME 16.0 (H) 01/10/2020    PROTIME 15.5 (H) 01/09/2020     Neurological examination:  Higher Integrative  Function: Oriented to time, place and person. Normal fund of knowledge and higher integrative function.    Impression/Assessment:    Mrs. Pantoja presented today via telephone visit for stroke follow-up.  She has been doing very well since being discharged from the hospital in January after suffering her stroke and has been maintained on Eliquis and Lipitor with no new stroke/TIA symptoms since January.  She continues with PT/OT outpatient and has shown great improvement per her report.  She is inquiring about driving and I have informed her that she will need clearance per PT/OT recommendations and then she will need to follow-up with PCP or me for further driving clearance.  She currently lives alone but has obtained a life alert monitor which I think is a great idea.  SBP has been running on the lower side and she has had recent adjustments of her antihypertensive per her PCP.  I have asked that she monitor it daily so that we can avoid hypotension and recurrence of stroke. We discussed at length her personal risk factors for stroke and importance of risk factor control for stroke prevention, including BP cholesterol control.  She will monitor BP regularly and follow-up with PCP for continued cholesterol surveillance.  We discussed stroke/TIA symptoms  and importance of calling 911 if she were to experience any of these.  Follow-up in 1 year with me, sooner if symptoms warrant.  Follow-up with Dr. Raji Rodriguez in our office for NPH evaluation in 2 to 3 months.    Plan:     Continue Eliquis  Decrease Lipitor to 40mg, obtain lipid panel check with next PCP appointment.  Monitor BP regularly  Keep well-hydrated  Encourage regular physical activity as tolerated  Continue with PT/OT and will need driving clearance per their recommendation  Discussed falls precautions   Blood pressure control to <130/80   Goal LDL <70    Serum glucose < 140   Call 911 for stroke any stroke symptoms   Follow-up in 1 year with me but would like her to follow up with Dr. Rodriguez in our office in 2-3 months for NPH evaluation.  Camille was seen today for stroke.    Diagnoses and all orders for this visit:    Paroxysmal atrial fibrillation with rapid ventricular response (CMS/HCC)    Acute ischemic left MCA stroke (CMS/HCC)    Mixed hyperlipidemia    Other orders  -     atorvastatin (LIPITOR) 80 MG tablet; Take 0.5 tablets by mouth Every Night.      MDM  Reviewed: previous chart and vitals  Reviewed previous: labs, MRI, CT scan and ECG  Interpretation: labs, MRI and CT scan      You have chosen to receive care through a telephone visit today. Do you consent to use a telephone visit for your medical care today? YES  This visit has been rescheduled as a phone visit to comply with patient safety concerns in accordance with CDC recommendations. Total time of discussion was 15 minutes.      JOHN Hess

## 2020-04-17 ENCOUNTER — TELEPHONE (OUTPATIENT)
Dept: FAMILY MEDICINE CLINIC | Facility: CLINIC | Age: 65
End: 2020-04-17

## 2020-04-17 DIAGNOSIS — I10 ESSENTIAL HYPERTENSION: ICD-10-CM

## 2020-04-17 RX ORDER — METOPROLOL SUCCINATE 100 MG/1
100 TABLET, EXTENDED RELEASE ORAL DAILY
Qty: 90 TABLET | Refills: 1 | Status: SHIPPED | OUTPATIENT
Start: 2020-04-17 | End: 2020-10-28 | Stop reason: SDUPTHER

## 2020-04-17 NOTE — TELEPHONE ENCOUNTER
Patient is calling with a concern that her Metoprolol was changed from 50 mg twice a day to 20 mg once daily.      Please clarify.      Maria Luisa [address] [id number] confirmed.    Patient [phone]

## 2020-04-17 NOTE — TELEPHONE ENCOUNTER
I spoke with the patient a couple of weeks ago and we changed her metoprolol tartrate XL from 25 mg 3 tablets twice daily to metoprolol succinate  mg once daily.  Since we received a message stating the patient called with a concern that her metoprolol was changed from 50 mg twice a day to 20 mg once daily, when she calls back, please let her know that the new metoprolol XL succinate is 100 mg once a day.  Please ask her to check her bottle to make sure it says the correct dose on it.  I called the patient and left a message and asked for her to call us back so that we can clarify this for her.

## 2020-04-22 ENCOUNTER — TELEPHONE (OUTPATIENT)
Dept: FAMILY MEDICINE CLINIC | Facility: CLINIC | Age: 65
End: 2020-04-22

## 2020-04-22 NOTE — TELEPHONE ENCOUNTER
Jo-Ann CHEN from [facility] called and stated she needed an order to see Camille on Monday.  She advised that Dr. Ann could give her a verbal over the phone.  Please call her at [phone].    ROM Melton CMA

## 2020-04-24 ENCOUNTER — TELEPHONE (OUTPATIENT)
Dept: FAMILY MEDICINE CLINIC | Facility: CLINIC | Age: 65
End: 2020-04-24

## 2020-05-21 ENCOUNTER — TELEPHONE (OUTPATIENT)
Dept: FAMILY MEDICINE CLINIC | Facility: CLINIC | Age: 65
End: 2020-05-21

## 2020-05-21 NOTE — TELEPHONE ENCOUNTER
Juancarlos with [facility] called in to notify the doctor that the patient will be released, she sees no need for further physical therapy.    Juancarlos call back [phone]

## 2020-06-02 ENCOUNTER — TELEPHONE (OUTPATIENT)
Dept: FAMILY MEDICINE CLINIC | Facility: CLINIC | Age: 65
End: 2020-06-02

## 2020-06-02 NOTE — TELEPHONE ENCOUNTER
"We received an anonymous report that the patient has not been bathing properly and she is not caring for herself.  She has an appointment with Neurology on July 28, 2020.  She does not have a follow-up appointment scheduled with cardiology, Dr. Brown.  She states she has not been exercising like she is supposed to. She has not been having falling. She states she does not lose her balance. She states she takes a \"bird bath.\" She got safety bars for her bathtub a few weeks ago.  She states she has not taken a bath or shower or bath for 3 years.  She states she does not want to kneel on the ground because she has difficulty getting down onto the floor.  She states her neighbor that found her when she had her stroke is going to install her bath room bars but he has been busy with his lawn service.  I told the patient that I was concerned about her not taking a proper bath. She said that Edu, her net door neighbor is going to cut her grass in the next day or two and that she promises she will ask him to install the bathroom hand rails. She promises to take a shower as soon as her handrails are installed in her bathroom.  I also advised her to follow-up with cardiology and offered to help get her an appointment. She said she would make her own appointment and agreed to all the cardiologists office tomorrow.  I offered to help her find an assisted living facility to stay where she could get more help and she adamantly refused and said she is not going to go to an assisted living facility or a nursing home. She is aware of her appointment with me on July 15, 2020 and plans to be at the office that day. She states she has a new 2019 Chevy Blazer and has been driving to the store and other places close to her home.  "

## 2020-06-10 NOTE — PROGRESS NOTES
Date of Office Visit: 2020  Encounter Provider: JOHN Johnson  Place of Service: [facility] CARDIOLOGY  Patient Name: Camille Pantoja  :1955  Primary Cardiologist: Dr. Brown    CC:  Hospital follow up    Dear Dr. Ann    HPI: Camille Pantoja is a pleasant 64 y.o. female who presents 2020 for cardiac follow up. She has a history of DVT, hypertension, GERD and hyperlipidemia    This is a 64-year-old female who recently had left lower lobe pneumonia diagnosed last week and treated with Levaquin.  She began to have diarrhea and to become progressively more weak.  She lives alone and her neighbor found her down the floor. She said that she had slid off of her bed.  She  presented to [facility] on 1/10 with altered mental status. She was found to be in rapid atrial fibrillation. She was noted to have an elevated troponin. Echocardiogram was obtained and showed new cardiomyopathy. She was transferred to [facility] for cardiac catheterization as well as further neurologic evaluation. MRI did show acute left hemisphere subcortical stroke. On 20 morning, patient had new mental status changes. MRI showed slight expansion of stroke. On the evening of  patient had decrease level of consciousness. She was sent for MRI which showed new area of infarcts in the right cerebellum. She was placed on a heparin drip. Medications were adjusted for rate control. Losartan was stopped to avoid hypotension. She was placed on digoxin. She was transitioned from heparin to apixaban.  PCP follow up was recommended for left thyroid hypodense nodule noted on CTA head and neck. Patient was discharged to  [facility] rehabilitation center.     She has been back home for several months and states she is doing well.  She continues to live by herself.  She does have family close by that helps her.  She uses a walker and a cane at home.  She does  have right-sided weakness.  She denies any palpitations, shortness of breath, lower extremity edema.  She has had no episodes of chest pain or chest tightness.  She denies any dizziness, lightheadedness, syncope or presyncopal episodes.  She denies any fatigue and denies falling.  She has not had any unexplained leg pain, numbness or tingling her upper or lower extremities.  She does not snore and denies any PND, cough, orthopnea.  She is taking her medications as directed.  She is on Eliquis and denies any bleeding, dark or tarry stools.  She states she checks her blood pressure at home and is quite similar to what we got here today.       Past Medical History:   Diagnosis Date   • CHF (congestive heart failure) (CMS/[facility])    • Hyperlipidemia    • Hypertension    • Irregular heart beat    • Skin cancer    • Stroke (CMS/[facility])        Past Surgical History:   Procedure Laterality Date   • CARDIAC CATHETERIZATION N/A 1/10/2020    Procedure: Left Heart Cath;  Surgeon: Cain Mcneil MD;  Location:  [facility];  Service: Cardiovascular   • CARDIAC CATHETERIZATION N/A 1/10/2020    Procedure: Coronary angiography;  Surgeon: Cain Mcneil MD;  Location:  [facility];  Service: Cardiovascular   • FINGER SURGERY     • HYSTERECTOMY     • JOINT REPLACEMENT     • ORIF SHOULDER DISLOCATION W/ HUMERAL FRACTURE     • TIBIA FRACTURE SURGERY         Social History     Socioeconomic History   • Marital status:      Spouse name: Not on file   • Number of children: Not on file   • Years of education: Not on file   • Highest education level: Not on file   Tobacco Use   • Smoking status: Never Smoker   • Smokeless tobacco: Never Used   Substance and Sexual Activity   • Alcohol use: Not Currently     Comment: Reports a hx of drinking everyday for 18 years atleast. She says that she quit May 2018, but says that she drinks occasionally now.    • Drug use: No   • Sexual activity: Defer        Family History   Problem Relation Age of Onset   • Breast cancer Brother    • Macular degeneration Mother    • Heart disease Mother    • Heart disease Father    • Heart disease Maternal Uncle        The following portion of the patient's history were reviewed and updated as appropriate: past medical history, past surgical history, past social history, past family history, allergies, current medications, and problem list.    Review of Systems   Constitution: Negative for diaphoresis, fever and malaise/fatigue.   HENT: Negative for congestion, hearing loss, hoarse voice, nosebleeds and sore throat.    Eyes: Negative for photophobia, vision loss in left eye, vision loss in right eye and visual disturbance.   Cardiovascular: Negative for chest pain, dyspnea on exertion, irregular heartbeat, leg swelling, near-syncope, orthopnea, palpitations, paroxysmal nocturnal dyspnea and syncope.   Respiratory: Negative for cough, hemoptysis, shortness of breath, sleep disturbances due to breathing, snoring, sputum production and wheezing.    Endocrine: Negative for cold intolerance, heat intolerance, polydipsia, polyphagia and polyuria.   Hematologic/Lymphatic: Negative for bleeding problem. Does not bruise/bleed easily.   Skin: Negative for color change, dry skin, poor wound healing, rash and suspicious lesions.   Musculoskeletal: Negative for arthritis, back pain, falls, gout, joint pain, joint swelling, muscle cramps, muscle weakness and myalgias.        Right sided weakness   Gastrointestinal: Negative for bloating, abdominal pain, constipation, diarrhea, dysphagia, melena, nausea and vomiting.   Neurological: Negative for excessive daytime sleepiness, dizziness, headaches, light-headedness, loss of balance, numbness, paresthesias, seizures, vertigo and weakness.   Psychiatric/Behavioral: Negative for depression, memory loss and substance abuse. The patient is not nervous/anxious.        Allergies   Allergen Reactions   •  "Cephalexin Other (See Comments)     Reports she doesn't remember having an allergic reaction.    • Beef-Derived Products Rash   • Warden Rash   • Duricef [Cefadroxil] Rash   • Fish Allergy Rash   • Garlic Rash   • Green Beans Rash   • Onion Rash   • Peach [Prunus Persica] Rash   • Wheat Rash         Current Outpatient Medications:   •  apixaban (ELIQUIS) 5 MG tablet tablet, Take 1 tablet by mouth Every 12 (Twelve) Hours. Indications: Atrial Fibrillation, Disp: 60 tablet, Rfl: 5  •  atorvastatin (LIPITOR) 80 MG tablet, Take 0.5 tablets by mouth Every Night., Disp: 90 tablet, Rfl: 1  •  digoxin (LANOXIN) 125 MCG tablet, Take 1 tablet by mouth Daily., Disp: 90 tablet, Rfl: 1  •  melatonin 5 MG tablet tablet, TAKE ONE BY MOUTH  EVERY NIGHT FOR 30 DAYS, Disp: 30 tablet, Rfl: 5  •  metoprolol succinate XL (Toprol XL) 100 MG 24 hr tablet, Take 1 tablet by mouth Daily., Disp: 90 tablet, Rfl: 1  •  omeprazole (priLOSEC) 20 MG capsule, Take 1 capsule by mouth Daily., Disp: 30 capsule, Rfl: 3  •  oxybutynin (DITROPAN) 5 MG tablet, Take 1 tablet by mouth 2 (Two) Times a Day., Disp: 60 tablet, Rfl: 5        Objective:     Vitals:    06/11/20 1348   BP: 108/68   Pulse: 67   Resp: 16   SpO2: 97%   Weight: 56.2 kg (124 lb)   Height: 162.6 cm (64\")     Body mass index is 21.28 kg/m².      Physical Exam   Constitutional: She is oriented to person, place, and time. She appears well-developed and well-nourished. No distress.   Thin, appears older than stated age   HENT:   Head: Normocephalic and atraumatic.   Right Ear: External ear normal.   Left Ear: External ear normal.   Nose: Nose normal.   Eyes: Pupils are equal, round, and reactive to light. Conjunctivae are normal. Right eye exhibits no discharge. Left eye exhibits no discharge.   Neck: Normal range of motion. Neck supple. No JVD present. No tracheal deviation present. No thyromegaly present.   Cardiovascular: Normal rate, regular rhythm, normal heart sounds and intact distal " pulses. Exam reveals no gallop and no friction rub.   No murmur heard.  Pulmonary/Chest: Effort normal and breath sounds normal. No respiratory distress. She has no wheezes. She has no rales. She exhibits no tenderness.   Abdominal: Soft. Bowel sounds are normal. She exhibits no distension. There is no tenderness.   Musculoskeletal: Normal range of motion. She exhibits no edema, tenderness or deformity.   Right sided weakness. Uses a cane for ambulation, shuffling gate.   Lymphadenopathy:     She has no cervical adenopathy.   Neurological: She is alert and oriented to person, place, and time. Coordination (shuffling gate) abnormal.   Skin: Skin is warm and dry. No rash noted. No erythema.   Psychiatric: She has a normal mood and affect. Her behavior is normal. Judgment and thought content normal.             ECG 12 Lead  Date/Time: 6/11/2020 2:00 PM  Performed by: Margaux Luna APRN  Authorized by: Margaux Luna APRN   Comparison: compared with previous ECG from 1/11/2020  Similar to previous ECG  Rhythm: atrial fibrillation  Rate: normal  Conduction: left anterior fascicular block  ST Segments: ST segments normal  T inversion: V1 and V2  QRS axis: left    Clinical impression: abnormal EKG              Assessment:       Diagnosis Plan   1. Paroxysmal atrial fibrillation with rapid ventricular response (CMS/HCC)     2. Essential hypertension     3. Mixed hyperlipidemia     4. Acute ischemic left MCA stroke (CMS/HCC)            Plan:     1. Atrial fibrillation -  rate controlled, on BB, digoxin and Eliquis  3. Cardiomyopathy - non ischemic (coronary angiography 1/10/2020). Ef 27%.  On BB.  BP low normal. Not on ACE/ARB at this time.  4. Acute stroke - 1/10 and again on 1/11- MRI brain with acute left hemispheric subcortical stroke. On Eliquis and statin.  She has right sided weakness, shuffling gate, uses a cane/walker at home.  5. History of DVT - was on warfarin.  (she had been subtherapeutic for the past month)  prior to last admission for her CVA.  Now on Eliquis     RTO in 6 months with RM    As always, it has been a pleasure to participate in your patient's care. Thank you.       Sincerely,       JOHN Johnson      Current Outpatient Medications:   •  apixaban (ELIQUIS) 5 MG tablet tablet, Take 1 tablet by mouth Every 12 (Twelve) Hours. Indications: Atrial Fibrillation, Disp: 60 tablet, Rfl: 5  •  atorvastatin (LIPITOR) 80 MG tablet, Take 0.5 tablets by mouth Every Night., Disp: 90 tablet, Rfl: 1  •  digoxin (LANOXIN) 125 MCG tablet, Take 1 tablet by mouth Daily., Disp: 90 tablet, Rfl: 1  •  melatonin 5 MG tablet tablet, TAKE ONE BY MOUTH  EVERY NIGHT FOR 30 DAYS, Disp: 30 tablet, Rfl: 5  •  metoprolol succinate XL (Toprol XL) 100 MG 24 hr tablet, Take 1 tablet by mouth Daily., Disp: 90 tablet, Rfl: 1  •  omeprazole (priLOSEC) 20 MG capsule, Take 1 capsule by mouth Daily., Disp: 30 capsule, Rfl: 3  •  oxybutynin (DITROPAN) 5 MG tablet, Take 1 tablet by mouth 2 (Two) Times a Day., Disp: 60 tablet, Rfl: 5    Dictated utilizing Dragon dictation

## 2020-06-11 ENCOUNTER — OFFICE VISIT (OUTPATIENT)
Dept: CARDIOLOGY | Facility: CLINIC | Age: 65
End: 2020-06-11

## 2020-06-11 VITALS
DIASTOLIC BLOOD PRESSURE: 68 MMHG | SYSTOLIC BLOOD PRESSURE: 108 MMHG | WEIGHT: 124 LBS | HEIGHT: 64 IN | RESPIRATION RATE: 16 BRPM | BODY MASS INDEX: 21.17 KG/M2 | HEART RATE: 67 BPM | OXYGEN SATURATION: 97 %

## 2020-06-11 DIAGNOSIS — E78.2 MIXED HYPERLIPIDEMIA: ICD-10-CM

## 2020-06-11 DIAGNOSIS — I63.512 ACUTE ISCHEMIC LEFT MCA STROKE (HCC): ICD-10-CM

## 2020-06-11 DIAGNOSIS — I10 ESSENTIAL HYPERTENSION: ICD-10-CM

## 2020-06-11 DIAGNOSIS — I48.0 PAROXYSMAL ATRIAL FIBRILLATION WITH RAPID VENTRICULAR RESPONSE (HCC): Primary | ICD-10-CM

## 2020-06-11 PROCEDURE — 93000 ELECTROCARDIOGRAM COMPLETE: CPT | Performed by: NURSE PRACTITIONER

## 2020-06-11 PROCEDURE — 99214 OFFICE O/P EST MOD 30 MIN: CPT | Performed by: NURSE PRACTITIONER

## 2020-07-08 ENCOUNTER — TELEPHONE (OUTPATIENT)
Dept: FAMILY MEDICINE CLINIC | Facility: CLINIC | Age: 65
End: 2020-07-08

## 2020-07-08 DIAGNOSIS — E78.2 MIXED HYPERLIPIDEMIA: Primary | ICD-10-CM

## 2020-07-08 DIAGNOSIS — N32.89 BLADDER SPASMS: ICD-10-CM

## 2020-07-08 DIAGNOSIS — Z79.899 HIGH RISK MEDICATION USE: ICD-10-CM

## 2020-07-08 NOTE — TELEPHONE ENCOUNTER
PATIENT WANTED TO CALL AND LET  NURSE VEGA WITH  DR. FRANCISCO  KNOW THAT SHE IS GOING TO [facility] T[facility].    PLEASE CONTACT PATIENT @[phone]

## 2020-07-08 NOTE — TELEPHONE ENCOUNTER
Camille came into have her blood work done. When checking in she had a 100.5 temp.  She was put in an exam room to give her a minute to cool off, as she was hot from being outside.  She does not use her air conditioner.   Porsche her temp was checked again it did not change.  While talking with her she advised Niesha and I that she had a burn on her back from a heating pad you microwave.  She showed this to us.  It was about the size of a golf ball.  It looked deep and dark red, red around the scab.  We advised Dr. Ann of this.  He said with her medical history and fever he felt she should go to  and be evaluated and have COVID testing done. I advised Camille of this.  She said I am going home to eat, take my dog to the groomer and then Iwill go to .  ROM Melton CMA

## 2020-07-09 NOTE — TELEPHONE ENCOUNTER
I spoke with Camille.  She stated that she does not have a fever today. I urged her to go to UC to get checked out.  She stated I am fine.  I again advised her to go to UC.  She stated I might go today or tomorrow.  ROM Melton CMA

## 2020-07-09 NOTE — TELEPHONE ENCOUNTER
I do not see any encounter notes for the urgent care yesterday. Please call the patient and ask if she went to an urgent care yesterday. If so, which one?  If not, please go now.

## 2020-07-13 ENCOUNTER — RESULTS ENCOUNTER (OUTPATIENT)
Dept: FAMILY MEDICINE CLINIC | Facility: CLINIC | Age: 65
End: 2020-07-13

## 2020-07-13 DIAGNOSIS — E78.2 MIXED HYPERLIPIDEMIA: ICD-10-CM

## 2020-07-13 DIAGNOSIS — N32.89 BLADDER SPASMS: ICD-10-CM

## 2020-07-13 DIAGNOSIS — Z79.899 HIGH RISK MEDICATION USE: ICD-10-CM

## 2020-07-16 LAB
ALBUMIN SERPL-MCNC: 4.7 G/DL (ref 3.5–5.2)
ALBUMIN/GLOB SERPL: 1.8 G/DL
ALP SERPL-CCNC: 101 U/L (ref 39–117)
ALT SERPL-CCNC: 18 U/L (ref 1–33)
AST SERPL-CCNC: 17 U/L (ref 1–32)
BASOPHILS # BLD AUTO: ABNORMAL 10*3/UL
BASOPHILS # BLD MANUAL: 0.06 10*3/MM3 (ref 0–0.2)
BASOPHILS NFR BLD MANUAL: 1 % (ref 0–1.5)
BILIRUB SERPL-MCNC: 0.8 MG/DL (ref 0–1.2)
BUN SERPL-MCNC: 13 MG/DL (ref 8–23)
BUN/CREAT SERPL: 13.8 (ref 7–25)
CALCIUM SERPL-MCNC: 9.6 MG/DL (ref 8.6–10.5)
CHLORIDE SERPL-SCNC: 102 MMOL/L (ref 98–107)
CHOLEST SERPL-MCNC: 135 MG/DL (ref 0–200)
CHOLEST/HDLC SERPL: 2.87 {RATIO}
CO2 SERPL-SCNC: 25.8 MMOL/L (ref 22–29)
CREAT SERPL-MCNC: 0.94 MG/DL (ref 0.57–1)
DIFFERENTIAL COMMENT: ABNORMAL
EOSINOPHIL # BLD AUTO: ABNORMAL 10*3/UL
EOSINOPHIL NFR BLD AUTO: ABNORMAL %
ERYTHROCYTE [DISTWIDTH] IN BLOOD BY AUTOMATED COUNT: 12.1 % (ref 12.3–15.4)
GLOBULIN SER CALC-MCNC: 2.6 GM/DL
GLUCOSE SERPL-MCNC: 92 MG/DL (ref 65–99)
HCT VFR BLD AUTO: 39.3 % (ref 34–46.6)
HDLC SERPL-MCNC: 47 MG/DL (ref 40–60)
HGB BLD-MCNC: 13.1 G/DL (ref 12–15.9)
LDLC SERPL CALC-MCNC: 69 MG/DL (ref 0–100)
LYMPHOCYTES # BLD AUTO: ABNORMAL 10*3/UL
LYMPHOCYTES # BLD MANUAL: 0.87 10*3/MM3 (ref 0.7–3.1)
LYMPHOCYTES NFR BLD AUTO: ABNORMAL %
LYMPHOCYTES NFR BLD MANUAL: 15 % (ref 19.6–45.3)
MCH RBC QN AUTO: 30.1 PG (ref 26.6–33)
MCHC RBC AUTO-ENTMCNC: 33.3 G/DL (ref 31.5–35.7)
MCV RBC AUTO: 90.3 FL (ref 79–97)
MONOCYTES # BLD MANUAL: 0.41 10*3/MM3 (ref 0.1–0.9)
MONOCYTES NFR BLD AUTO: ABNORMAL %
MONOCYTES NFR BLD MANUAL: 7 % (ref 5–12)
NEUTROPHILS # BLD MANUAL: 4.48 10*3/MM3 (ref 1.7–7)
NEUTROPHILS NFR BLD AUTO: ABNORMAL %
NEUTROPHILS NFR BLD MANUAL: 77 % (ref 42.7–76)
PLATELET # BLD AUTO: 155 10*3/MM3 (ref 140–450)
PLATELET BLD QL SMEAR: ABNORMAL
POTASSIUM SERPL-SCNC: 4.6 MMOL/L (ref 3.5–5.2)
PROT SERPL-MCNC: 7.3 G/DL (ref 6–8.5)
RBC # BLD AUTO: 4.35 10*6/MM3 (ref 3.77–5.28)
RBC MORPH BLD: ABNORMAL
SODIUM SERPL-SCNC: 141 MMOL/L (ref 136–145)
TRIGL SERPL-MCNC: 94 MG/DL (ref 0–150)
VLDLC SERPL CALC-MCNC: 18.8 MG/DL
WBC # BLD AUTO: 5.82 10*3/MM3 (ref 3.4–10.8)

## 2020-07-17 DIAGNOSIS — I48.91 ATRIAL FIBRILLATION, UNSPECIFIED TYPE (HCC): ICD-10-CM

## 2020-07-17 RX ORDER — APIXABAN 5 MG/1
TABLET, FILM COATED ORAL
Qty: 60 TABLET | Refills: 4 | Status: SHIPPED | OUTPATIENT
Start: 2020-07-17 | End: 2020-12-15

## 2020-07-17 NOTE — TELEPHONE ENCOUNTER
I spoke with Camille about her labs.  She wanted to know when are you going to send in her Eliquis.  She will be out before her next appointment with you.  ROM Melton CMA

## 2020-07-22 ENCOUNTER — OFFICE VISIT (OUTPATIENT)
Dept: FAMILY MEDICINE CLINIC | Facility: CLINIC | Age: 65
End: 2020-07-22

## 2020-07-22 VITALS
OXYGEN SATURATION: 97 % | WEIGHT: 122 LBS | TEMPERATURE: 97.7 F | SYSTOLIC BLOOD PRESSURE: 116 MMHG | DIASTOLIC BLOOD PRESSURE: 62 MMHG | HEIGHT: 64 IN | RESPIRATION RATE: 16 BRPM | HEART RATE: 56 BPM | BODY MASS INDEX: 20.83 KG/M2

## 2020-07-22 DIAGNOSIS — E78.2 MIXED HYPERLIPIDEMIA: ICD-10-CM

## 2020-07-22 DIAGNOSIS — I10 ESSENTIAL HYPERTENSION: ICD-10-CM

## 2020-07-22 DIAGNOSIS — Z79.899 HIGH RISK MEDICATION USE: ICD-10-CM

## 2020-07-22 DIAGNOSIS — N32.89 BLADDER SPASMS: ICD-10-CM

## 2020-07-22 DIAGNOSIS — Z87.19 HISTORY OF GASTROESOPHAGEAL REFLUX (GERD): Primary | ICD-10-CM

## 2020-07-22 PROCEDURE — 99214 OFFICE O/P EST MOD 30 MIN: CPT | Performed by: FAMILY MEDICINE

## 2020-07-22 NOTE — ASSESSMENT & PLAN NOTE
Controlled with metoprolol succinate  mg daily.  No medication side effects.  Continue current treatment.

## 2020-07-22 NOTE — PATIENT INSTRUCTIONS
The Best Foods to Lower Cholesterol    High-Fiber Delights    Whole grains like barley, oats and oat bran have been shown to lower cholesterol thanks to the ample amounts of soluble fiber that they possess. Other fiber-rich favorites include beans, okra and eggplant, all of which are low in calories and high in nutrients. Try incorporating these delicious and nutritious foods into your diet when gathering the best foods to lower cholesterol.    The Enemies of LDL    When assessing the best foods to lower cholesterol, remember that not all cholesterols are created equal. You have your good cholesterols (HDL) and your bad cholesterols (LDL). Proper cholesterol management is about keeping the LDL cholesterol under control. Numerous foods are renowned for their ability to reduce LDL cholesterol, including liquid vegetable oils (such as canola and sunflower oils), fruits rich in pectin (such as apples, strawberries and oranges), fatty fish and soy products (such as tofu).    What Not to Eat    When it comes to cholesterol management, it’s important to also pay attention to the foods that you’re already eating. Many people, when determining what to eat, will mistakenly focus on the amount of cholesterol on the labels of packaged foods, but this is only a small part of the equation. It may surprise you to learn that saturated fats and trans fats have a much greater effect on your overall cholesterol level, so keep them to a minimum.    Hypertension, Adult  High blood pressure (hypertension) is when the force of blood pumping through the arteries is too strong. The arteries are the blood vessels that carry blood from the heart throughout the body. Hypertension forces the heart to work harder to pump blood and may cause arteries to become narrow or stiff. Untreated or uncontrolled hypertension can cause a heart attack, heart failure, a stroke, kidney disease, and other problems.  A blood pressure reading consists of a higher  "number over a lower number. Ideally, your blood pressure should be below 120/80. The first (\"top\") number is called the systolic pressure. It is a measure of the pressure in your arteries as your heart beats. The second (\"bottom\") number is called the diastolic pressure. It is a measure of the pressure in your arteries as the heart relaxes.  What are the causes?  The exact cause of this condition is not known. There are some conditions that result in or are related to high blood pressure.  What increases the risk?  Some risk factors for high blood pressure are under your control. The following factors may make you more likely to develop this condition:  · Smoking.  · Having type 2 diabetes mellitus, high cholesterol, or both.  · Not getting enough exercise or physical activity.  · Being overweight.  · Having too much fat, sugar, calories, or salt (sodium) in your diet.  · Drinking too much alcohol.  Some risk factors for high blood pressure may be difficult or impossible to change. Some of these factors include:  · Having chronic kidney disease.  · Having a family history of high blood pressure.  · Age. Risk increases with age.  · Race. You may be at higher risk if you are .  · Gender. Men are at higher risk than women before age 45. After age 65, women are at higher risk than men.  · Having obstructive sleep apnea.  · Stress.  What are the signs or symptoms?  High blood pressure may not cause symptoms. Very high blood pressure (hypertensive crisis) may cause:  · Headache.  · Anxiety.  · Shortness of breath.  · Nosebleed.  · Nausea and vomiting.  · Vision changes.  · Severe chest pain.  · Seizures.  How is this diagnosed?  This condition is diagnosed by measuring your blood pressure while you are seated, with your arm resting on a flat surface, your legs uncrossed, and your feet flat on the floor. The cuff of the blood pressure monitor will be placed directly against the skin of your upper arm at the " level of your heart. It should be measured at least twice using the same arm. Certain conditions can cause a difference in blood pressure between your right and left arms.  Certain factors can cause blood pressure readings to be lower or higher than normal for a short period of time:  · When your blood pressure is higher when you are in a health care provider's office than when you are at home, this is called white coat hypertension. Most people with this condition do not need medicines.  · When your blood pressure is higher at home than when you are in a health care provider's office, this is called masked hypertension. Most people with this condition may need medicines to control blood pressure.  If you have a high blood pressure reading during one visit or you have normal blood pressure with other risk factors, you may be asked to:  · Return on a different day to have your blood pressure checked again.  · Monitor your blood pressure at home for 1 week or longer.  If you are diagnosed with hypertension, you may have other blood or imaging tests to help your health care provider understand your overall risk for other conditions.  How is this treated?  This condition is treated by making healthy lifestyle changes, such as eating healthy foods, exercising more, and reducing your alcohol intake. Your health care provider may prescribe medicine if lifestyle changes are not enough to get your blood pressure under control, and if:  · Your systolic blood pressure is above 130.  · Your diastolic blood pressure is above 80.  Your personal target blood pressure may vary depending on your medical conditions, your age, and other factors.  Follow these instructions at home:  Eating and drinking    · Eat a diet that is high in fiber and potassium, and low in sodium, added sugar, and fat. An example eating plan is called the DASH (Dietary Approaches to Stop Hypertension) diet. To eat this way:  ? Eat plenty of fresh fruits and  vegetables. Try to fill one half of your plate at each meal with fruits and vegetables.  ? Eat whole grains, such as whole-wheat pasta, brown rice, or whole-grain bread. Fill about one fourth of your plate with whole grains.  ? Eat or drink low-fat dairy products, such as skim milk or low-fat yogurt.  ? Avoid fatty cuts of meat, processed or cured meats, and poultry with skin. Fill about one fourth of your plate with lean proteins, such as fish, chicken without skin, beans, eggs, or tofu.  ? Avoid pre-made and processed foods. These tend to be higher in sodium, added sugar, and fat.  · Reduce your daily sodium intake. Most people with hypertension should eat less than 1,500 mg of sodium a day.  · Do not drink alcohol if:  ? Your health care provider tells you not to drink.  ? You are pregnant, may be pregnant, or are planning to become pregnant.  · If you drink alcohol:  ? Limit how much you use to:  § 0-1 drink a day for women.  § 0-2 drinks a day for men.  ? Be aware of how much alcohol is in your drink. In the [place] one drink equals one 12 oz bottle of beer (355 mL), one 5 oz glass of wine (148 mL), or one 1½ oz glass of hard liquor (44 mL).  Lifestyle    · Work with your health care provider to maintain a healthy body weight or to lose weight. Ask what an ideal weight is for you.  · Get at least 30 minutes of exercise most days of the week. Activities may include walking, swimming, or biking.  · Include exercise to strengthen your muscles (resistance exercise), such as Pilates or lifting weights, as part of your weekly exercise routine. Try to do these types of exercises for 30 minutes at least 3 days a week.  · Do not use any products that contain nicotine or tobacco, such as cigarettes, e-cigarettes, and chewing tobacco. If you need help quitting, ask your health care provider.  · Monitor your blood pressure at home as told by your health care provider.  · Keep all follow-up visits as told by your health care  provider. This is important.  Medicines  · Take over-the-counter and prescription medicines only as told by your health care provider. Follow directions carefully. Blood pressure medicines must be taken as prescribed.  · Do not skip doses of blood pressure medicine. Doing this puts you at risk for problems and can make the medicine less effective.  · Ask your health care provider about side effects or reactions to medicines that you should watch for.  Contact a health care provider if you:  · Think you are having a reaction to a medicine you are taking.  · Have headaches that keep coming back (recurring).  · Feel dizzy.  · Have swelling in your ankles.  · Have trouble with your vision.  Get help right away if you:  · Develop a severe headache or confusion.  · Have unusual weakness or numbness.  · Feel faint.  · Have severe pain in your chest or abdomen.  · Vomit repeatedly.  · Have trouble breathing.  Summary  · Hypertension is when the force of blood pumping through your arteries is too strong. If this condition is not controlled, it may put you at risk for serious complications.  · Your personal target blood pressure may vary depending on your medical conditions, your age, and other factors. For most people, a normal blood pressure is less than 120/80.  · Hypertension is treated with lifestyle changes, medicines, or a combination of both. Lifestyle changes include losing weight, eating a healthy, low-sodium diet, exercising more, and limiting alcohol.  This information is not intended to replace advice given to you by your health care provider. Make sure you discuss any questions you have with your health care provider.  Document Released: 12/18/2006 Document Revised: 08/28/2019 Document Reviewed: 08/28/2019  Elsevier Patient Education © 2020 [employer]

## 2020-07-22 NOTE — PROGRESS NOTES
Subjective   Camille Pantoja is a 64 y.o. female is here for   Chief Complaint   Patient presents with   • Hypertension   • Hyperlipidemia   • Heartburn       History of Present Illness     Patient has hypertension.  Her blood pressure today is 116/62.  She takes metoprolol XL once daily.    She has hyperlipidemia and takes Lipitor 80 mg daily.    She has a history of GERD and takes omeprazole 20 mg daily.  She states she was off the Omeprazole for 4 days and did not have any GERD or heartburn symptoms. She wants to stay on the medicine because she has been on it for a long time and she believes that having been on it for a long time is reason enough to stay on it. She does not believe she should stop taking it even if she does not have any GERD or heartburn symptoms.  She states she went off of it for a while because she was too lazy to go get it.     She has overactive bladder and takes oxybutynin 5 mg twice daily.      The following portions of the patient's history were reviewed and updated as appropriate: allergies, current medications, past family history, past medical history, past social history, past surgical history and problem list.     reports that she has never smoked. She has never used smokeless tobacco. She reports that she drank alcohol. She reports that she does not use drugs.    Review of Systems   Constitutional: Negative for activity change and unexpected weight change.   HENT: Negative for congestion.    Respiratory: Negative for shortness of breath and wheezing.    Cardiovascular: Negative for chest pain and palpitations.   Gastrointestinal: Negative for abdominal pain, blood in stool and constipation.   Genitourinary: Negative for difficulty urinating and hematuria.   Musculoskeletal: Negative for gait problem.   Skin: Negative for color change and rash.        PHQ-9 Depression Screening  Little interest or pleasure in doing things?     Feeling down, depressed, or hopeless?     Trouble  "falling or staying asleep, or sleeping too much?     Feeling tired or having little energy?     Poor appetite or overeating?     Feeling bad about yourself - or that you are a failure or have let yourself or your family down?     Trouble concentrating on things, such as reading the newspaper or watching television?     Moving or speaking so slowly that other people could have noticed? Or the opposite - being so fidgety or restless that you have been moving around a lot more than usual?     Thoughts that you would be better off dead, or of hurting yourself in some way?     PHQ-9 Total Score     If you checked off any problems, how difficult have these problems made it for you to do your work, take care of things at home, or get along with other people?           Objective   /62 (BP Location: Left arm, Patient Position: Sitting, Cuff Size: Adult)   Pulse 56   Temp 97.7 °F (36.5 °C) (Temporal)   Resp 16   Ht 162.6 cm (64\")   Wt 55.3 kg (122 lb)   SpO2 97%   BMI 20.94 kg/m²   Physical Exam   Constitutional: She is oriented to person, place, and time. She appears well-developed and well-nourished. No distress.   HENT:   Head: Normocephalic and atraumatic.   Right Ear: External ear normal.   Left Ear: External ear normal.   Nose: Nose normal.   Mouth/Throat: Oropharynx is clear and moist. No oropharyngeal exudate.   Eyes: Lids are normal. Right eye exhibits no discharge. Left eye exhibits no discharge. No scleral icterus.   Neck: Trachea normal, normal range of motion and full passive range of motion without pain. Neck supple. No tracheal deviation and no edema present. No thyromegaly present.   Cardiovascular: Normal rate, regular rhythm, normal heart sounds and intact distal pulses. Exam reveals no gallop and no friction rub.   No murmur heard.  Pulmonary/Chest: Effort normal and breath sounds normal. No stridor. No tachypnea and no bradypnea. No respiratory distress. She has no decreased breath sounds. She " has no wheezes. She has no rales. She exhibits no tenderness.   Abdominal: Normal appearance. There is no hepatosplenomegaly.   Musculoskeletal: She exhibits no edema.   Lymphadenopathy:        Head (right side): No submental, no submandibular, no tonsillar, no preauricular, no posterior auricular and no occipital adenopathy present.        Head (left side): No submental, no submandibular, no tonsillar, no preauricular, no posterior auricular and no occipital adenopathy present.     She has no cervical adenopathy.        Right cervical: No superficial cervical, no deep cervical and no posterior cervical adenopathy present.       Left cervical: No superficial cervical, no deep cervical and no posterior cervical adenopathy present.   Neurological: She is alert and oriented to person, place, and time. She has normal strength and normal reflexes. She is not disoriented.   Skin: Skin is warm, dry and intact. Capillary refill takes less than 2 seconds. No rash noted. She is not diaphoretic. No cyanosis or erythema. No pallor. Nails show no clubbing.   Psychiatric: She has a normal mood and affect. Her behavior is normal. Cognition and memory are normal.   Nursing note and vitals reviewed.      Procedures    Assessment/Plan   Diagnoses and all orders for this visit:    1. History of gastroesophageal reflux (GERD) (Primary)  Assessment & Plan:  Discontinue Omeprazole.      2. Essential hypertension  Assessment & Plan:  Controlled with metoprolol succinate  mg daily.  No medication side effects.  Continue current treatment.      3. Mixed hyperlipidemia  Assessment & Plan:  Controlled with simvastatin 20 mg daily.  No medication side effects.  Continue current treatment.      4. Bladder spasms  Assessment & Plan:  Controlled with Oxybutinin 5 mg BID.  No medication side effects.  Continue current treatment.

## 2020-07-28 ENCOUNTER — OFFICE VISIT (OUTPATIENT)
Dept: NEUROLOGY | Facility: CLINIC | Age: 65
End: 2020-07-28

## 2020-07-28 VITALS
HEART RATE: 50 BPM | HEIGHT: 64 IN | OXYGEN SATURATION: 98 % | DIASTOLIC BLOOD PRESSURE: 66 MMHG | BODY MASS INDEX: 20.79 KG/M2 | SYSTOLIC BLOOD PRESSURE: 118 MMHG | WEIGHT: 121.8 LBS

## 2020-07-28 DIAGNOSIS — IMO0002 VENTRICULOMEGALY OF BRAIN ON FETAL ULTRASOUND: Primary | ICD-10-CM

## 2020-07-28 DIAGNOSIS — R25.2 SPASTICITY: ICD-10-CM

## 2020-07-28 DIAGNOSIS — I69.30 SEQUELAE, POST-STROKE: ICD-10-CM

## 2020-07-28 PROBLEM — R26.9 ABNORMAL GAIT: Status: ACTIVE | Noted: 2020-07-28

## 2020-07-28 PROCEDURE — 99215 OFFICE O/P EST HI 40 MIN: CPT | Performed by: PSYCHIATRY & NEUROLOGY

## 2020-07-28 RX ORDER — OMEPRAZOLE 20 MG/1
20 CAPSULE, DELAYED RELEASE ORAL DAILY
COMMUNITY
End: 2020-09-04
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
## 2020-08-05 RX ORDER — OXYBUTYNIN CHLORIDE 5 MG/1
TABLET ORAL
Qty: 60 TABLET | Refills: 4 | Status: SHIPPED | OUTPATIENT
Start: 2020-08-05 | End: 2021-01-04 | Stop reason: SDUPTHER

## 2020-08-07 ENCOUNTER — HOSPITAL ENCOUNTER (EMERGENCY)
Facility: HOSPITAL | Age: 65
Discharge: HOME | End: 2020-08-07
Attending: EMERGENCY MEDICINE | Admitting: EMERGENCY MEDICINE

## 2020-08-07 ENCOUNTER — APPOINTMENT (OUTPATIENT)
Dept: CT IMAGING | Facility: HOSPITAL | Age: 65
End: 2020-08-07

## 2020-08-07 VITALS
BODY MASS INDEX: 22.76 KG/M2 | OXYGEN SATURATION: 100 % | TEMPERATURE: 98.2 F | RESPIRATION RATE: 18 BRPM | WEIGHT: 136.6 LBS | SYSTOLIC BLOOD PRESSURE: 130 MMHG | HEIGHT: 65 IN | DIASTOLIC BLOOD PRESSURE: 67 MMHG | HEART RATE: 71 BPM

## 2020-08-07 DIAGNOSIS — R20.2 PARESTHESIA OF LEFT LOWER EXTREMITY: Primary | ICD-10-CM

## 2020-08-07 LAB
ALBUMIN SERPL-MCNC: 4.3 G/DL (ref 3.5–5.2)
ALBUMIN/GLOB SERPL: 1.2 G/DL
ALP SERPL-CCNC: 109 U/L (ref 39–117)
ALT SERPL W P-5'-P-CCNC: 21 U/L (ref 1–33)
ANION GAP SERPL CALCULATED.3IONS-SCNC: 10.6 MMOL/L (ref 5–15)
APTT PPP: 29 SECONDS (ref 24.3–38.1)
AST SERPL-CCNC: 23 U/L (ref 1–32)
BASOPHILS # BLD AUTO: 0.03 10*3/MM3 (ref 0–0.2)
BASOPHILS NFR BLD AUTO: 0.6 % (ref 0–1.5)
BILIRUB SERPL-MCNC: 0.7 MG/DL (ref 0–1.2)
BUN SERPL-MCNC: 10 MG/DL (ref 8–23)
BUN/CREAT SERPL: 10.8 (ref 7–25)
CALCIUM SPEC-SCNC: 10.2 MG/DL (ref 8.6–10.5)
CHLORIDE SERPL-SCNC: 101 MMOL/L (ref 98–107)
CO2 SERPL-SCNC: 28.4 MMOL/L (ref 22–29)
CREAT SERPL-MCNC: 0.93 MG/DL (ref 0.57–1)
DEPRECATED RDW RBC AUTO: 44.8 FL (ref 37–54)
EOSINOPHIL # BLD AUTO: 0.25 10*3/MM3 (ref 0–0.4)
EOSINOPHIL NFR BLD AUTO: 4.9 % (ref 0.3–6.2)
ERYTHROCYTE [DISTWIDTH] IN BLOOD BY AUTOMATED COUNT: 12.9 % (ref 12.3–15.4)
GFR SERPL CREATININE-BSD FRML MDRD: 60 ML/MIN/1.73
GLOBULIN UR ELPH-MCNC: 3.7 GM/DL
GLUCOSE SERPL-MCNC: 112 MG/DL (ref 65–99)
HCT VFR BLD AUTO: 40.9 % (ref 34–46.6)
HGB BLD-MCNC: 12.9 G/DL (ref 12–15.9)
HOLD SPECIMEN: NORMAL
HOLD SPECIMEN: NORMAL
IMM GRANULOCYTES # BLD AUTO: 0.01 10*3/MM3 (ref 0–0.05)
IMM GRANULOCYTES NFR BLD AUTO: 0.2 % (ref 0–0.5)
INR PPP: 1.09 (ref 0.9–1.1)
LIPASE SERPL-CCNC: 51 U/L (ref 13–60)
LYMPHOCYTES # BLD AUTO: 1.26 10*3/MM3 (ref 0.7–3.1)
LYMPHOCYTES NFR BLD AUTO: 24.6 % (ref 19.6–45.3)
MCH RBC QN AUTO: 29.7 PG (ref 26.6–33)
MCHC RBC AUTO-ENTMCNC: 31.5 G/DL (ref 31.5–35.7)
MCV RBC AUTO: 94 FL (ref 79–97)
MONOCYTES # BLD AUTO: 0.41 10*3/MM3 (ref 0.1–0.9)
MONOCYTES NFR BLD AUTO: 8 % (ref 5–12)
NEUTROPHILS NFR BLD AUTO: 3.16 10*3/MM3 (ref 1.7–7)
NEUTROPHILS NFR BLD AUTO: 61.7 % (ref 42.7–76)
NRBC BLD AUTO-RTO: 0 /100 WBC (ref 0–0.2)
PLATELET # BLD AUTO: 174 10*3/MM3 (ref 140–450)
PMV BLD AUTO: 12.9 FL (ref 6–12)
POTASSIUM SERPL-SCNC: 4.5 MMOL/L (ref 3.5–5.2)
PROT SERPL-MCNC: 8 G/DL (ref 6–8.5)
PROTHROMBIN TIME: 13.8 SECONDS (ref 12.1–15)
RBC # BLD AUTO: 4.35 10*6/MM3 (ref 3.77–5.28)
SODIUM SERPL-SCNC: 140 MMOL/L (ref 136–145)
TROPONIN T SERPL-MCNC: <0.01 NG/ML (ref 0–0.03)
WBC # BLD AUTO: 5.12 10*3/MM3 (ref 3.4–10.8)
WHOLE BLOOD HOLD SPECIMEN: NORMAL
WHOLE BLOOD HOLD SPECIMEN: NORMAL

## 2020-08-07 PROCEDURE — 85610 PROTHROMBIN TIME: CPT | Performed by: EMERGENCY MEDICINE

## 2020-08-07 PROCEDURE — 83690 ASSAY OF LIPASE: CPT | Performed by: EMERGENCY MEDICINE

## 2020-08-07 PROCEDURE — 70450 CT HEAD/BRAIN W/O DYE: CPT

## 2020-08-07 PROCEDURE — 99283 EMERGENCY DEPT VISIT LOW MDM: CPT

## 2020-08-07 PROCEDURE — 85730 THROMBOPLASTIN TIME PARTIAL: CPT | Performed by: EMERGENCY MEDICINE

## 2020-08-07 PROCEDURE — 99284 EMERGENCY DEPT VISIT MOD MDM: CPT | Performed by: EMERGENCY MEDICINE

## 2020-08-07 PROCEDURE — 93005 ELECTROCARDIOGRAM TRACING: CPT | Performed by: EMERGENCY MEDICINE

## 2020-08-07 PROCEDURE — 93010 ELECTROCARDIOGRAM REPORT: CPT | Performed by: INTERNAL MEDICINE

## 2020-08-07 PROCEDURE — 84484 ASSAY OF TROPONIN QUANT: CPT | Performed by: EMERGENCY MEDICINE

## 2020-08-07 PROCEDURE — 85025 COMPLETE CBC W/AUTO DIFF WBC: CPT | Performed by: EMERGENCY MEDICINE

## 2020-08-07 PROCEDURE — 80053 COMPREHEN METABOLIC PANEL: CPT | Performed by: EMERGENCY MEDICINE

## 2020-08-07 RX ORDER — SODIUM CHLORIDE 0.9 % (FLUSH) 0.9 %
10 SYRINGE (ML) INJECTION AS NEEDED
Status: DISCONTINUED | OUTPATIENT
Start: 2020-08-07 | End: 2020-08-07 | Stop reason: HOSPADM

## 2020-08-08 NOTE — ED PROVIDER NOTES
" EMERGENCY DEPARTMENT ENCOUNTER      Room Number: 5/05      HPI:    Chief complaint: Leg numbness    Location: Bilateral lower extremities    Quality/Severity: Described as \"being asleep\".  Moderate severity     Timing/Duration: Symptoms started at approximately 1730 hrs. after awakening from a nap    Modifying Factors: None    Associated Symptoms: None    Narrative: Pt is a 64 y.o. female who presents complaining of bilateral lower extremity numbness as noted above.  Patient relates that she awoke from a nap with the above symptoms.  Patient states that she felt like her lower extremities \"were asleep\".  Sensation did not resolve quickly and the patient became concerned because of previous CVA experienced in January of this year.  The numbness has essentially resolved.  Patient denies headache, vision changes, cognition problems, speech problems and focal weaknesses.      PMD: Nahun Ann Jr., DO    REVIEW OF SYSTEMS  Review of Systems   Constitutional: Negative for activity change, appetite change, fatigue and fever.   HENT: Negative for congestion.    Respiratory: Negative for cough, shortness of breath and wheezing.    Cardiovascular: Positive for palpitations. Negative for chest pain and leg swelling.   Gastrointestinal: Negative for abdominal pain, diarrhea, nausea and vomiting.   Endocrine: Negative for polydipsia.   Genitourinary: Negative for difficulty urinating, dysuria, flank pain, frequency and urgency.   Musculoskeletal: Positive for gait problem (Persistent right lower extremity weakness from prior CVA and patient uses cane or walker to assist ambulation). Negative for back pain.   Skin: Negative for rash.   Neurological: Negative for dizziness, weakness and headaches.   Psychiatric/Behavioral: Negative for confusion.   All other systems reviewed and are negative.      PAST MEDICAL HISTORY  Active Ambulatory Problems     Diagnosis Date Noted   • Hyperlipidemia 05/31/2016   • Hypertension " 05/31/2016   • Vitamin D deficiency 06/03/2016   • Osteoarthritis 06/03/2016   • Paroxysmal atrial fibrillation with rapid ventricular response (CMS/HCC) 01/09/2020   • Metabolic encephalopathy 01/10/2020   • Cerebral ventriculomegaly 01/10/2020   • Acute ischemic left MCA stroke (CMS/[facility]) 01/10/2020   • Aftercare 01/16/2020   • Right hemiparesis (CMS/[facility]) 01/16/2020   • Bladder spasms 01/25/2020   • Functional diarrhea 01/25/2020   • History of gastroesophageal reflux (GERD) 07/22/2020   • Abnormal gait 07/28/2020     Resolved Ambulatory Problems     Diagnosis Date Noted   • Embolism from thrombosis of vein of distal end of lower extremity 03/29/2016   • Deep vein thrombosis of lower extremity (CMS/[facility]) 03/29/2016   • Gastroesophageal reflux disease with esophagitis 05/31/2016   • Tinnitus 06/03/2016   • Pap smear, as part of routine gynecological examination 11/13/2017   • Elevated LFTs 11/28/2017   • Overweight (BMI 25.0-29.9) 06/12/2019   • Gingivitis 07/30/2019   • Tongue sore 07/30/2019   • Heartburn 07/30/2019   • Chronic atrial fibrillation with rapid ventricular response (CMS/[facility]) 01/09/2020   • Atrial fibrillation (CMS/[facility]) 01/10/2020     Past Medical History:   Diagnosis Date   • CHF (congestive heart failure) (CMS/[facility])    • Irregular heart beat    • Skin cancer    • Stroke (CMS/[facility])        PAST SURGICAL HISTORY  Past Surgical History:   Procedure Laterality Date   • CARDIAC CATHETERIZATION N/A 1/10/2020    Procedure: Left Heart Cath;  Surgeon: Cain Mcneil MD;  Location:  [facility];  Service: Cardiovascular   • CARDIAC CATHETERIZATION N/A 1/10/2020    Procedure: Coronary angiography;  Surgeon: Cain Mcneil MD;  Location:  [facility];  Service: Cardiovascular   • FINGER SURGERY     • HYSTERECTOMY     • JOINT REPLACEMENT     • ORIF SHOULDER DISLOCATION W/ HUMERAL FRACTURE     • TIBIA FRACTURE SURGERY         FAMILY HISTORY  Family History   Problem Relation  Age of Onset   • Breast cancer Brother    • Macular degeneration Mother    • Heart disease Mother    • Heart disease Father    • Heart disease Maternal Uncle        SOCIAL HISTORY  Social History     Socioeconomic History   • Marital status:      Spouse name: Not on file   • Number of children: Not on file   • Years of education: Not on file   • Highest education level: Not on file   Tobacco Use   • Smoking status: Never Smoker   • Smokeless tobacco: Never Used   Substance and Sexual Activity   • Alcohol use: Not Currently     Comment: Reports a hx of drinking everyday for 18 years atleast. She says that she quit May 2018, but says that she drinks occasionally now.    • Drug use: No   • Sexual activity: Defer       ALLERGIES  Cephalexin; Melatonin; Beef-derived products; Citrus; Duricef [cefadroxil]; Fish allergy; Garlic; Green beans; Onion; Peach [prunus persica]; and Wheat    PHYSICAL EXAM  ED Triage Vitals [08/07/20 1934]   Temp Heart Rate Resp BP SpO2   98.1 °F (36.7 °C) 70 18 118/91 100 %      Temp src Heart Rate Source Patient Position BP Location FiO2 (%)   Oral Monitor Lying Left arm --       Physical Exam   Constitutional: She is oriented to person, place, and time.   The patient is a thin, unhealthy appearing, 64-year-old, female no acute distress.  Patient is noted to be pleasant and seems well-informed.   HENT:   Head: Normocephalic and atraumatic.   Eyes: Pupils are equal, round, and reactive to light. Conjunctivae and EOM are normal.   Neck: Normal range of motion. Neck supple. No thyromegaly present.   Cardiovascular:   No murmur heard.  Normal rate with irregularly irregular rhythm.  No gross murmurs heard.   Pulmonary/Chest: Effort normal and breath sounds normal. No respiratory distress.   Abdominal: Soft. Bowel sounds are normal. There is no tenderness.   Musculoskeletal: Normal range of motion. She exhibits no edema.   Left lower extremity noted to be slightly larger than the right side.   Prior right ankle fusion.   Lymphadenopathy:     She has no cervical adenopathy.   Neurological: She is alert and oriented to person, place, and time. No cranial nerve deficit.   Mild right lower extremity weakness with abnormal coordination.  Sensation to light touch grossly normal.   Skin: Skin is warm and dry.   Psychiatric: Affect and judgment normal.   Nursing note and vitals reviewed.      LAB RESULTS  Results for orders placed or performed during the hospital encounter of 08/07/20   Comprehensive Metabolic Panel   Result Value Ref Range    Glucose 112 (H) 65 - 99 mg/dL    BUN 10 8 - 23 mg/dL    Creatinine 0.93 0.57 - 1.00 mg/dL    Sodium 140 136 - 145 mmol/L    Potassium 4.5 3.5 - 5.2 mmol/L    Chloride 101 98 - 107 mmol/L    CO2 28.4 22.0 - 29.0 mmol/L    Calcium 10.2 8.6 - 10.5 mg/dL    Total Protein 8.0 6.0 - 8.5 g/dL    Albumin 4.30 3.50 - 5.20 g/dL    ALT (SGPT) 21 1 - 33 U/L    AST (SGOT) 23 1 - 32 U/L    Alkaline Phosphatase 109 39 - 117 U/L    Total Bilirubin 0.7 0.0 - 1.2 mg/dL    eGFR Non African Amer 60 (L) >60 mL/min/1.73    Globulin 3.7 gm/dL    A/G Ratio 1.2 g/dL    BUN/Creatinine Ratio 10.8 7.0 - 25.0    Anion Gap 10.6 5.0 - 15.0 mmol/L   Protime-INR   Result Value Ref Range    Protime 13.8 12.1 - 15.0 Seconds    INR 1.09 0.90 - 1.10   aPTT   Result Value Ref Range    PTT 29.0 24.3 - 38.1 seconds   Lipase   Result Value Ref Range    Lipase 51 13 - 60 U/L   Troponin   Result Value Ref Range    Troponin T <0.010 0.000 - 0.030 ng/mL   CBC Auto Differential   Result Value Ref Range    WBC 5.12 3.40 - 10.80 10*3/mm3    RBC 4.35 3.77 - 5.28 10*6/mm3    Hemoglobin 12.9 12.0 - 15.9 g/dL    Hematocrit 40.9 34.0 - 46.6 %    MCV 94.0 79.0 - 97.0 fL    MCH 29.7 26.6 - 33.0 pg    MCHC 31.5 31.5 - 35.7 g/dL    RDW 12.9 12.3 - 15.4 %    RDW-SD 44.8 37.0 - 54.0 fl    MPV 12.9 (H) 6.0 - 12.0 fL    Platelets 174 140 - 450 10*3/mm3    Neutrophil % 61.7 42.7 - 76.0 %    Lymphocyte % 24.6 19.6 - 45.3 %    Monocyte  % 8.0 5.0 - 12.0 %    Eosinophil % 4.9 0.3 - 6.2 %    Basophil % 0.6 0.0 - 1.5 %    Immature Grans % 0.2 0.0 - 0.5 %    Neutrophils, Absolute 3.16 1.70 - 7.00 10*3/mm3    Lymphocytes, Absolute 1.26 0.70 - 3.10 10*3/mm3    Monocytes, Absolute 0.41 0.10 - 0.90 10*3/mm3    Eosinophils, Absolute 0.25 0.00 - 0.40 10*3/mm3    Basophils, Absolute 0.03 0.00 - 0.20 10*3/mm3    Immature Grans, Absolute 0.01 0.00 - 0.05 10*3/mm3    nRBC 0.0 0.0 - 0.2 /100 WBC   Light Blue Top   Result Value Ref Range    Extra Tube hold for add-on    Green Top (Gel)   Result Value Ref Range    Extra Tube Hold for add-ons.    Lavender Top   Result Value Ref Range    Extra Tube hold for add-on    Gold Top - SST   Result Value Ref Range    Extra Tube Hold for add-ons.          I ordered the above labs and reviewed the results    RADIOLOGY  Ct Head Without Contrast    Result Date: 8/7/2020  Narrative: CT HEAD, NONCONTRAST, 8/7/2020 HISTORY: 64-year-old female in the ED after a transient episode of bilateral leg numbness today, now resolved. Past history of CVA. TECHNIQUE: CT imaging of the head without IV contrast. Radiation dose reduction techniques included automated exposure control. Radiation audit for CT and nuclear cardiology exams in the last 12 months: 1. COMPARISON: *  CT head, and MRI brain, 1/9/2020. FINDINGS: No acute intracranial abnormality is demonstrated. There is a small chronic infarct in the right cerebellar hemisphere that was not present on the previous studies. Mild generalized cerebral volume loss with disproportionate enlargement of the ventricles, stable since prior. No evidence of acute intracranial hemorrhage, mass, mass effect, cerebral edema, extra-axial fluid collection or additional abnormality.     Impression: 1.  No acute intracranial abnormality. 2.  Stable diffuse chronic changes as noted above. 3.  Small chronic infarct in the right cerebellar hemisphere is newly apparent since the earlier studies this year.  Signer Name: Chandrakant Rhoades MD  Signed: 8/7/2020 8:47 PM  Workstation Name: [id number]  [facility]      I ordered the above radiologic testing and reviewed the results    PROCEDURES  Procedures      PROGRESS AND CONSULTS  ED Course as of Aug 07 2114   Fri Aug 07, 2020   2029 Old records reviewed and patient did have a CVA after cardiac catheterization in January of this year.  This was in addition to another CVA that the patient experienced prior to the cardiac catheterization.  History of alcohol abuse.  Patient on chronic anticoagulation due to DVT history and current A. fib is also documented.    [ML]   2103 EKG tracing was contemporaneously reviewed at 2044 hrs. and showed an atrial fibrillation with a ventricular rate of 81 bpm.  Multiple PVCs noted.  Interventricular conduction delay present.  ST segments and T waves unremarkable.    [ML]   2104 It was explained to the patient that no new findings were evident on her work-up today.  In review of her previous history it is suspected that her symptoms could possibly be related to her prior CVAs.  Discharge plan, expectations and warnings discussed.    [ML]      ED Course User Index  [ML] Anjum Zarate MD           MEDICAL DECISION MAKING  Results were reviewed/discussed with the patient and they were also made aware of online access. Pt also made aware that some labs, such as cultures, will not be resulted during ER visit and follow up with PMD is necessary.     MDM       DIAGNOSIS  Final diagnoses:   Paresthesia of left lower extremity       Latest Documented Vital Signs:  As of 21:14  BP- 118/91 HR- 70 Temp- 98.1 °F (36.7 °C) (Oral) O2 sat- 100%    DISPOSITION  Discharged in stable condition       Medication List      No changes were made to your prescriptions during this visit.       Follow-up Information     Schedule an appointment as soon as possible for a visit  with Nahun Ann Jr., DO.    Specialties:   Family Medicine, Emergency Medicine, Urgent Care  Contact information:  [address]  [place] [address]  [phone]                      Anjum Zarate MD  08/07/20 2119

## 2020-08-12 ENCOUNTER — OFFICE VISIT (OUTPATIENT)
Dept: FAMILY MEDICINE CLINIC | Facility: CLINIC | Age: 65
End: 2020-08-12

## 2020-08-12 VITALS
HEIGHT: 65 IN | SYSTOLIC BLOOD PRESSURE: 116 MMHG | WEIGHT: 123 LBS | BODY MASS INDEX: 20.49 KG/M2 | HEART RATE: 43 BPM | OXYGEN SATURATION: 98 % | DIASTOLIC BLOOD PRESSURE: 72 MMHG | TEMPERATURE: 97.3 F | RESPIRATION RATE: 16 BRPM

## 2020-08-12 DIAGNOSIS — Z79.899 HIGH RISK MEDICATION USE: ICD-10-CM

## 2020-08-12 DIAGNOSIS — I10 ESSENTIAL HYPERTENSION: ICD-10-CM

## 2020-08-12 DIAGNOSIS — R20.2 PARESTHESIA: Primary | ICD-10-CM

## 2020-08-12 DIAGNOSIS — E78.2 MIXED HYPERLIPIDEMIA: ICD-10-CM

## 2020-08-12 PROBLEM — F10.20 ALCOHOLISM: Status: ACTIVE | Noted: 2020-08-12

## 2020-08-12 PROCEDURE — 99213 OFFICE O/P EST LOW 20 MIN: CPT | Performed by: FAMILY MEDICINE

## 2020-08-12 NOTE — PROGRESS NOTES
Subjective   Camille Pantoja is a 64 y.o. female is here for   Chief Complaint   Patient presents with   • Hypertension     er follow up   • Numbness       History of Present Illness     She is here for emergency room follow-up.    On August 7 she states that she felt bilateral lower extremity numbness she stated her legs felt like they were falling asleep.  She called 911 and went to the emergency room.  She says she felt a tingling going down both legs.  It was persistent and then go away.  She was concerned about cerebrovascular accident because she had a stroke in January 2020.  She had a CT scan of her head, and EKG, and labs.  She states all of her tests were normal.  She still had symptoms when she was in the emergency room but it was almost gone by the time she got home.    She has been using a walker at home and also uses a cane for ambulating.    Pt was drinking 5 drinks of Vodka every day since she was 14 years old. She quit drinking in January when she was hospitalized. She states she has felt better since she quit drinking.    She has hypertension and takes metoprolol succinate  mg daily.  Her blood pressure today is 116/72.    The following portions of the patient's history were reviewed and updated as appropriate: allergies, current medications, past family history, past medical history, past social history, past surgical history and problem list.     reports that she has never smoked. She has never used smokeless tobacco. She reports that she drank alcohol. She reports that she does not use drugs.    Review of Systems   Constitutional: Negative for activity change and unexpected weight change.   HENT: Negative for congestion.    Respiratory: Negative for shortness of breath and wheezing.    Cardiovascular: Negative for chest pain and palpitations.   Gastrointestinal: Negative for abdominal pain, blood in stool and constipation.   Genitourinary: Negative for difficulty urinating and hematuria.  "  Musculoskeletal: Negative for gait problem.   Skin: Negative for color change and rash.        PHQ-9 Depression Screening  Little interest or pleasure in doing things?     Feeling down, depressed, or hopeless?     Trouble falling or staying asleep, or sleeping too much?     Feeling tired or having little energy?     Poor appetite or overeating?     Feeling bad about yourself - or that you are a failure or have let yourself or your family down?     Trouble concentrating on things, such as reading the newspaper or watching television?     Moving or speaking so slowly that other people could have noticed? Or the opposite - being so fidgety or restless that you have been moving around a lot more than usual?     Thoughts that you would be better off dead, or of hurting yourself in some way?     PHQ-9 Total Score     If you checked off any problems, how difficult have these problems made it for you to do your work, take care of things at home, or get along with other people?           Objective   /72 (BP Location: Left arm, Patient Position: Sitting, Cuff Size: Adult)   Pulse (!) 43   Temp 97.3 °F (36.3 °C) (Temporal)   Resp 16   Ht 165.1 cm (65\")   Wt 55.8 kg (123 lb)   SpO2 98%   BMI 20.47 kg/m²   Physical Exam   Constitutional: She is oriented to person, place, and time. She appears well-developed and well-nourished. No distress.   HENT:   Head: Normocephalic and atraumatic.   Right Ear: External ear normal.   Left Ear: External ear normal.   Nose: Nose normal.   Mouth/Throat: Oropharynx is clear and moist. No oropharyngeal exudate.   Eyes: Lids are normal. Right eye exhibits no discharge. Left eye exhibits no discharge. No scleral icterus.   Neck: Trachea normal, normal range of motion and full passive range of motion without pain. Neck supple. No tracheal deviation and no edema present. No thyromegaly present.   Cardiovascular: Normal rate, regular rhythm, normal heart sounds and intact distal pulses. " Exam reveals no gallop and no friction rub.   No murmur heard.  Pulmonary/Chest: Effort normal and breath sounds normal. No stridor. No tachypnea and no bradypnea. No respiratory distress. She has no decreased breath sounds. She has no wheezes. She has no rales. She exhibits no tenderness.   Abdominal: Normal appearance. There is no hepatosplenomegaly.   Musculoskeletal: She exhibits no edema.   Lymphadenopathy:        Head (right side): No submental, no submandibular, no tonsillar, no preauricular, no posterior auricular and no occipital adenopathy present.        Head (left side): No submental, no submandibular, no tonsillar, no preauricular, no posterior auricular and no occipital adenopathy present.     She has no cervical adenopathy.        Right cervical: No superficial cervical, no deep cervical and no posterior cervical adenopathy present.       Left cervical: No superficial cervical, no deep cervical and no posterior cervical adenopathy present.   Neurological: She is alert and oriented to person, place, and time. She has normal strength and normal reflexes. She is not disoriented.   Skin: Skin is warm, dry and intact. Capillary refill takes less than 2 seconds. No rash noted. She is not diaphoretic. No cyanosis or erythema. No pallor. Nails show no clubbing.   Psychiatric: She has a normal mood and affect. Her behavior is normal. Cognition and memory are normal.   Nursing note and vitals reviewed.      Procedures    Assessment/Plan   Diagnoses and all orders for this visit:    1. Paresthesia (Primary)  Comments:  Resolved    2. Essential hypertension  Assessment & Plan:  Controlled with Metoprolol Succinate  mg daily. No medication side effects. Continue current treatment.

## 2020-08-12 NOTE — PATIENT INSTRUCTIONS
Paresthesia  Paresthesia is an abnormal burning or prickling sensation. It is usually felt in the hands, arms, legs, or feet. However, it may occur in any part of the body. Usually, paresthesia is not painful. It may feel like:  · Tingling or numbness.  · Buzzing.  · Itching.  Paresthesia may occur without any clear cause, or it may be caused by:  · Breathing too quickly (hyperventilation).  · Pressure on a nerve.  · An underlying medical condition.  · Side effects of a medication.  · Nutritional deficiencies.  · Exposure to toxic chemicals.  Most people experience temporary (transient) paresthesia at some time in their lives. For some people, it may be long-lasting (chronic) because of an underlying medical condition. If you have paresthesia that lasts a long time, you may need to be evaluated by your health care provider.  Follow these instructions at home:  Alcohol use    · Do not drink alcohol if:  ? Your health care provider tells you not to drink.  ? You are pregnant, may be pregnant, or are planning to become pregnant.  · If you drink alcohol:  ? Limit how much you use to:  § 0-1 drink a day for women.  § 0-2 drinks a day for men.  ? Be aware of how much alcohol is in your drink. In the [place] one drink equals one 12 oz bottle of beer (355 mL), one 5 oz glass of wine (148 mL), or one 1½ oz glass of hard liquor (44 mL).  Nutrition    · Eat a healthy diet. This includes:  ? Eating foods that are high in fiber, such as fresh fruits and vegetables, whole grains, and beans.  ? Limiting foods that are high in fat and processed sugars, such as fried or sweet foods.  General instructions  · Take over-the-counter and prescription medicines only as told by your health care provider.  · Do not use any products that contain nicotine or tobacco, such as cigarettes and e-cigarettes. These can keep blood from reaching damaged nerves. If you need help quitting, ask your health care provider.  · If you have diabetes, work  closely with your health care provider to keep your blood sugar under control.  · If you have numbness in your feet:  ? Check every day for signs of injury or infection. Watch for redness, warmth, and swelling.  ? Wear padded socks and comfortable shoes. These help protect your feet.  · Keep all follow-up visits as told by your health care provider. This is important.  Contact a health care provider if you:  · Have paresthesia that gets worse or does not go away.  · Have a burning or prickling feeling that gets worse when you walk.  · Have pain, cramps, or dizziness.  · Develop a rash.  Get help right away if you:  · Feel weak.  · Have trouble walking or moving.  · Have problems with speech, understanding, or vision.  · Feel confused.  · Cannot control your bladder or bowel movements.  · Have numbness after an injury.  · Develop new weakness in an arm or leg.  · Faint.  Summary  · Paresthesia is an abnormal burning or prickling sensation that is usually felt in the hands, arms, legs, or feet. It may also occur in other parts of the body.  · Paresthesia may occur without any clear cause, or it may be caused by breathing too quickly (hyperventilation), pressure on a nerve, an underlying medical condition, side effects of a medication, nutritional deficiencies, or exposure to toxic chemicals.  · If you have paresthesia that lasts a long time, you may need to be evaluated by your health care provider.  This information is not intended to replace advice given to you by your health care provider. Make sure you discuss any questions you have with your health care provider.  Document Released: 12/08/2003 Document Revised: 01/13/2020 Document Reviewed: 12/27/2018  Elsevier Patient Education © 2020 [employer]

## 2020-08-25 RX ORDER — DIGOXIN 125 MCG
125 TABLET ORAL
Qty: 90 TABLET | Refills: 1 | OUTPATIENT
Start: 2020-08-25

## 2020-08-25 NOTE — TELEPHONE ENCOUNTER
Please call the patient and let her know that I called [facility] and this prescription has been routed to her cardiologist, Dr. Brown.

## 2020-08-27 RX ORDER — DIGOXIN 125 MCG
125 TABLET ORAL
Qty: 90 TABLET | Refills: 0 | Status: SHIPPED | OUTPATIENT
Start: 2020-08-27 | End: 2020-11-20 | Stop reason: SDUPTHER

## 2020-08-28 RX ORDER — DIGOXIN 125 MCG
TABLET ORAL
Qty: 90 TABLET | Refills: 0 | OUTPATIENT
Start: 2020-08-28

## 2020-09-04 RX ORDER — OMEPRAZOLE 20 MG/1
CAPSULE, DELAYED RELEASE ORAL
Qty: 30 CAPSULE | Refills: 2 | Status: SHIPPED | OUTPATIENT
Start: 2020-09-04 | End: 2020-12-02

## 2020-09-12 ENCOUNTER — RESULTS ENCOUNTER (OUTPATIENT)
Dept: FAMILY MEDICINE CLINIC | Facility: CLINIC | Age: 65
End: 2020-09-12

## 2020-09-12 DIAGNOSIS — E78.2 MIXED HYPERLIPIDEMIA: ICD-10-CM

## 2020-09-12 DIAGNOSIS — Z79.899 HIGH RISK MEDICATION USE: ICD-10-CM

## 2020-10-20 ENCOUNTER — RESULTS ENCOUNTER (OUTPATIENT)
Dept: FAMILY MEDICINE CLINIC | Facility: CLINIC | Age: 65
End: 2020-10-20

## 2020-10-20 DIAGNOSIS — Z79.899 HIGH RISK MEDICATION USE: ICD-10-CM

## 2020-10-22 ENCOUNTER — TELEPHONE (OUTPATIENT)
Dept: FAMILY MEDICINE CLINIC | Facility: CLINIC | Age: 65
End: 2020-10-22

## 2020-10-22 LAB
ALBUMIN SERPL-MCNC: 4.2 G/DL (ref 3.5–5.2)
ALBUMIN/GLOB SERPL: 1.8 G/DL
ALP SERPL-CCNC: 100 U/L (ref 39–117)
ALT SERPL-CCNC: 16 U/L (ref 1–33)
AST SERPL-CCNC: 18 U/L (ref 1–32)
BASOPHILS # BLD AUTO: 0.03 10*3/MM3 (ref 0–0.2)
BASOPHILS NFR BLD AUTO: 0.7 % (ref 0–1.5)
BILIRUB SERPL-MCNC: 0.9 MG/DL (ref 0–1.2)
BUN SERPL-MCNC: 13 MG/DL (ref 8–23)
BUN/CREAT SERPL: 17.1 (ref 7–25)
CALCIUM SERPL-MCNC: 9.5 MG/DL (ref 8.6–10.5)
CHLORIDE SERPL-SCNC: 102 MMOL/L (ref 98–107)
CO2 SERPL-SCNC: 32.5 MMOL/L (ref 22–29)
CREAT SERPL-MCNC: 0.76 MG/DL (ref 0.57–1)
EOSINOPHIL # BLD AUTO: 0.17 10*3/MM3 (ref 0–0.4)
EOSINOPHIL NFR BLD AUTO: 4 % (ref 0.3–6.2)
ERYTHROCYTE [DISTWIDTH] IN BLOOD BY AUTOMATED COUNT: 12.5 % (ref 12.3–15.4)
GLOBULIN SER CALC-MCNC: 2.4 GM/DL
GLUCOSE SERPL-MCNC: 80 MG/DL (ref 65–99)
HCT VFR BLD AUTO: 35.6 % (ref 34–46.6)
HGB BLD-MCNC: 11.8 G/DL (ref 12–15.9)
IMM GRANULOCYTES # BLD AUTO: 0.01 10*3/MM3 (ref 0–0.05)
IMM GRANULOCYTES NFR BLD AUTO: 0.2 % (ref 0–0.5)
LYMPHOCYTES # BLD AUTO: 0.93 10*3/MM3 (ref 0.7–3.1)
LYMPHOCYTES NFR BLD AUTO: 22.1 % (ref 19.6–45.3)
MCH RBC QN AUTO: 30.6 PG (ref 26.6–33)
MCHC RBC AUTO-ENTMCNC: 33.1 G/DL (ref 31.5–35.7)
MCV RBC AUTO: 92.5 FL (ref 79–97)
MONOCYTES # BLD AUTO: 0.37 10*3/MM3 (ref 0.1–0.9)
MONOCYTES NFR BLD AUTO: 8.8 % (ref 5–12)
NEUTROPHILS # BLD AUTO: 2.69 10*3/MM3 (ref 1.7–7)
NEUTROPHILS NFR BLD AUTO: 64.2 % (ref 42.7–76)
NRBC BLD AUTO-RTO: 0 /100 WBC (ref 0–0.2)
PLATELET # BLD AUTO: 151 10*3/MM3 (ref 140–450)
POTASSIUM SERPL-SCNC: 4.1 MMOL/L (ref 3.5–5.2)
PROT SERPL-MCNC: 6.6 G/DL (ref 6–8.5)
RBC # BLD AUTO: 3.85 10*6/MM3 (ref 3.77–5.28)
SODIUM SERPL-SCNC: 141 MMOL/L (ref 136–145)
WBC # BLD AUTO: 4.2 10*3/MM3 (ref 3.4–10.8)

## 2020-10-22 NOTE — TELEPHONE ENCOUNTER
Patient called back stating she did not get her cholesterol results. When pulling chart results it does not appear that this was drawn. Patient not happy with this.

## 2020-10-27 DIAGNOSIS — E78.2 MIXED HYPERLIPIDEMIA: Primary | ICD-10-CM

## 2020-10-27 NOTE — TELEPHONE ENCOUNTER
A new order will have to be put in for the Lipid Panel and the patient will have to come in to be redrawn.

## 2020-10-28 ENCOUNTER — OFFICE VISIT (OUTPATIENT)
Dept: FAMILY MEDICINE CLINIC | Facility: CLINIC | Age: 65
End: 2020-10-28

## 2020-10-28 VITALS
HEIGHT: 65 IN | TEMPERATURE: 97.1 F | SYSTOLIC BLOOD PRESSURE: 116 MMHG | DIASTOLIC BLOOD PRESSURE: 62 MMHG | HEART RATE: 55 BPM | RESPIRATION RATE: 16 BRPM | OXYGEN SATURATION: 96 % | BODY MASS INDEX: 21.16 KG/M2 | WEIGHT: 127 LBS

## 2020-10-28 DIAGNOSIS — E78.2 MIXED HYPERLIPIDEMIA: ICD-10-CM

## 2020-10-28 DIAGNOSIS — N32.89 BLADDER SPASMS: ICD-10-CM

## 2020-10-28 DIAGNOSIS — I10 ESSENTIAL HYPERTENSION: ICD-10-CM

## 2020-10-28 DIAGNOSIS — Z79.899 HIGH RISK MEDICATION USE: Primary | ICD-10-CM

## 2020-10-28 PROCEDURE — 99214 OFFICE O/P EST MOD 30 MIN: CPT | Performed by: FAMILY MEDICINE

## 2020-10-28 RX ORDER — METOPROLOL SUCCINATE 100 MG/1
100 TABLET, EXTENDED RELEASE ORAL DAILY
Qty: 90 TABLET | Refills: 1 | Status: SHIPPED | OUTPATIENT
Start: 2020-10-28 | End: 2021-04-30

## 2020-10-28 NOTE — PATIENT INSTRUCTIONS
Dyslipidemia  Dyslipidemia is an imbalance of waxy, fat-like substances (lipids) in the blood. The body needs lipids in small amounts. Dyslipidemia often involves a high level of cholesterol or triglycerides, which are types of lipids.  Common forms of dyslipidemia include:  · High levels of bad cholesterol (LDL cholesterol). LDL is the type of cholesterol that causes fatty deposits (plaques) to build up in the blood vessels that carry blood away from your heart (arteries).  · Low levels of good cholesterol (HDL cholesterol). HDL cholesterol is the type of cholesterol that protects against heart disease. High levels of HDL remove the LDL buildup from arteries.  · High levels of triglycerides. Triglycerides are a fatty substance in the blood that is linked to a buildup of plaques in the arteries.    You can develop dyslipidemia because of the genes you are born with (primary dyslipidemia) or changes that occur during your life (secondary dyslipidemia), or as a side effect of certain medical treatments.  What are the causes?  Primary dyslipidemia is caused by changes (mutations) in genes that are passed down through families (inherited). These mutations cause several types of dyslipidemia. Mutations can result in disorders that make the body produce too much LDL cholesterol or triglycerides, or not enough HDL cholesterol. These disorders may lead to heart disease, arterial disease, or stroke at an early age.  Causes of secondary dyslipidemia include certain lifestyle choices and diseases that lead to dyslipidemia, such as:  · Eating a diet that is high in animal fat.  · Not getting enough activity or exercise (having a sedentary lifestyle).  · Having diabetes, kidney disease, liver disease, or thyroid disease.  · Drinking large amounts of alcohol.  · Using certain types of drugs.    What increases the risk?  You may be at greater risk for dyslipidemia if you are an older man or if you are a woman who has gone through  menopause. Other risk factors include:  · Having a family history of dyslipidemia.  · Taking certain medicines, including birth control pills, steroids, some diuretics, beta-blockers, and some medicines for HIV.  · Smoking cigarettes.  · Eating a high-fat diet.  · Drinking large amounts of alcohol.  · Having certain medical conditions such as diabetes, polycystic ovary syndrome (PCOS), pregnancy, kidney disease, liver disease, or hypothyroidism.  · Not exercising regularly.  · Being overweight or obese with too much belly fat.    What are the signs or symptoms?  Dyslipidemia does not usually cause any symptoms.  Very high lipid levels can cause fatty bumps under the skin (xanthomas) or a white or gray ring around the black center (pupil) of the eye. Very high triglyceride levels can cause inflammation of the pancreas (pancreatitis).  How is this diagnosed?  Your health care provider may diagnose dyslipidemia based on a routine blood test (fasting blood test). Because most people do not have symptoms of the condition, this blood testing (lipid profile) is done on adults age 20 and older and is repeated every 5 years. This test checks:  · Total cholesterol. This is a measure of the total amount of cholesterol in your blood, including LDL cholesterol, HDL cholesterol, and triglycerides. A healthy number is below 200.  · LDL cholesterol. The target number for LDL cholesterol is different for each person, depending on individual risk factors. For most people, a number below 100 is healthy. Ask your health care provider what your LDL cholesterol number should be.  · HDL cholesterol. An HDL level of 60 or higher is best because it helps to protect against heart disease. A number below 40 for men or below 50 for women increases the risk for heart disease.  · Triglycerides. A healthy triglyceride number is below 150.    If your lipid profile is abnormal, your health care provider may do other blood tests to get more  information about your condition.  How is this treated?  Treatment depends on the type of dyslipidemia that you have and your other risk factors for heart disease and stroke. Your health care provider will have a target range for your lipid levels based on this information.  For many people, treatment starts with lifestyle changes, such as diet and exercise. Your health care provider may recommend that you:  · Get regular exercise.  · Make changes to your diet.  · Quit smoking if you smoke.    If diet changes and exercise do not help you reach your goals, your health care provider may also prescribe medicine to lower lipids. The most commonly prescribed type of medicine lowers your LDL cholesterol (statin drug). If you have a high triglyceride level, your provider may prescribe another type of drug (fibrate) or an omega-3 fish oil supplement, or both.  Follow these instructions at home:  · Take over-the-counter and prescription medicines only as told by your health care provider. This includes supplements.  · Get regular exercise. Start an aerobic exercise and strength training program as told by your health care provider. Ask your health care provider what activities are safe for you. Your health care provider may recommend:  ? 30 minutes of aerobic activity 4-6 days a week. Brisk walking is an example of aerobic activity.  ? Strength training 2 days a week.  · Eat a healthy diet as told by your health care provider. This can help you reach and maintain a healthy weight, lower your LDL cholesterol, and raise your HDL cholesterol. It may help to work with a diet and nutrition specialist (dietitian) to make a plan that is right for you. Your dietitian or health care provider may recommend:  ? Limiting your calories, if you are overweight.  ? Eating more fruits, vegetables, whole grains, fish, and lean meats.  ? Limiting saturated fat, trans fat, and cholesterol.  · Follow instructions from your health care provider  or dietitian about eating or drinking restrictions.  · Limit alcohol intake to no more than one drink per day for nonpregnant women and two drinks per day for men. One drink equals 12 oz of beer, 5 oz of wine, or 1½ oz of hard liquor.  · Do not use any products that contain nicotine or tobacco, such as cigarettes and e-cigarettes. If you need help quitting, ask your health care provider.  · Keep all follow-up visits as told by your health care provider. This is important.  Contact a health care provider if:  · You are having trouble sticking to your exercise or diet plan.  · You are struggling to quit smoking or control your use of alcohol.  Summary  · Dyslipidemia is an imbalance of waxy, fat-like substances (lipids) in the blood. The body needs lipids in small amounts. Dyslipidemia often involves a high level of cholesterol or triglycerides, which are types of lipids.  · Treatment depends on the type of dyslipidemia that you have and your other risk factors for heart disease and stroke.  · For many people, treatment starts with lifestyle changes, such as diet and exercise. Your health care provider may also prescribe medicine to lower lipids.  This information is not intended to replace advice given to you by your health care provider. Make sure you discuss any questions you have with your health care provider.  Document Released: 12/23/2014 Document Revised: 08/14/2017 Document Reviewed: 08/14/2017  [employer] Patient Education © 2019 [employer]      The Best Foods to Lower Cholesterol    High-Fiber Delights    Whole grains like barley, oats and oat bran have been shown to lower cholesterol thanks to the ample amounts of soluble fiber that they possess. Other fiber-rich favorites include beans, okra and eggplant, all of which are low in calories and high in nutrients. Try incorporating these delicious and nutritious foods into your diet when gathering the best foods to lower cholesterol.    The  Enemies of LDL    When assessing the best foods to lower cholesterol, remember that not all cholesterols are created equal. You have your good cholesterols (HDL) and your bad cholesterols (LDL). Proper cholesterol management is about keeping the LDL cholesterol under control. Numerous foods are renowned for their ability to reduce LDL cholesterol, including liquid vegetable oils (such as canola and sunflower oils), fruits rich in pectin (such as apples, strawberries and oranges), fatty fish and soy products (such as tofu).    What Not to Eat    When it comes to cholesterol management, it’s important to also pay attention to the foods that you’re already eating. Many people, when determining what to eat, will mistakenly focus on the amount of cholesterol on the labels of packaged foods, but this is only a small part of the equation. It may surprise you to learn that saturated fats and trans fats have a much greater effect on your overall cholesterol level, so keep them to a minimum.

## 2020-10-28 NOTE — ASSESSMENT & PLAN NOTE
Controlled with simvastatin 20 mg daily.  No medication side effects.  Continue current treatment.

## 2020-10-28 NOTE — PROGRESS NOTES
Subjective   Camille Pantoja is a 64 y.o. female is here for   Chief Complaint   Patient presents with   • Hypertension   • Hyperlipidemia   • Heartburn   • overactive bladder       History of Present Illness     Pt has controlled HTN.    Pt has controlled HLD.    She has OAB controlled with medication.    Pt has heartburn controlled with Omeprazole PRN.    Health Maintenance Due   Topic Date Due   • HEPATITIS C SCREENING  02/02/2016        reports that she has never smoked. She has never used smokeless tobacco. She reports previous alcohol use. She reports that she does not use drugs.    Review of Systems   Constitutional: Negative for activity change and unexpected weight change.   HENT: Negative for congestion.    Respiratory: Negative for shortness of breath and wheezing.    Cardiovascular: Negative for chest pain and palpitations.   Gastrointestinal: Negative for abdominal pain, blood in stool and constipation.   Genitourinary: Negative for difficulty urinating and hematuria.   Musculoskeletal: Negative for gait problem.   Skin: Negative for color change and rash.        PHQ-9 Depression Screening  Little interest or pleasure in doing things?     Feeling down, depressed, or hopeless?     Trouble falling or staying asleep, or sleeping too much?     Feeling tired or having little energy?     Poor appetite or overeating?     Feeling bad about yourself - or that you are a failure or have let yourself or your family down?     Trouble concentrating on things, such as reading the newspaper or watching television?     Moving or speaking so slowly that other people could have noticed? Or the opposite - being so fidgety or restless that you have been moving around a lot more than usual?     Thoughts that you would be better off dead, or of hurting yourself in some way?     PHQ-9 Total Score     If you checked off any problems, how difficult have these problems made it for you to do your work, take care of things at home,  "or get along with other people?       BP Readings from Last 12 Encounters:   10/28/20 116/62   08/12/20 116/72   08/07/20 130/67   07/28/20 118/66   07/22/20 116/62   06/11/20 108/68   02/05/20 122/80   01/25/20 139/85   01/16/20 (!) 86/69   01/10/20 121/83   01/08/20 114/62   01/03/20 138/78       Wt Readings from Last 12 Encounters:   10/28/20 57.6 kg (127 lb)   08/12/20 55.8 kg (123 lb)   08/07/20 62 kg (136 lb 9.6 oz)   07/28/20 55.2 kg (121 lb 12.8 oz)   07/22/20 55.3 kg (122 lb)   06/11/20 56.2 kg (124 lb)   02/05/20 52.6 kg (116 lb)   01/21/20 54.5 kg (120 lb 2.4 oz)   01/16/20 55 kg (121 lb 3.2 oz)   01/13/20 52 kg (114 lb 10.2 oz)   01/10/20 55.8 kg (123 lb)   01/10/20 55.8 kg (123 lb)        Objective   /62 (BP Location: Right arm, Patient Position: Sitting, Cuff Size: Adult)   Pulse 55   Temp 97.1 °F (36.2 °C) (Temporal)   Resp 16   Ht 165.1 cm (65\")   Wt 57.6 kg (127 lb)   SpO2 96%   BMI 21.13 kg/m²   Physical Exam  Vitals signs and nursing note reviewed.   Constitutional:       General: She is not in acute distress.     Appearance: Normal appearance. She is well-developed. She is not diaphoretic.   HENT:      Head: Normocephalic and atraumatic.      Right Ear: External ear normal.      Left Ear: External ear normal.      Nose: Nose normal.      Mouth/Throat:      Pharynx: No oropharyngeal exudate.   Eyes:      General: Lids are normal. No scleral icterus.        Right eye: No discharge.         Left eye: No discharge.   Neck:      Musculoskeletal: Full passive range of motion without pain, normal range of motion and neck supple. No edema.      Thyroid: No thyromegaly.      Trachea: Trachea normal. No tracheal deviation.   Cardiovascular:      Rate and Rhythm: Normal rate and regular rhythm.      Heart sounds: Normal heart sounds. No murmur. No friction rub. No gallop.    Pulmonary:      Effort: Pulmonary effort is normal. No tachypnea, bradypnea or respiratory distress.      Breath sounds: " Normal breath sounds. No stridor. No decreased breath sounds, wheezing or rales.   Chest:      Chest wall: No tenderness.   Lymphadenopathy:      Head:      Right side of head: No submental, submandibular, tonsillar, preauricular, posterior auricular or occipital adenopathy.      Left side of head: No submental, submandibular, tonsillar, preauricular, posterior auricular or occipital adenopathy.      Cervical: No cervical adenopathy.      Right cervical: No superficial, deep or posterior cervical adenopathy.     Left cervical: No superficial, deep or posterior cervical adenopathy.   Skin:     General: Skin is warm and dry.      Capillary Refill: Capillary refill takes less than 2 seconds.      Coloration: Skin is not pale.      Findings: No erythema or rash.      Nails: There is no clubbing.     Neurological:      Mental Status: She is alert and oriented to person, place, and time. She is not disoriented.      Deep Tendon Reflexes: Reflexes are normal and symmetric.   Psychiatric:         Behavior: Behavior normal.         Procedures    Assessment/Plan   Diagnoses and all orders for this visit:    1. High risk medication use (Primary)  -     CBC & Differential; Future  -     Comprehensive Metabolic Panel; Future    2. Mixed hyperlipidemia  Assessment & Plan:  Controlled with simvastatin 20 mg daily.  No medication side effects.  Continue current treatment.      Orders:  -     Lipid Panel With / Chol / HDL Ratio  -     Lipid Panel With / Chol / HDL Ratio; Future    3. Essential hypertension  Assessment & Plan:  Controlled with Metoprolol Succinate  mg daily. No medication side effects. Continue current treatment.      Orders:  -     metoprolol succinate XL (Toprol XL) 100 MG 24 hr tablet; Take 1 tablet by mouth Daily.  Dispense: 90 tablet; Refill: 1    4. Bladder spasms  Assessment & Plan:  Controlled with Oxybutinin 5 mg BID.  No medication side effects.  Continue current treatment.               Return in  about 3 months (around 1/28/2021) for HTN-C, HLD-C, GERD-C with labs 2+ days prior.

## 2020-11-20 RX ORDER — DIGOXIN 125 MCG
125 TABLET ORAL
Qty: 90 TABLET | Refills: 3 | Status: SHIPPED | OUTPATIENT
Start: 2020-11-20 | End: 2021-11-18

## 2020-12-02 RX ORDER — OMEPRAZOLE 20 MG/1
CAPSULE, DELAYED RELEASE ORAL
Qty: 30 CAPSULE | Refills: 1 | Status: SHIPPED | OUTPATIENT
Start: 2020-12-02 | End: 2021-02-01

## 2020-12-15 DIAGNOSIS — I48.91 ATRIAL FIBRILLATION, UNSPECIFIED TYPE (HCC): ICD-10-CM

## 2020-12-15 RX ORDER — APIXABAN 5 MG/1
TABLET, FILM COATED ORAL
Qty: 60 TABLET | Refills: 5 | Status: SHIPPED | OUTPATIENT
Start: 2020-12-15 | End: 2021-06-07 | Stop reason: SDUPTHER

## 2020-12-18 ENCOUNTER — OFFICE VISIT (OUTPATIENT)
Dept: CARDIOLOGY | Facility: CLINIC | Age: 65
End: 2020-12-18

## 2020-12-18 VITALS
HEART RATE: 55 BPM | HEIGHT: 65 IN | SYSTOLIC BLOOD PRESSURE: 118 MMHG | BODY MASS INDEX: 21.69 KG/M2 | DIASTOLIC BLOOD PRESSURE: 74 MMHG | WEIGHT: 130.2 LBS

## 2020-12-18 DIAGNOSIS — E78.2 MIXED HYPERLIPIDEMIA: ICD-10-CM

## 2020-12-18 DIAGNOSIS — I63.512 ACUTE ISCHEMIC LEFT MCA STROKE (HCC): ICD-10-CM

## 2020-12-18 DIAGNOSIS — I42.8 NICM (NONISCHEMIC CARDIOMYOPATHY) (HCC): ICD-10-CM

## 2020-12-18 DIAGNOSIS — I48.0 PAROXYSMAL ATRIAL FIBRILLATION WITH RAPID VENTRICULAR RESPONSE (HCC): Primary | ICD-10-CM

## 2020-12-18 DIAGNOSIS — I10 ESSENTIAL HYPERTENSION: ICD-10-CM

## 2020-12-18 PROCEDURE — 93000 ELECTROCARDIOGRAM COMPLETE: CPT | Performed by: INTERNAL MEDICINE

## 2020-12-18 PROCEDURE — 99214 OFFICE O/P EST MOD 30 MIN: CPT | Performed by: INTERNAL MEDICINE

## 2020-12-18 NOTE — PROGRESS NOTES
Date of Office Visit: 2020  Encounter Provider: Jovita Brown MD  Place of Service: [facility] CARDIOLOGY  Patient Name: Camille Pantoja  :1955      Patient ID:  Camille Pantoja is a 64 y.o. female is here for  followup for history of atrial fibrillation and stroke.        History of Present Illness    She has a history of DVT, hypertension, GERD and hyperlipidemia     This is a 64-year-old female who recently had left lower lobe pneumonia diagnosed 2020 and treated with Levaquin.  She began to have diarrhea and to become progressively more weak.  She lives alone and her neighbor found her down the floor. She said that she had slid off of her bed.  She  presented to [facility] on 1/10 with altered mental status. She was found to be in rapid atrial fibrillation. She was noted to have an elevated troponin. Echocardiogram was obtained and showed new cardiomyopathy moderate left ventricular dilation, ejection fraction 27%, mild concentric left ventricle hypertrophy, severe biatrial enlargement, moderate right ventricular dilation with moderately reduced right ventricular systolic function, mild mitral and tricuspid insufficiency, akinesis of the inferior and lateral wall with hypokinesis of the anterior and septum.. She was transferred to [facility] for cardiac catheterization as well as further neurologic evaluation. MRI did show acute left hemisphere subcortical stroke. On 20 morning, patient had new mental status changes. MRI showed slight expansion of stroke. On the evening of  patient had decrease level of consciousness. She was sent for MRI which showed new area of infarcts in the right cerebellum. She was placed on a heparin drip. Medications were adjusted for rate control. Losartan was stopped to avoid hypotension. She was placed on digoxin. She was transitioned from heparin to apixaban.  PCP follow up was recommended for  left thyroid hypodense nodule noted on CTA head and neck. Patient was discharged to  [facility].     Labs on 10/21/2020 showed normal CMP and hemoglobin 11.8, otherwise normal CBC.    She has no chest pain or pressure.  She has no orthopnea or PND.  She still feels occasional tachycardia.  She has had no dizziness or syncope.  She has no fevers chills or cough.  She has no nausea or vomiting.  She is taking her medications as directed without difficulty.  She is to have her lipids checked next month.       Past Medical History:   Diagnosis Date   • CHF (congestive heart failure) (CMS/HCC)    • Hyperlipidemia    • Hypertension    • Irregular heart beat    • Skin cancer    • Stroke (CMS/HCC)          Past Surgical History:   Procedure Laterality Date   • CARDIAC CATHETERIZATION N/A 1/10/2020    Procedure: Left Heart Cath;  Surgeon: Cain Mcneil MD;  Location:  [facility]ATH [facility]VAS[facility];  Service: Cardiovascular   • CARDIAC CATHETERIZATION N/A 1/10/2020    Procedure: Coronary angiography;  Surgeon: Cain Mcneil MD;  Location:  [facility]ATH [facility];  Service: Cardiovascular   • FINGER SURGERY     • HYSTERECTOMY     • JOINT REPLACEMENT     • ORIF SHOULDER DISLOCATION W/ HUMERAL FRACTURE     • TIBIA FRACTURE SURGERY         Current Outpatient Medications on File Prior to Visit   Medication Sig Dispense Refill   • atorvastatin (LIPITOR) 80 MG tablet Take 0.5 tablets by mouth Every Night. 90 tablet 1   • digoxin (LANOXIN) 125 MCG tablet Take 1 tablet by mouth Daily. 90 tablet 3   • Eliquis 5 MG tablet tablet TAKE ONE TABLET BY MOUTH EVERY 12 HOURS 60 tablet 5   • metoprolol succinate XL (Toprol XL) 100 MG 24 hr tablet Take 1 tablet by mouth Daily. 90 tablet 1   • omeprazole (priLOSEC) 20 MG capsule TAKE ONE CAPSULE BY MOUTH DAILY 30 capsule 1   • oxybutynin (DITROPAN) 5 MG tablet TAKE ONE TABLET BY MOUTH TWICE A DAY 60 tablet 4     No current facility-administered  "medications on file prior to visit.        Social History     Socioeconomic History   • Marital status:      Spouse name: Not on file   • Number of children: Not on file   • Years of education: Not on file   • Highest education level: Not on file   Tobacco Use   • Smoking status: Never Smoker   • Smokeless tobacco: Never Used   Substance and Sexual Activity   • Alcohol use: Not Currently     Comment: Reports a hx of drinking everyday for 18 years atleast. She says that she quit May 2018, but says that she drinks occasionally now.    • Drug use: No     Comment: caffeine use: 2 cups daily.    • Sexual activity: Defer           Review of Systems   Constitution: Negative.   HENT: Negative for congestion.    Eyes: Negative for vision loss in left eye and vision loss in right eye.   Respiratory: Negative.  Negative for cough, hemoptysis, shortness of breath, sleep disturbances due to breathing, snoring, sputum production and wheezing.    Endocrine: Negative.    Hematologic/Lymphatic: Negative.    Skin: Negative for poor wound healing and rash.   Musculoskeletal: Negative for falls, gout, muscle cramps and myalgias.   Gastrointestinal: Negative for abdominal pain, diarrhea, dysphagia, hematemesis, melena, nausea and vomiting.   Neurological: Negative for excessive daytime sleepiness, dizziness, headaches, light-headedness, loss of balance, seizures and vertigo.   Psychiatric/Behavioral: Negative for depression and substance abuse. The patient is not nervous/anxious.        Procedures    ECG 12 Lead    Date/Time: 12/18/2020 1:57 PM  Performed by: Jovita Brown MD  Authorized by: Jovita Brown MD   Comparison: compared with previous ECG   Comparison to previous ECG: Prior a fib noted  Rhythm: sinus rhythm    Clinical impression: normal ECG                Objective:      Vitals:    12/18/20 1341   BP: 118/74   BP Location: Left arm   Pulse: 55   Weight: 59.1 kg (130 lb 3.2 oz)   Height: 165.1 cm (65\") "     Body mass index is 21.67 kg/m².    Vitals signs reviewed.   Constitutional:       General: Not in acute distress.     Appearance: Well-developed. Not diaphoretic.   Eyes:      General: No scleral icterus.     Conjunctiva/sclera: Conjunctivae normal.   HENT:      Head: Normocephalic and atraumatic.   Neck:      Musculoskeletal: Neck supple.      Thyroid: No thyromegaly.      Vascular: No carotid bruit or JVD.      Lymphadenopathy: No cervical adenopathy.   Pulmonary:      Effort: Pulmonary effort is normal. No respiratory distress.      Breath sounds: Normal breath sounds. No wheezing. No rhonchi. No rales.   Chest:      Chest wall: Not tender to palpatation.   Cardiovascular:      Normal rate. Regular rhythm.      No gallop.   Edema:     Peripheral edema absent.   Abdominal:      General: Bowel sounds are normal. There is no distension or abdominal bruit.      Palpations: Abdomen is soft. There is no abdominal mass.      Tenderness: There is no abdominal tenderness.   Musculoskeletal:         General: No deformity.      Extremities: No clubbing present.  Skin:     General: Skin is warm and dry. There is no cyanosis.      Coloration: Skin is not pale.      Findings: No rash.   Neurological:      Mental Status: Alert and oriented to person, place, and time.      Cranial Nerves: No cranial nerve deficit.   Psychiatric:         Judgment: Judgment normal.         Lab Review:       Assessment:      Diagnosis Plan   1. Paroxysmal atrial fibrillation with rapid ventricular response (CMS/HCC)  Adult Transthoracic Echo Complete W/ Cont if Necessary Per Protocol   2. Essential hypertension  Adult Transthoracic Echo Complete W/ Cont if Necessary Per Protocol   3. Mixed hyperlipidemia     4. Acute ischemic left MCA stroke (CMS/HCC)     5. NICM (nonischemic cardiomyopathy) (CMS/HCC)  Adult Transthoracic Echo Complete W/ Cont if Necessary Per Protocol     1. PAF, in NSR but is on digoxin, Toprol and apixaban.  Would remain on  these as she has had paroxysmal atrial fibrillation for a while.  2. Multiple strokes noted on her MRI.  3. Nonischemic cardiomyopathy, repeat echocardiogram     Plan:       See Cj in 6 months, no medication changes.

## 2021-01-04 RX ORDER — OXYBUTYNIN CHLORIDE 5 MG/1
5 TABLET ORAL 2 TIMES DAILY
Qty: 60 TABLET | Refills: 4 | Status: SHIPPED | OUTPATIENT
Start: 2021-01-04 | End: 2021-06-02 | Stop reason: SDUPTHER

## 2021-01-04 NOTE — TELEPHONE ENCOUNTER
Caller: Camille Pantoja    Relationship: Self    Best call back number:[phone]    Medication needed:   Requested Prescriptions     Pending Prescriptions Disp Refills   • oxybutynin (DITROPAN) 5 MG tablet 60 tablet 4     Sig: Take 1 tablet by mouth 2 (Two) Times a Day.       When do you need the refill by: 1/4/21    What details did the patient provide when requesting the medication: N/A    Does the patient have less than a 3 day supply:  [x] Yes  [] No    What is the patient's preferred pharmacy:     [facility] [address] [facility] [phone] [facility] [phone]   [phone]

## 2021-01-26 ENCOUNTER — APPOINTMENT (OUTPATIENT)
Dept: GENERAL RADIOLOGY | Facility: HOSPITAL | Age: 66
End: 2021-01-26

## 2021-01-26 ENCOUNTER — HOSPITAL ENCOUNTER (EMERGENCY)
Facility: HOSPITAL | Age: 66
Discharge: HOME | End: 2021-01-26
Attending: EMERGENCY MEDICINE | Admitting: EMERGENCY MEDICINE

## 2021-01-26 VITALS
DIASTOLIC BLOOD PRESSURE: 73 MMHG | WEIGHT: 133 LBS | RESPIRATION RATE: 16 BRPM | BODY MASS INDEX: 22.16 KG/M2 | OXYGEN SATURATION: 100 % | HEIGHT: 65 IN | HEART RATE: 59 BPM | SYSTOLIC BLOOD PRESSURE: 144 MMHG | TEMPERATURE: 98.4 F

## 2021-01-26 DIAGNOSIS — M25.461 EFFUSION, RIGHT KNEE: ICD-10-CM
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
## 2021-01-26 DIAGNOSIS — M25.551 RIGHT HIP PAIN: ICD-10-CM

## 2021-01-26 PROCEDURE — 99284 EMERGENCY DEPT VISIT MOD MDM: CPT | Performed by: PHYSICIAN ASSISTANT

## 2021-01-26 PROCEDURE — 73502 X-RAY EXAM HIP UNI 2-3 VIEWS: CPT

## 2021-01-26 PROCEDURE — 99284 EMERGENCY DEPT VISIT MOD MDM: CPT

## 2021-01-26 PROCEDURE — 73562 X-RAY EXAM OF KNEE 3: CPT

## 2021-01-26 RX ORDER — HYDROCODONE BITARTRATE AND ACETAMINOPHEN 5; 325 MG/1; MG/1
1 TABLET ORAL ONCE
Status: COMPLETED | OUTPATIENT
Start: 2021-01-26 | End: 2021-01-26

## 2021-01-26 RX ADMIN — HYDROCODONE BITARTRATE AND ACETAMINOPHEN 1 TABLET: 5; 325 TABLET ORAL at 21:05

## 2021-01-27 ENCOUNTER — EPISODE CHANGES (OUTPATIENT)
Dept: CASE MANAGEMENT | Facility: OTHER | Age: 66
End: 2021-01-27

## 2021-01-29 ENCOUNTER — PATIENT OUTREACH (OUTPATIENT)
Dept: CASE MANAGEMENT | Facility: OTHER | Age: 66
End: 2021-01-29

## 2021-01-29 NOTE — OUTREACH NOTE
Care Plan Note      Responses   AWV Materials  Send Materials   Specific Disease Process Teaching  -- [Fall reacuations and right hip and knee injury]   Other Patient Education/Resources   24/7 [facility] Nurse Call Line, Advanced Care Planning, Jannette   24/7 Nurse Call Line Education Method  Send Materials   ACP Education Method  Send Materials   MyChar Education Method  Send Materials   Does patient have depression diagnosis?  No   Advanced Directives:  Send Materials   Ed Visits past 12 months:  2 or 3   Hospitalizations past 12 months  None   Medication Adherence  Medications understood        The main concerns and/or symptoms the patient would like to address are: Talked with patient. Discussed 1/26/21  ED visit regarding fall and pain to right hip and knee. Patient states to be compliant with ED recommendations; applying ice; elevating knee; wearing splint as directed and has 2/2/21 ortho appointment.Patient reports touch toe weight bearing when ambulating with walker; has good sensation and no numbness to right leg or knee and using Tylenol for pain. Patient lives alone usually independent with ADL's and transportation.She states to have supportive neighbor who will assist with meals and transporyation. She reports no difficulty with fever; chest pain;  SOB appetite but does have difficulty with sleeping. She states to be compliant with medications. Patient has Life Alert which she used during fall. .     Education/instruction provided by Care Coordinator: Reviewed with patient ED AVS commendations; fall and safety precautions; education provided;COVID 19 precautions; 24/7 Nurse Line Telephone number; [facility] contact information; [facility] Planning and Support services.   Patient verbalized understanding and states to appreciate phone call.  No further questions or concerns voiced at this time.     Follow Up Outreach Due: Follow up as needed.     Maren Ortez RN  Ambulatory Case  Manager    1/29/2021, 10:21 EST

## 2021-02-01 RX ORDER — OMEPRAZOLE 20 MG/1
CAPSULE, DELAYED RELEASE ORAL
Qty: 30 CAPSULE | Refills: 2 | Status: SHIPPED | OUTPATIENT
Start: 2021-02-01 | End: 2021-04-30

## 2021-02-02 ENCOUNTER — TELEPHONE (OUTPATIENT)
Dept: FAMILY MEDICINE CLINIC | Facility: CLINIC | Age: 66
End: 2021-02-02

## 2021-02-02 NOTE — TELEPHONE ENCOUNTER
PATIENT IS REQUESTING THAT A COPY OF HER TEST RESULTS BE SENT TO HER, PATIENT IS NOT SET UP WITH [employer] AND DOES NOT WANT TO BE.    CALL BACK IF NEEDED:  [phone]     [facility] [address]

## 2021-02-04 ENCOUNTER — PATIENT OUTREACH (OUTPATIENT)
Dept: CASE MANAGEMENT | Facility: OTHER | Age: 66
End: 2021-02-04

## 2021-02-04 NOTE — OUTREACH NOTE
Patient Outreach Note  Talked with patient. Patient states to have cancelled ortho appointment due to bad weather. She states will contact Dr. Hendrix (orthopedic surgeon) as needed.  She reports wearing brace to right knee; using walker  able to bear weight with good sensation . She reports improvement regarding pain; has episodes of difficulty sleeping (has had in the past); and no difficulty with fever; chest pain; SOB; or appetite. Patient reports compliance with medications and continues to receive assistance from her neighbor as needed. Reviewed with patient fall and safety precautions;benefit of follow up physician appointments; home health services; COVID 19 precautions; 24/7 Nurse Line Telephone number; [facility] contact information; Advance Directives (completed/ needs to file); My Chart (declines) and gaps in care.  Patient verbalized understanding; states to appreciate phone call. And request further outreach.   No further questions or concerns voiced at this time.     Maren Ortez RN  Ambulatory     2/4/2021, 12:15 EST

## 2021-02-05 NOTE — TELEPHONE ENCOUNTER
Caller: Camille Pantoja    Relationship: Self    Best call back number: [phone]    What test was performed: LABS    When was the test performed: 1/26/21    Additional notes: PATIENT REQUESTING RESULTS BE MAILED TO HER AS SOON AS POSSIBLE

## 2021-02-19 ENCOUNTER — PATIENT OUTREACH (OUTPATIENT)
Dept: CASE MANAGEMENT | Facility: OTHER | Age: 66
End: 2021-02-19

## 2021-02-19 NOTE — OUTREACH NOTE
Patient Outreach Note  Talked with patient. Patient reports improvement regarding right hip and knee pan and soreness due to fall. She reports to be using walker to ambulate; able to bear weight without difficulty and using brace to knee for support. She reports to have cancelled ortho appointment due to feeling better and confirms 3/12/21 neurology and 6/18/21 cardiology appointments. Patient declines RN-ACM assistance in scheduling PCP appointment and willmake appointment as needed. She reports difficulty with sleeping as she wakes up during night and no difficulty with chest pain; SOB; appetite. compliant with medications and appointments.  Patient independent with ADL's;  meal  preparation; transportation;compliant with medications; taking Eliquis as directed and medical  appointments.Reviewed with education regarding fall; safety and bleeding precautions; benefit of monitoring blood pressure and HTN education; .COVID 19 precautions; COVID 19 vaccine; 24/7 Nurse Line Telephone number; ACM contact information; Advance Directives(completed/ needs to file); My Chart(addressed); gaps in care(declines flu vaccine); MWV (addressed); Case Management and [facility] Planning and Support services.  Patient verbalized understanding and states to appreciate phone call. She declines further outreach at this time. SDOH reviewed with patient and reports no difficulty with food; transportation or financial resources.  No further questions or concerns voiced at this time.     Maren Ortez RN  Ambulatory     2/19/2021, 10:36 EST

## 2021-02-22 ENCOUNTER — TELEPHONE (OUTPATIENT)
Dept: NEUROLOGY | Facility: CLINIC | Age: 66
End: 2021-02-22

## 2021-02-22 NOTE — TELEPHONE ENCOUNTER
Caller: PT    Relationship to patient: SELF    Best call back number: [phone]    Chief complaint: NA    Type of visit: F/U     Requested date: 3-12-21 @ 3 PM    If rescheduling, when is the original appointment: NA     Additional notes:PATIENT IS WANTING TO KNOW IF SHE CAN CHANGE APPT TO TELEPHONE VISIT. SHE CANNOT FIND A RIDE FOR APPT. PLEASE ADVISE.

## 2021-03-29 ENCOUNTER — OFFICE VISIT (OUTPATIENT)
Dept: ORTHOPEDIC SURGERY | Facility: CLINIC | Age: 66
End: 2021-03-29

## 2021-03-29 VITALS
WEIGHT: 133 LBS | SYSTOLIC BLOOD PRESSURE: 120 MMHG | BODY MASS INDEX: 22.16 KG/M2 | HEIGHT: 65 IN | HEART RATE: 85 BPM | DIASTOLIC BLOOD PRESSURE: 88 MMHG

## 2021-03-29 DIAGNOSIS — M17.11 PRIMARY OSTEOARTHRITIS OF RIGHT KNEE: ICD-10-CM

## 2021-03-29 DIAGNOSIS — M15.9 PRIMARY OSTEOARTHRITIS INVOLVING MULTIPLE JOINTS: Primary | ICD-10-CM

## 2021-03-29 PROCEDURE — 99203 OFFICE O/P NEW LOW 30 MIN: CPT | Performed by: ORTHOPAEDIC SURGERY

## 2021-03-29 PROCEDURE — 20610 DRAIN/INJ JOINT/BURSA W/O US: CPT | Performed by: ORTHOPAEDIC SURGERY

## 2021-03-29 PROCEDURE — 73562 X-RAY EXAM OF KNEE 3: CPT | Performed by: ORTHOPAEDIC SURGERY

## 2021-03-29 RX ORDER — LIDOCAINE HYDROCHLORIDE 10 MG/ML
4 INJECTION, SOLUTION EPIDURAL; INFILTRATION; INTRACAUDAL; PERINEURAL
Status: COMPLETED | OUTPATIENT
Start: 2021-03-29 | End: 2021-03-29

## 2021-03-29 RX ORDER — BETAMETHASONE SODIUM PHOSPHATE AND BETAMETHASONE ACETATE 3; 3 MG/ML; MG/ML
6 INJECTION, SUSPENSION INTRA-ARTICULAR; INTRALESIONAL; INTRAMUSCULAR; SOFT TISSUE
Status: COMPLETED | OUTPATIENT
Start: 2021-03-29 | End: 2021-03-29

## 2021-03-29 RX ADMIN — LIDOCAINE HYDROCHLORIDE 4 ML: 10 INJECTION, SOLUTION EPIDURAL; INFILTRATION; INTRACAUDAL; PERINEURAL at 15:08

## 2021-03-29 RX ADMIN — BETAMETHASONE SODIUM PHOSPHATE AND BETAMETHASONE ACETATE 6 MG: 3; 3 INJECTION, SUSPENSION INTRA-ARTICULAR; INTRALESIONAL; INTRAMUSCULAR; SOFT TISSUE at 15:08

## 2021-03-29 NOTE — PROGRESS NOTES
Subjective: Osteoarthritis right knee     Patient ID: Camille Pantoja is a 65 y.o. female.    Chief Complaint:    History of Present Illness 65-year-old female known to me is seen for new problem involving her right knee.  Was injured this past January she fell fell backwards and was thrown forward onto the right leg injuring the hip and the knee.  Was seen in the emergency room x-rays of the hip were negative for acute injuries and x-ray of the knee other than advanced osteoarthritis showed no acute injury.  Since that time she has been ambulating with the knee immobilizer in the walker because of pain and discomfort.  Has not seen anybody since that initial injury and ER evaluation.  She is not taking any anti-inflammatories as she is currently on Eliquis.       Social History     Occupational History   • Not on file   Tobacco Use   • Smoking status: Never Smoker   • Smokeless tobacco: Never Used   Vaping Use   • Vaping Use: Never used   Substance and Sexual Activity   • Alcohol use: Not Currently     Comment: Reports a hx of drinking everyday for 18 years atleast. She says that she quit May 2018, but says that she drinks occasionally now.    • Drug use: No     Comment: caffeine use: 2 cups daily.    • Sexual activity: Defer      Review of Systems   Constitutional: Negative for appetite change, chills, diaphoresis, fever and unexpected weight change.   HENT: Negative for hearing loss, nosebleeds, sore throat and tinnitus.    Eyes: Negative for pain and visual disturbance.   Respiratory: Negative for cough, shortness of breath and wheezing.    Cardiovascular: Negative for chest pain and palpitations.   Gastrointestinal: Negative for abdominal pain, diarrhea, nausea and vomiting.   Endocrine: Negative for cold intolerance, heat intolerance and polydipsia.   Genitourinary: Negative for difficulty urinating, dysuria and hematuria.   Musculoskeletal: Positive for arthralgias and myalgias. Negative for joint swelling.    Skin: Negative for rash and wound.   Allergic/Immunologic: Negative for environmental allergies.   Neurological: Negative for dizziness, syncope and numbness.   Hematological: Does not bruise/bleed easily.   Psychiatric/Behavioral: Negative for dysphoric mood and sleep disturbance. The patient is not nervous/anxious.          Past Medical History:   Diagnosis Date   • CHF (congestive heart failure) (CMS/HCC)    • Hyperlipidemia    • Hypertension    • Irregular heart beat    • Skin cancer    • Stroke (CMS/HCC)      Past Surgical History:   Procedure Laterality Date   • CARDIAC CATHETERIZATION N/A 1/10/2020    Procedure: Left Heart Cath;  Surgeon: Cain Mcneil MD;  Location:  [facility];  Service: Cardiovascular   • CARDIAC CATHETERIZATION N/A 1/10/2020    Procedure: Coronary angiography;  Surgeon: Cain Mcneil MD;  Location:  [facility];  Service: Cardiovascular   • FINGER SURGERY     • HYSTERECTOMY     • JOINT REPLACEMENT     • ORIF SHOULDER DISLOCATION W/ HUMERAL FRACTURE     • TIBIA FRACTURE SURGERY       Family History   Problem Relation Age of Onset   • Breast cancer Brother    • Macular degeneration Mother    • Heart disease Mother    • Heart disease Father    • Heart disease Maternal Uncle          Objective:  Vitals:    03/29/21 1445   BP: 120/88   Pulse: 85         03/29/21  1445   Weight: 60.3 kg (133 lb)     Body mass index is 22.13 kg/m².        Ortho Exam   AP and lateral and sunrise of that right knee done in the emergency showed advanced osteoarthritis with no acute fracture.  A repeat x-ray done in the office AP lateral sunrise view again shows advanced osteoarthritis in all 3 compartments with no acute fracture.  AP and lateral of the hip showed an endoprosthetic hip replacement with no acute injury.  She is alert and oriented x3.  She is ambulating with the knee immobilizer and a walker.  She is alert and oriented x3.  Exam of the knee shows no  swelling effusion erythema and the skin is cool to touch.  She has 0 to about 120 degrees of motion with pain and crepitus but there is no instability at 0 or 90 degrees nor any varus or valgus instability.  Joint line tenderness with negative Tl's.  Quad and hamstring function is 3 out of 5 secondary to pain the calf nontender.  Skin is cool to touch good capillary refill.    Assessment:        1. Primary osteoarthritis involving multiple joints    2. Primary osteoarthritis of right knee           Plan:  Reviewed the x-rays at [facility] in January repeat x-rays done today.  X-ray shows advanced osteoarthritis but no acute injuries.  She is essentially has had no treatment.  She cannot take oral anti-inflammatories as she is on Eliquis.  After reviewing treatment options I did advise on Voltaren gel 4 times a day but also going to see what a cortisone injection a lidocaine Kenalog at a ratio 4:1.  Return in 4 weeks if still symptomatic we will see with an MRI.  Answered all questions    Large Joint Arthrocentesis: R knee  Date/Time: 3/29/2021 3:08 PM  Consent given by: patient  Site marked: site marked  Timeout: Immediately prior to procedure a time out was called to verify the correct patient, procedure, equipment, support staff and site/side marked as required   Supporting Documentation  Indications: pain   Procedure Details  Location: knee - R knee  Preparation: Patient was prepped and draped in the usual sterile fashion  Needle size: 22 G  Approach: superior  Medications administered: 4 mL lidocaine PF 1% 1 %; 6 mg betamethasone acetate-betamethasone sodium phosphate 6 (3-3) MG/ML  Patient tolerance: patient tolerated the procedure well with no immediate complications            Work Status:    ROMEO query complete.    Orders:  Orders Placed This Encounter   Procedures   • Large Joint Arthrocentesis: R knee   • XR Knee 3 View Right       Medications:  No orders of the defined types were placed in  this encounter.      Followup:  Return in about 4 weeks (around 4/26/2021).          Dictated utilizing Dragon dictation

## 2021-04-30 DIAGNOSIS — I10 ESSENTIAL HYPERTENSION: ICD-10-CM

## 2021-04-30 RX ORDER — METOPROLOL SUCCINATE 100 MG/1
TABLET, EXTENDED RELEASE ORAL
Qty: 90 TABLET | Refills: 0 | Status: SHIPPED | OUTPATIENT
Start: 2021-04-30 | End: 2021-06-07 | Stop reason: SDUPTHER

## 2021-04-30 RX ORDER — OMEPRAZOLE 20 MG/1
CAPSULE, DELAYED RELEASE ORAL
Qty: 30 CAPSULE | Refills: 1 | Status: SHIPPED | OUTPATIENT
Start: 2021-04-30 | End: 2021-06-07 | Stop reason: SDUPTHER

## 2021-06-01 DIAGNOSIS — N32.89 BLADDER SPASMS: Primary | ICD-10-CM

## 2021-06-02 RX ORDER — OXYBUTYNIN CHLORIDE 5 MG/1
5 TABLET ORAL 2 TIMES DAILY
Qty: 60 TABLET | Refills: 0 | Status: SHIPPED | OUTPATIENT
Start: 2021-06-02 | End: 2021-06-07 | Stop reason: SDUPTHER

## 2021-06-02 RX ORDER — OXYBUTYNIN CHLORIDE 5 MG/1
TABLET ORAL
Qty: 60 TABLET | Refills: 3 | OUTPATIENT
Start: 2021-06-02

## 2021-06-02 NOTE — TELEPHONE ENCOUNTER
PATIENT HAS SCHEDULED AN APPOINTMENT FOR  6/7. SHE WILL RUN OUT OF MEDICATION Friday , SHE IS REQUESTING A 3 DAY REFILL UNTIL SHE CAN COME IN THE OFFICE Monday.

## 2021-06-07 ENCOUNTER — OFFICE VISIT (OUTPATIENT)
Dept: FAMILY MEDICINE CLINIC | Facility: CLINIC | Age: 66
End: 2021-06-07

## 2021-06-07 VITALS
HEIGHT: 65 IN | WEIGHT: 136.2 LBS | BODY MASS INDEX: 22.69 KG/M2 | HEART RATE: 65 BPM | SYSTOLIC BLOOD PRESSURE: 118 MMHG | DIASTOLIC BLOOD PRESSURE: 62 MMHG | OXYGEN SATURATION: 97 % | TEMPERATURE: 97.1 F

## 2021-06-07 DIAGNOSIS — G81.91 RIGHT HEMIPARESIS (HCC): ICD-10-CM

## 2021-06-07 DIAGNOSIS — I10 ESSENTIAL HYPERTENSION: ICD-10-CM

## 2021-06-07 DIAGNOSIS — Z12.31 SCREENING MAMMOGRAM, ENCOUNTER FOR: ICD-10-CM

## 2021-06-07 DIAGNOSIS — Z79.899 HIGH RISK MEDICATION USE: ICD-10-CM

## 2021-06-07 DIAGNOSIS — F10.20 ALCOHOLISM (HCC): ICD-10-CM

## 2021-06-07 DIAGNOSIS — I48.91 ATRIAL FIBRILLATION, UNSPECIFIED TYPE (HCC): ICD-10-CM

## 2021-06-07 DIAGNOSIS — N32.89 BLADDER SPASMS: ICD-10-CM

## 2021-06-07 DIAGNOSIS — K21.9 GASTROESOPHAGEAL REFLUX DISEASE WITHOUT ESOPHAGITIS: ICD-10-CM

## 2021-06-07 DIAGNOSIS — E78.2 MIXED HYPERLIPIDEMIA: ICD-10-CM

## 2021-06-07 DIAGNOSIS — I42.8 NICM (NONISCHEMIC CARDIOMYOPATHY) (HCC): Primary | ICD-10-CM

## 2021-06-07 PROCEDURE — 99214 OFFICE O/P EST MOD 30 MIN: CPT | Performed by: FAMILY MEDICINE

## 2021-06-07 RX ORDER — OXYBUTYNIN CHLORIDE 5 MG/1
5 TABLET ORAL 2 TIMES DAILY
Qty: 180 TABLET | Refills: 1 | Status: SHIPPED | OUTPATIENT
Start: 2021-06-07 | End: 2021-12-06 | Stop reason: SDUPTHER

## 2021-06-07 RX ORDER — METOPROLOL SUCCINATE 100 MG/1
100 TABLET, EXTENDED RELEASE ORAL DAILY
Qty: 90 TABLET | Refills: 1 | Status: SHIPPED | OUTPATIENT
Start: 2021-06-07 | End: 2021-12-06 | Stop reason: ALTCHOICE

## 2021-06-07 RX ORDER — OMEPRAZOLE 20 MG/1
20 CAPSULE, DELAYED RELEASE ORAL
Qty: 90 CAPSULE | Refills: 1 | Status: SHIPPED | OUTPATIENT
Start: 2021-06-07 | End: 2021-12-06

## 2021-06-07 NOTE — PATIENT INSTRUCTIONS
Dyslipidemia  Dyslipidemia is an imbalance of waxy, fat-like substances (lipids) in the blood. The body needs lipids in small amounts. Dyslipidemia often involves a high level of cholesterol or triglycerides, which are types of lipids.  Common forms of dyslipidemia include:  · High levels of bad cholesterol (LDL cholesterol). LDL is the type of cholesterol that causes fatty deposits (plaques) to build up in the blood vessels that carry blood away from your heart (arteries).  · Low levels of good cholesterol (HDL cholesterol). HDL cholesterol is the type of cholesterol that protects against heart disease. High levels of HDL remove the LDL buildup from arteries.  · High levels of triglycerides. Triglycerides are a fatty substance in the blood that is linked to a buildup of plaques in the arteries.    You can develop dyslipidemia because of the genes you are born with (primary dyslipidemia) or changes that occur during your life (secondary dyslipidemia), or as a side effect of certain medical treatments.  What are the causes?  Primary dyslipidemia is caused by changes (mutations) in genes that are passed down through families (inherited). These mutations cause several types of dyslipidemia. Mutations can result in disorders that make the body produce too much LDL cholesterol or triglycerides, or not enough HDL cholesterol. These disorders may lead to heart disease, arterial disease, or stroke at an early age.  Causes of secondary dyslipidemia include certain lifestyle choices and diseases that lead to dyslipidemia, such as:  · Eating a diet that is high in animal fat.  · Not getting enough activity or exercise (having a sedentary lifestyle).  · Having diabetes, kidney disease, liver disease, or thyroid disease.  · Drinking large amounts of alcohol.  · Using certain types of drugs.    What increases the risk?  You may be at greater risk for dyslipidemia if you are an older man or if you are a woman who has gone through  menopause. Other risk factors include:  · Having a family history of dyslipidemia.  · Taking certain medicines, including birth control pills, steroids, some diuretics, beta-blockers, and some medicines for HIV.  · Smoking cigarettes.  · Eating a high-fat diet.  · Drinking large amounts of alcohol.  · Having certain medical conditions such as diabetes, polycystic ovary syndrome (PCOS), pregnancy, kidney disease, liver disease, or hypothyroidism.  · Not exercising regularly.  · Being overweight or obese with too much belly fat.    What are the signs or symptoms?  Dyslipidemia does not usually cause any symptoms.  Very high lipid levels can cause fatty bumps under the skin (xanthomas) or a white or gray ring around the black center (pupil) of the eye. Very high triglyceride levels can cause inflammation of the pancreas (pancreatitis).  How is this diagnosed?  Your health care provider may diagnose dyslipidemia based on a routine blood test (fasting blood test). Because most people do not have symptoms of the condition, this blood testing (lipid profile) is done on adults age 20 and older and is repeated every 5 years. This test checks:  · Total cholesterol. This is a measure of the total amount of cholesterol in your blood, including LDL cholesterol, HDL cholesterol, and triglycerides. A healthy number is below 200.  · LDL cholesterol. The target number for LDL cholesterol is different for each person, depending on individual risk factors. For most people, a number below 100 is healthy. Ask your health care provider what your LDL cholesterol number should be.  · HDL cholesterol. An HDL level of 60 or higher is best because it helps to protect against heart disease. A number below 40 for men or below 50 for women increases the risk for heart disease.  · Triglycerides. A healthy triglyceride number is below 150.    If your lipid profile is abnormal, your health care provider may do other blood tests to get more  information about your condition.  How is this treated?  Treatment depends on the type of dyslipidemia that you have and your other risk factors for heart disease and stroke. Your health care provider will have a target range for your lipid levels based on this information.  For many people, treatment starts with lifestyle changes, such as diet and exercise. Your health care provider may recommend that you:  · Get regular exercise.  · Make changes to your diet.  · Quit smoking if you smoke.    If diet changes and exercise do not help you reach your goals, your health care provider may also prescribe medicine to lower lipids. The most commonly prescribed type of medicine lowers your LDL cholesterol (statin drug). If you have a high triglyceride level, your provider may prescribe another type of drug (fibrate) or an omega-3 fish oil supplement, or both.  Follow these instructions at home:  · Take over-the-counter and prescription medicines only as told by your health care provider. This includes supplements.  · Get regular exercise. Start an aerobic exercise and strength training program as told by your health care provider. Ask your health care provider what activities are safe for you. Your health care provider may recommend:  ? 30 minutes of aerobic activity 4-6 days a week. Brisk walking is an example of aerobic activity.  ? Strength training 2 days a week.  · Eat a healthy diet as told by your health care provider. This can help you reach and maintain a healthy weight, lower your LDL cholesterol, and raise your HDL cholesterol. It may help to work with a diet and nutrition specialist (dietitian) to make a plan that is right for you. Your dietitian or health care provider may recommend:  ? Limiting your calories, if you are overweight.  ? Eating more fruits, vegetables, whole grains, fish, and lean meats.  ? Limiting saturated fat, trans fat, and cholesterol.  · Follow instructions from your health care provider  or dietitian about eating or drinking restrictions.  · Limit alcohol intake to no more than one drink per day for nonpregnant women and two drinks per day for men. One drink equals 12 oz of beer, 5 oz of wine, or 1½ oz of hard liquor.  · Do not use any products that contain nicotine or tobacco, such as cigarettes and e-cigarettes. If you need help quitting, ask your health care provider.  · Keep all follow-up visits as told by your health care provider. This is important.  Contact a health care provider if:  · You are having trouble sticking to your exercise or diet plan.  · You are struggling to quit smoking or control your use of alcohol.  Summary  · Dyslipidemia is an imbalance of waxy, fat-like substances (lipids) in the blood. The body needs lipids in small amounts. Dyslipidemia often involves a high level of cholesterol or triglycerides, which are types of lipids.  · Treatment depends on the type of dyslipidemia that you have and your other risk factors for heart disease and stroke.  · For many people, treatment starts with lifestyle changes, such as diet and exercise. Your health care provider may also prescribe medicine to lower lipids.  This information is not intended to replace advice given to you by your health care provider. Make sure you discuss any questions you have with your health care provider.  Document Released: 12/23/2014 Document Revised: 08/14/2017 Document Reviewed: 08/14/2017  [employer] Patient Education © 2019 [employer]

## 2021-06-07 NOTE — PROGRESS NOTES
Chief Complaint  Heartburn, Hypertension, Hyperlipidemia, and Bladder Spasms    Subjective          Camille Pantoja presents to [facility] for     History of Present Illness     Pt denies having a physical in the last year.    Last Mammogram was in 2018--Normal--ORDERED today.    Pt had a partial Hysterectomy. Pt denies having a pap in the last 3 years.     Pt states she has not had a Diabetic Eye Exam or Foot exam. Pt denies being a Diabetic or Pre-Diabetic.    Pt denies having been screened for HEP C.    Pt denies/declines being vaccinated for COVID 19. MKB.       Pt states she has never had diabetes. Patient has been erroneously marked as diabetic. Based on the available clinical information, she does not have diabetes and should therefore be excluded from diabetic health maintenance and quality measures for the remainder of the reporting period.    She eats Rice Krispy treats for breakfast and reynaldo processed granola bars. She eats apple slices, lunch meat, cheese.     She has controlled HLD.    Health Maintenance Due   Topic Date Due   • URINE MICROALBUMIN  Never done   • DXA SCAN  Never done   • COVID-19 Vaccine (1) Never done   • HEPATITIS C SCREENING  Never done   • DIABETIC FOOT EXAM  Never done   • DIABETIC EYE EXAM  11/16/2018   • HEMOGLOBIN A1C  07/10/2020   • ANNUAL WELLNESS VISIT  01/08/2021   • MAMMOGRAM  05/28/2021   • PAP SMEAR  06/04/2021        reports that she has never smoked. She has never used smokeless tobacco. She reports previous alcohol use. She reports that she does not use drugs.    PHQ-9 Depression Screening  Little interest or pleasure in doing things? 3   Feeling down, depressed, or hopeless? 3   Trouble falling or staying asleep, or sleeping too much? 0   Feeling tired or having little energy? 0   Poor appetite or overeating? 0   Feeling bad about yourself - or that you are a failure or have let yourself or your family down? 0   Trouble concentrating  on things, such as reading the newspaper or watching television? 0   Moving or speaking so slowly that other people could have noticed? Or the opposite - being so fidgety or restless that you have been moving around a lot more than usual? 0   Thoughts that you would be better off dead, or of hurting yourself in some way? 0   PHQ-9 Total Score 6   If you checked off any problems, how difficult have these problems made it for you to do your work, take care of things at home, or get along with other people? Not difficult at all     Lab Results   Component Value Date    WBC 4.72 01/26/2021    HGB 11.7 (L) 01/26/2021    HCT 36.0 01/26/2021    MCV 90.9 01/26/2021     01/26/2021     Lab Results   Component Value Date    GLUCOSE 112 (H) 08/07/2020    BUN 11 01/26/2021    CREATININE 0.73 01/26/2021    EGFRIFNONA 80 01/26/2021    EGFRIFAFRI 96 01/26/2021    BCR 15.1 01/26/2021    K 4.2 01/26/2021    CO2 29.4 (H) 01/26/2021    CALCIUM 9.5 01/26/2021    PROTENTOTREF 6.9 01/26/2021    ALBUMIN 4.00 01/26/2021    LABIL2 1.4 01/26/2021    AST 19 01/26/2021    ALT 14 01/26/2021     Lab Results   Component Value Date    TSH 0.991 01/10/2020     Lab Results   Component Value Date    HGBA1C 5.50 01/10/2020     Brief Urine Lab Results     None          BP Readings from Last 12 Encounters:   06/07/21 118/62   03/29/21 120/88   01/26/21 144/73   12/18/20 118/74   10/28/20 116/62   08/12/20 116/72   08/07/20 130/67   07/28/20 118/66   07/22/20 116/62   06/11/20 108/68   02/05/20 122/80   01/25/20 139/85       Wt Readings from Last 12 Encounters:   06/07/21 61.8 kg (136 lb 3.2 oz)   03/29/21 60.3 kg (133 lb)   01/26/21 60.3 kg (133 lb)   12/18/20 59.1 kg (130 lb 3.2 oz)   10/28/20 57.6 kg (127 lb)   08/12/20 55.8 kg (123 lb)   08/07/20 62 kg (136 lb 9.6 oz)   07/28/20 55.2 kg (121 lb 12.8 oz)   07/22/20 55.3 kg (122 lb)   06/11/20 56.2 kg (124 lb)   02/05/20 52.6 kg (116 lb)   01/21/20 54.5 kg (120 lb 2.4 oz)       Objective   Vital  "Signs:   /62 (BP Location: Right arm, Patient Position: Sitting, Cuff Size: Adult)   Pulse 65   Temp 97.1 °F (36.2 °C) (Temporal)   Ht 165.1 cm (65\")   Wt 61.8 kg (136 lb 3.2 oz)   SpO2 97%   BMI 22.66 kg/m²     Physical Exam  Vitals and nursing note reviewed.   Constitutional:       General: She is not in acute distress.     Appearance: Normal appearance. She is well-developed. She is not diaphoretic.   HENT:      Head: Normocephalic and atraumatic.      Right Ear: External ear normal.      Left Ear: External ear normal.      Nose: Nose normal.      Mouth/Throat:      Pharynx: No oropharyngeal exudate.   Eyes:      General: Lids are normal. No scleral icterus.        Right eye: No discharge.         Left eye: No discharge.   Neck:      Thyroid: No thyromegaly.      Trachea: Trachea normal. No tracheal deviation.   Cardiovascular:      Rate and Rhythm: Normal rate and regular rhythm.      Heart sounds: Normal heart sounds. No murmur heard.   No friction rub. No gallop.    Pulmonary:      Effort: Pulmonary effort is normal. No tachypnea, bradypnea or respiratory distress.      Breath sounds: Normal breath sounds. No stridor. No decreased breath sounds, wheezing or rales.   Chest:      Chest wall: No tenderness.   Musculoskeletal:      Cervical back: Full passive range of motion without pain, normal range of motion and neck supple. No edema.   Lymphadenopathy:      Head:      Right side of head: No submental, submandibular, tonsillar, preauricular, posterior auricular or occipital adenopathy.      Left side of head: No submental, submandibular, tonsillar, preauricular, posterior auricular or occipital adenopathy.      Cervical: No cervical adenopathy.      Right cervical: No superficial, deep or posterior cervical adenopathy.     Left cervical: No superficial, deep or posterior cervical adenopathy.   Skin:     General: Skin is warm and dry.      Capillary Refill: Capillary refill takes less than 2 seconds. "      Coloration: Skin is not pale.      Findings: No erythema or rash.      Nails: There is no clubbing.   Neurological:      Mental Status: She is alert and oriented to person, place, and time. She is not disoriented.      Deep Tendon Reflexes: Reflexes are normal and symmetric.   Psychiatric:         Behavior: Behavior normal.        Result Review :                 Assessment and Plan    Problem List Items Addressed This Visit     Hyperlipidemia    Overview     January 3, 2020: Controlled with simvastatin 20 mg daily.  No medication side effects.  Continue current treatment.    7/22/2020: Controlled with simvastatin 20 mg daily.  No medication side effects.  Continue current treatment.         Relevant Orders    Lipid Panel With / Chol / HDL Ratio    Lipid Panel With / Chol / HDL Ratio    Hypertension    Overview     4/8/2020:  Controlled with metoprolol XL 25 mg 3 tabs daily.      She has been checking her BP at home.      3/30 103/76  4/2 149/74  4/3 111/82  4/7 110/70    No medication side effects.  Pt prefers BID Metoprolol.  D/C Metoprpolol-XL 100mg Daily and start regular release 50 mg BID    7/22/2020: Controlled with metoprolol succinate  mg daily.  No medication side effects.  Continue current treatment.    8/12/2020:  BP today is 116/72. Controlled with Metoprolol Succinate  mg daily. No medication side effects. Continue current treatment.         Relevant Medications    metoprolol succinate XL (TOPROL-XL) 100 MG 24 hr tablet    Right hemiparesis (CMS/HCC)    Bladder spasms    Overview     2/5/2020:  Controlled with Oxybutinin 5 mg BID.  No medication side effects.  Continue current treatment.         Relevant Medications    oxybutynin (DITROPAN) 5 MG tablet    Alcoholism (CMS/HCC)    Overview     8/12/2020:  Pt advised to remain abstinent.  She is going to pur out all of the alcohol that she currently has at home.         NICM (nonischemic cardiomyopathy) (CMS/HCC) - Primary    Relevant  Medications    metoprolol succinate XL (TOPROL-XL) 100 MG 24 hr tablet    Gastroesophageal reflux disease without esophagitis    Overview     6/7/2021:  She states she has GERD and needs Omeprazole every day.         Relevant Medications    omeprazole (priLOSEC) 20 MG capsule      Other Visit Diagnoses     Atrial fibrillation, unspecified type (CMS/HCC)        Relevant Medications    apixaban (Eliquis) 5 MG tablet tablet    metoprolol succinate XL (TOPROL-XL) 100 MG 24 hr tablet    Screening mammogram, encounter for        Relevant Orders    Mammo Screening Bilateral With CAD    High risk medication use        Relevant Orders    CBC & Differential    Comprehensive Metabolic Panel    CBC & Differential    Comprehensive Metabolic Panel    Lipid Panel With / Chol / HDL Ratio          Follow Up   Return in about 6 months (around 12/7/2021) for HTN-C, HLD-C with cbc cmp flp prior.  Patient was given instructions and counseling regarding her condition or for health maintenance advice. Please see specific information pulled into the AVS if appropriate.     Pt encouraged to cut back on Omeprazole and improve her diet. She refuses to make any lifestyle changes and understands and accepts all risks of all medication side effects, including liver problems, kidney problems, other serious problems, and even death.

## 2021-06-08 LAB
ALBUMIN SERPL-MCNC: 4.3 G/DL (ref 3.8–4.8)
ALBUMIN/GLOB SERPL: 1.5 {RATIO} (ref 1.2–2.2)
ALP SERPL-CCNC: 119 IU/L (ref 48–121)
ALT SERPL-CCNC: 17 IU/L (ref 0–32)
AST SERPL-CCNC: 21 IU/L (ref 0–40)
BASOPHILS # BLD AUTO: 0 X10E3/UL (ref 0–0.2)
BASOPHILS NFR BLD AUTO: 1 %
BILIRUB SERPL-MCNC: 0.5 MG/DL (ref 0–1.2)
BUN SERPL-MCNC: 10 MG/DL (ref 8–27)
BUN/CREAT SERPL: 11 (ref 12–28)
CALCIUM SERPL-MCNC: 9.6 MG/DL (ref 8.7–10.3)
CHLORIDE SERPL-SCNC: 101 MMOL/L (ref 96–106)
CHOLEST SERPL-MCNC: 147 MG/DL (ref 100–199)
CHOLEST/HDLC SERPL: 3.2 RATIO (ref 0–4.4)
CO2 SERPL-SCNC: 24 MMOL/L (ref 20–29)
CREAT SERPL-MCNC: 0.89 MG/DL (ref 0.57–1)
EOSINOPHIL # BLD AUTO: 0.2 X10E3/UL (ref 0–0.4)
EOSINOPHIL NFR BLD AUTO: 3 %
ERYTHROCYTE [DISTWIDTH] IN BLOOD BY AUTOMATED COUNT: 12.8 % (ref 11.7–15.4)
GLOBULIN SER CALC-MCNC: 2.8 G/DL (ref 1.5–4.5)
GLUCOSE SERPL-MCNC: 102 MG/DL (ref 65–99)
HCT VFR BLD AUTO: 38.4 % (ref 34–46.6)
HDLC SERPL-MCNC: 46 MG/DL
HGB BLD-MCNC: 12.5 G/DL (ref 11.1–15.9)
IMM GRANULOCYTES # BLD AUTO: 0 X10E3/UL (ref 0–0.1)
IMM GRANULOCYTES NFR BLD AUTO: 0 %
LDLC SERPL CALC-MCNC: 82 MG/DL (ref 0–99)
LYMPHOCYTES # BLD AUTO: 1.1 X10E3/UL (ref 0.7–3.1)
LYMPHOCYTES NFR BLD AUTO: 20 %
Lab: NORMAL
MCH RBC QN AUTO: 29.1 PG (ref 26.6–33)
MCHC RBC AUTO-ENTMCNC: 32.6 G/DL (ref 31.5–35.7)
MCV RBC AUTO: 90 FL (ref 79–97)
MONOCYTES # BLD AUTO: 0.4 X10E3/UL (ref 0.1–0.9)
MONOCYTES NFR BLD AUTO: 8 %
NEUTROPHILS # BLD AUTO: 3.9 X10E3/UL (ref 1.4–7)
NEUTROPHILS NFR BLD AUTO: 68 %
PLATELET # BLD AUTO: 197 X10E3/UL (ref 150–450)
POTASSIUM SERPL-SCNC: 4.5 MMOL/L (ref 3.5–5.2)
PROT SERPL-MCNC: 7.1 G/DL (ref 6–8.5)
RBC # BLD AUTO: 4.29 X10E6/UL (ref 3.77–5.28)
SODIUM SERPL-SCNC: 141 MMOL/L (ref 134–144)
TRIGL SERPL-MCNC: 105 MG/DL (ref 0–149)
VLDLC SERPL CALC-MCNC: 19 MG/DL (ref 5–40)
WBC # BLD AUTO: 5.7 X10E3/UL (ref 3.4–10.8)

## 2021-06-21 ENCOUNTER — TRANSCRIBE ORDERS (OUTPATIENT)
Dept: ADMINISTRATIVE | Facility: HOSPITAL | Age: 66
End: 2021-06-21

## 2021-06-21 DIAGNOSIS — Z12.31 VISIT FOR SCREENING MAMMOGRAM: Primary | ICD-10-CM

## 2021-06-30 ENCOUNTER — APPOINTMENT (OUTPATIENT)
Dept: CARDIOLOGY | Facility: HOSPITAL | Age: 66
End: 2021-06-30

## 2021-07-01 ENCOUNTER — HOSPITAL ENCOUNTER (OUTPATIENT)
Dept: CARDIOLOGY | Facility: HOSPITAL | Age: 66
Discharge: HOME | End: 2021-07-01
Admitting: INTERNAL MEDICINE

## 2021-07-01 ENCOUNTER — TELEPHONE (OUTPATIENT)
Dept: CARDIOLOGY | Facility: CLINIC | Age: 66
End: 2021-07-01

## 2021-07-01 VITALS
HEART RATE: 57 BPM | DIASTOLIC BLOOD PRESSURE: 71 MMHG | SYSTOLIC BLOOD PRESSURE: 103 MMHG | HEIGHT: 65 IN | WEIGHT: 134.48 LBS | BODY MASS INDEX: 22.41 KG/M2

## 2021-07-01 DIAGNOSIS — I42.8 NICM (NONISCHEMIC CARDIOMYOPATHY) (HCC): ICD-10-CM

## 2021-07-01 DIAGNOSIS — I10 ESSENTIAL HYPERTENSION: ICD-10-CM

## 2021-07-01 DIAGNOSIS — I48.0 PAROXYSMAL ATRIAL FIBRILLATION WITH RAPID VENTRICULAR RESPONSE (HCC): ICD-10-CM

## 2021-07-01 LAB
BH CV ECHO MEAS - AO MAX PG: 9 MMHG
BH CV ECHO MEAS - AO MEAN PG (FULL): 1 MMHG
BH CV ECHO MEAS - AO MEAN PG: 4 MMHG
BH CV ECHO MEAS - AO ROOT AREA (BSA CORRECTED): 1.9
BH CV ECHO MEAS - AO ROOT AREA: 7.5 CM^2
BH CV ECHO MEAS - AO ROOT DIAM: 3.1 CM
BH CV ECHO MEAS - AO V2 MAX: 148 CM/SEC
BH CV ECHO MEAS - AO V2 MEAN: 91.8 CM/SEC
BH CV ECHO MEAS - AO V2 VTI: 28.6 CM
BH CV ECHO MEAS - ASC AORTA: 3.9 CM
BH CV ECHO MEAS - AVA(I,A): 2.1 CM^2
BH CV ECHO MEAS - AVA(I,D): 2.1 CM^2
BH CV ECHO MEAS - BSA(HAYCOCK): 1.7 M^2
BH CV ECHO MEAS - BSA: 1.7 M^2
BH CV ECHO MEAS - BZI_BMI: 22.4 KILOGRAMS/M^2
BH CV ECHO MEAS - BZI_METRIC_HEIGHT: 165 CM
BH CV ECHO MEAS - BZI_METRIC_WEIGHT: 61 KG
BH CV ECHO MEAS - EDV(CUBED): 106.5 ML
BH CV ECHO MEAS - EDV(MOD-SP2): 66.5 ML
BH CV ECHO MEAS - EDV(MOD-SP4): 83.5 ML
BH CV ECHO MEAS - EDV(TEICH): 104.4 ML
BH CV ECHO MEAS - EF(CUBED): 53.6 %
BH CV ECHO MEAS - EF(MOD-BP): 56 %
BH CV ECHO MEAS - EF(MOD-SP2): 64.1 %
BH CV ECHO MEAS - EF(MOD-SP4): 50.5 %
BH CV ECHO MEAS - EF(TEICH): 45.4 %
BH CV ECHO MEAS - ESV(CUBED): 49.4 ML
BH CV ECHO MEAS - ESV(MOD-SP2): 23.9 ML
BH CV ECHO MEAS - ESV(MOD-SP4): 41.3 ML
BH CV ECHO MEAS - ESV(TEICH): 57 ML
BH CV ECHO MEAS - FS: 22.6 %
BH CV ECHO MEAS - IVS/LVPW: 0.98
BH CV ECHO MEAS - IVSD: 0.78 CM
BH CV ECHO MEAS - LAT PEAK E' VEL: 12.7 CM/SEC
BH CV ECHO MEAS - LV DIASTOLIC VOL/BSA (35-75): 50 ML/M^2
BH CV ECHO MEAS - LV MASS(C)D: 121.2 GRAMS
BH CV ECHO MEAS - LV MASS(C)DI: 72.6 GRAMS/M^2
BH CV ECHO MEAS - LV MAX PG: 6 MMHG
BH CV ECHO MEAS - LV MEAN PG: 3 MMHG
BH CV ECHO MEAS - LV SYSTOLIC VOL/BSA (12-30): 24.7 ML/M^2
BH CV ECHO MEAS - LV V1 MAX: 123 CM/SEC
BH CV ECHO MEAS - LV V1 MEAN: 74.8 CM/SEC
BH CV ECHO MEAS - LV V1 VTI: 23.5 CM
BH CV ECHO MEAS - LVIDD: 4.7 CM
BH CV ECHO MEAS - LVIDS: 3.7 CM
BH CV ECHO MEAS - LVLD AP2: 7.3 CM
BH CV ECHO MEAS - LVLD AP4: 7.3 CM
BH CV ECHO MEAS - LVLS AP2: 5.8 CM
BH CV ECHO MEAS - LVLS AP4: 6.3 CM
BH CV ECHO MEAS - LVOT AREA (M): 2.5 CM^2
BH CV ECHO MEAS - LVOT AREA: 2.5 CM^2
BH CV ECHO MEAS - LVOT DIAM: 1.8 CM
BH CV ECHO MEAS - LVPWD: 0.79 CM
BH CV ECHO MEAS - MED PEAK E' VEL: 8.2 CM/SEC
BH CV ECHO MEAS - MV A DUR: 158 SEC
BH CV ECHO MEAS - MV A MAX VEL: 45.6 CM/SEC
BH CV ECHO MEAS - MV DEC SLOPE: 328 CM/SEC^2
BH CV ECHO MEAS - MV DEC TIME: 187 SEC
BH CV ECHO MEAS - MV E MAX VEL: 66.9 CM/SEC
BH CV ECHO MEAS - MV E/A: 1.5
BH CV ECHO MEAS - MV MEAN PG: 1 MMHG
BH CV ECHO MEAS - MV P1/2T MAX VEL: 78.2 CM/SEC
BH CV ECHO MEAS - MV P1/2T: 69.8 MSEC
BH CV ECHO MEAS - MV V2 MEAN: 35.3 CM/SEC
BH CV ECHO MEAS - MV V2 VTI: 27.1 CM
BH CV ECHO MEAS - MVA P1/2T LCG: 2.8 CM^2
BH CV ECHO MEAS - MVA(P1/2T): 3.2 CM^2
BH CV ECHO MEAS - MVA(VTI): 2.2 CM^2
BH CV ECHO MEAS - PA ACC TIME: 0.07 SEC
BH CV ECHO MEAS - PA MAX PG: 2.1 MMHG
BH CV ECHO MEAS - PA PR(ACCEL): 45.7 MMHG
BH CV ECHO MEAS - PA V2 MAX: 73 CM/SEC
BH CV ECHO MEAS - QP/QS: 0.85
BH CV ECHO MEAS - RV MEAN PG: 0 MMHG
BH CV ECHO MEAS - RV V1 MEAN: 31.8 CM/SEC
BH CV ECHO MEAS - RV V1 VTI: 13.3 CM
BH CV ECHO MEAS - RVOT AREA: 3.8 CM^2
BH CV ECHO MEAS - RVOT DIAM: 2.2 CM
BH CV ECHO MEAS - SI(AO): 129.2 ML/M^2
BH CV ECHO MEAS - SI(CUBED): 34.2 ML/M^2
BH CV ECHO MEAS - SI(LVOT): 35.8 ML/M^2
BH CV ECHO MEAS - SI(MOD-SP2): 25.5 ML/M^2
BH CV ECHO MEAS - SI(MOD-SP4): 25.3 ML/M^2
BH CV ECHO MEAS - SI(TEICH): 28.4 ML/M^2
BH CV ECHO MEAS - SV(AO): 215.9 ML
BH CV ECHO MEAS - SV(CUBED): 57.1 ML
BH CV ECHO MEAS - SV(LVOT): 59.8 ML
BH CV ECHO MEAS - SV(MOD-SP2): 42.6 ML
BH CV ECHO MEAS - SV(MOD-SP4): 42.2 ML
BH CV ECHO MEAS - SV(RVOT): 50.6 ML
BH CV ECHO MEAS - SV(TEICH): 47.4 ML
BH CV ECHO MEAS - TAPSE (>1.6): 2.3 CM
BH CV ECHO MEASUREMENTS AVERAGE E/E' RATIO: 6.4
BH CV XLRA - RV BASE: 4 CM
BH CV XLRA - RV LENGTH: 6.6 CM
BH CV XLRA - RV MID: 2.9 CM
BH CV XLRA - TDI S': 11.6 CM/SEC
LEFT ATRIUM VOLUME INDEX: 23 ML/M2
MAXIMAL PREDICTED HEART RATE: 153 BPM
STRESS TARGET HR: 130 BPM

## 2021-07-01 PROCEDURE — 93306 TTE W/DOPPLER COMPLETE: CPT | Performed by: INTERNAL MEDICINE

## 2021-07-01 PROCEDURE — 93306 TTE W/DOPPLER COMPLETE: CPT

## 2021-07-01 RX ORDER — ATORVASTATIN CALCIUM 80 MG/1
TABLET, FILM COATED ORAL
Qty: 45 TABLET | Refills: 0 | OUTPATIENT
Start: 2021-07-01

## 2021-07-01 NOTE — TELEPHONE ENCOUNTER
Caller: Camille Pantoja    Relationship: Self    Best call back number: [phone]    Medication needed:   Requested Prescriptions     Pending Prescriptions Disp Refills   • atorvastatin (LIPITOR) 80 MG tablet 90 tablet 1     Sig: Take 0.5 tablets by mouth Every Night.     What is the patient's preferred pharmacy: [address] [facility] - [phone] [facility] [phone]

## 2021-07-01 NOTE — TELEPHONE ENCOUNTER
Left message PCP is managing, must seek refill from them. Hollie recommends continue lipitor 40 mg for secondary stroke prevention, based on most recent labs. Goal <70. Call if any questions.

## 2021-07-01 NOTE — TELEPHONE ENCOUNTER
I spoke with Alexandra @ [facility] here in [place] (Incoming call).     Alexandra wanted to verify that the omeprazole script that was sent in for the pt was correct. She stated usually it would be daily for this pt and the pt wasn't expecting to take the medication every other day.  Alexandra was wanting to know if it was ok to go ahead and dispense as daily instead of every other day.    Alexandra also states that this pts Cardiologist wants her PCP to take over managing the pts Atorvastatin 80 MG--1/2 tablet daily.       Pls advise.

## 2021-07-02 RX ORDER — ATORVASTATIN CALCIUM 80 MG/1
40 TABLET, FILM COATED ORAL NIGHTLY
Qty: 90 TABLET | Refills: 1 | Status: SHIPPED | OUTPATIENT
Start: 2021-07-02 | End: 2021-12-06 | Stop reason: SDUPTHER

## 2021-07-02 NOTE — TELEPHONE ENCOUNTER
Provider: ROSANA AMAYA    Caller: ASHLEIGH GILBERT    Relationship to Patient: SELF    Phone Number: [phone]    Reason for Call: THE PT CALLED IN TODAY BECAUSE HER PCP TOLD HER THEY HAVE NO CLUE ABOUT THEM TAKING OVER HER CHOLESTEROL SURVEILLANCE. THEY DID NOT REFILL HER MEDICATION ACCORDING TO THE PT. PLEASE GIVE HER PCP A CALL TO DISCUSS THIS WITH THEM AND THEN GIVE THE PT A PHONE CALL.

## 2021-07-02 NOTE — TELEPHONE ENCOUNTER
Notified patient of results. She verbalized understanding.    Dee Rush, RN  [place] [facility] [place]

## 2021-07-06 ENCOUNTER — HOSPITAL ENCOUNTER (OUTPATIENT)
Dept: MAMMOGRAPHY | Facility: HOSPITAL | Age: 66
Discharge: HOME | End: 2021-07-06
Admitting: FAMILY MEDICINE

## 2021-07-06 DIAGNOSIS — Z12.31 VISIT FOR SCREENING MAMMOGRAM: ICD-10-CM

## 2021-07-06 PROBLEM — R92.2 DENSE BREAST TISSUE ON MAMMOGRAM: Status: ACTIVE | Noted: 2021-07-06

## 2021-07-06 PROBLEM — R92.30 DENSE BREAST TISSUE ON MAMMOGRAM: Status: ACTIVE | Noted: 2021-07-06

## 2021-07-06 PROBLEM — R92.8 ABNORMAL MAMMOGRAM OF BOTH BREASTS: Status: ACTIVE | Noted: 2021-07-06

## 2021-07-06 PROCEDURE — 77067 SCR MAMMO BI INCL CAD: CPT

## 2021-07-06 PROCEDURE — 77063 BREAST TOMOSYNTHESIS BI: CPT

## 2021-07-06 NOTE — PROGRESS NOTES
"Please call the patient and let her know that her mammogram showed no evidence of malignancy.  The mammogram does show that she has \"extremely dense breast\" tissue, which is considered \"dense breast\" tissue.  Having \"dense breast\" tissue slightly increases the risk of developing breast cancer. \"Dense breast\" tissue also lowers the sensitivity of the mammogram thereby decreasing the chance a breast cancer would be seen on the mammogram if it were actually there.  In other words, \"dense breast\" tissue may mask a cancer from being seen on mammogram.  Continue with annual screening mammograms.  "

## 2021-07-13 ENCOUNTER — OFFICE VISIT (OUTPATIENT)
Dept: NEUROLOGY | Facility: CLINIC | Age: 66
End: 2021-07-13

## 2021-07-13 VITALS
DIASTOLIC BLOOD PRESSURE: 80 MMHG | HEART RATE: 56 BPM | OXYGEN SATURATION: 97 % | SYSTOLIC BLOOD PRESSURE: 124 MMHG | BODY MASS INDEX: 21.66 KG/M2 | WEIGHT: 130 LBS

## 2021-07-13 DIAGNOSIS — G93.89 CEREBRAL VENTRICULOMEGALY: ICD-10-CM

## 2021-07-13 DIAGNOSIS — I63.412 CEREBROVASCULAR ACCIDENT (CVA) DUE TO EMBOLISM OF LEFT MIDDLE CEREBRAL ARTERY (HCC): Primary | ICD-10-CM

## 2021-07-13 DIAGNOSIS — R26.9 ABNORMAL GAIT: ICD-10-CM

## 2021-07-13 PROBLEM — I63.512 ACUTE ISCHEMIC LEFT MCA STROKE: Status: RESOLVED | Noted: 2020-01-10 | Resolved: 2021-07-13

## 2021-07-13 PROCEDURE — 99213 OFFICE O/P EST LOW 20 MIN: CPT | Performed by: NURSE PRACTITIONER

## 2021-07-13 NOTE — PROGRESS NOTES
DOS: 2021  NAME: Camille Pantoja   : 1955  PCP: Nahun Ann Jr., DO    Chief Complaint   Patient presents with   • Stroke      Referring MD: No ref. provider found    Neurological Problem:  65 y.o. right-handed female with a Hx of hyperlipidemia, hypertension, nonischemic cardiomyopathy    Interval History:  Ms. Pantoja presents today for stroke follow-up.  She was previously seen by me via telephone visit for a hospital stroke follow-up on 4/10/2020 after she suffered several acute left hemispheric infarcts in 2020 secondary to new onset atrial fibrillation as well as she was found to have a severely low ejection fraction of 27% on her echo.  She was also noted to have ventriculomegaly on her imaging during her hospitalization.  She had been maintained on Eliquis 5 mg twice daily and doing well.  I recommended that she continue her Eliquis and Lipitor for secondary stroke prevention and asked that she follow-up with my colleague Dr. Raji Rodriguez for NPH evaluation given the findings on her MRI.  She did see him on 2020 in the office for follow-up.  He felt that she did not exhibit the usual presentation of NPH.  He did discuss the evaluation of NPH including a high-volume LP for consideration of a  shunt if she were to become symptomatic.  She had stated that she did not want to undergo any of that evaluation if it came down to that.  He noted that she had circumduction of her left leg that was an unexpected finding.  He did not see anything on her head imaging that would explain that as well as her CTA did not show any clear-cut significant central canal stenosis.  He recommended a MRI C-spine and T-spine to further evaluate however she declined.    Today she presents and reports she has still been maintained on Eliquis 5 mg twice daily and Lipitor 40 mg daily with no new stroke/TIA symptoms.  She denies any signs or symptoms of bleeding while being maintained on her Eliquis.   She reports that her BP has been normalized.  Today she is walking with her walker but usually uses a cane.  She uses her walker with longer distances.  She had a fall in January where she states she was reaching for something in her pantry and felt like she was losing her balance and was unable to move or stop herself causing her to fall backwards where she then ran into something and ended up falling forward onto her right knee causing her significant pain and swelling.  She denied hitting her head.  I reviewed her ED note from 1/26, documentation to confirm that she lost her balance and fell.  She had no fracture but was given a knee immobilizer and pain medication with recommendations to follow-up outpatient with orthopedics.  Patient reports she has had an episode like this before where she has lost her balance and fallen and was unable to move.  She calls them seizures but she is not sure if they are actually seizures.  She denies losing consciousness, bowel or bladder incontinence, tongue biting, convulsions, or confusion.  She states that she felt weird all day.  She did not check her blood pressure.  She reports the day that this happened was exactly a year from her being admitted to the hospital with her stroke.  She denied any excess stress.  There was no mention of potential seizure activity or concerns for seizure on her ER documentation.  She denies any signs or symptoms of depression.  She reports she has had no change with her sleep but has been having to get up a lot at night due to letting her dog out to use the bathroom.  She eats a well-balanced diet.  She tries to get sleep.  She notes no worsening of her balance, memory, confusion, or mood changes.  She states she feels great.    Review of Systems:        Review of Systems   Respiratory: Negative for chest tightness, shortness of breath and wheezing.    Cardiovascular: Negative for chest pain, palpitations and leg swelling.   Musculoskeletal:  "Positive for gait problem.   Neurological: Positive for seizures (January 2021). Negative for dizziness, tremors, syncope, facial asymmetry, speech difficulty, weakness, light-headedness, numbness and headaches.   Psychiatric/Behavioral: Negative for agitation, behavioral problems, confusion, decreased concentration, dysphoric mood, hallucinations, self-injury, sleep disturbance and suicidal ideas. The patient is not nervous/anxious and is not hyperactive.        \"The following portions of the patient's history were reviewed and updated as appropriate: allergies, current medications, past family history, past medical history, past social history, past surgical history and problem list.\"    Review and Interpretation of Imaging as described in interval history.    Laboratory Results:             Lab Results   Component Value Date    HGBA1C 5.50 01/10/2020     Lab Results   Component Value Date    CHOL 106 01/10/2020     Lab Results   Component Value Date    HDL 46 06/07/2021    HDL 45 01/26/2021    HDL 47 07/15/2020     Lab Results   Component Value Date    LDL 82 06/07/2021    LDL 83 01/26/2021    LDL 69 07/15/2020     Lab Results   Component Value Date    TRIG 105 06/07/2021    TRIG 84 01/26/2021    TRIG 94 07/15/2020     No components found for: CHOLHDL  No results found for: RPR  Lab Results   Component Value Date    TSH 0.991 01/10/2020     Lab Results   Component Value Date    [id number] 435 01/10/2020     Lab Results   Component Value Date    GLUCOSE 112 (H) 08/07/2020    BUN 10 06/07/2021    CREATININE 0.89 06/07/2021    EGFRIFNONA 67 06/07/2021    EGFRIFAFRI 78 06/07/2021    BCR 11 (L) 06/07/2021    K 4.5 06/07/2021    CO2 24 06/07/2021    CALCIUM 9.6 06/07/2021    PROTENTOTREF 7.1 06/07/2021    ALBUMIN 4.3 06/07/2021    LABIL2 1.5 06/07/2021    AST 21 06/07/2021    ALT 17 06/07/2021     Lab Results   Component Value Date    WBC 5.7 06/07/2021    HGB 12.5 06/07/2021    HCT 38.4 06/07/2021    MCV 90 06/07/2021 "     06/07/2021     Lab Results   Component Value Date    INR 1.09 08/07/2020    INR 1.31 (H) 01/13/2020    INR 1.30 (H) 01/10/2020    PROTIME 13.8 08/07/2020    PROTIME 16.0 (H) 01/13/2020    PROTIME 16.0 (H) 01/10/2020       Physical Examination:   mRS:   General Appearance:   Frail elderly female, appears older than stated age, pleasant, well developed, alert, and cooperative.  HEENT: Normocephalic. Atraumatic. PERRL.  Extremities:    No obvious edema.  Respiratory:   Even and unlabored.    Neurological examination:  Higher Integrative  Function: Oriented to time, place and person. Normal registration, recalled 3/3 short term memory words, normal attention span and concentration. Delayed speech with response, otherwise normal language including comprehension, spontaneous speech, repetition, reading, writing, naming and vocabulary. Normal fund of knowledge and higher integrative function.  CN II: Normal visual acuity and visual fields.    CN III IV VI: Extraocular movements are full without nystagmus.   CN V: Normal facial sensation and strength of muscles of mastication.  CN VII: Facial movements are symmetric. No weakness.  CN VIII:   Auditory acuity is normal.  CN XI: Sternocleidomastoid and trapezius are normal.  No weakness.  CN XII:   The tongue is midline.  No atrophy or fasciculations.  Motor: 5/5 upper extremities, 4/5 RLE, 3/5 LLE. No fasciculations, rigidity, spasticity, or abnormal movements.  Sensation: Normal to light touch, vibration, temperature, in arms and legs.   Station and Gait: Pushed from chair to walker with first attempt.  Scuffing of both feet while walking.  Use a rolling walker.  Mildly stooped posture.  Coordination: Finger to nose test shows no dysmetria.  Rapid alternating movements are normal.      Impression/Assessment:    Ms. Pantjoa presents today for stroke follow-up.  She has been maintained on Eliquis 5 mg twice daily and Lipitor 40 mg with no new stroke/TIA symptoms.   "She denies any signs or symptoms of bleeding while being maintained on her Eliquis.  She suffered a fall in January where she lost her balance and fell.  She denied hitting her head.  She was worked up for a swollen and painful right knee.  She had stated that she had a \"seizure\".  But the episode that she had does not sound consistent with a seizure or seizure-like activity.  She reports that the only reason she calls it this is because she could not move or stop herself from falling.  I asked that if she starts to have more frequent falls that she make me aware as we will need to discuss if at that time if we felt that her falls were related to her worsening ventriculomegaly concerning for NPH.  She denied any worsening of her balance, memory, development of confusion, or mood changes.  Her gait appears the same as when I saw her last.  She is using a rolling walker which I encouraged her to continue to use.  She is still not interested in undergoing any type of lumbar puncture or additional spinal imaging.  I instructed her to call me back if she develops any of the symptoms we discussed above or worsening of her gait and balance.  She voiced understanding. We discussed at length her personal risk factors for stroke and importance of risk factor control for stroke prevention, including BP and cholesterol control.  She will monitor BP regularly and follow-up with PCP for continued cholesterol surveillance.  We discussed stroke/TIA symptoms and importance of calling 911 if she were to experience any of these.  Follow-up as needed.    Plan:     Continue Eliquis 5 mg twice daily, monitor for signs symptoms of bleeding.  Continue Lipitor 40 mg, repeat lipid panel with scheduled lab drawing with PCP.  I have asked that they manage this further as she will be following up with me only as needed.  Report to me if she starts to have any worsening of her gait and balance or suffering more frequent falls or develops any new " confusion, memory loss, or mood changes.  Monitor BP regularly  Keep well-hydrated  Encourage regular physical activity as tolerated.  Falls precautions discussed  Maintain well-balanced diet   Blood pressure control to <130/80   Goal LDL <70-recommend high dose statins-    Serum glucose < 140   Call 911 for stroke any stroke symptoms   Follow-up as needed.  Diagnoses and all orders for this visit:    1. Cerebrovascular accident (CVA) due to embolism of left middle cerebral artery (CMS/HCC) (Primary)    2. Abnormal gait    3. Cerebral ventriculomegaly        MDM  Reviewed: previous chart and vitals  Reviewed previous: labs, MRI and CT scan  Interpretation: labs, MRI and CT scan        JOHN Hess

## 2021-07-14 ENCOUNTER — OFFICE VISIT (OUTPATIENT)
Dept: CARDIOLOGY | Facility: CLINIC | Age: 66
End: 2021-07-14

## 2021-07-14 VITALS
RESPIRATION RATE: 16 BRPM | DIASTOLIC BLOOD PRESSURE: 70 MMHG | SYSTOLIC BLOOD PRESSURE: 110 MMHG | HEIGHT: 64 IN | BODY MASS INDEX: 22.71 KG/M2 | OXYGEN SATURATION: 100 % | HEART RATE: 51 BPM | WEIGHT: 133 LBS

## 2021-07-14 DIAGNOSIS — I10 ESSENTIAL HYPERTENSION: ICD-10-CM

## 2021-07-14 DIAGNOSIS — I48.0 PAROXYSMAL ATRIAL FIBRILLATION WITH RAPID VENTRICULAR RESPONSE (HCC): Primary | ICD-10-CM

## 2021-07-14 DIAGNOSIS — I42.8 NICM (NONISCHEMIC CARDIOMYOPATHY) (HCC): ICD-10-CM

## 2021-07-14 DIAGNOSIS — E78.2 MIXED HYPERLIPIDEMIA: ICD-10-CM

## 2021-07-14 PROCEDURE — 99214 OFFICE O/P EST MOD 30 MIN: CPT | Performed by: NURSE PRACTITIONER

## 2021-07-14 PROCEDURE — 93000 ELECTROCARDIOGRAM COMPLETE: CPT | Performed by: NURSE PRACTITIONER

## 2021-07-14 NOTE — PROGRESS NOTES
Date of Office Visit: 2021  Encounter Provider: JOHN Johnson  Place of Service: [facility] CARDIOLOGY  Patient Name: Camille Pantoja  :1955  Primary Cardiologist: Dr. Brown    CC:  6 month follow up    Dear Dr. Ann    HPI: Camille Pantoja is a pleasant 65 y.o. female who presents 2021 for cardiac follow up.  I reviewed her past medical records including notes, labs and testing in preparation for today's visit.    She has a history of DVT, hypertension, GERD and hyperlipidemia     This is a 64-year-old female who recently had left lower lobe pneumonia diagnosed 2020 and treated with Levaquin.  She began to have diarrhea and to become progressively more weak.  She lives alone and her neighbor found her down the floor. She said that she had slid off of her bed.  She  presented to [facility] on 1/10 with altered mental status. She was found to be in rapid atrial fibrillation. She was noted to have an elevated troponin. Echocardiogram was obtained and showed new cardiomyopathy moderate left ventricular dilation, ejection fraction 27%, mild concentric left ventricle hypertrophy, severe biatrial enlargement, moderate right ventricular dilation with moderately reduced right ventricular systolic function, mild mitral and tricuspid insufficiency, akinesis of the inferior and lateral wall with hypokinesis of the anterior and septum.. She was transferred to [facility] for cardiac catheterization as well as further neurologic evaluation. MRI did show acute left hemisphere subcortical stroke. On 20 morning, patient had new mental status changes. MRI showed slight expansion of stroke. On the evening of  patient had decrease level of consciousness. She was sent for MRI which showed new area of infarcts in the right cerebellum. She was placed on a heparin drip. Medications were adjusted for rate control. Losartan was stopped to  avoid hypotension. She was placed on digoxin. She was transitioned from heparin to apixaban.  PCP follow up was recommended for left thyroid hypodense nodule noted on CTA head and neck. Patient was discharged to  [facility].      cardiac cath 1/20/2020  Findings:  1. Coronary Artery Anatomy:  Dominance: Right  Left Main: Angiographically normal  Left Anterior Descending: Moderate in caliber size.  Contains luminal irregularities throughout.  Gives off a large first diagonal vessel which contains luminal irregularities.  Circumflex Artery: Moderate caliber size.  Contains luminal irregularities throughout.  Supplies a high and mid marginal branch vessels contain luminal irregularities.  Right Coronary Artery: Large in caliber size.  Contains luminal irregularities throughout.  Supplies a large posterior descending and posterior lateral branch system.     2. Hemodynamics:  Left Ventricle: 121/6/12 mmHg  Aorta: 121/71/93 mmHg     Conclusions:  1. Angiographically normal coronary arteries with no significant coronary artery disease.  2. Compensated left ventricular filling pressures of 12 mmHg    Labs on 10/21/2020 showed normal CMP and hemoglobin 11.8, otherwise normal CBC.     Echo 7/1/2021 - Interpretation Summary  · Calculated left ventricular EF = 56% Estimated left ventricular EF was in agreement with the calculated left ventricular EF. Left ventricular systolic function is normal.  · Left ventricular diastolic function was normal.        She is here for 6-month follow-up.  She denies any palpitations, shortness of breath, lower extremity edema.  She denies any dizziness or lightheadedness.  She denies any chest pain or chest pressure.  She does have fatigue.  She still has weakness.  She either uses a cane or walker for ambulation.  Her blood pressure is well controlled.  She is taking her medications as directed.  We did go over her echo and she has returned to normal function.   She did ask me what could have caused her heart to get so weak and we discussed possible options.  She did say that she used to be a very heavy drinker and before all this happened she had been drinking a lot.  She states she has not had anything to drink since January 2020.     Past Medical History:   Diagnosis Date   • CHF (congestive heart failure) (CMS/[facility])    • Hyperlipidemia    • Hypertension    • Irregular heart beat    • Skin cancer    • Stroke (CMS/[facility])        Past Surgical History:   Procedure Laterality Date   • CARDIAC CATHETERIZATION N/A 1/10/2020    Procedure: Left Heart Cath;  Surgeon: Cain Mcneil MD;  Location:  [facility] CATH [facility]VASIVE [facility];  Service: Cardiovascular   • CARDIAC CATHETERIZATION N/A 1/10/2020    Procedure: Coronary angiography;  Surgeon: Cain Mcneil MD;  Location:  [facility]ATH [facility]VAS[facility];  Service: Cardiovascular   • FINGER SURGERY     • HYSTERECTOMY     • JOINT REPLACEMENT     • ORIF SHOULDER DISLOCATION W/ HUMERAL FRACTURE     • TIBIA FRACTURE SURGERY         Social History     Socioeconomic History   • Marital status:      Spouse name: Not on file   • Number of children: Not on file   • Years of education: Not on file   • Highest education level: Not on file   Tobacco Use   • Smoking status: Never Smoker   • Smokeless tobacco: Never Used   Vaping Use   • Vaping Use: Never used   Substance and Sexual Activity   • Alcohol use: Not Currently     Comment: Reports a hx of drinking everyday for 18 years atleast. She says that she quit May 2018, but says that she drinks occasionally now.    • Drug use: No     Comment: caffeine use: 2 cups daily.    • Sexual activity: Defer       Family History   Problem Relation Age of Onset   • Breast cancer Brother    • Macular degeneration Mother    • Heart disease Mother    • Heart disease Father    • Heart disease Maternal Uncle        The following portion of the patient's history were reviewed and updated as appropriate:  past medical history, past surgical history, past social history, past family history, allergies, current medications, and problem list.    Review of Systems   Constitutional: Positive for malaise/fatigue. Negative for diaphoresis and fever.   HENT: Negative for congestion, hearing loss, hoarse voice, nosebleeds and sore throat.    Eyes: Negative for photophobia, vision loss in left eye, vision loss in right eye and visual disturbance.   Cardiovascular: Negative for chest pain, dyspnea on exertion, irregular heartbeat, leg swelling, near-syncope, orthopnea, palpitations, paroxysmal nocturnal dyspnea and syncope.   Respiratory: Negative for cough, hemoptysis, shortness of breath, sleep disturbances due to breathing, snoring, sputum production and wheezing.    Endocrine: Negative for cold intolerance, heat intolerance, polydipsia, polyphagia and polyuria.   Hematologic/Lymphatic: Negative for bleeding problem. Does not bruise/bleed easily.   Skin: Negative for color change, dry skin, poor wound healing, rash and suspicious lesions.   Musculoskeletal: Positive for muscle weakness. Negative for arthritis, back pain, falls, gout, joint pain, joint swelling, muscle cramps and myalgias.   Gastrointestinal: Negative for bloating, abdominal pain, constipation, diarrhea, dysphagia, melena, nausea and vomiting.   Neurological: Negative for excessive daytime sleepiness, dizziness, headaches, light-headedness, loss of balance, numbness, paresthesias, seizures, vertigo and weakness.   Psychiatric/Behavioral: Negative for depression, memory loss and substance abuse. The patient is not nervous/anxious.        Allergies   Allergen Reactions   • Cephalexin Other (See Comments)     Reports she doesn't remember having an allergic reaction.    • Melatonin Other (See Comments)     Not Efficacious   • Beef-Derived Products Rash   • Chenango Rash   • Duricef [Cefadroxil] Rash   • Fish Allergy Rash   • Garlic Rash   • Green Beans Rash   •  "Onion Rash   • Peach [Prunus Persica] Rash   • Wheat Rash         Current Outpatient Medications:   •  apixaban (Eliquis) 5 MG tablet tablet, Take 1 tablet by mouth Every 12 (Twelve) Hours., Disp: 180 tablet, Rfl: 1  •  atorvastatin (LIPITOR) 80 MG tablet, Take 0.5 tablets by mouth Every Night., Disp: 90 tablet, Rfl: 1  •  digoxin (LANOXIN) 125 MCG tablet, Take 1 tablet by mouth Daily., Disp: 90 tablet, Rfl: 3  •  metoprolol succinate XL (TOPROL-XL) 100 MG 24 hr tablet, Take 1 tablet by mouth Daily. (Patient taking differently: Take 100 mg by mouth Daily. Pt takes 5o mg BID), Disp: 90 tablet, Rfl: 1  •  omeprazole (priLOSEC) 20 MG capsule, Take 1 capsule by mouth Every Other Day As Needed (GERD)., Disp: 90 capsule, Rfl: 1  •  oxybutynin (DITROPAN) 5 MG tablet, Take 1 tablet by mouth 2 (Two) Times a Day., Disp: 180 tablet, Rfl: 1        Objective:     Vitals:    07/14/21 1458   BP: 110/70   Pulse: 51   Resp: 16   SpO2: 100%   Weight: 60.3 kg (133 lb)   Height: 162.6 cm (64\")     Body mass index is 22.83 kg/m².      Vitals reviewed.   Constitutional:       General: Not in acute distress.     Appearance: Well-developed and not in distress.   Eyes:      General:         Right eye: No discharge.         Left eye: No discharge.      Conjunctiva/sclera: Conjunctivae normal.   HENT:      Head: Normocephalic and atraumatic.      Right Ear: External ear normal.      Left Ear: External ear normal.      Nose: Nose normal.   Neck:      Thyroid: No thyromegaly.      Vascular: No JVD.      Trachea: No tracheal deviation.      Lymphadenopathy: No cervical adenopathy.   Pulmonary:      Effort: Pulmonary effort is normal. No respiratory distress.      Breath sounds: Normal breath sounds. No wheezing. No rales.   Chest:      Chest wall: Not tender to palpatation.   Cardiovascular:      Normal rate. Regular rhythm.      No gallop.   Pulses:     Intact distal pulses.   Edema:     Peripheral edema absent.   Abdominal:      General: There " is no distension.      Palpations: Abdomen is soft.      Tenderness: There is no abdominal tenderness.   Musculoskeletal: Normal range of motion.         General: No tenderness or deformity.      Cervical back: Normal range of motion and neck supple. Skin:     General: Skin is warm and dry.      Findings: No erythema or rash.   Neurological:      Mental Status: Alert and oriented to person, place, and time.      Coordination: Coordination normal.   Psychiatric:         Attention and Perception: Attention normal.         Mood and Affect: Mood normal.         Speech: Speech normal.         Behavior: Behavior normal.         Thought Content: Thought content normal.         Cognition and Memory: Cognition normal.         Judgment: Judgment normal.               ECG 12 Lead    Date/Time: 7/14/2021 3:11 PM  Performed by: Margaux Luna APRN  Authorized by: Margaux Luna APRN   Comparison: compared with previous ECG from 12/18/2020  Similar to previous ECG  Rhythm: sinus rhythm  Rate: normal  Conduction: left anterior fascicular block  ST Depression: I  T flattening: V2  QRS axis: left    Clinical impression: abnormal EKG              Assessment:       Diagnosis Plan   1. Paroxysmal atrial fibrillation with rapid ventricular response (CMS/HCC)     2. NICM (nonischemic cardiomyopathy) (CMS/HCC)     3. Essential hypertension     4. Mixed hyperlipidemia            Plan:      1.  PAF -she is in sinus rhythm, rate controlled.  She is on beta-blocker and digoxin.  She is taking her Eliquis 5 mg twice daily.  2. Multiple strokes noted on her MRI.  She still has some residual and weakness.  3.  Nonischemic cardiomyopathy-echocardiogram 7/2021 shows return to normal function with normal EF.    RTO in 6 months with RM    As always, it has been a pleasure to participate in your patient's care. Thank you.       Sincerely,       JOHN Johnson      Current Outpatient Medications:   •  apixaban (Eliquis) 5 MG tablet tablet, Take 1  tablet by mouth Every 12 (Twelve) Hours., Disp: 180 tablet, Rfl: 1  •  atorvastatin (LIPITOR) 80 MG tablet, Take 0.5 tablets by mouth Every Night., Disp: 90 tablet, Rfl: 1  •  digoxin (LANOXIN) 125 MCG tablet, Take 1 tablet by mouth Daily., Disp: 90 tablet, Rfl: 3  •  metoprolol succinate XL (TOPROL-XL) 100 MG 24 hr tablet, Take 1 tablet by mouth Daily. (Patient taking differently: Take 100 mg by mouth Daily. Pt takes 5o mg BID), Disp: 90 tablet, Rfl: 1  •  omeprazole (priLOSEC) 20 MG capsule, Take 1 capsule by mouth Every Other Day As Needed (GERD)., Disp: 90 capsule, Rfl: 1  •  oxybutynin (DITROPAN) 5 MG tablet, Take 1 tablet by mouth 2 (Two) Times a Day., Disp: 180 tablet, Rfl: 1    Dictated utilizing Dragon dictation

## 2021-11-18 RX ORDER — DIGOXIN 125 MCG
TABLET ORAL
Qty: 90 TABLET | Refills: 3 | Status: SHIPPED | OUTPATIENT
Start: 2021-11-18 | End: 2022-01-14

## 2021-12-06 ENCOUNTER — OFFICE VISIT (OUTPATIENT)
Dept: FAMILY MEDICINE CLINIC | Facility: CLINIC | Age: 66
End: 2021-12-06

## 2021-12-06 VITALS
HEIGHT: 64 IN | TEMPERATURE: 98.6 F | DIASTOLIC BLOOD PRESSURE: 60 MMHG | SYSTOLIC BLOOD PRESSURE: 130 MMHG | HEART RATE: 58 BPM | WEIGHT: 135 LBS | OXYGEN SATURATION: 97 % | RESPIRATION RATE: 16 BRPM | BODY MASS INDEX: 23.05 KG/M2

## 2021-12-06 DIAGNOSIS — I48.0 PAROXYSMAL ATRIAL FIBRILLATION WITH RAPID VENTRICULAR RESPONSE (HCC): ICD-10-CM

## 2021-12-06 DIAGNOSIS — Z76.89 ENCOUNTER TO ESTABLISH CARE: Primary | ICD-10-CM

## 2021-12-06 DIAGNOSIS — Z13.29 THYROID DISORDER SCREENING: ICD-10-CM

## 2021-12-06 DIAGNOSIS — I10 ESSENTIAL HYPERTENSION: ICD-10-CM

## 2021-12-06 DIAGNOSIS — E78.2 MIXED HYPERLIPIDEMIA: ICD-10-CM

## 2021-12-06 DIAGNOSIS — I48.91 ATRIAL FIBRILLATION, UNSPECIFIED TYPE (HCC): ICD-10-CM

## 2021-12-06 DIAGNOSIS — K21.9 GASTROESOPHAGEAL REFLUX DISEASE WITHOUT ESOPHAGITIS: ICD-10-CM

## 2021-12-06 DIAGNOSIS — N32.89 BLADDER SPASMS: ICD-10-CM

## 2021-12-06 PROCEDURE — 99214 OFFICE O/P EST MOD 30 MIN: CPT | Performed by: NURSE PRACTITIONER

## 2021-12-06 RX ORDER — OXYBUTYNIN CHLORIDE 5 MG/1
5 TABLET ORAL 2 TIMES DAILY
Qty: 180 TABLET | Refills: 1 | Status: SHIPPED | OUTPATIENT
Start: 2021-12-06 | End: 2022-06-14 | Stop reason: SDUPTHER

## 2021-12-06 RX ORDER — METOPROLOL TARTRATE 50 MG/1
50 TABLET, FILM COATED ORAL 2 TIMES DAILY
Qty: 180 TABLET | Refills: 1 | Status: SHIPPED | OUTPATIENT
Start: 2021-12-06 | End: 2021-12-14 | Stop reason: ALTCHOICE

## 2021-12-06 RX ORDER — ATORVASTATIN CALCIUM 40 MG/1
40 TABLET, FILM COATED ORAL NIGHTLY
Qty: 90 TABLET | Refills: 1 | Status: SHIPPED | OUTPATIENT
Start: 2021-12-06 | End: 2022-06-14 | Stop reason: SDUPTHER

## 2021-12-06 RX ORDER — OMEPRAZOLE 20 MG/1
20 CAPSULE, DELAYED RELEASE ORAL DAILY
Qty: 90 CAPSULE | Refills: 1 | Status: SHIPPED | OUTPATIENT
Start: 2021-12-06 | End: 2022-06-14 | Stop reason: SDUPTHER

## 2021-12-06 NOTE — PROGRESS NOTES
Patient ID: Camille Pantoja is a 65 y.o. female     Patient Care Team:  Head, JOHN Ferguson as PCP - General (Nurse Practitioner)    Subjective     Chief Complaint   Patient presents with   • Establish Care   • Hypertension   • Atrial Fibrillation       History of Present Illness    Camille Pantoja presents to [facility] today to establish care with our practice.  Previous PCP was Dr. Ann.     History of CVA.  Residual Right sided weakness.      Atrial fibrillation: Currently managed with Lanoxin 125 mcg daily, metoprolol 50 mg twice a day, along with Eliquis 5 mg twice a day.  Followed by cardiology.    Hyperlipidemia: Managed with atorvastatin 40 mg nightly.  GERD: Managed with omeprazole 20 mg daily.  Bladder spasms started after her stroke.  States are well controlled with Ditropan 5 mg twice a day.  States she is due for labs and follow-up with Dr. Ann.  Currently she needs a refill on her Eliquis prescription.    She denies any complaints of fever, chills, cough, chest pain, shortness of air, abdominal pain, nausea, or any other concerns.     The following portions of the patient's history were reviewed and updated as appropriate: allergies, current medications, past family history, past medical history, past social history, past surgical history and problem list.       ROS    Vitals:    12/06/21 1536   BP: 130/60   Pulse: 58   Resp: 16   Temp: 98.6 °F (37 °C)   SpO2: 97%       Documented weights    12/06/21 1536   Weight: 61.2 kg (135 lb)     Body mass index is 23.17 kg/m².        Objective     Physical Exam  Vitals reviewed.   Constitutional:       Appearance: She is not ill-appearing.   Cardiovascular:      Rate and Rhythm: Normal rate and regular rhythm.      Heart sounds: No murmur heard.      Pulmonary:      Effort: Pulmonary effort is normal.      Breath sounds: Normal breath sounds. No wheezing.   Musculoskeletal:      Right lower leg: No edema.      Left lower  leg: No edema.      Comments: Uses cane for ambulation.  Weakness to right lower extremity however able to bear weight.     Neurological:      Mental Status: She is alert and oriented to person, place, and time.            Assessment/Plan     Assessment/Plan     Diagnoses and all orders for this visit:    1. Encounter to establish care (Primary)    2. Paroxysmal atrial fibrillation with rapid ventricular response (HCC)  -     CBC (No Diff); Future  -     metoprolol tartrate (LOPRESSOR) 50 MG tablet; Take 1 tablet by mouth 2 (Two) Times a Day.  Dispense: 180 tablet; Refill: 1    3. Mixed hyperlipidemia  -     Comprehensive Metabolic Panel; Future  -     Lipid Panel; Future  -     atorvastatin (LIPITOR) 40 MG tablet; Take 1 tablet by mouth Every Night.  Dispense: 90 tablet; Refill: 1    4. Essential hypertension  -     Comprehensive Metabolic Panel; Future    5. Atrial fibrillation, unspecified type (HCC)  -     apixaban (Eliquis) 5 MG tablet tablet; Take 1 tablet by mouth Every 12 (Twelve) Hours.  Dispense: 180 tablet; Refill: 1    6. Thyroid disorder screening  -     TSH; Future    7. Gastroesophageal reflux disease without esophagitis  -     omeprazole (priLOSEC) 20 MG capsule; Take 1 capsule by mouth Daily.  Dispense: 90 capsule; Refill: 1    8. Bladder spasms  -     oxybutynin (DITROPAN) 5 MG tablet; Take 1 tablet by mouth 2 (Two) Times a Day.  Dispense: 180 tablet; Refill: 1      Summary:  Camille ELIANE Pantoja presents office today to establish care with our practice.  Blood pressure currently stable at 130/60.  Refills provided on needed medications.  Instructed to schedule for fasting lab appointment in the near future.  We will notify her of results.  If all are stable, instructed return to office in 6 months for next recheck appointment with fasting labs and AWV.    In the meantime, instructed to contact us sooner for any problems or concerns.    Follow Up:  Return in about 6 months (around 6/6/2022) for Recheck,  Medicare [facility] with fasting labs the week before.  .    Patient was given instructions and counseling regarding condition or for health maintenance advice.  Please see specific information pulled into the AVS if appropriate.      Patient was wearing facemask when I entered the room and throughout our encounter. Protective equipment was worn throughout this patient encounter including a face mask, eye protection,  and gloves. Hand hygiene was performed before donning protective equipment and after removal when leaving the room.     Kelly Patino, APRN  Family Medicine  [facility]  12/06/21  17:13 EST

## 2021-12-07 ENCOUNTER — PATIENT ROUNDING (BHMG ONLY) (OUTPATIENT)
Dept: FAMILY MEDICINE CLINIC | Facility: CLINIC | Age: 66
End: 2021-12-07

## 2021-12-07 NOTE — PROGRESS NOTES
December 7, 2021    Hello, may I speak with Camille Pantoja?    My name is Alice     I am  with [facility]  [address] [address] [phone].    Before we get started may I verify your date of birth? 1955    I am calling to officially welcome you to our practice and ask about your recent visit. Is this a good time to talk? yes    Tell me about your visit with us. What things went well?  She loved Kelly it is the first female  provider she has had. She like the office the staff was nice. She use to see Dr. Cobb and wanted an office closer to home is why she changes in the first place.        We're always looking for ways to make our patients' experiences even better. Do you have recommendations on ways we may improve?  no    Overall were you satisfied with your first visit to our practice? yes       I appreciate you taking the time to speak with me today. Is there anything else I can do for you? no      Thank you, and have a great day.

## 2021-12-09 DIAGNOSIS — I48.91 ATRIAL FIBRILLATION, UNSPECIFIED TYPE (HCC): ICD-10-CM

## 2021-12-09 RX ORDER — APIXABAN 5 MG/1
TABLET, FILM COATED ORAL
Qty: 180 TABLET | Refills: 1 | OUTPATIENT
Start: 2021-12-09

## 2021-12-14 ENCOUNTER — TELEPHONE (OUTPATIENT)
Dept: FAMILY MEDICINE CLINIC | Facility: CLINIC | Age: 66
End: 2021-12-14

## 2021-12-14 RX ORDER — METOPROLOL SUCCINATE 100 MG/1
100 TABLET, EXTENDED RELEASE ORAL DAILY
Qty: 30 TABLET | Refills: 0 | Status: SHIPPED | OUTPATIENT
Start: 2021-12-14 | End: 2022-01-14 | Stop reason: SDUPTHER

## 2021-12-14 NOTE — TELEPHONE ENCOUNTER
Patient asked if she could be switched back to metoprolol tartrate 100 mg extended release that she cuts in half. Patient asked for a phone call to let her know if we are able to do that.

## 2021-12-17 ENCOUNTER — TELEPHONE (OUTPATIENT)
Dept: FAMILY MEDICINE CLINIC | Facility: CLINIC | Age: 66
End: 2021-12-17

## 2021-12-17 NOTE — TELEPHONE ENCOUNTER
Caller: Camille Pantoja    Relationship: Self    Best call back number: [phone] (H)    What form or medical record are you requesting: LAB WORK     Who is requesting this form or medical record from you: SELF, PERSONAL RECORDS    How would you like to receive the form or medical records (pick-up, mail, fax): MAIL    If mail, what is the address:     [address] [place] [id number]    Timeframe paperwork needed: ASAP    Additional notes:       PATIENT FORGOT TO GET A PHYSICAL COPY OF THESE RESULTS- AND WOULD LIKE US TO MAIL THEM TO HER

## 2022-01-14 ENCOUNTER — TELEPHONE (OUTPATIENT)
Dept: FAMILY MEDICINE CLINIC | Facility: CLINIC | Age: 67
End: 2022-01-14

## 2022-01-14 ENCOUNTER — OFFICE VISIT (OUTPATIENT)
Dept: CARDIOLOGY | Facility: CLINIC | Age: 67
End: 2022-01-14

## 2022-01-14 VITALS
HEIGHT: 64 IN | WEIGHT: 135.3 LBS | HEART RATE: 48 BPM | DIASTOLIC BLOOD PRESSURE: 76 MMHG | SYSTOLIC BLOOD PRESSURE: 118 MMHG | BODY MASS INDEX: 23.1 KG/M2

## 2022-01-14 DIAGNOSIS — I10 PRIMARY HYPERTENSION: ICD-10-CM

## 2022-01-14 DIAGNOSIS — I42.8 NICM (NONISCHEMIC CARDIOMYOPATHY): Primary | ICD-10-CM

## 2022-01-14 DIAGNOSIS — E78.2 MIXED HYPERLIPIDEMIA: ICD-10-CM

## 2022-01-14 DIAGNOSIS — I48.0 PAROXYSMAL ATRIAL FIBRILLATION WITH RAPID VENTRICULAR RESPONSE: ICD-10-CM

## 2022-01-14 PROCEDURE — 99214 OFFICE O/P EST MOD 30 MIN: CPT | Performed by: INTERNAL MEDICINE

## 2022-01-14 PROCEDURE — 93000 ELECTROCARDIOGRAM COMPLETE: CPT | Performed by: INTERNAL MEDICINE

## 2022-01-14 RX ORDER — METOPROLOL SUCCINATE 100 MG/1
50 TABLET, EXTENDED RELEASE ORAL 2 TIMES DAILY
Qty: 90 TABLET | Refills: 1 | Status: SHIPPED | OUTPATIENT
Start: 2022-01-14 | End: 2022-06-14 | Stop reason: SDUPTHER

## 2022-01-14 NOTE — TELEPHONE ENCOUNTER
Caller: Camille Pantoja    Relationship: Self    Best call back number: [phone]     Requested Prescriptions:      metoprolol succinate XL (Toprol XL) 100 MG 24 hr tablet     Pharmacy where request should be sent:      Additional details provided by patient:PATIENT IS CALLING TO STATE SHE PICKED UP THE ABOVE PRESCRIPTION TODAY.  SHE STATES SHE ONLY RECEIVED #30.  SHE STATES SHE IS WANTING A 90 DAY SUPPLY WITH REFILLS.  SHE IS WANTING TO KNOW IF THERE IS A REASON THAT SHE DID NOT GET THE 90 DAY SUPPLY.  SHE STATES HER CARDIOLOGIST TOOK HER OFF OF THE FOLLOWING MEDICATION TODAY.             Does the patient have less than a 3 day supply:  [] Yes  [x] No    America Gómez, RegSched Rep   01/14/22 15:51 EST       PLEASE ADVISE.

## 2022-01-14 NOTE — PROGRESS NOTES
Date of Office Visit: 2022  Encounter Provider: Jovita Brown MD  Place of Service: [facility] CARDIOLOGY  Patient Name: Camille Pantoja  :1955      Patient ID:  Camille Pantoja is a 66 y.o. female is here for  followup for atrial fibrillation        History of Present Illness    She has a history of DVT, hypertension, GERD, atrial fibrillation, cardiomyopathy, stroke and hyperlipidemia     This is a 64-year-old female who recently had left lower lobe pneumonia diagnosed 2020 and treated with Levaquin.  She began to have diarrhea and to become progressively more weak.  She lives alone and her neighbor found her down the floor. She said that she had slid off of her bed.  She  presented to [facility] on 1/10/20 with altered mental status. She was found to be in rapid atrial fibrillation. She was noted to have an elevated troponin. Echocardiogram was obtained and showed new cardiomyopathy moderate left ventricular dilation, ejection fraction 27%, mild concentric left ventricle hypertrophy, severe biatrial enlargement, moderate right ventricular dilation with moderately reduced right ventricular systolic function, mild mitral and tricuspid insufficiency, akinesis of the inferior and lateral wall with hypokinesis of the anterior and septum.. She was transferred to [facility] for further neurologic evaluation. MRI did show acute left hemisphere subcortical stroke. On 20 morning, patient had new mental status changes. MRI showed slight expansion of stroke. On the evening of  patient had decrease level of consciousness. She was sent for MRI which showed new area of infarcts in the right cerebellum. She was placed on a heparin drip and eventually to apixaban.  Cardiac catheterization done 1/10/2020 showed normal coronary arteries.  PCP follow up was recommended for left thyroid hypodense nodule noted on CTA head and neck. Patient  was discharged to  [facility].      Echo done 7/21 showed ejection fraction 56% with normal diastolic function, normal chamber sizes and no significant valvular disease.  Labs on 12/14/2021 showed normal CMP, normal TSH, Toposar 139, HDL 51, LDL 72, triglycerides 80, normal CBC.    She lives alone.  She uses a walker at home as well as a cane when she is out.  She still has weakness on the right side.  She doesn't feel her heart racing or skipping.  She has no chest pain or pressure.  She has no orthopnea or PND.  She has no exertional dyspnea.  Her appetite is good and she sleeps well.  She has had no seizures, syncope or falls.    Past Medical History:   Diagnosis Date   • CHF (congestive heart failure) ([facility])    • Hyperlipidemia    • Hypertension    • Irregular heart beat    • Skin cancer    • Stroke (HCC)          Past Surgical History:   Procedure Laterality Date   • CARDIAC CATHETERIZATION N/A 1/10/2020    Procedure: Left Heart Cath;  Surgeon: Cain Mcneil MD;  Location:  [facility] CATH INVASIVE LOCATION;  Service: Cardiovascular   • CARDIAC CATHETERIZATION N/A 1/10/2020    Procedure: Coronary angiography;  Surgeon: Cain Mcneil MD;  Location:  [facility] CATH INVASIVE LOCATION;  Service: Cardiovascular   • FINGER SURGERY     • HYSTERECTOMY     • JOINT REPLACEMENT     • ORIF SHOULDER DISLOCATION W/ HUMERAL FRACTURE     • TIBIA FRACTURE SURGERY         Current Outpatient Medications on File Prior to Visit   Medication Sig Dispense Refill   • apixaban (Eliquis) 5 MG tablet tablet Take 1 tablet by mouth Every 12 (Twelve) Hours. 180 tablet 1   • atorvastatin (LIPITOR) 40 MG tablet Take 1 tablet by mouth Every Night. 90 tablet 1   • digoxin (LANOXIN) 125 MCG tablet TAKE ONE TABLET BY MOUTH DAILY 90 tablet 3   • metoprolol succinate XL (Toprol XL) 100 MG 24 hr tablet Take 1 tablet by mouth Daily. (Patient taking differently: Take 50 mg by mouth 2 (Two) Times a Day.) 30 tablet 0  "  • omeprazole (priLOSEC) 20 MG capsule Take 1 capsule by mouth Daily. 90 capsule 1   • oxybutynin (DITROPAN) 5 MG tablet Take 1 tablet by mouth 2 (Two) Times a Day. 180 tablet 1     No current facility-administered medications on file prior to visit.       Social History     Socioeconomic History   • Marital status:    Tobacco Use   • Smoking status: Never Smoker   • Smokeless tobacco: Never Used   Vaping Use   • Vaping Use: Never used   Substance and Sexual Activity   • Alcohol use: Not Currently     Comment: Reports a hx of drinking everyday for 18 years atleast. She says that she quit May 2018, but says that she drinks occasionally now.    • Drug use: No     Comment: caffeine use: 2 cups daily.    • Sexual activity: Defer           ROS    Procedures    ECG 12 Lead    Date/Time: 1/14/2022 3:02 PM  Performed by: Jovita Brown MD  Authorized by: Jovita Brown MD   Comparison: compared with previous ECG   Similar to previous ECG  Rhythm: sinus bradycardia  QRS axis: left    Clinical impression: abnormal EKG                Objective:      Vitals:    01/14/22 1453 01/14/22 1454   BP: 118/76 118/76   BP Location: Right arm Left arm   Weight: 61.4 kg (135 lb 4.8 oz)    Height: 162.6 cm (64\")      Body mass index is 23.22 kg/m².    Vitals reviewed.   Constitutional:       General: Not in acute distress.     Appearance: Well-developed. Not diaphoretic.   Eyes:      General: No scleral icterus.     Conjunctiva/sclera: Conjunctivae normal.   HENT:      Head: Normocephalic and atraumatic.   Neck:      Thyroid: No thyromegaly.      Vascular: No carotid bruit or JVD.      Lymphadenopathy: No cervical adenopathy.   Pulmonary:      Effort: Pulmonary effort is normal. No respiratory distress.      Breath sounds: Normal breath sounds. No wheezing. No rhonchi. No rales.   Chest:      Chest wall: Not tender to palpatation.   Cardiovascular:      Normal rate. Regular rhythm.      Murmurs: There is no murmur.    "   No gallop.   Pulses:     Intact distal pulses.   Edema:     Peripheral edema absent.   Abdominal:      General: Bowel sounds are normal. There is no distension or abdominal bruit.      Palpations: Abdomen is soft. There is no abdominal mass.      Tenderness: There is no abdominal tenderness.   Musculoskeletal:         General: No deformity.      Extremities: No clubbing present.     Cervical back: Neck supple. Skin:     General: Skin is warm and dry. There is no cyanosis.      Coloration: Skin is not pale.      Findings: No rash.   Neurological:      Mental Status: Alert and oriented to person, place, and time.      Cranial Nerves: No cranial nerve deficit.   Psychiatric:         Judgment: Judgment normal.         Lab Review:       Assessment:      Diagnosis Plan   1. NICM (nonischemic cardiomyopathy) (HCC)     2. Paroxysmal atrial fibrillation with rapid ventricular response (HCC)     3. Primary hypertension     4. Mixed hyperlipidemia       1. PAF, in NSR but is on digoxin, Toprol and apixaban.    For now would continue anticoagulation.  She has no atrial fibrillation but her presentation with this was so significant I would hate for her to have any more strokes being off of this I think she needs to remain on apixaban indefinitely unless there is clear indication that it poses more risk than benefit.  2. Multiple strokes noted on her MRI.  3. H/o Nonischemic cardiomyopathy, resolved, normal EF.  4. History of seizures.       Plan:       Stop digoxin, no other medication changes, see Cj in 1 year.

## 2022-02-09 ENCOUNTER — OFFICE VISIT (OUTPATIENT)
Dept: ORTHOPEDIC SURGERY | Facility: CLINIC | Age: 67
End: 2022-02-09

## 2022-02-09 VITALS
BODY MASS INDEX: 23.05 KG/M2 | DIASTOLIC BLOOD PRESSURE: 83 MMHG | HEART RATE: 84 BPM | HEIGHT: 64 IN | SYSTOLIC BLOOD PRESSURE: 123 MMHG | WEIGHT: 135 LBS

## 2022-02-09 DIAGNOSIS — M17.11 PRIMARY OSTEOARTHRITIS OF RIGHT KNEE: Primary | ICD-10-CM

## 2022-02-09 DIAGNOSIS — S80.01XA CONTUSION OF RIGHT KNEE, INITIAL ENCOUNTER: ICD-10-CM

## 2022-02-09 DIAGNOSIS — M15.9 PRIMARY OSTEOARTHRITIS INVOLVING MULTIPLE JOINTS: ICD-10-CM

## 2022-02-09 PROCEDURE — 20610 DRAIN/INJ JOINT/BURSA W/O US: CPT | Performed by: ORTHOPAEDIC SURGERY

## 2022-02-09 PROCEDURE — 73562 X-RAY EXAM OF KNEE 3: CPT | Performed by: ORTHOPAEDIC SURGERY

## 2022-02-09 PROCEDURE — 99213 OFFICE O/P EST LOW 20 MIN: CPT | Performed by: ORTHOPAEDIC SURGERY

## 2022-02-09 RX ORDER — TRIAMCINOLONE ACETONIDE 40 MG/ML
40 INJECTION, SUSPENSION INTRA-ARTICULAR; INTRAMUSCULAR
Status: COMPLETED | OUTPATIENT
Start: 2022-02-09 | End: 2022-02-09

## 2022-02-09 RX ORDER — LIDOCAINE HYDROCHLORIDE 10 MG/ML
4 INJECTION, SOLUTION EPIDURAL; INFILTRATION; INTRACAUDAL; PERINEURAL
Status: COMPLETED | OUTPATIENT
Start: 2022-02-09 | End: 2022-02-09

## 2022-02-09 RX ADMIN — LIDOCAINE HYDROCHLORIDE 4 ML: 10 INJECTION, SOLUTION EPIDURAL; INFILTRATION; INTRACAUDAL; PERINEURAL at 11:18

## 2022-02-09 RX ADMIN — TRIAMCINOLONE ACETONIDE 40 MG: 40 INJECTION, SUSPENSION INTRA-ARTICULAR; INTRAMUSCULAR at 11:18

## 2022-02-09 NOTE — PROGRESS NOTES
Subjective: Osteoarthritis right knee, knee contusion     Patient ID: Camille Pantoja is a 66 y.o. female.    Chief Complaint:    History of Present Illness 66-year-old female known to me is seen for last year for osteoarthritis of her right knee that was treated with a cortisone injection and did well until last week.  She fell when she got tripped by her dog in the home landing on the right knee developed immediate pain and discomfort.  Patient states she has a life locular and she activated that and EMS came to her house and assisted her getting up off the floor which she could not do by herself but after a little while she is able to get around okay with a knee immobilizer that she had previously.  Because of the persistent pain discomfort and inability to put full weight on it without discomfort she is seen here today.  She cannot take anti-inflammatories as she is on anticoagulants.  She did use Voltaren gel but just a couple times a day.       Social History     Occupational History   • Not on file   Tobacco Use   • Smoking status: Never Smoker   • Smokeless tobacco: Never Used   Vaping Use   • Vaping Use: Never used   Substance and Sexual Activity   • Alcohol use: Not Currently     Comment: Reports a hx of drinking everyday for 18 years atleast. She says that she quit May 2018, but says that she drinks occasionally now.    • Drug use: No     Comment: caffeine use: 2 cups daily.    • Sexual activity: Defer      Review of Systems   Constitutional: Negative for chills, diaphoresis, fever and unexpected weight change.   HENT: Negative for hearing loss, nosebleeds, sore throat and tinnitus.    Eyes: Negative for pain and visual disturbance.   Respiratory: Negative for cough, shortness of breath and wheezing.    Cardiovascular: Negative for chest pain and palpitations.   Gastrointestinal: Negative for abdominal pain, diarrhea, nausea and vomiting.   Endocrine: Negative for cold intolerance, heat intolerance and  polydipsia.   Genitourinary: Negative for difficulty urinating, dysuria and hematuria.   Musculoskeletal: Positive for arthralgias, joint swelling and myalgias.   Skin: Negative for rash and wound.   Allergic/Immunologic: Negative for environmental allergies.   Neurological: Negative for dizziness, syncope and numbness.   Hematological: Does not bruise/bleed easily.   Psychiatric/Behavioral: Negative for dysphoric mood and sleep disturbance. The patient is not nervous/anxious.          Past Medical History:   Diagnosis Date   • CHF (congestive heart failure) (HCC)    • Hyperlipidemia    • Hypertension    • Irregular heart beat    • Skin cancer    • Stroke (HCC)      Past Surgical History:   Procedure Laterality Date   • CARDIAC CATHETERIZATION N/A 1/10/2020    Procedure: Left Heart Cath;  Surgeon: Cain Mcneil MD;  Location:  [facility] CATH INVASIVE LOCATION;  Service: Cardiovascular   • CARDIAC CATHETERIZATION N/A 1/10/2020    Procedure: Coronary angiography;  Surgeon: Cain Mcneil MD;  Location:  [facility] CATH INVASIVE LOCATION;  Service: Cardiovascular   • FINGER SURGERY     • HYSTERECTOMY     • JOINT REPLACEMENT     • ORIF SHOULDER DISLOCATION W/ HUMERAL FRACTURE     • TIBIA FRACTURE SURGERY       Family History   Problem Relation Age of Onset   • Breast cancer Brother    • Macular degeneration Mother    • Heart disease Mother    • Heart disease Father    • Heart disease Maternal Uncle          Objective:  Vitals:    02/09/22 1040   BP: 123/83   Pulse: 84         02/09/22  1040   Weight: 61.2 kg (135 lb)     Body mass index is 23.17 kg/m².        Ortho Exam   AP lateral sunrise view of the knee shows tricompartmental arthritis with bone-on-bone medially and patellofemoral joint.  Compared to previous x-rays there is no acute changes noted.  On exam she is alert and oriented x3.  There is a mild effusion in the knee is cool to touch.  There is no ecchymosis bruising or heat noted.  She lacks maybe 5 degrees  of extension can flex about 110 degrees.  There is moderate pain and crepitus on range of motion but there is no gross instability to varus valgus or at 0 90 degrees.  Quad and hamstring function is 4-5 secondary to pain and the calf is nontender.  She has good pulses no sensory deficit.    Assessment:        1. Primary osteoarthritis of right knee    2. Primary osteoarthritis involving multiple joints    3. Contusion of right knee, initial encounter           Plan: Reviewed with the patient her x-rays history and physical exam.  There is no acute fracture noted but she does have end-stage degenerative arthritis that she aggravated the fall.  There is a mild effusion for after reviewing treatment options we will proceed with an attempted aspiration but no fluid was obtained but I did then inject 4 cc of lidocaine and 1 cc Kenalog.  Patient was instructed to weight-bear as tolerated.  Told her she had good response to the cortisone last year.  Also advised her using Voltaren gel 4 times a day religiously to try to resolve her symptoms.  If she does not get significant relief she can call them contact insurance company regarding gel injections.  Otherwise return as needed for repeat cortisone injections which can be given every 3 to 4 months.  Answered all questions  Large Joint Arthrocentesis: R knee  Date/Time: 2/9/2022 11:18 AM  Consent given by: patient  Site marked: site marked  Timeout: Immediately prior to procedure a time out was called to verify the correct patient, procedure, equipment, support staff and site/side marked as required   Supporting Documentation  Indications: pain and joint swelling   Procedure Details  Location: knee - R knee  Preparation: Patient was prepped and draped in the usual sterile fashion  Needle size: 18 G  Approach: superior (LATERAL)  Medications administered: 40 mg triamcinolone acetonide 40 MG/ML; 4 mL lidocaine PF 1% 1 %  Aspirate amount: 0 mL  Patient tolerance: patient  tolerated the procedure well with no immediate complications                  Work Status:    ROMEO query complete.    Orders:  Orders Placed This Encounter   Procedures   • Large Joint Arthrocentesis: R knee   • XR Knee 3 View Right       Medications:  No orders of the defined types were placed in this encounter.      Followup:  Return if symptoms worsen or fail to improve.          Dictated utilizing Dragon dictation

## 2022-06-06 DIAGNOSIS — Z11.59 ENCOUNTER FOR HEPATITIS C SCREENING TEST FOR LOW RISK PATIENT: ICD-10-CM

## 2022-06-06 DIAGNOSIS — I10 ESSENTIAL HYPERTENSION: ICD-10-CM

## 2022-06-06 DIAGNOSIS — I48.91 ATRIAL FIBRILLATION, UNSPECIFIED TYPE: ICD-10-CM

## 2022-06-06 DIAGNOSIS — I48.91 ATRIAL FIBRILLATION, UNSPECIFIED TYPE: Primary | ICD-10-CM

## 2022-06-06 DIAGNOSIS — E78.2 MIXED HYPERLIPIDEMIA: ICD-10-CM

## 2022-06-07 DIAGNOSIS — I48.91 ATRIAL FIBRILLATION, UNSPECIFIED TYPE: ICD-10-CM

## 2022-06-07 RX ORDER — APIXABAN 5 MG/1
TABLET, FILM COATED ORAL
Qty: 180 TABLET | Refills: 0 | Status: SHIPPED | OUTPATIENT
Start: 2022-06-07 | End: 2022-09-02

## 2022-06-09 LAB
ALBUMIN SERPL-MCNC: 3.7 G/DL (ref 3.8–4.8)
ALBUMIN/GLOB SERPL: 1.5 {RATIO} (ref 1.2–2.2)
ALP SERPL-CCNC: 87 IU/L (ref 44–121)
ALT SERPL-CCNC: 7 IU/L (ref 0–32)
AST SERPL-CCNC: 14 IU/L (ref 0–40)
BILIRUB SERPL-MCNC: 0.6 MG/DL (ref 0–1.2)
BUN SERPL-MCNC: 8 MG/DL (ref 8–27)
BUN/CREAT SERPL: 11 (ref 12–28)
CALCIUM SERPL-MCNC: 9.1 MG/DL (ref 8.7–10.3)
CHLORIDE SERPL-SCNC: 106 MMOL/L (ref 96–106)
CHOLEST SERPL-MCNC: 118 MG/DL (ref 100–199)
CHOLEST/HDLC SERPL: 2.9 RATIO (ref 0–4.4)
CO2 SERPL-SCNC: 23 MMOL/L (ref 20–29)
CREAT SERPL-MCNC: 0.73 MG/DL (ref 0.57–1)
EGFRCR SERPLBLD CKD-EPI 2021: 90 ML/MIN/1.73
ERYTHROCYTE [DISTWIDTH] IN BLOOD BY AUTOMATED COUNT: 12.4 % (ref 11.7–15.4)
GLOBULIN SER CALC-MCNC: 2.5 G/DL (ref 1.5–4.5)
GLUCOSE SERPL-MCNC: 82 MG/DL (ref 65–99)
HCT VFR BLD AUTO: 33.9 % (ref 34–46.6)
HCV AB S/CO SERPL IA: <0.1 S/CO RATIO (ref 0–0.9)
HDLC SERPL-MCNC: 41 MG/DL
HGB BLD-MCNC: 11.1 G/DL (ref 11.1–15.9)
LDLC SERPL CALC-MCNC: 63 MG/DL (ref 0–99)
MCH RBC QN AUTO: 29.4 PG (ref 26.6–33)
MCHC RBC AUTO-ENTMCNC: 32.7 G/DL (ref 31.5–35.7)
MCV RBC AUTO: 90 FL (ref 79–97)
PLATELET # BLD AUTO: 157 X10E3/UL (ref 150–450)
POTASSIUM SERPL-SCNC: 3.9 MMOL/L (ref 3.5–5.2)
PROT SERPL-MCNC: 6.2 G/DL (ref 6–8.5)
RBC # BLD AUTO: 3.77 X10E6/UL (ref 3.77–5.28)
SODIUM SERPL-SCNC: 142 MMOL/L (ref 134–144)
TRIGL SERPL-MCNC: 66 MG/DL (ref 0–149)
TSH SERPL DL<=0.005 MIU/L-ACNC: 0.95 UIU/ML (ref 0.45–4.5)
VLDLC SERPL CALC-MCNC: 14 MG/DL (ref 5–40)
WBC # BLD AUTO: 4.9 X10E3/UL (ref 3.4–10.8)

## 2022-06-14 ENCOUNTER — OFFICE VISIT (OUTPATIENT)
Dept: FAMILY MEDICINE CLINIC | Facility: CLINIC | Age: 67
End: 2022-06-14

## 2022-06-14 VITALS
WEIGHT: 132 LBS | DIASTOLIC BLOOD PRESSURE: 70 MMHG | SYSTOLIC BLOOD PRESSURE: 110 MMHG | HEIGHT: 64 IN | TEMPERATURE: 98.8 F | BODY MASS INDEX: 22.53 KG/M2 | HEART RATE: 72 BPM | OXYGEN SATURATION: 98 % | RESPIRATION RATE: 16 BRPM

## 2022-06-14 DIAGNOSIS — I10 PRIMARY HYPERTENSION: ICD-10-CM

## 2022-06-14 DIAGNOSIS — Z00.00 MEDICARE ANNUAL WELLNESS VISIT, SUBSEQUENT: Primary | ICD-10-CM

## 2022-06-14 DIAGNOSIS — E78.2 MIXED HYPERLIPIDEMIA: ICD-10-CM

## 2022-06-14 DIAGNOSIS — K21.9 GASTROESOPHAGEAL REFLUX DISEASE WITHOUT ESOPHAGITIS: ICD-10-CM

## 2022-06-14 DIAGNOSIS — N32.89 BLADDER SPASMS: ICD-10-CM

## 2022-06-14 DIAGNOSIS — I48.0 PAROXYSMAL ATRIAL FIBRILLATION WITH RAPID VENTRICULAR RESPONSE: ICD-10-CM

## 2022-06-14 PROCEDURE — G0439 PPPS, SUBSEQ VISIT: HCPCS | Performed by: NURSE PRACTITIONER

## 2022-06-14 PROCEDURE — 96160 PT-FOCUSED HLTH RISK ASSMT: CPT | Performed by: NURSE PRACTITIONER

## 2022-06-14 PROCEDURE — 1126F AMNT PAIN NOTED NONE PRSNT: CPT | Performed by: NURSE PRACTITIONER

## 2022-06-14 PROCEDURE — 1159F MED LIST DOCD IN RCRD: CPT | Performed by: NURSE PRACTITIONER

## 2022-06-14 PROCEDURE — 1170F FXNL STATUS ASSESSED: CPT | Performed by: NURSE PRACTITIONER

## 2022-06-14 RX ORDER — OXYBUTYNIN CHLORIDE 5 MG/1
5 TABLET ORAL 2 TIMES DAILY
Qty: 180 TABLET | Refills: 0 | Status: SHIPPED | OUTPATIENT
Start: 2022-06-14 | End: 2022-06-24

## 2022-06-14 RX ORDER — METOPROLOL SUCCINATE 100 MG/1
50 TABLET, EXTENDED RELEASE ORAL 2 TIMES DAILY
Qty: 90 TABLET | Refills: 0 | Status: SHIPPED | OUTPATIENT
Start: 2022-06-14 | End: 2022-11-09

## 2022-06-14 RX ORDER — ATORVASTATIN CALCIUM 40 MG/1
40 TABLET, FILM COATED ORAL NIGHTLY
Qty: 90 TABLET | Refills: 0 | Status: SHIPPED | OUTPATIENT
Start: 2022-06-14 | End: 2022-09-12

## 2022-06-14 RX ORDER — OMEPRAZOLE 20 MG/1
20 CAPSULE, DELAYED RELEASE ORAL DAILY
Qty: 90 CAPSULE | Refills: 0 | Status: SHIPPED | OUTPATIENT
Start: 2022-06-14 | End: 2022-06-27

## 2022-06-14 NOTE — PROGRESS NOTES
The ABCs of the Annual Wellness Visit  Subsequent Medicare Wellness Visit    Chief Complaint   Patient presents with   • Medicare Wellness-subsequent   • Hyperlipidemia   • Hypertension   • Atrial Fibrillation      Subjective    History of Present Illness:  Camille Pantoja is a 66 y.o. female who presents for a Subsequent Medicare Wellness Visit along with continued management concerning hypertension and hyperlipidemia.  Since she was last seen, she feels she has been doing well.  Denies any new problems or concerns.  Hypertension: Currently managed with metoprolol XL 50 mg twice a day.  Hyperlipidemia: Managed with atorvastatin 40 mg daily.  Atrial fibrillation: Managed with metoprolol along with Eliquis 5 mg.  Also followed by cardiology.  Previously was taking digoxin which was discontinued per Dr. Brown in January.    Hx of CVA:  Residual right sided weakness.  Uses cane at home however walker when outside of home.      Previous alcohol use.  Sober for 2 years.    Non-smoker    The following portions of the patient's history were reviewed and   updated as appropriate: allergies, current medications, past family history, past medical history, past social history, past surgical history and problem list.    Compared to one year ago, the patient feels her physical   health is the same.    Compared to one year ago, the patient feels her mental   health is the same.    Recent Hospitalizations: NO        Current Medical Providers:  Patient Care Team:  Head, JOHN Ferguson as PCP - General (Nurse Practitioner)    Outpatient Medications Prior to Visit   Medication Sig Dispense Refill   • Eliquis 5 MG tablet tablet TAKE ONE TABLET BY MOUTH EVERY 12 HOURS 180 tablet 0   • atorvastatin (LIPITOR) 40 MG tablet Take 1 tablet by mouth Every Night. 90 tablet 1   • metoprolol succinate XL (Toprol XL) 100 MG 24 hr tablet Take 0.5 tablets by mouth 2 (Two) Times a Day. 90 tablet 1   • omeprazole (priLOSEC) 20 MG capsule Take 1  "capsule by mouth Daily. 90 capsule 1   • oxybutynin (DITROPAN) 5 MG tablet Take 1 tablet by mouth 2 (Two) Times a Day. 180 tablet 1     No facility-administered medications prior to visit.       No opioid medication identified on active medication list. I have reviewed chart for other potential  high risk medication/s and harmful drug interactions in the elderly.          Aspirin is not on active medication list.  Aspirin use is not indicated based on review of current medical condition/s. Risk of harm outweighs potential benefits.  .    Patient Active Problem List   Diagnosis   • Hyperlipidemia   • Hypertension   • Vitamin D deficiency   • Osteoarthritis   • Paroxysmal atrial fibrillation with rapid ventricular response (HCC)   • Metabolic encephalopathy   • Cerebral ventriculomegaly   • Aftercare   • Right hemiparesis (HCC)   • Bladder spasms   • Functional diarrhea   • History of gastroesophageal reflux (GERD)   • Abnormal gait   • Alcoholism (HCC)   • NICM (nonischemic cardiomyopathy) (HCC)   • Gastroesophageal reflux disease without esophagitis   • Dense breast tissue on mammogram   • Cerebrovascular accident (CVA) due to embolism of left middle cerebral artery (HCC)     Advance Care Planning  Advance Directive is not on file.  ACP discussion was held with the patient during this visit. Patient has an advance directive (not in EMR), copy requested.          Objective    Vitals:    06/14/22 1442   BP: 110/70   BP Location: Right arm   Patient Position: Sitting   Cuff Size: Adult   Pulse: 72   Resp: 16   Temp: 98.8 °F (37.1 °C)   SpO2: 98%   Weight: 59.9 kg (132 lb)   Height: 162.6 cm (64\")   PainSc: 0-No pain     Estimated body mass index is 22.66 kg/m² as calculated from the following:    Height as of this encounter: 162.6 cm (64\").    Weight as of this encounter: 59.9 kg (132 lb).    BMI is within normal parameters. No other follow-up for BMI required.      Does the patient have evidence of cognitive " impairment? No    Physical Exam  Vitals reviewed.   Constitutional:       General: She is not in acute distress.  HENT:      Head: Normocephalic and atraumatic.      Right Ear: Tympanic membrane normal.      Left Ear: Tympanic membrane normal.   Cardiovascular:      Rate and Rhythm: Normal rate and regular rhythm.      Heart sounds: No murmur heard.  Pulmonary:      Effort: Pulmonary effort is normal.      Breath sounds: Normal breath sounds. No wheezing.   Abdominal:      Palpations: Abdomen is soft.   Musculoskeletal:      Right lower leg: No edema.      Left lower leg: No edema.      Comments: Right lower extremity weakness (baseline).  Ambulating with walker without difficulty.     Skin:     General: Skin is warm and dry.   Neurological:      Mental Status: She is alert and oriented to person, place, and time.       Lab Results   Component Value Date    CHLPL 118 06/08/2022    TRIG 66 06/08/2022    HDL 41 06/08/2022    LDL 63 06/08/2022    VLDL 14 06/08/2022            HEALTH RISK ASSESSMENT    Smoking Status:  Social History     Tobacco Use   Smoking Status Never Smoker   Smokeless Tobacco Never Used     Alcohol Consumption:  Social History     Substance and Sexual Activity   Alcohol Use Not Currently    Comment: Reports a hx of drinking everyday for 18 years atleast. She says that she quit May 2018, but says that she drinks occasionally now.      Fall Risk Screen:    [facility] Fall Risk Assessment was completed, and patient is at HIGH risk for falls. Assessment completed on:6/14/2022    Depression Screening:  PHQ-2/PHQ-9 Depression Screening 6/14/2022   Retired PHQ-9 Total Score -   Retired Total Score -   Little Interest or Pleasure in Doing Things 0-->not at all   Feeling Down, Depressed or Hopeless 0-->not at all   PHQ-9: Brief Depression Severity Measure Score 0       Health Habits and Functional and Cognitive Screening:  Functional & Cognitive Status 6/14/2022   Do you have difficulty preparing food and  eating? No   Do you have difficulty bathing yourself, getting dressed or grooming yourself? No   Do you have difficulty using the toilet? No   Do you have difficulty moving around from place to place? No   Do you have trouble with steps or getting out of a bed or a chair? No   Current Diet Limited Junk Food   Dental Exam Up to date   Eye Exam Not up to date   Exercise (times per week) 0 times per week   Current Exercise Activities Include -   Do you need help using the phone?  No   Are you deaf or do you have serious difficulty hearing?  No   Do you need help with transportation? No   Do you need help shopping? No   Do you need help preparing meals?  No   Do you need help with housework?  No   Do you need help with laundry? No   Do you need help taking your medications? No   Do you need help managing money? No   Do you ever drive or ride in a car without wearing a seat belt? No   Have you felt unusual stress, anger or loneliness in the last month? No   Who do you live with? Alone   If you need help, do you have trouble finding someone available to you? No   Have you been bothered in the last four weeks by sexual problems? -   Do you have difficulty concentrating, remembering or making decisions? No       Age-appropriate Screening Schedule:  Refer to the list below for future screening recommendations based on patient's age, sex and/or medical conditions. Orders for these recommended tests are listed in the plan section. The patient has been provided with a written plan.    Health Maintenance   Topic Date Due   • DXA SCAN  06/14/2022 (Originally 1955)   • ZOSTER VACCINE (1 of 2) 06/14/2022 (Originally 12/21/2003)   • TDAP/TD VACCINES (1 - Tdap) 12/06/2022 (Originally 12/21/1974)   • INFLUENZA VACCINE  10/01/2022   • LIPID PANEL  06/08/2023   • MAMMOGRAM  07/06/2023   • PAP SMEAR  Discontinued              Assessment & Plan   CMS Preventative Services Quick Reference  Risk Factors Identified During  Encounter  Fall Risk-High or Moderate  Immunizations Discussed/Encouraged (specific Immunizations; Shingrix and COVID19  The above risks/problems have been discussed with the patient.  Follow up actions/plans if indicated are seen below in the Assessment/Plan Section.  Pertinent information has been shared with the patient in the After Visit Summary.    Camille Pantoja states she has been doing well since she was last seen.  Reviewed recent labs along with patient which all appear stable.  Blood pressure currently stable at 110/70.  No changes in medications at this time.  Refills provided.  She plans to transfer care to provider closer to home in [place].  She has an upcoming appointment with JOHN Gilmore on 7/13/2022.      Diagnoses and all orders for this visit:    1. Medicare annual wellness visit, subsequent (Primary)    2. Paroxysmal atrial fibrillation with rapid ventricular response (HCC)  -     metoprolol succinate XL (Toprol XL) 100 MG 24 hr tablet; Take 0.5 tablets by mouth 2 (Two) Times a Day.  Dispense: 90 tablet; Refill: 0    3. Primary hypertension    4. Mixed hyperlipidemia  -     atorvastatin (LIPITOR) 40 MG tablet; Take 1 tablet by mouth Every Night.  Dispense: 90 tablet; Refill: 0    5. Bladder spasms  -     oxybutynin (DITROPAN) 5 MG tablet; Take 1 tablet by mouth 2 (Two) Times a Day.  Dispense: 180 tablet; Refill: 0    6. Gastroesophageal reflux disease without esophagitis  -     omeprazole (priLOSEC) 20 MG capsule; Take 1 capsule by mouth Daily.  Dispense: 90 capsule; Refill: 0        Follow Up:   Return if symptoms worsen or fail to improve.     An After Visit Summary and PPPS were made available to the patient.    Patient was wearing face mask when I entered the room and throughout our encounter. Protective equipment was worn throughout this patient encounter including a face mask, eye protection, and gloves. Hand hygiene was performed before donning protective equipment and after  removal when leaving the room.     Kelly Patino, APRN

## 2022-06-15 ENCOUNTER — TELEPHONE (OUTPATIENT)
Dept: FAMILY MEDICINE CLINIC | Facility: CLINIC | Age: 67
End: 2022-06-15

## 2022-06-15 DIAGNOSIS — E78.2 MIXED HYPERLIPIDEMIA: ICD-10-CM

## 2022-06-15 RX ORDER — ATORVASTATIN CALCIUM 40 MG/1
TABLET, FILM COATED ORAL
Qty: 90 TABLET | Refills: 0 | OUTPATIENT
Start: 2022-06-15

## 2022-06-15 NOTE — TELEPHONE ENCOUNTER
Caller: Camille Pantoja    Relationship: Self    Best call back number: [phone] (H)      What form or medical record are you requesting: MOST RECENT LAB WORK FROM 06/08/22     Who is requesting this form or medical record from you: PATIENT FOR PERSONAL RECORDS     How would you like to receive the form or medical records (pick-up, mail, fax): MAIL    If mail, what is the address:       [address] [place] [id number]    Timeframe paperwork needed: ASAP    Additional notes:     PLEASE ADVISE

## 2022-06-24 DIAGNOSIS — N32.89 BLADDER SPASMS: ICD-10-CM

## 2022-06-24 RX ORDER — OXYBUTYNIN CHLORIDE 5 MG/1
TABLET ORAL
Qty: 180 TABLET | Refills: 0 | Status: SHIPPED | OUTPATIENT
Start: 2022-06-24 | End: 2022-12-08

## 2022-06-25 DIAGNOSIS — K21.9 GASTROESOPHAGEAL REFLUX DISEASE WITHOUT ESOPHAGITIS: ICD-10-CM

## 2022-06-27 RX ORDER — OMEPRAZOLE 20 MG/1
CAPSULE, DELAYED RELEASE ORAL
Qty: 90 CAPSULE | Refills: 0 | Status: SHIPPED | OUTPATIENT
Start: 2022-06-27 | End: 2022-12-08

## 2022-09-02 DIAGNOSIS — I48.91 ATRIAL FIBRILLATION, UNSPECIFIED TYPE: ICD-10-CM

## 2022-09-02 RX ORDER — APIXABAN 5 MG/1
TABLET, FILM COATED ORAL
Qty: 180 TABLET | Refills: 1 | Status: SHIPPED | OUTPATIENT
Start: 2022-09-02 | End: 2023-03-02

## 2022-09-10 DIAGNOSIS — E78.2 MIXED HYPERLIPIDEMIA: ICD-10-CM

## 2022-09-12 RX ORDER — ATORVASTATIN CALCIUM 40 MG/1
TABLET, FILM COATED ORAL
Qty: 90 TABLET | Refills: 0 | Status: SHIPPED | OUTPATIENT
Start: 2022-09-12 | End: 2022-12-08

## 2022-09-30 NOTE — PROGRESS NOTES
Case Management Discharge Note      Final Note: Pt was dc'd to a skilled bed @  [facility]      Provided post acute provider list?: Yes  Post Acute Provider List: Inpatient Rehab    Destination - Selection Complete      Service Provider Request Status Selected Services Address Phone Number Fax Number     [facility] Selected Skilled Nursing [address] [place] [id number] [phone] --      Durable Medical Equipment      No service has been selected for the patient.      Dialysis/Infusion      No service has been selected for the patient.      Home Medical Care      No service has been selected for the patient.      Therapy      No service has been selected for the patient.      Community Resources      No service has been selected for the patient.        Transportation Services  Private: Car    Final Discharge Disposition Code: 03 - skilled nursing facility (SNF)   Xerosis Normal Treatment: I recommended application of Cetaphil or CeraVe numerous times a day going to bed to all dry areas.

## 2022-11-09 DIAGNOSIS — I48.0 PAROXYSMAL ATRIAL FIBRILLATION WITH RAPID VENTRICULAR RESPONSE: ICD-10-CM

## 2022-11-09 RX ORDER — METOPROLOL SUCCINATE 100 MG/1
TABLET, EXTENDED RELEASE ORAL
Qty: 90 TABLET | Refills: 0 | Status: SHIPPED | OUTPATIENT
Start: 2022-11-09 | End: 2023-02-06

## 2022-12-08 DIAGNOSIS — N32.89 BLADDER SPASMS: ICD-10-CM

## 2022-12-08 DIAGNOSIS — K21.9 GASTROESOPHAGEAL REFLUX DISEASE WITHOUT ESOPHAGITIS: ICD-10-CM

## 2022-12-08 DIAGNOSIS — E78.2 MIXED HYPERLIPIDEMIA: ICD-10-CM

## 2022-12-08 RX ORDER — OXYBUTYNIN CHLORIDE 5 MG/1
TABLET ORAL
Qty: 180 TABLET | Refills: 0 | Status: SHIPPED | OUTPATIENT
Start: 2022-12-08 | End: 2023-03-02 | Stop reason: SDUPTHER

## 2022-12-08 RX ORDER — OMEPRAZOLE 20 MG/1
CAPSULE, DELAYED RELEASE ORAL
Qty: 90 CAPSULE | Refills: 0 | Status: SHIPPED | OUTPATIENT
Start: 2022-12-08 | End: 2023-03-02 | Stop reason: SDUPTHER

## 2022-12-08 RX ORDER — ATORVASTATIN CALCIUM 40 MG/1
TABLET, FILM COATED ORAL
Qty: 90 TABLET | Refills: 0 | Status: SHIPPED | OUTPATIENT
Start: 2022-12-08 | End: 2023-03-02 | Stop reason: SDUPTHER

## 2023-02-05 DIAGNOSIS — I48.0 PAROXYSMAL ATRIAL FIBRILLATION WITH RAPID VENTRICULAR RESPONSE: ICD-10-CM

## 2023-02-06 RX ORDER — METOPROLOL SUCCINATE 100 MG/1
TABLET, EXTENDED RELEASE ORAL
Qty: 90 TABLET | Refills: 0 | Status: SHIPPED | OUTPATIENT
Start: 2023-02-06 | End: 2023-03-02 | Stop reason: SDUPTHER

## 2023-03-02 ENCOUNTER — OFFICE VISIT (OUTPATIENT)
Dept: FAMILY MEDICINE CLINIC | Facility: CLINIC | Age: 68
End: 2023-03-02
Payer: MEDICARE

## 2023-03-02 VITALS
RESPIRATION RATE: 16 BRPM | OXYGEN SATURATION: 99 % | DIASTOLIC BLOOD PRESSURE: 68 MMHG | HEIGHT: 64 IN | TEMPERATURE: 97.9 F | HEART RATE: 62 BPM | SYSTOLIC BLOOD PRESSURE: 112 MMHG | WEIGHT: 142 LBS | BODY MASS INDEX: 24.24 KG/M2

## 2023-03-02 DIAGNOSIS — Z79.01 CHRONIC ANTICOAGULATION: ICD-10-CM

## 2023-03-02 DIAGNOSIS — E78.2 MIXED HYPERLIPIDEMIA: ICD-10-CM

## 2023-03-02 DIAGNOSIS — E28.319 ASYMPTOMATIC PREMATURE MENOPAUSE: ICD-10-CM

## 2023-03-02 DIAGNOSIS — R29.6 RECURRENT FALLS: Primary | ICD-10-CM

## 2023-03-02 DIAGNOSIS — K21.9 GASTROESOPHAGEAL REFLUX DISEASE WITHOUT ESOPHAGITIS: ICD-10-CM

## 2023-03-02 DIAGNOSIS — E55.9 VITAMIN D DEFICIENCY: ICD-10-CM

## 2023-03-02 DIAGNOSIS — N32.89 BLADDER SPASMS: ICD-10-CM

## 2023-03-02 DIAGNOSIS — I10 PRIMARY HYPERTENSION: ICD-10-CM

## 2023-03-02 DIAGNOSIS — I48.0 PAROXYSMAL ATRIAL FIBRILLATION WITH RAPID VENTRICULAR RESPONSE: ICD-10-CM

## 2023-03-02 DIAGNOSIS — I48.91 ATRIAL FIBRILLATION, UNSPECIFIED TYPE: ICD-10-CM

## 2023-03-02 PROCEDURE — 99214 OFFICE O/P EST MOD 30 MIN: CPT | Performed by: STUDENT IN AN ORGANIZED HEALTH CARE EDUCATION/TRAINING PROGRAM

## 2023-03-02 RX ORDER — METOPROLOL SUCCINATE 100 MG/1
50 TABLET, EXTENDED RELEASE ORAL 2 TIMES DAILY
Qty: 90 TABLET | Refills: 0 | Status: SHIPPED | OUTPATIENT
Start: 2023-03-02 | End: 2023-03-31 | Stop reason: SDUPTHER

## 2023-03-02 RX ORDER — ATORVASTATIN CALCIUM 40 MG/1
40 TABLET, FILM COATED ORAL NIGHTLY
Qty: 90 TABLET | Refills: 0 | Status: SHIPPED | OUTPATIENT
Start: 2023-03-02

## 2023-03-02 RX ORDER — OMEPRAZOLE 20 MG/1
20 CAPSULE, DELAYED RELEASE ORAL DAILY
Qty: 90 CAPSULE | Refills: 0 | Status: SHIPPED | OUTPATIENT
Start: 2023-03-02

## 2023-03-02 RX ORDER — OXYBUTYNIN CHLORIDE 5 MG/1
5 TABLET ORAL 2 TIMES DAILY
Qty: 180 TABLET | Refills: 0 | Status: SHIPPED | OUTPATIENT
Start: 2023-03-02

## 2023-03-02 RX ORDER — APIXABAN 5 MG/1
TABLET, FILM COATED ORAL
Qty: 180 TABLET | Refills: 1 | Status: SHIPPED | OUTPATIENT
Start: 2023-03-02

## 2023-03-02 NOTE — PROGRESS NOTES
Gurjit Rodriguez,   [facility] PRIMARY CARE  [facility]  [place] [id number] [phone]    Subjective      Name Camille Pantoja MRN [id number]     FIELD 1955  AGE/SEX 67 y.o. / female      Chief Complaint Chief Complaint   Patient presents with   • Establish Care         Visit History for  2023    History of Present Illness  Camille Pantoja is a 67 y.o. female who presented today for Establish Care  She has seen multiple primary care physicians in the past.  She is trying to find a PCP that she feels most comfortable with.    She has a past medical history of paroxysmal atrial fibrillation with CVA.  She currently sees Dr. Rodriguez for neurology.  She states that she was recently diagnosed with cerebral ventriculomegaly and due to her being on chronic anticoagulation they are not going to do anything about it at this time.    Sees Cardiology for Afib    Had cancer after marriage and had a full hysterectomy.  She has not been able to have any children.    She does still deal with recurrent falls.  She has been on calcium and zinc in the past, but has not had DEXA scan completed recently.      Medications and Allergies   Current Outpatient Medications   Medication Instructions   • atorvastatin (LIPITOR) 40 mg, Oral, Nightly   • Eliquis 5 MG tablet tablet TAKE ONE TABLET BY MOUTH EVERY 12 HOURS   • metoprolol succinate XL (TOPROL-XL) 50 mg, Oral, 2 Times Daily   • omeprazole (PRILOSEC) 20 mg, Oral, Daily   • oxybutynin (DITROPAN) 5 mg, Oral, 2 Times Daily     Allergies   Allergen Reactions   • Cephalexin Other (See Comments)     Reports she doesn't remember having an allergic reaction.    • Melatonin Other (See Comments)     Not Efficacious   • Beef-Derived Products Rash   • [place] Rash   • Duricef [Cefadroxil] Rash   • Fish Allergy Rash   • Garlic Rash   • Green Beans Rash   • Onion Rash   • Other Rash   • Peach [Prunus Persica] Rash   • Wheat Rash   • Wheat  "Bran Rash      I have reviewed the above medications and allergies     Objective:      Vitals Vitals:    03/02/23 1449   BP: 112/68   BP Location: Right arm   Patient Position: Sitting   Cuff Size: Large Adult   Pulse: 62   Resp: 16   Temp: 97.9 °F (36.6 °C)   SpO2: 99%   Weight: 64.4 kg (142 lb)   Height: 162.6 cm (64\")     Body mass index is 24.37 kg/m².    Physical Exam  Vitals reviewed.   Constitutional:       General: She is not in acute distress.     Appearance: She is not ill-appearing.      Comments: Utilizing walker for stability during ambulation   Cardiovascular:      Rate and Rhythm: Normal rate and regular rhythm.   Pulmonary:      Effort: Pulmonary effort is normal.      Breath sounds: Normal breath sounds.   Neurological:      Mental Status: She is alert.   Psychiatric:         Mood and Affect: Mood normal.         Behavior: Behavior normal.         Thought Content: Thought content normal.         Judgment: Judgment normal.            Assessment/Plan      Issues Addressed/ Plan  1. Recurrent falls  - Most likely secondary to her stroke history and vestibular issues.  Possible in the future we could try to utilize vestibular therapy  - Due to her recurrent falls we need to make sure that her bone density is good in order to prevent bone injury.  - DEXA Bone Density Axial; Future  - CBC w AUTO Differential    2. Asymptomatic premature menopause  - Early hysterectomy secondary to cancer.  - DEXA Bone Density Axial; Future    3. Mixed hyperlipidemia  - Currently on atorvastatin 40 mg.  No changes indicated to medication at this time.  Being put stroke, patient needs to stay on statin therapy.  - Lipid Panel    4. Primary hypertension  - Currently utilizing metoprolol for her paroxysmal A-fib.  Blood pressure today is 112/68.  Need to watch for further decrease in blood pressure as this can cause further instability for her and falls.  - Comprehensive metabolic panel    5. Chronic anticoagulation  -She " denies any bloody stool, epistaxis, hematemesis, and no excessive bruising.  This been sometime since her blood levels have been checked.  - CBC w AUTO Differential    6. Vitamin D deficiency  - Noted in the past, but not currently on any treatment.  She may need to restart treatment with frequent falls.  - Vitamin D,25-Hydroxy     There are no Patient Instructions on file for this visit.      Follow up  recommended Return in about 3 months (around 6/2/2023) for follow-up medications.   - Dragon voice recognition software was utilized to complete this chart.  Every reasonable attempt was made to edit and correct the text, however some incorrect words may remain.   Gurjit Rodriguez, DO

## 2023-03-03 DIAGNOSIS — E55.9 VITAMIN D DEFICIENCY: Primary | ICD-10-CM

## 2023-03-03 LAB
25(OH)D3+25(OH)D2 SERPL-MCNC: 10.2 NG/ML (ref 30–100)
ALBUMIN SERPL-MCNC: 4.4 G/DL (ref 3.8–4.8)
ALBUMIN/GLOB SERPL: 1.7 {RATIO} (ref 1.2–2.2)
ALP SERPL-CCNC: 126 IU/L (ref 44–121)
ALT SERPL-CCNC: 13 IU/L (ref 0–32)
AST SERPL-CCNC: 16 IU/L (ref 0–40)
BASOPHILS # BLD AUTO: 0 X10E3/UL (ref 0–0.2)
BASOPHILS NFR BLD AUTO: 1 %
BILIRUB SERPL-MCNC: 0.6 MG/DL (ref 0–1.2)
BUN SERPL-MCNC: 13 MG/DL (ref 8–27)
BUN/CREAT SERPL: 16 (ref 12–28)
CALCIUM SERPL-MCNC: 9.7 MG/DL (ref 8.7–10.3)
CHLORIDE SERPL-SCNC: 102 MMOL/L (ref 96–106)
CHOLEST SERPL-MCNC: 135 MG/DL (ref 100–199)
CO2 SERPL-SCNC: 23 MMOL/L (ref 20–29)
CREAT SERPL-MCNC: 0.82 MG/DL (ref 0.57–1)
EGFRCR SERPLBLD CKD-EPI 2021: 77 ML/MIN/1.73
EOSINOPHIL # BLD AUTO: 0.2 X10E3/UL (ref 0–0.4)
EOSINOPHIL NFR BLD AUTO: 4 %
ERYTHROCYTE [DISTWIDTH] IN BLOOD BY AUTOMATED COUNT: 12.7 % (ref 11.7–15.4)
GLOBULIN SER CALC-MCNC: 2.6 G/DL (ref 1.5–4.5)
GLUCOSE SERPL-MCNC: 95 MG/DL (ref 70–99)
HCT VFR BLD AUTO: 37.2 % (ref 34–46.6)
HDLC SERPL-MCNC: 48 MG/DL
HGB BLD-MCNC: 12.3 G/DL (ref 11.1–15.9)
IMM GRANULOCYTES # BLD AUTO: 0 X10E3/UL (ref 0–0.1)
IMM GRANULOCYTES NFR BLD AUTO: 0 %
LDLC SERPL CALC-MCNC: 73 MG/DL (ref 0–99)
LYMPHOCYTES # BLD AUTO: 1 X10E3/UL (ref 0.7–3.1)
LYMPHOCYTES NFR BLD AUTO: 21 %
MCH RBC QN AUTO: 29.4 PG (ref 26.6–33)
MCHC RBC AUTO-ENTMCNC: 33.1 G/DL (ref 31.5–35.7)
MCV RBC AUTO: 89 FL (ref 79–97)
MONOCYTES # BLD AUTO: 0.3 X10E3/UL (ref 0.1–0.9)
MONOCYTES NFR BLD AUTO: 7 %
NEUTROPHILS # BLD AUTO: 3.2 X10E3/UL (ref 1.4–7)
NEUTROPHILS NFR BLD AUTO: 67 %
PLATELET # BLD AUTO: 180 X10E3/UL (ref 150–450)
POTASSIUM SERPL-SCNC: 4.4 MMOL/L (ref 3.5–5.2)
PROT SERPL-MCNC: 7 G/DL (ref 6–8.5)
RBC # BLD AUTO: 4.19 X10E6/UL (ref 3.77–5.28)
SODIUM SERPL-SCNC: 139 MMOL/L (ref 134–144)
TRIGL SERPL-MCNC: 69 MG/DL (ref 0–149)
VLDLC SERPL CALC-MCNC: 14 MG/DL (ref 5–40)
WBC # BLD AUTO: 4.8 X10E3/UL (ref 3.4–10.8)

## 2023-03-03 RX ORDER — ERGOCALCIFEROL 1.25 MG/1
50000 CAPSULE ORAL WEEKLY
Qty: 5 CAPSULE | Refills: 2 | Status: SHIPPED | OUTPATIENT
Start: 2023-03-03

## 2023-03-08 DIAGNOSIS — K21.9 GASTROESOPHAGEAL REFLUX DISEASE WITHOUT ESOPHAGITIS: ICD-10-CM

## 2023-03-08 DIAGNOSIS — E78.2 MIXED HYPERLIPIDEMIA: ICD-10-CM

## 2023-03-08 DIAGNOSIS — N32.89 BLADDER SPASMS: ICD-10-CM

## 2023-03-08 RX ORDER — OMEPRAZOLE 20 MG/1
CAPSULE, DELAYED RELEASE ORAL
Qty: 90 CAPSULE | Refills: 0 | OUTPATIENT
Start: 2023-03-08

## 2023-03-08 RX ORDER — ATORVASTATIN CALCIUM 40 MG/1
TABLET, FILM COATED ORAL
Qty: 90 TABLET | Refills: 0 | OUTPATIENT
Start: 2023-03-08

## 2023-03-08 RX ORDER — OXYBUTYNIN CHLORIDE 5 MG/1
TABLET ORAL
Qty: 180 TABLET | Refills: 0 | OUTPATIENT
Start: 2023-03-08

## 2023-03-31 ENCOUNTER — OFFICE VISIT (OUTPATIENT)
Dept: CARDIOLOGY | Facility: CLINIC | Age: 68
End: 2023-03-31
Payer: MEDICARE

## 2023-03-31 VITALS
BODY MASS INDEX: 24.24 KG/M2 | DIASTOLIC BLOOD PRESSURE: 70 MMHG | SYSTOLIC BLOOD PRESSURE: 100 MMHG | RESPIRATION RATE: 16 BRPM | HEART RATE: 47 BPM | OXYGEN SATURATION: 99 % | WEIGHT: 142 LBS | HEIGHT: 64 IN

## 2023-03-31 DIAGNOSIS — I48.0 PAROXYSMAL ATRIAL FIBRILLATION WITH RAPID VENTRICULAR RESPONSE: ICD-10-CM

## 2023-03-31 DIAGNOSIS — I42.8 NICM (NONISCHEMIC CARDIOMYOPATHY): Primary | ICD-10-CM

## 2023-03-31 DIAGNOSIS — G81.91 RIGHT HEMIPARESIS: ICD-10-CM

## 2023-03-31 DIAGNOSIS — I10 PRIMARY HYPERTENSION: ICD-10-CM

## 2023-03-31 DIAGNOSIS — I50.22 CHRONIC HFREF (HEART FAILURE WITH REDUCED EJECTION FRACTION): ICD-10-CM

## 2023-03-31 DIAGNOSIS — I63.412 CEREBROVASCULAR ACCIDENT (CVA) DUE TO EMBOLISM OF LEFT MIDDLE CEREBRAL ARTERY: ICD-10-CM

## 2023-03-31 DIAGNOSIS — E78.2 MIXED HYPERLIPIDEMIA: ICD-10-CM

## 2023-03-31 PROCEDURE — 93000 ELECTROCARDIOGRAM COMPLETE: CPT | Performed by: NURSE PRACTITIONER

## 2023-03-31 PROCEDURE — 1159F MED LIST DOCD IN RCRD: CPT | Performed by: NURSE PRACTITIONER

## 2023-03-31 PROCEDURE — 3074F SYST BP LT 130 MM HG: CPT | Performed by: NURSE PRACTITIONER

## 2023-03-31 PROCEDURE — 99214 OFFICE O/P EST MOD 30 MIN: CPT | Performed by: NURSE PRACTITIONER

## 2023-03-31 PROCEDURE — 3078F DIAST BP <80 MM HG: CPT | Performed by: NURSE PRACTITIONER

## 2023-03-31 PROCEDURE — 1160F RVW MEDS BY RX/DR IN RCRD: CPT | Performed by: NURSE PRACTITIONER

## 2023-03-31 RX ORDER — METOPROLOL SUCCINATE 50 MG/1
50 TABLET, EXTENDED RELEASE ORAL DAILY
Qty: 90 TABLET | Refills: 3 | Status: SHIPPED | OUTPATIENT
Start: 2023-03-31
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
## 2023-04-19 ENCOUNTER — TELEPHONE (OUTPATIENT)
Dept: NEUROLOGY | Facility: CLINIC | Age: 68
End: 2023-04-19
Payer: MEDICARE
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
## 2023-04-24 NOTE — TELEPHONE ENCOUNTER
PT CALLED TO FOLLOW UP. PT WANTS TO KNOW IF SHE CAN BE SEEN IN [place] AS IT IS HARD FOR PT TO GET TO [facility].

## 2023-04-26 ENCOUNTER — APPOINTMENT (OUTPATIENT)
Dept: BONE DENSITY | Facility: HOSPITAL | Age: 68
End: 2023-04-26
Payer: MEDICARE

## 2023-04-26 DIAGNOSIS — E28.319 ASYMPTOMATIC PREMATURE MENOPAUSE: ICD-10-CM

## 2023-04-26 DIAGNOSIS — R29.6 RECURRENT FALLS: ICD-10-CM

## 2023-04-26 PROCEDURE — 77080 DXA BONE DENSITY AXIAL: CPT

## 2023-04-27 ENCOUNTER — TELEPHONE (OUTPATIENT)
Dept: FAMILY MEDICINE CLINIC | Facility: CLINIC | Age: 68
End: 2023-04-27
Payer: MEDICARE

## 2023-04-27 PROBLEM — M81.0 OSTEOPOROSIS: Status: ACTIVE | Noted: 2023-04-27

## 2023-04-27 NOTE — TELEPHONE ENCOUNTER
"----- Message from Gurjit Rodriguez DO sent at 4/27/2023  1:40 PM EDT -----  Regarding: Needs appointment  Have recent results that need to be conveyed to the patient about her DEXA scan.      Message:  \"Your recent DEXA scan indicated that you do have Osteoporosis.  Your T-score is also elevated and means that we need to treat with medication.  We are going to have to sit and discuss these medications together to see which would be the best fit for you.  This will need to be sooner than the appointment you already have scheduled.\"    She is going to need an appointment to discuss medication options      "

## 2023-05-01 NOTE — TELEPHONE ENCOUNTER
Provider: DR AGUILAR  Caller: ASHLEIGH  Relationship to Patient: SELF  Phone Number: [phone]  Reason for Call: CALLED TO CHECK THE STATUS OF GETTING FOLLOW UP. PLEASE ADVISE, THANK YOU.

## 2023-05-01 NOTE — TELEPHONE ENCOUNTER
Spoke with patient and made appointment for 6/29/2023. Patient will call back if she can not get a ride to appointment

## 2023-05-04 ENCOUNTER — OFFICE VISIT (OUTPATIENT)
Dept: FAMILY MEDICINE CLINIC | Facility: CLINIC | Age: 68
End: 2023-05-04
Payer: MEDICARE

## 2023-05-04 VITALS
HEART RATE: 63 BPM | WEIGHT: 147 LBS | RESPIRATION RATE: 18 BRPM | DIASTOLIC BLOOD PRESSURE: 78 MMHG | OXYGEN SATURATION: 98 % | SYSTOLIC BLOOD PRESSURE: 128 MMHG | HEIGHT: 65 IN | BODY MASS INDEX: 24.49 KG/M2

## 2023-05-04 DIAGNOSIS — M81.0 AGE-RELATED OSTEOPOROSIS WITHOUT CURRENT PATHOLOGICAL FRACTURE: Primary | ICD-10-CM

## 2023-05-04 PROCEDURE — 3074F SYST BP LT 130 MM HG: CPT | Performed by: STUDENT IN AN ORGANIZED HEALTH CARE EDUCATION/TRAINING PROGRAM

## 2023-05-04 PROCEDURE — 99213 OFFICE O/P EST LOW 20 MIN: CPT | Performed by: STUDENT IN AN ORGANIZED HEALTH CARE EDUCATION/TRAINING PROGRAM

## 2023-05-04 PROCEDURE — 3078F DIAST BP <80 MM HG: CPT | Performed by: STUDENT IN AN ORGANIZED HEALTH CARE EDUCATION/TRAINING PROGRAM

## 2023-05-04 PROCEDURE — 1160F RVW MEDS BY RX/DR IN RCRD: CPT | Performed by: STUDENT IN AN ORGANIZED HEALTH CARE EDUCATION/TRAINING PROGRAM

## 2023-05-04 PROCEDURE — 1159F MED LIST DOCD IN RCRD: CPT | Performed by: STUDENT IN AN ORGANIZED HEALTH CARE EDUCATION/TRAINING PROGRAM

## 2023-05-04 RX ORDER — ALENDRONATE SODIUM 10 MG/1
10 TABLET ORAL
Qty: 30 TABLET | Refills: 2 | Status: SHIPPED | OUTPATIENT
Start: 2023-05-04

## 2023-05-04 NOTE — PROGRESS NOTES
"    Gurjit Rodriguez DO  [facility] PRIMARY CARE  [address] [place] [address] [address]  [place] [id number] [phone]    Subjective      Name Camille Pantoja MRN [id number]    1955 AGE/SEX 67 y.o. / female      Chief Complaint Chief Complaint   Patient presents with   • Osteoporosis         Visit History for  2023    History of Present Illness  Camille Pantoja is a 67 y.o. female who presented today for Osteoporosis  She does have a history of reflux, but currently feels like she is well controlled.  Understands the need for further treatment of osteoporosis.  Would like to pursue further treatment.  Would like to try oral based medications first.      Medications and Allergies   Current Outpatient Medications   Medication Instructions   • alendronate (FOSAMAX) 10 mg, Oral, Every Morning Before Breakfast   • atorvastatin (LIPITOR) 40 mg, Oral, Nightly   • Eliquis 5 MG tablet tablet TAKE ONE TABLET BY MOUTH EVERY 12 HOURS   • metoprolol succinate XL (TOPROL-XL) 50 mg, Oral, Daily   • omeprazole (PRILOSEC) 20 mg, Oral, Daily   • oxybutynin (DITROPAN) 5 mg, Oral, 2 Times Daily   • vitamin D (ERGOCALCIFEROL) 50,000 Units, Oral, Weekly     Allergies   Allergen Reactions   • Cephalexin Other (See Comments)     Reports she doesn't remember having an allergic reaction.    • Melatonin Other (See Comments)     Not Efficacious   • Beef-Derived Products Rash   • [place] Rash   • Duricef [Cefadroxil] Rash   • Fish Allergy Rash   • Garlic Rash   • Green Beans Rash   • Onion Rash   • Other Rash   • Peach [Prunus Persica] Rash   • Wheat Rash   • Wheat Bran Rash      I have reviewed the above medications and allergies     Objective:      Vitals Vitals:    23 1408   BP: 128/78   BP Location: Right arm   Patient Position: Sitting   Cuff Size: Adult   Pulse: 63   Resp: 18   SpO2: 98%   Weight: 66.7 kg (147 lb)   Height: 165.1 cm (65\")     Body mass index is 24.46 kg/m².    Physical Exam  Vitals reviewed. "   Constitutional:       General: She is not in acute distress.     Appearance: She is not ill-appearing.   Pulmonary:      Effort: Pulmonary effort is normal.   Psychiatric:         Mood and Affect: Mood normal.         Behavior: Behavior normal.         Thought Content: Thought content normal.         Judgment: Judgment normal.            Assessment/Plan      Issues Addressed/ Plan  1. Age-related osteoporosis without current pathological fracture  - Discussed the need for further treatment, and treatment options.  Discussed at length the risks and benefits of starting Fosamax.  Also discussed the need to sit upright for at least an hour after taking medication.  Patient understands that it is possible she may not be able to tolerate oral medication.  He did continue to monitor for GERD symptoms.  - We will also continue to follow calcium and vitamin D levels.  - We will repeat DEXA scan after treatment.  - alendronate (FOSAMAX) 10 MG tablet; Take 1 tablet by mouth Every Morning Before Breakfast.  Dispense: 30 tablet; Refill: 2     There are no Patient Instructions on file for this visit.      Follow up  recommended Return in about 1 month (around 6/4/2023) for Tolerance of osteoporosis medication.   - Dragon voice recognition software was utilized to complete this chart.  Every reasonable attempt was made to edit and correct the text, however some incorrect words may remain.   Gurjit Rodriguez, DO

## 2023-05-27 DIAGNOSIS — K21.9 GASTROESOPHAGEAL REFLUX DISEASE WITHOUT ESOPHAGITIS: ICD-10-CM

## 2023-05-27 DIAGNOSIS — N32.89 BLADDER SPASMS: ICD-10-CM

## 2023-05-30 RX ORDER — OXYBUTYNIN CHLORIDE 5 MG/1
TABLET ORAL
Qty: 180 TABLET | Refills: 0 | Status: SHIPPED | OUTPATIENT
Start: 2023-05-30

## 2023-05-30 RX ORDER — OMEPRAZOLE 20 MG/1
CAPSULE, DELAYED RELEASE ORAL
Qty: 90 CAPSULE | Refills: 0 | Status: SHIPPED | OUTPATIENT
Start: 2023-05-30

## 2023-05-31 DIAGNOSIS — E55.9 VITAMIN D DEFICIENCY: ICD-10-CM

## 2023-05-31 RX ORDER — ERGOCALCIFEROL 1.25 MG/1
CAPSULE ORAL
Qty: 5 CAPSULE | Refills: 2 | Status: SHIPPED | OUTPATIENT
Start: 2023-05-31

## 2023-06-01 DIAGNOSIS — E78.2 MIXED HYPERLIPIDEMIA: ICD-10-CM

## 2023-06-01 RX ORDER — ATORVASTATIN CALCIUM 40 MG/1
TABLET, FILM COATED ORAL
Qty: 90 TABLET | Refills: 0 | Status: SHIPPED | OUTPATIENT
Start: 2023-06-01

## 2023-06-29 PROBLEM — Z91.81 AT HIGH RISK FOR FALLS: Status: ACTIVE | Noted: 2023-06-29

## 2023-06-30 ENCOUNTER — TELEPHONE (OUTPATIENT)
Dept: NEUROLOGY | Facility: CLINIC | Age: 68
End: 2023-06-30

## 2023-06-30 NOTE — TELEPHONE ENCOUNTER
Provider: ROSANA AMAYA APRN    Caller: ASHLEIGH    Relationship to Patient: SELF    Phone Number: [phone]    Reason for Call: PT CALLING REGARDING THE PHYSICAL THERAPY REFERRAL WITH  [facility].   STATED SHE WOULD LIKE TO HAVE IT WITH HOME HEALTH INSTEAD.  STATED SHE HAS TO GET A RIDE FOR HER APPOINTMENTS.    When was the patient last seen: 6-29-23    PLEASE CALL & ADVISE    Include Location In Plan?: Yes Detail Level: Detailed

## 2023-07-26 ENCOUNTER — HOSPITAL ENCOUNTER (OUTPATIENT)
Dept: MRI IMAGING | Facility: HOSPITAL | Age: 68
Discharge: HOME | End: 2023-07-26
Admitting: NURSE PRACTITIONER
Payer: MEDICARE

## 2023-07-26 DIAGNOSIS — H53.133 ACUTE LOSS OF VISION, BILATERAL: ICD-10-CM

## 2023-07-26 PROCEDURE — 70551 MRI BRAIN STEM W/O DYE: CPT

## 2023-07-28 ENCOUNTER — TELEPHONE (OUTPATIENT)
Dept: NEUROLOGY | Facility: CLINIC | Age: 68
End: 2023-07-28
Payer: MEDICARE

## 2023-08-28 DIAGNOSIS — E78.2 MIXED HYPERLIPIDEMIA: ICD-10-CM

## 2023-08-28 DIAGNOSIS — N32.89 BLADDER SPASMS: ICD-10-CM

## 2023-08-28 DIAGNOSIS — I48.91 ATRIAL FIBRILLATION, UNSPECIFIED TYPE: ICD-10-CM

## 2023-08-28 RX ORDER — APIXABAN 5 MG/1
TABLET, FILM COATED ORAL
Qty: 180 TABLET | Refills: 1 | OUTPATIENT
Start: 2023-08-28

## 2023-08-28 RX ORDER — OXYBUTYNIN CHLORIDE 5 MG/1
TABLET ORAL
Qty: 180 TABLET | Refills: 0 | Status: SHIPPED | OUTPATIENT
Start: 2023-08-28

## 2023-08-28 RX ORDER — ATORVASTATIN CALCIUM 40 MG/1
TABLET, FILM COATED ORAL
Qty: 90 TABLET | Refills: 0 | Status: SHIPPED | OUTPATIENT
Start: 2023-08-28

## 2023-08-29 DIAGNOSIS — I48.91 ATRIAL FIBRILLATION, UNSPECIFIED TYPE: ICD-10-CM

## 2023-08-30 DIAGNOSIS — I48.91 ATRIAL FIBRILLATION, UNSPECIFIED TYPE: ICD-10-CM

## 2023-08-30 RX ORDER — APIXABAN 5 MG/1
TABLET, FILM COATED ORAL
Qty: 180 TABLET | Refills: 1 | OUTPATIENT
Start: 2023-08-30

## 2023-08-30 NOTE — TELEPHONE ENCOUNTER
Caller: Camille Pantoja    Relationship: Self    Best call back number: [phone]     Requested Prescriptions:   Requested Prescriptions     Pending Prescriptions Disp Refills    apixaban (Eliquis) 5 MG tablet tablet 180 tablet 1     Sig: Take 1 tablet by mouth Every 12 (Twelve) Hours.        Pharmacy where request should be sent: [facility] [id number] [facility] [address] - [phone] PH - [phone] FX     Last office visit with prescribing clinician: 6/6/2023   Last telemedicine visit with prescribing clinician: Visit date not found   Next office visit with prescribing clinician: 9/7/2023       Does the patient have less than a 3 day supply:  [] Yes  [x] No      Chilango Benavides Rep   08/30/23 09:33 EDT

## 2023-09-12 ENCOUNTER — OFFICE VISIT (OUTPATIENT)
Dept: FAMILY MEDICINE CLINIC | Facility: CLINIC | Age: 68
End: 2023-09-12

## 2023-09-12 VITALS
DIASTOLIC BLOOD PRESSURE: 84 MMHG | RESPIRATION RATE: 18 BRPM | WEIGHT: 141 LBS | OXYGEN SATURATION: 98 % | HEART RATE: 60 BPM | SYSTOLIC BLOOD PRESSURE: 108 MMHG | HEIGHT: 65 IN | BODY MASS INDEX: 23.49 KG/M2

## 2023-09-12 DIAGNOSIS — Z12.31 SCREENING MAMMOGRAM FOR BREAST CANCER: ICD-10-CM

## 2023-09-12 DIAGNOSIS — I10 PRIMARY HYPERTENSION: ICD-10-CM

## 2023-09-12 DIAGNOSIS — E78.2 MIXED HYPERLIPIDEMIA: Primary | ICD-10-CM

## 2023-09-12 DIAGNOSIS — E55.9 VITAMIN D DEFICIENCY: ICD-10-CM

## 2023-09-12 PROBLEM — F10.21 ALCOHOLISM IN REMISSION: Status: ACTIVE | Noted: 2020-08-12

## 2023-09-12 PROCEDURE — 3074F SYST BP LT 130 MM HG: CPT | Performed by: STUDENT IN AN ORGANIZED HEALTH CARE EDUCATION/TRAINING PROGRAM

## 2023-09-12 PROCEDURE — 3079F DIAST BP 80-89 MM HG: CPT | Performed by: STUDENT IN AN ORGANIZED HEALTH CARE EDUCATION/TRAINING PROGRAM

## 2023-09-12 PROCEDURE — 99214 OFFICE O/P EST MOD 30 MIN: CPT | Performed by: STUDENT IN AN ORGANIZED HEALTH CARE EDUCATION/TRAINING PROGRAM

## 2023-09-12 NOTE — PROGRESS NOTES
Gurjit Rodriguez,   [facility] PRIMARY CARE  [facility]  [place] [id number] [phone]    Subjective      Name Camille Pantoja MRN [id number]    1955 AGE/SEX 67 y.o. / female      Chief Complaint Chief Complaint   Patient presents with    Hypertension     Medication follow up          Visit History for  2023    History of Present Illness  Camille Pantoja is a 67 y.o. female who presented today for Hypertension (Medication follow up )    The patient had a fall out of bed at 1:00 AM on 2023 when her dog, who weighs approximately 80 pounds, pushed her out of bed. She was able to crawl to the foot of her bed and stand again.    She ambulates with a walker. She is able to drive short distances. She does not drive at night.    She is taking vitamin D once weekly. She is taking Fosamax and she is tolerating this well.        Medications and Allergies   Current Outpatient Medications   Medication Instructions    alendronate (FOSAMAX) 10 mg, Oral, Every Morning Before Breakfast    apixaban (ELIQUIS) 5 mg, Oral, Every 12 Hours    atorvastatin (LIPITOR) 40 MG tablet TAKE ONE TABLET BY MOUTH ONCE NIGHTLY    metoprolol succinate XL (TOPROL-XL) 50 mg, Oral, Daily    oxybutynin (DITROPAN) 5 MG tablet TAKE 1 TABLET BY MOUTH TWICE A DAY    vitamin D (ERGOCALCIFEROL) 1.25 MG (54860 UT) capsule capsule TAKE ONE CAPSULE BY MOUTH ONCE WEEKLY     Allergies   Allergen Reactions    Cephalexin Other (See Comments)     Reports she doesn't remember having an allergic reaction.     Melatonin Other (See Comments)     Not Efficacious    Beef-Derived Products Rash    Citrus Rash    Duricef [Cefadroxil] Rash    Fish Allergy Rash    Garlic Rash    Green Beans Rash    Onion Rash    Other Rash    Peach [Prunus Persica] Rash    Wheat Rash    Wheat Bran Rash      I have reviewed the above medications and allergies     Objective:      Vitals Vitals:    23 1414   BP: 108/84   BP Location:  "Right arm   Patient Position: Sitting   Cuff Size: Adult   Pulse: 60   Resp: 18   SpO2: 98%   Weight: 64 kg (141 lb)   Height: 165.1 cm (65\")     Body mass index is 23.46 kg/m².    Physical Exam  Vitals reviewed.   Constitutional:       General: She is not in acute distress.     Appearance: She is not ill-appearing.   Pulmonary:      Effort: Pulmonary effort is normal.   Psychiatric:         Mood and Affect: Mood normal.         Behavior: Behavior normal.         Thought Content: Thought content normal.         Judgment: Judgment normal.          Assessment/Plan      Issues Addressed/ Plan  1. Mixed hyperlipidemia  -Taking medication as directed, no changes indicated at this time.  Needing labs in order to determine if changes are needed in the future.  - Lipid Panel; Future    2. Primary hypertension  -Currently stable.  Needing liver and kidney function checked at this time.  - Comprehensive Metabolic Panel; Future  - Lipid Panel; Future    3. Vitamin D deficiency  -Has been taking vitamin D as directed.  Going to recheck vitamin D levels to make sure they are sufficient at this time.  - Vitamin D 25 hydroxy; Future    4. Screening mammogram for breast cancer  - Mammo Screening Bilateral With CAD; Future     There are no Patient Instructions on file for this visit.         Follow up  recommended Return in about 3 months (around 12/12/2023) for Medicare Wellness.   - Dragon voice recognition software was utilized to complete this chart.  Every reasonable attempt was made to edit and correct the text, however some incorrect words may remain.   Gurjit Rodriguez DO       Transcribed from ambient dictation for Gurjit Rodriguez DO by Lavonne Hdez.  09/12/23   16:46 EDT    Patient or patient representative verbalized consent to the visit recording.  I have personally performed the services described in this document as transcribed by the above individual, and it is both accurate and complete.  "

## 2023-09-19 ENCOUNTER — TRANSCRIBE ORDERS (OUTPATIENT)
Dept: MAMMOGRAPHY | Facility: HOSPITAL | Age: 68
End: 2023-09-19
Payer: MEDICARE

## 2023-09-19 DIAGNOSIS — Z12.31 SCREENING MAMMOGRAM FOR BREAST CANCER: Primary | ICD-10-CM

## 2023-09-29 ENCOUNTER — OFFICE VISIT (OUTPATIENT)
Dept: CARDIOLOGY | Facility: CLINIC | Age: 68
End: 2023-09-29
Payer: MEDICARE

## 2023-09-29 VITALS
DIASTOLIC BLOOD PRESSURE: 74 MMHG | WEIGHT: 142 LBS | BODY MASS INDEX: 23.66 KG/M2 | HEART RATE: 71 BPM | SYSTOLIC BLOOD PRESSURE: 110 MMHG | HEIGHT: 65 IN

## 2023-09-29 DIAGNOSIS — I10 PRIMARY HYPERTENSION: ICD-10-CM

## 2023-09-29 DIAGNOSIS — E78.2 MIXED HYPERLIPIDEMIA: ICD-10-CM

## 2023-09-29 DIAGNOSIS — I42.8 NICM (NONISCHEMIC CARDIOMYOPATHY): ICD-10-CM

## 2023-09-29 DIAGNOSIS — I48.0 PAROXYSMAL ATRIAL FIBRILLATION WITH RAPID VENTRICULAR RESPONSE: Primary | ICD-10-CM

## 2023-09-29 PROCEDURE — 1160F RVW MEDS BY RX/DR IN RCRD: CPT | Performed by: INTERNAL MEDICINE

## 2023-09-29 PROCEDURE — 3078F DIAST BP <80 MM HG: CPT | Performed by: INTERNAL MEDICINE

## 2023-09-29 PROCEDURE — 93000 ELECTROCARDIOGRAM COMPLETE: CPT | Performed by: INTERNAL MEDICINE

## 2023-09-29 PROCEDURE — 99213 OFFICE O/P EST LOW 20 MIN: CPT | Performed by: INTERNAL MEDICINE

## 2023-09-29 PROCEDURE — 3074F SYST BP LT 130 MM HG: CPT | Performed by: INTERNAL MEDICINE

## 2023-09-29 PROCEDURE — 1159F MED LIST DOCD IN RCRD: CPT | Performed by: INTERNAL MEDICINE

## 2023-09-29 NOTE — PROGRESS NOTES
Date of Office Visit: 2023  Encounter Provider: Jovita Brown MD  Place of Service: [facility] CARDIOLOGY  Patient Name: Camille Pantoja  :1955      Patient ID:  Camille Pantoja is a 67 y.o. female is here for  followup for hypertension.        History of Present Illness    She has a history of DVT, alcohol abuse, hypertension, GERD, atrial fibrillation, cardiomyopathy, stroke and hyperlipidemia     She had a left lower lobe pneumonia diagnosed 2020 and treated with Levaquin developed weakness and diarrhea.  She then represented to [facility] on 1/10/20 with altered mental status. She was found to be in rapid atrial fibrillation. She was noted to have an elevated troponin. Echocardiogram was obtained and showed new cardiomyopathy moderate left ventricular dilation, ejection fraction 27%, mild concentric left ventricle hypertrophy, severe biatrial enlargement, moderate right ventricular dilation with moderately reduced right ventricular systolic function, mild mitral and tricuspid insufficiency, akinesis of the inferior and lateral wall with hypokinesis of the anterior and septum.. She was transferred to [facility] for further neurologic evaluation. MRI did show acute left hemisphere subcortical stroke. On 20 morning, patient had new mental status changes. MRI showed slight expansion of stroke. On the evening of  patient had decrease level of consciousness. She was sent for MRI which showed new area of infarcts in the right cerebellum. She was placed on a heparin drip and eventually to apixaban.  Cardiac catheterization done 1/10/2020 showed normal coronary arteries.  She had a left thyroid hypodense nodule noted on CTA head and neck. Patient was discharged to  [facility] rehabilitation [place].      Echo done  showed ejection fraction 56% with normal diastolic function, normal chamber sizes and no significant  valvular disease.      Labs on 3/2/2023 show unremarkable CMP, total cholesterol 135, HDL 48, LDL 73, triglycerides 69, low vitamin D level at 10.2, normal CBC.    She has no chest pain or difficulty breathing.  She does not feel her heart racing or skipping.  She is using a walker.  She has no orthopnea or PND.  She has no fevers, chills or cough.  She is taking her medications as directed without difficulty.    Past Medical History:   Diagnosis Date    CHF (congestive heart failure)     Hyperlipidemia     Hypertension     Irregular heart beat     Osteoporosis     Skin cancer     Stroke          Past Surgical History:   Procedure Laterality Date    CARDIAC CATHETERIZATION N/A 1/10/2020    Procedure: Left Heart Cath;  Surgeon: Cain Mcneil MD;  Location:  [facility] INVAS[facility] LOC[facility];  Service: Cardiovascular    CARDIAC CATHETERIZATION N/A 1/10/2020    Procedure: Coronary angiography;  Surgeon: Cain Mcneil MD;  Location:  [facility] INV[facility];  Service: Cardiovascular    FINGER SURGERY      HYSTERECTOMY      JOINT REPLACEMENT      ORIF SHOULDER DISLOCATION W/ HUMERAL FRACTURE      TIBIA FRACTURE SURGERY         Current Outpatient Medications on File Prior to Visit   Medication Sig Dispense Refill    alendronate (FOSAMAX) 10 MG tablet Take 1 tablet by mouth Every Morning Before Breakfast. 90 tablet 1    apixaban (Eliquis) 5 MG tablet tablet Take 1 tablet by mouth Every 12 (Twelve) Hours. 180 tablet 1    atorvastatin (LIPITOR) 40 MG tablet TAKE ONE TABLET BY MOUTH ONCE NIGHTLY 90 tablet 0    metoprolol succinate XL (TOPROL-XL) 50 MG 24 hr tablet Take 1 tablet by mouth Daily. 90 tablet 3    oxybutynin (DITROPAN) 5 MG tablet TAKE 1 TABLET BY MOUTH TWICE A  tablet 0    vitamin D (ERGOCALCIFEROL) 1.25 MG (55945 UT) capsule capsule TAKE ONE CAPSULE BY MOUTH ONCE WEEKLY 5 capsule 2     No current facility-administered medications on file prior to visit.       Social History     Socioeconomic  "History    Marital status:    Tobacco Use    Smoking status: Never     Passive exposure: Never    Smokeless tobacco: Never   Vaping Use    Vaping Use: Never used   Substance and Sexual Activity    Alcohol use: Not Currently     Comment: Reports a hx of drinking everyday for 18 years atleast. She says that she quit May 2018, but says that she drinks occasionally now.     Drug use: No     Comment: caffeine use: 2 cups daily.     Sexual activity: Not Currently           ROS    Procedures    ECG 12 Lead    Date/Time: 9/29/2023 3:24 PM  Performed by: Jovita Brown MD  Authorized by: Jovita Brown MD   Comparison: compared with previous ECG   Similar to previous ECG  Rhythm: sinus rhythm  Conduction: left anterior fascicular block    Clinical impression: abnormal EKG            Objective:      Vitals:    09/29/23 1505   BP: 110/74   Pulse: 71   Weight: 64.4 kg (142 lb)   Height: 165.1 cm (65\")     Body mass index is 23.63 kg/m².    Vitals reviewed.   Constitutional:       General: Not in acute distress.     Appearance: Well-developed. Not diaphoretic.   Eyes:      General: No scleral icterus.     Conjunctiva/sclera: Conjunctivae normal.   HENT:      Head: Normocephalic and atraumatic.   Neck:      Thyroid: No thyromegaly.      Vascular: No carotid bruit or JVD.      Lymphadenopathy: No cervical adenopathy.   Pulmonary:      Effort: Pulmonary effort is normal. No respiratory distress.      Breath sounds: Normal breath sounds. No wheezing. No rhonchi. No rales.   Chest:      Chest wall: Not tender to palpatation.   Cardiovascular:      Normal rate. Regular rhythm.      Murmurs: There is no murmur.      No gallop.  No rub.   Pulses:     Intact distal pulses.      Carotid: 2+ bilaterally.     Radial: 2+ bilaterally.     Dorsalis pedis: 2+ bilaterally.     Posterior tibial: 2+ bilaterally.  Edema:     Peripheral edema absent.   Abdominal:      General: Bowel sounds are normal. There is no distension or " abdominal bruit.      Palpations: Abdomen is soft. There is no abdominal mass.      Tenderness: There is no abdominal tenderness.   Musculoskeletal:         General: No deformity.      Extremities: No clubbing present.     Cervical back: Neck supple. Skin:     General: Skin is warm and dry. There is no cyanosis.      Coloration: Skin is not pale.      Findings: No rash.   Neurological:      Mental Status: Alert and oriented to person, place, and time.      Cranial Nerves: No cranial nerve deficit.   Psychiatric:         Judgment: Judgment normal.       Lab Review:       Assessment:      Diagnosis Plan   1. NICM (nonischemic cardiomyopathy)        2. Paroxysmal atrial fibrillation with rapid ventricular response        3. Primary hypertension        4. Mixed hyperlipidemia          PAF, in NSR on Toprol and apixaban.  Remain on apixaban indefinitely unless there is clear indication that continuation of apixaban has more risk than its benefit.  She needs the apixaban because of a history of multiple strokes when she was not on anticoagulation.  Multiple strokes noted on her MRI.  H/o Nonischemic cardiomyopathy, resolved, normal EF.  History of seizures.  Hyperlipidemia.  On atorvastatin.      Plan:       See flora in 1 year, no med changes, overall doing well. No other testing at this time.

## 2023-09-30 DIAGNOSIS — E55.9 VITAMIN D DEFICIENCY: ICD-10-CM

## 2023-10-02 RX ORDER — ERGOCALCIFEROL 1.25 MG/1
CAPSULE ORAL
Qty: 5 CAPSULE | Refills: 2 | Status: SHIPPED | OUTPATIENT
Start: 2023-10-02

## 2023-10-03 ENCOUNTER — HOSPITAL ENCOUNTER (OUTPATIENT)
Dept: MAMMOGRAPHY | Facility: HOSPITAL | Age: 68
Discharge: HOME | End: 2023-10-03
Admitting: STUDENT IN AN ORGANIZED HEALTH CARE EDUCATION/TRAINING PROGRAM
Payer: MEDICARE

## 2023-10-03 DIAGNOSIS — Z12.31 SCREENING MAMMOGRAM FOR BREAST CANCER: ICD-10-CM

## 2023-10-03 PROCEDURE — 77067 SCR MAMMO BI INCL CAD: CPT

## 2023-10-03 PROCEDURE — 77063 BREAST TOMOSYNTHESIS BI: CPT

## 2023-10-12 ENCOUNTER — TELEPHONE (OUTPATIENT)
Dept: FAMILY MEDICINE CLINIC | Facility: CLINIC | Age: 68
End: 2023-10-12

## 2023-10-12 NOTE — TELEPHONE ENCOUNTER
Caller: Camille Pantoja    Relationship: Self    Best call back number: [phone]    What test was performed: MAMMOGRAM     When was the test performed: 10/3/23    Where was the test performed: [facility]     Additional notes: PATIENT IS REQUESTING A CALL BACK WITH TEST RESULTS.

## 2023-10-31 ENCOUNTER — OFFICE VISIT (OUTPATIENT)
Dept: NEUROLOGY | Facility: CLINIC | Age: 68
End: 2023-10-31
Payer: MEDICARE

## 2023-10-31 VITALS
HEIGHT: 65 IN | SYSTOLIC BLOOD PRESSURE: 128 MMHG | DIASTOLIC BLOOD PRESSURE: 74 MMHG | BODY MASS INDEX: 23.63 KG/M2 | OXYGEN SATURATION: 100 % | HEART RATE: 71 BPM

## 2023-10-31 DIAGNOSIS — G60.9 IDIOPATHIC PERIPHERAL NEUROPATHY: Primary | ICD-10-CM

## 2023-10-31 DIAGNOSIS — G95.9 MYELOPATHY: ICD-10-CM

## 2023-10-31 DIAGNOSIS — R73.9 HYPERGLYCEMIA, UNSPECIFIED: ICD-10-CM
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
## 2023-11-06 ENCOUNTER — TELEPHONE (OUTPATIENT)
Dept: FAMILY MEDICINE CLINIC | Facility: CLINIC | Age: 68
End: 2023-11-06
Payer: MEDICARE

## 2023-11-06 NOTE — TELEPHONE ENCOUNTER
Caller: Camille Pantoja    Relationship: Self    Best call back number: [phone]    What is the best time to reach you: AFTER 3:30 PM TODAY, 11/06/23    Who are you requesting to speak with (clinical staff, provider,  specific staff member): CLINICAL STAFF    Do you know the name of the person who called: PATIENT     What was the call regarding: PATIENT SAID SHE MISSED A CALL FROM THE OFFICE TODAY AND WAS NOT SURE WHO CALLED     Is it okay if the provider responds through MyChart: NO

## 2023-11-17 ENCOUNTER — LAB (OUTPATIENT)
Dept: LAB | Facility: HOSPITAL | Age: 68
End: 2023-11-17
Payer: MEDICARE

## 2023-11-17 DIAGNOSIS — G60.9 IDIOPATHIC PERIPHERAL NEUROPATHY: ICD-10-CM

## 2023-11-17 DIAGNOSIS — I10 PRIMARY HYPERTENSION: ICD-10-CM

## 2023-11-17 DIAGNOSIS — E78.2 MIXED HYPERLIPIDEMIA: ICD-10-CM

## 2023-11-17 DIAGNOSIS — E55.9 VITAMIN D DEFICIENCY: ICD-10-CM

## 2023-11-17 LAB
25(OH)D3 SERPL-MCNC: 87.5 NG/ML (ref 30–100)
ALBUMIN SERPL-MCNC: 4.3 G/DL (ref 3.5–5.2)
ALBUMIN/GLOB SERPL: 1.5 G/DL
ALP SERPL-CCNC: 107 U/L (ref 39–117)
ALT SERPL W P-5'-P-CCNC: 13 U/L (ref 1–33)
ANION GAP SERPL CALCULATED.3IONS-SCNC: 10.9 MMOL/L (ref 5–15)
AST SERPL-CCNC: 18 U/L (ref 1–32)
BILIRUB SERPL-MCNC: 0.7 MG/DL (ref 0–1.2)
BUN SERPL-MCNC: 13 MG/DL (ref 8–23)
BUN/CREAT SERPL: 18.1 (ref 7–25)
CALCIUM SPEC-SCNC: 9.9 MG/DL (ref 8.6–10.5)
CHLORIDE SERPL-SCNC: 102 MMOL/L (ref 98–107)
CHOLEST SERPL-MCNC: 130 MG/DL (ref 0–200)
CO2 SERPL-SCNC: 24.1 MMOL/L (ref 22–29)
CREAT SERPL-MCNC: 0.72 MG/DL (ref 0.57–1)
EGFRCR SERPLBLD CKD-EPI 2021: 90.6 ML/MIN/1.73
ERYTHROCYTE [SEDIMENTATION RATE] IN BLOOD: 16 MM/HR (ref 0–30)
FOLATE SERPL-MCNC: 9.02 NG/ML (ref 4.78–24.2)
GLOBULIN UR ELPH-MCNC: 2.9 GM/DL
GLUCOSE SERPL-MCNC: 82 MG/DL (ref 65–99)
HBA1C MFR BLD: 5.7 % (ref 4.8–5.6)
HDLC SERPL-MCNC: 55 MG/DL (ref 40–60)
LDLC SERPL CALC-MCNC: 62 MG/DL (ref 0–100)
LDLC/HDLC SERPL: 1.13 {RATIO}
POTASSIUM SERPL-SCNC: 4.1 MMOL/L (ref 3.5–5.2)
PROT SERPL-MCNC: 7.2 G/DL (ref 6–8.5)
RPR SER QL: NORMAL
SODIUM SERPL-SCNC: 137 MMOL/L (ref 136–145)
T4 FREE SERPL-MCNC: 1.52 NG/DL (ref 0.93–1.7)
TRIGL SERPL-MCNC: 64 MG/DL (ref 0–150)
TSH SERPL DL<=0.05 MIU/L-ACNC: 0.99 UIU/ML (ref 0.27–4.2)
VIT B12 BLD-MCNC: 227 PG/ML (ref 211–946)
VLDLC SERPL-MCNC: 13 MG/DL (ref 5–40)

## 2023-11-17 PROCEDURE — 82175 ASSAY OF ARSENIC: CPT | Performed by: PSYCHIATRY & NEUROLOGY

## 2023-11-17 PROCEDURE — 85652 RBC SED RATE AUTOMATED: CPT | Performed by: PSYCHIATRY & NEUROLOGY

## 2023-11-17 PROCEDURE — 82784 ASSAY IGA/IGD/IGG/IGM EACH: CPT

## 2023-11-17 PROCEDURE — 82746 ASSAY OF FOLIC ACID SERUM: CPT | Performed by: PSYCHIATRY & NEUROLOGY

## 2023-11-17 PROCEDURE — 83825 ASSAY OF MERCURY: CPT | Performed by: PSYCHIATRY & NEUROLOGY

## 2023-11-17 PROCEDURE — 82306 VITAMIN D 25 HYDROXY: CPT

## 2023-11-17 PROCEDURE — 82525 ASSAY OF COPPER: CPT

## 2023-11-17 PROCEDURE — 83036 HEMOGLOBIN GLYCOSYLATED A1C: CPT | Performed by: PSYCHIATRY & NEUROLOGY

## 2023-11-17 PROCEDURE — 86334 IMMUNOFIX E-PHORESIS SERUM: CPT

## 2023-11-17 PROCEDURE — 86592 SYPHILIS TEST NON-TREP QUAL: CPT | Performed by: PSYCHIATRY & NEUROLOGY

## 2023-11-17 PROCEDURE — 80053 COMPREHEN METABOLIC PANEL: CPT

## 2023-11-17 PROCEDURE — 82595 ASSAY OF CRYOGLOBULIN: CPT

## 2023-11-17 PROCEDURE — 84165 PROTEIN E-PHORESIS SERUM: CPT | Performed by: PSYCHIATRY & NEUROLOGY

## 2023-11-17 PROCEDURE — 84443 ASSAY THYROID STIM HORMONE: CPT | Performed by: PSYCHIATRY & NEUROLOGY

## 2023-11-17 PROCEDURE — 84439 ASSAY OF FREE THYROXINE: CPT | Performed by: PSYCHIATRY & NEUROLOGY

## 2023-11-17 PROCEDURE — 80061 LIPID PANEL: CPT

## 2023-11-17 PROCEDURE — 83655 ASSAY OF LEAD: CPT | Performed by: PSYCHIATRY & NEUROLOGY

## 2023-11-17 PROCEDURE — 82607 VITAMIN B-12: CPT | Performed by: PSYCHIATRY & NEUROLOGY

## 2023-11-20 LAB
ALBUMIN SERPL ELPH-MCNC: 3.9 G/DL (ref 2.9–4.4)
ALBUMIN/GLOB SERPL: 1.2 {RATIO} (ref 0.7–1.7)
ALPHA1 GLOB SERPL ELPH-MCNC: 0.3 G/DL (ref 0–0.4)
ALPHA2 GLOB SERPL ELPH-MCNC: 0.8 G/DL (ref 0.4–1)
B-GLOBULIN SERPL ELPH-MCNC: 1.2 G/DL (ref 0.7–1.3)
GAMMA GLOB SERPL ELPH-MCNC: 1 G/DL (ref 0.4–1.8)
GLOBULIN SER CALC-MCNC: 3.3 G/DL (ref 2.2–3.9)
IGA SERPL-MCNC: 360 MG/DL (ref 87–352)
IGG SERPL-MCNC: 1202 MG/DL (ref 586–1602)
IGM SERPL-MCNC: 61 MG/DL (ref 26–217)
LABORATORY COMMENT REPORT: NORMAL
M PROTEIN SERPL ELPH-MCNC: NORMAL G/DL
PROT PATTERN SERPL ELPH-IMP: NORMAL
PROT PATTERN SERPL IFE-IMP: ABNORMAL
PROT SERPL-MCNC: 7.2 G/DL (ref 6–8.5)

## 2023-11-22 LAB
ARSENIC BLD-MCNC: 2 UG/L (ref 0–9)
LEAD BLDV-MCNC: 1.2 UG/DL (ref 0–3.4)
MERCURY BLD-MCNC: <1 UG/L (ref 0–14.9)

## 2023-11-24 DIAGNOSIS — N32.89 BLADDER SPASMS: ICD-10-CM

## 2023-11-24 DIAGNOSIS — E78.2 MIXED HYPERLIPIDEMIA: ICD-10-CM

## 2023-11-24 LAB — CRYOGLOB SER QL 1D COLD INC: NORMAL

## 2023-11-27 RX ORDER — ATORVASTATIN CALCIUM 40 MG/1
TABLET, FILM COATED ORAL
Qty: 90 TABLET | Refills: 0 | Status: SHIPPED | OUTPATIENT
Start: 2023-11-27

## 2023-11-27 RX ORDER — OXYBUTYNIN CHLORIDE 5 MG/1
TABLET ORAL
Qty: 180 TABLET | Refills: 0 | Status: SHIPPED | OUTPATIENT
Start: 2023-11-27

## 2023-11-30 LAB — COPPER SERPL-MCNC: 126 UG/DL (ref 80–158)

## 2023-12-01 ENCOUNTER — OFFICE VISIT (OUTPATIENT)
Dept: FAMILY MEDICINE CLINIC | Facility: CLINIC | Age: 68
End: 2023-12-01
Payer: MEDICARE

## 2023-12-01 VITALS
SYSTOLIC BLOOD PRESSURE: 118 MMHG | HEIGHT: 65 IN | BODY MASS INDEX: 22.99 KG/M2 | HEART RATE: 59 BPM | OXYGEN SATURATION: 95 % | DIASTOLIC BLOOD PRESSURE: 62 MMHG | WEIGHT: 138 LBS | RESPIRATION RATE: 18 BRPM

## 2023-12-01 DIAGNOSIS — I48.0 PAROXYSMAL ATRIAL FIBRILLATION WITH RAPID VENTRICULAR RESPONSE: ICD-10-CM

## 2023-12-01 DIAGNOSIS — Z00.00 MEDICARE ANNUAL WELLNESS VISIT, SUBSEQUENT: Primary | ICD-10-CM

## 2023-12-01 NOTE — PROGRESS NOTES
The ABCs of the Annual Wellness Visit  Subsequent Medicare Wellness Visit    Chief Complaint   Patient presents with    Medicare Wellness-subsequent     Here for AWV and to discuss labs       Subjective    History of Present Illness:  Camille Pantoja is a 68 y.o. female who presents for a Subsequent Medicare Wellness Visit.    The following portions of the patient's history were reviewed and   updated as appropriate: allergies, current medications, past family history, past medical history, past social history, past surgical history, and problem list.    Compared to one year ago, the patient feels her physical   health is worse.  Has possibly experiencing mini strokes per neurology.  Still in process of finding neuropathy.  Has upcoming MRI for cervical and thoracic.      Compared to one year ago, the patient feels her mental   health is the same.    Recent Hospitalizations:  She was not admitted to the hospital during the last year.       Current Medical Providers:  Patient Care Team:  Gurjit Rodriguez DO as PCP - General (Family Medicine)    Outpatient Medications Prior to Visit   Medication Sig Dispense Refill    apixaban (Eliquis) 5 MG tablet tablet Take 1 tablet by mouth Every 12 (Twelve) Hours. 180 tablet 1    atorvastatin (LIPITOR) 40 MG tablet TAKE ONE TABLET BY MOUTH ONCE NIGHTLY 90 tablet 0    metoprolol succinate XL (TOPROL-XL) 50 MG 24 hr tablet Take 1 tablet by mouth Daily. 90 tablet 3    oxybutynin (DITROPAN) 5 MG tablet TAKE 1 TABLET BY MOUTH TWICE A  tablet 0    vitamin D (ERGOCALCIFEROL) 1.25 MG (05879 UT) capsule capsule TAKE ONE CAPSULE BY MOUTH ONCE WEEKLY 5 capsule 2    alendronate (FOSAMAX) 10 MG tablet Take 1 tablet by mouth Every Morning Before Breakfast. 90 tablet 1     No facility-administered medications prior to visit.       No opioid medication identified on active medication list. I have reviewed chart for other potential  high risk medication/s and harmful drug interactions in the  "elderly.        Aspirin is not on active medication list.  Aspirin use is not indicated based on review of current medical condition/s. Risk of harm outweighs potential benefits.  .    Patient Active Problem List   Diagnosis    Hyperlipidemia    Hypertension    Vitamin D deficiency    Osteoarthritis    Paroxysmal atrial fibrillation with rapid ventricular response    Metabolic encephalopathy    Cerebral ventriculomegaly    Aftercare    Right hemiparesis    Bladder spasms    Functional diarrhea    History of gastroesophageal reflux (GERD)    Abnormal gait    Alcoholism    NICM (nonischemic cardiomyopathy)    Gastroesophageal reflux disease without esophagitis    Dense breast tissue on mammogram    Cerebrovascular accident (CVA) due to embolism of left middle cerebral artery    Osteoporosis    At high risk for falls    Alcoholism in remission    Idiopathic peripheral neuropathy    Myelopathy     Advance Care Planning  Advance Directive is not on file.  ACP discussion was held with the patient during this visit. Patient does not have an advance directive, information provided.          Objective    Vitals:    12/01/23 1510   BP: 118/62   BP Location: Right arm   Patient Position: Sitting   Cuff Size: Adult   Pulse: 59   Resp: 18   SpO2: 95%   Weight: 62.6 kg (138 lb)   Height: 165.1 cm (65\")     Estimated body mass index is 22.96 kg/m² as calculated from the following:    Height as of this encounter: 165.1 cm (65\").    Weight as of this encounter: 62.6 kg (138 lb).    BMI is within normal parameters. No other follow-up for BMI required.      Does the patient have evidence of cognitive impairment? No    Physical Exam  Vitals reviewed.   Constitutional:       General: She is not in acute distress.     Appearance: She is not ill-appearing.   Cardiovascular:      Rate and Rhythm: Normal rate and regular rhythm.   Pulmonary:      Effort: Pulmonary effort is normal.      Breath sounds: Normal breath sounds.   Neurological: "      Mental Status: She is alert.   Psychiatric:         Mood and Affect: Mood normal.         Behavior: Behavior normal.         Thought Content: Thought content normal.         Judgment: Judgment normal.       Lab Results   Component Value Date    TRIG 64 2023    HDL 55 2023    LDL 62 2023    VLDL 13 2023    HGBA1C 5.70 (H) 2023            HEALTH RISK ASSESSMENT    Smoking Status:  Social History     Tobacco Use   Smoking Status Never    Passive exposure: Never   Smokeless Tobacco Never     Alcohol Consumption:  Social History     Substance and Sexual Activity   Alcohol Use Not Currently    Comment: Reports a hx of drinking everyday for 18 years atleast. She says that she quit May 2018, but says that she drinks occasionally now.      Fall Risk Screen:    FARRAH Fall Risk Assessment was completed, and patient is at MODERATE risk for falls. Assessment completed on:2023    Depression Screenin/1/2023     3:15 PM   PHQ-2/PHQ-9 Depression Screening   Little Interest or Pleasure in Doing Things 0-->not at all   Feeling Down, Depressed or Hopeless 0-->not at all   PHQ-9: Brief Depression Severity Measure Score 0       Health Habits and Functional and Cognitive Screenin/1/2023     3:00 PM   Functional & Cognitive Status   Do you have difficulty preparing food and eating? No   Do you have difficulty bathing yourself, getting dressed or grooming yourself? No   Do you have difficulty using the toilet? No   Do you have difficulty moving around from place to place? No   Do you have trouble with steps or getting out of a bed or a chair? No   Current Diet Well Balanced Diet   Dental Exam Up to date   Eye Exam Not up to date   Exercise (times per week) 0 times per week   Current Exercises Include Other   Do you need help using the phone?  No   Are you deaf or do you have serious difficulty hearing?  No   Do you need help to go to places out of walking distance? No   Do you  need help shopping? Yes   Do you need help preparing meals?  No   Do you need help with housework?  No   Do you need help with laundry? No   Do you need help taking your medications? No   Do you need help managing money? No   Do you ever drive or ride in a car without wearing a seat belt? No   Have you felt unusual stress, anger or loneliness in the last month? No   Who do you live with? Alone   If you need help, do you have trouble finding someone available to you? No   Do you have difficulty concentrating, remembering or making decisions? No       Age-appropriate Screening Schedule:  Refer to the list below for future screening recommendations based on patient's age, sex and/or medical conditions. Orders for these recommended tests are listed in the plan section. The patient has been provided with a written plan.    Health Maintenance   Topic Date Due    Pneumococcal Vaccine 65+ (1 - PCV) Never done    TDAP/TD VACCINES (1 - Tdap) Never done    ZOSTER VACCINE (1 of 2) Never done    INFLUENZA VACCINE  08/01/2023    COVID-19 Vaccine (3 - 2023-24 season) 09/01/2023    LIPID PANEL  11/17/2024    ANNUAL WELLNESS VISIT  12/01/2024    DXA SCAN  04/26/2025    MAMMOGRAM  10/03/2025    COLORECTAL CANCER SCREENING  10/05/2025    HEPATITIS C SCREENING  Completed    PAP SMEAR  Discontinued              Assessment & Plan   CMS Preventative Services Quick Reference  Risk Factors Identified During Encounter  Fall Risk-High or Moderate: Discussed Fall Prevention in the home  The above risks/problems have been discussed with the patient.  Follow up actions/plans if indicated are seen below in the Assessment/Plan Section.  Pertinent information has been shared with the patient in the After Visit Summary.    Diagnoses and all orders for this visit:    1. Medicare annual wellness visit, subsequent (Primary)  - Overall doing well.  Discussed fall prevention.   2. Paroxysmal atrial fibrillation with rapid ventricular  response        Follow Up:   Return in about 1 year (around 12/1/2024).     An After Visit Summary and PPPS were made available to the patient.

## 2023-12-05 ENCOUNTER — HOSPITAL ENCOUNTER (OUTPATIENT)
Dept: MRI IMAGING | Facility: HOSPITAL | Age: 68
Discharge: HOME | End: 2023-12-05
Payer: MEDICARE

## 2023-12-05 DIAGNOSIS — G95.9 MYELOPATHY: ICD-10-CM

## 2023-12-05 PROCEDURE — 72141 MRI NECK SPINE W/O DYE: CPT

## 2023-12-05 PROCEDURE — 72146 MRI CHEST SPINE W/O DYE: CPT

## 2023-12-14 DIAGNOSIS — M81.0 AGE-RELATED OSTEOPOROSIS WITHOUT CURRENT PATHOLOGICAL FRACTURE: ICD-10-CM

## 2023-12-15 RX ORDER — ALENDRONATE SODIUM 10 MG/1
10 TABLET ORAL
Qty: 30 TABLET | Refills: 2 | Status: SHIPPED | OUTPATIENT
Start: 2023-12-15

## 2023-12-26 ENCOUNTER — TELEPHONE (OUTPATIENT)
Dept: NEUROLOGY | Facility: CLINIC | Age: 68
End: 2023-12-26
Payer: MEDICARE

## 2023-12-26 DIAGNOSIS — M81.0 AGE-RELATED OSTEOPOROSIS WITHOUT CURRENT PATHOLOGICAL FRACTURE: ICD-10-CM

## 2023-12-26 RX ORDER — ALENDRONATE SODIUM 10 MG/1
10 TABLET ORAL
Qty: 90 TABLET | Refills: 3 | Status: SHIPPED | OUTPATIENT
Start: 2023-12-26

## 2023-12-26 NOTE — TELEPHONE ENCOUNTER
DELETE AFTER REVIEWING: Telephone encounter to be sent to the clinical pool.    Caller: ASHLEIGH GILBERT    Relationship: PATIENT    Best call back number:   [phone]    Caller requesting test results:      What test was performed:  MRI   TSPINE W/O CONTRAST AND CSPINE W/O CONTRAST    When was the test performed:  BOTH ON 12/5/23    Where was the test performed:  [facility]    Additional notes:

## 2023-12-26 NOTE — TELEPHONE ENCOUNTER
Rx Refill Note  Requested Prescriptions     Pending Prescriptions Disp Refills    alendronate (FOSAMAX) 10 MG tablet [Pharmacy Med Name: ALENDRONATE SODIUM 10 MG TAB] 90 tablet      Sig: TAKE ONE TABLET BY MOUTH EVERY MORNING BEFORE BREAKFAST      Last office visit with prescribing clinician: 12/1/2023   Last telemedicine visit with prescribing clinician: Visit date not found   Next office visit with prescribing clinician: Visit date not found   {

## 2023-12-29 NOTE — TELEPHONE ENCOUNTER
Spoke with patient and gave her MRI results and she did not want to come in for appointment stating she lives over 30 miles away. Patient will call back in the spring and make a follow up then.

## 2024-01-12 DIAGNOSIS — E55.9 VITAMIN D DEFICIENCY: ICD-10-CM

## 2024-01-12 RX ORDER — ERGOCALCIFEROL 1.25 MG/1
50000 CAPSULE ORAL WEEKLY
Qty: 5 CAPSULE | Refills: 2 | Status: SHIPPED | OUTPATIENT
Start: 2024-01-12

## 2024-02-25 DIAGNOSIS — I48.91 ATRIAL FIBRILLATION, UNSPECIFIED TYPE: ICD-10-CM

## 2024-02-25 DIAGNOSIS — N32.89 BLADDER SPASMS: ICD-10-CM

## 2024-02-25 DIAGNOSIS — E78.2 MIXED HYPERLIPIDEMIA: ICD-10-CM

## 2024-02-26 RX ORDER — APIXABAN 5 MG/1
5 TABLET, FILM COATED ORAL EVERY 12 HOURS
Qty: 30 TABLET | Refills: 0 | Status: SHIPPED | OUTPATIENT
Start: 2024-02-26 | End: 2024-03-01 | Stop reason: SDUPTHER

## 2024-02-26 RX ORDER — ATORVASTATIN CALCIUM 40 MG/1
TABLET, FILM COATED ORAL
Qty: 30 TABLET | Refills: 0 | Status: SHIPPED | OUTPATIENT
Start: 2024-02-26

## 2024-02-26 RX ORDER — OXYBUTYNIN CHLORIDE 5 MG/1
TABLET ORAL
Qty: 60 TABLET | Refills: 0 | Status: SHIPPED | OUTPATIENT
Start: 2024-02-26

## 2024-02-26 NOTE — TELEPHONE ENCOUNTER
Rx Refill Note  Requested Prescriptions     Pending Prescriptions Disp Refills    Eliquis 5 MG tablet tablet [Pharmacy Med Name: ELIQUIS 5 MG TABLET] 180 tablet 1     Sig: TAKE ONE TABLET BY MOUTH EVERY 12 HOURS    atorvastatin (LIPITOR) 40 MG tablet [Pharmacy Med Name: ATORVASTATIN 40 MG TABLET] 90 tablet 0     Sig: TAKE ONE TABLET BY MOUTH ONCE NIGHTLY    oxybutynin (DITROPAN) 5 MG tablet [Pharmacy Med Name: oxyBUTYnin 5 MG TABLET] 180 tablet 0     Sig: TAKE 1 TABLET BY MOUTH TWICE A DAY      Last office visit with prescribing clinician: 12/1/2023   Last telemedicine visit with prescribing clinician: Visit date not found   Next office visit with prescribing clinician: 3/25/2024

## 2024-03-01 ENCOUNTER — TELEPHONE (OUTPATIENT)
Dept: FAMILY MEDICINE CLINIC | Facility: CLINIC | Age: 69
End: 2024-03-01
Payer: MEDICARE

## 2024-03-01 DIAGNOSIS — I48.91 ATRIAL FIBRILLATION, UNSPECIFIED TYPE: ICD-10-CM

## 2024-03-01 NOTE — TELEPHONE ENCOUNTER
Caller: Camille Pantoja    Relationship: Self    Best call back number: [phone]    Who are you requesting to speak with (clinical staff, provider,  specific staff member): CLINICAL STAFF    What was the call regarding: STATED THAT THEY HAD GOTTEN THE apixaban (Eliquis) 5 MG tablet tablet CALLED IN BUT THAT THEY WERE ONLY GIVEN 15 DAYS WORTH OF THE MEDICATION SINCE THEY TAKE IT 2 TIMES DAILY. STATED THAT THEY ARE NEEDING THE MEDICATION TO BE SENT IN AGAIN WITH ENOUGH TO DO UNTIL AT LEAST THE AMOUNT OF TIME BEFORE THEIR APPOINTMENT ON 3/25/24. STATED THAT THEY HAVE 1 REMAINING IN THE CURRENT BOTTLE AND THEN WILL BE STARTING ON THE BOTTLE THEY JUST PICKED UP TOMORROW AND WILL NOT HAVE ENOUGH TO GET TIL THEN. PLEASE CALL AND ADVISE FURTHER     PHARMACY: [facility] [id number] - [place] - [address] - [phone]  - [phone] FX

## 2024-03-27 DIAGNOSIS — N32.89 BLADDER SPASMS: ICD-10-CM

## 2024-03-27 DIAGNOSIS — E78.2 MIXED HYPERLIPIDEMIA: ICD-10-CM

## 2024-03-27 RX ORDER — ATORVASTATIN CALCIUM 40 MG/1
TABLET, FILM COATED ORAL
Qty: 30 TABLET | Refills: 2 | Status: SHIPPED | OUTPATIENT
Start: 2024-03-27

## 2024-03-27 RX ORDER — OXYBUTYNIN CHLORIDE 5 MG/1
TABLET ORAL
Qty: 60 TABLET | Refills: 2 | Status: SHIPPED | OUTPATIENT
Start: 2024-03-27

## 2024-04-01 DIAGNOSIS — I48.0 PAROXYSMAL ATRIAL FIBRILLATION WITH RAPID VENTRICULAR RESPONSE: ICD-10-CM

## 2024-04-01 RX ORDER — METOPROLOL SUCCINATE 50 MG/1
50 TABLET, EXTENDED RELEASE ORAL DAILY
Qty: 90 TABLET | Refills: 1 | Status: SHIPPED | OUTPATIENT
Start: 2024-04-01 | End: 2024-04-02

## 2024-04-01 NOTE — TELEPHONE ENCOUNTER
Caller: Kit Camille ELIANE    Relationship: Self    Best call back number: [phone]     Requested Prescriptions:   Requested Prescriptions     Pending Prescriptions Disp Refills    metoprolol succinate XL (TOPROL-XL) 50 MG 24 hr tablet 90 tablet 3     Sig: Take 1 tablet by mouth Daily.        Pharmacy where request should be sent: [facility] [id number] [address] - [phone] PH - [phone] FX     Last office visit with prescribing clinician: 12/1/2023   Last telemedicine visit with prescribing clinician: Visit date not found   Next office visit with prescribing clinician: 4/24/2024     Additional details provided by patient: PHARMACY SENT TO CARDIOLOGIST BUT PATIENT SAYS THAT DR. PAEZ HAS BEEN FILLING FOR HER.     Does the patient have less than a 3 day supply:  [] Yes  [x] No    Would you like a call back once the refill request has been completed: [] Yes [x] No    If the office needs to give you a call back, can they leave a voicemail: [] Yes [x] No    Chilango Cuevas Rep   04/01/24 11:36 EDT

## 2024-04-02 RX ORDER — METOPROLOL SUCCINATE 50 MG/1
50 TABLET, EXTENDED RELEASE ORAL DAILY
Qty: 90 TABLET | Refills: 3 | Status: SHIPPED | OUTPATIENT
Start: 2024-04-02

## 2024-04-08 DIAGNOSIS — I48.91 ATRIAL FIBRILLATION, UNSPECIFIED TYPE: ICD-10-CM

## 2024-04-08 RX ORDER — APIXABAN 5 MG/1
5 TABLET, FILM COATED ORAL EVERY 12 HOURS
Qty: 60 TABLET | Refills: 2 | Status: SHIPPED | OUTPATIENT
Start: 2024-04-08

## 2024-04-24 ENCOUNTER — OFFICE VISIT (OUTPATIENT)
Dept: FAMILY MEDICINE CLINIC | Facility: CLINIC | Age: 69
End: 2024-04-24
Payer: MEDICARE

## 2024-04-24 VITALS
RESPIRATION RATE: 18 BRPM | HEART RATE: 84 BPM | DIASTOLIC BLOOD PRESSURE: 78 MMHG | SYSTOLIC BLOOD PRESSURE: 136 MMHG | HEIGHT: 65 IN | BODY MASS INDEX: 21.49 KG/M2 | OXYGEN SATURATION: 99 % | WEIGHT: 129 LBS

## 2024-04-24 DIAGNOSIS — I48.91 ATRIAL FIBRILLATION, UNSPECIFIED TYPE: ICD-10-CM

## 2024-04-24 DIAGNOSIS — R73.03 PREDIABETES: ICD-10-CM

## 2024-04-24 DIAGNOSIS — I10 PRIMARY HYPERTENSION: ICD-10-CM

## 2024-04-24 DIAGNOSIS — M81.0 AGE-RELATED OSTEOPOROSIS WITHOUT CURRENT PATHOLOGICAL FRACTURE: ICD-10-CM

## 2024-04-24 DIAGNOSIS — Z79.01 CHRONIC ANTICOAGULATION: ICD-10-CM

## 2024-04-24 DIAGNOSIS — E55.9 VITAMIN D DEFICIENCY: Primary | ICD-10-CM

## 2024-04-24 DIAGNOSIS — N32.89 BLADDER SPASMS: ICD-10-CM

## 2024-04-24 DIAGNOSIS — E78.2 MIXED HYPERLIPIDEMIA: ICD-10-CM

## 2024-04-24 PROCEDURE — 3078F DIAST BP <80 MM HG: CPT | Performed by: STUDENT IN AN ORGANIZED HEALTH CARE EDUCATION/TRAINING PROGRAM

## 2024-04-24 PROCEDURE — 36415 COLL VENOUS BLD VENIPUNCTURE: CPT | Performed by: STUDENT IN AN ORGANIZED HEALTH CARE EDUCATION/TRAINING PROGRAM

## 2024-04-24 PROCEDURE — 1159F MED LIST DOCD IN RCRD: CPT | Performed by: STUDENT IN AN ORGANIZED HEALTH CARE EDUCATION/TRAINING PROGRAM

## 2024-04-24 PROCEDURE — 3075F SYST BP GE 130 - 139MM HG: CPT | Performed by: STUDENT IN AN ORGANIZED HEALTH CARE EDUCATION/TRAINING PROGRAM

## 2024-04-24 PROCEDURE — 1160F RVW MEDS BY RX/DR IN RCRD: CPT | Performed by: STUDENT IN AN ORGANIZED HEALTH CARE EDUCATION/TRAINING PROGRAM

## 2024-04-24 PROCEDURE — 99214 OFFICE O/P EST MOD 30 MIN: CPT | Performed by: STUDENT IN AN ORGANIZED HEALTH CARE EDUCATION/TRAINING PROGRAM

## 2024-04-24 RX ORDER — OXYBUTYNIN CHLORIDE 5 MG/1
5 TABLET ORAL 2 TIMES DAILY
Qty: 180 TABLET | Refills: 2 | Status: SHIPPED | OUTPATIENT
Start: 2024-04-24

## 2024-04-24 RX ORDER — ATORVASTATIN CALCIUM 40 MG/1
40 TABLET, FILM COATED ORAL NIGHTLY
Qty: 90 TABLET | Refills: 2 | Status: SHIPPED | OUTPATIENT
Start: 2024-04-24

## 2024-04-24 NOTE — PROGRESS NOTES
Venipuncture Blood Specimen Collection  Venipuncture performed in right arm by Kamilah Call MA with good hemostasis. Patient tolerated the procedure well without complications.   04/24/24   Kamilah Call MA

## 2024-04-25 LAB
25(OH)D3+25(OH)D2 SERPL-MCNC: 87.7 NG/ML (ref 30–100)
ALBUMIN SERPL-MCNC: 4.6 G/DL (ref 3.5–5.2)
ALBUMIN/GLOB SERPL: 1.5 G/DL
ALP SERPL-CCNC: 175 U/L (ref 39–117)
ALT SERPL-CCNC: 10 U/L (ref 1–33)
AST SERPL-CCNC: 16 U/L (ref 1–32)
BASOPHILS # BLD AUTO: 0.05 10*3/MM3 (ref 0–0.2)
BASOPHILS NFR BLD AUTO: 0.8 % (ref 0–1.5)
BILIRUB SERPL-MCNC: 0.8 MG/DL (ref 0–1.2)
BUN SERPL-MCNC: 12 MG/DL (ref 8–23)
BUN/CREAT SERPL: 13.2 (ref 7–25)
CALCIUM SERPL-MCNC: 10.1 MG/DL (ref 8.6–10.5)
CHLORIDE SERPL-SCNC: 104 MMOL/L (ref 98–107)
CHOLEST SERPL-MCNC: 127 MG/DL (ref 0–200)
CO2 SERPL-SCNC: 22.5 MMOL/L (ref 22–29)
CREAT SERPL-MCNC: 0.91 MG/DL (ref 0.57–1)
EGFRCR SERPLBLD CKD-EPI 2021: 68 ML/MIN/1.73
EOSINOPHIL # BLD AUTO: 0.25 10*3/MM3 (ref 0–0.4)
EOSINOPHIL NFR BLD AUTO: 4.2 % (ref 0.3–6.2)
ERYTHROCYTE [DISTWIDTH] IN BLOOD BY AUTOMATED COUNT: 12.3 % (ref 12.3–15.4)
GLOBULIN SER CALC-MCNC: 3 GM/DL
GLUCOSE SERPL-MCNC: 110 MG/DL (ref 65–99)
HBA1C MFR BLD: 5.7 % (ref 4.8–5.6)
HCT VFR BLD AUTO: 35.5 % (ref 34–46.6)
HDLC SERPL-MCNC: 55 MG/DL (ref 40–60)
HGB BLD-MCNC: 11.3 G/DL (ref 12–15.9)
IMM GRANULOCYTES # BLD AUTO: 0.01 10*3/MM3 (ref 0–0.05)
IMM GRANULOCYTES NFR BLD AUTO: 0.2 % (ref 0–0.5)
LDLC SERPL CALC-MCNC: 55 MG/DL (ref 0–100)
LYMPHOCYTES # BLD AUTO: 0.76 10*3/MM3 (ref 0.7–3.1)
LYMPHOCYTES NFR BLD AUTO: 12.6 % (ref 19.6–45.3)
MCH RBC QN AUTO: 29.8 PG (ref 26.6–33)
MCHC RBC AUTO-ENTMCNC: 31.8 G/DL (ref 31.5–35.7)
MCV RBC AUTO: 93.7 FL (ref 79–97)
MONOCYTES # BLD AUTO: 0.29 10*3/MM3 (ref 0.1–0.9)
MONOCYTES NFR BLD AUTO: 4.8 % (ref 5–12)
NEUTROPHILS # BLD AUTO: 4.65 10*3/MM3 (ref 1.7–7)
NEUTROPHILS NFR BLD AUTO: 77.4 % (ref 42.7–76)
NRBC BLD AUTO-RTO: 0 /100 WBC (ref 0–0.2)
PLATELET # BLD AUTO: 276 10*3/MM3 (ref 140–450)
POTASSIUM SERPL-SCNC: 4.5 MMOL/L (ref 3.5–5.2)
PROT SERPL-MCNC: 7.6 G/DL (ref 6–8.5)
RBC # BLD AUTO: 3.79 10*6/MM3 (ref 3.77–5.28)
SODIUM SERPL-SCNC: 141 MMOL/L (ref 136–145)
TRIGL SERPL-MCNC: 88 MG/DL (ref 0–150)
VLDLC SERPL CALC-MCNC: 17 MG/DL (ref 5–40)
WBC # BLD AUTO: 6.01 10*3/MM3 (ref 3.4–10.8)

## 2024-04-29 ENCOUNTER — TELEPHONE (OUTPATIENT)
Dept: FAMILY MEDICINE CLINIC | Facility: CLINIC | Age: 69
End: 2024-04-29
Payer: MEDICARE

## 2024-04-29 NOTE — TELEPHONE ENCOUNTER
Caller: Camille Pantoja    Relationship: Self    Best call back number: [phone]     What test was performed: LABS    When was the test performed: 4/24/24    Where was the test performed: IN OFFICE     Additional notes: PATIENT WOULD LIKE A CALLBACK

## 2024-05-01 NOTE — PROGRESS NOTES
Gurjit Rodriguez,   [facility] PRIMARY CARE  [facility]  [place] [id number] [phone]    Subjective      Name Camille Pantoja MRN [id number]    1955 AGE/SEX 68 y.o. / female      Chief Complaint Chief Complaint   Patient presents with    Hyperlipidemia     Check up          Visit History for  2024    History of Present Illness  Camille Pantoja is a 68 y.o. female who presented today for Hyperlipidemia (Check up )      History of Present Illness  The patient is a 68-year-old female who presents for evaluation of multiple medical concerns.    The patient's Eliquis prescription was refilled in early 2024 for a duration of 15 days. She has expressed a desire for a 90-day supply of Eliquis, which is more cost-effective for her. Her metoprolol prescription has been refilled by her cardiologist's nurse, and she is scheduled to see her in 10/2024. She intends to transition her care to this provider at that time.    The patient experienced a transient ischemic attack (TIA) on 04/10/2024 and 2024. She experienced a fall in her mailbox, necessitating assistance from her neighbor. A similar episode occurred a few weeks ago, resulting in a head injury. Despite her leg pain, she reports an improvement in her condition. She suspects a back fracture, but prefers to allow more time for healing before obtaining an x-ray. She experienced a spider bite during the winter, which was treated by Dr. Hendrix. Her pain has since migrated to her hip, a condition she has undergone a partial hip replacement. She relies on her walker for mobility. She manages her pain with Tylenol every 6 hours, except during the night. Her sleep is disrupted due to her dog's fear.    The patient's blood glucose levels have consistently been within the normal range. She has declined the initiation of medication for this condition. .    Supplemental Information  She has lost a few pounds.       "  Medications and Allergies   Current Outpatient Medications   Medication Instructions    alendronate (FOSAMAX) 10 mg, Oral, Every Morning Before Breakfast    apixaban (ELIQUIS) 5 mg, Oral, Every 12 Hours    atorvastatin (LIPITOR) 40 mg, Oral, Nightly    metoprolol succinate XL (TOPROL-XL) 50 mg, Oral, Daily    oxybutynin (DITROPAN) 5 mg, Oral, 2 Times Daily    vitamin D (ERGOCALCIFEROL) 50,000 Units, Oral, Weekly     Allergies   Allergen Reactions    Cephalexin Other (See Comments)     Reports she doesn't remember having an allergic reaction.     Melatonin Other (See Comments)     Not Efficacious    Beef-Derived Products Rash    Citrus Rash    Duricef [Cefadroxil] Rash    Fish Allergy Rash    Garlic Rash    Green Beans Rash    Onion Rash    Other Rash    Peach [Prunus Persica] Rash    Wheat Rash    Wheat Rash      I have reviewed the above medications and allergies     Objective:      Vitals Vitals:    04/24/24 1215   BP: 136/78   BP Location: Right arm   Patient Position: Sitting   Cuff Size: Adult   Pulse: 84   Resp: 18   SpO2: 99%   Weight: 58.5 kg (129 lb)   Height: 165.1 cm (65\")     Body mass index is 21.47 kg/m².    Physical Exam  Vitals reviewed.   Constitutional:       General: She is not in acute distress.     Appearance: She is not ill-appearing.   Cardiovascular:      Rate and Rhythm: Normal rate and regular rhythm.   Pulmonary:      Effort: Pulmonary effort is normal.      Breath sounds: Normal breath sounds.   Neurological:      Mental Status: She is alert.   Psychiatric:         Mood and Affect: Mood normal.         Behavior: Behavior normal.         Thought Content: Thought content normal.         Judgment: Judgment normal.       Physical Exam     Results  Laboratory Studies  A1c was 5.7.        Assessment/Plan      Issues Addressed/ Plan   Diagnosis Plan   1. Vitamin D deficiency  Vitamin D 25 hydroxy      2. Mixed hyperlipidemia  Lipid Panel    atorvastatin (LIPITOR) 40 MG tablet      3. Primary " hypertension  Comprehensive Metabolic Panel      4. Chronic anticoagulation  CBC w AUTO Differential      5. Prediabetes  Hemoglobin A1c      6. Atrial fibrillation, unspecified type  apixaban (Eliquis) 5 MG tablet tablet      7. Bladder spasms  oxybutynin (DITROPAN) 5 MG tablet      8. Age-related osteoporosis without current pathological fracture           Assessment & Plan  1. Medication refill.  Prescriptions for Eliquis, metoprolol, and Eliquis have been renewed. Additionally, laboratory tests have been ordered to monitor her blood levels, cholesterol, and blood glucose levels.    2. History of TIA.    Follow-up  The patient is scheduled for a follow-up visit in 6 months.   BMI is within normal parameters. No other follow-up for BMI required.       There are no Patient Instructions on file for this visit.        Follow up  recommended Return in about 6 months (around 10/24/2024).   - Dragon voice recognition software was utilized to complete this chart.  Every reasonable attempt was made to edit and correct the text, however some incorrect words may remain.   Gurjit Rodriguez DO    Patient or patient representative verbalized consent for the use of Ambient Listening during the visit with  Gurjit Rodriguez DO for chart documentation. 5/1/2024  15:58 EDT

## 2024-05-04 DIAGNOSIS — E55.9 VITAMIN D DEFICIENCY: ICD-10-CM

## 2024-05-06 RX ORDER — ERGOCALCIFEROL 1.25 MG/1
50000 CAPSULE ORAL WEEKLY
Qty: 5 CAPSULE | Refills: 2 | Status: SHIPPED | OUTPATIENT
Start: 2024-05-06

## 2024-06-05 NOTE — PROGRESS NOTES
Subjective   Camille Pantoja is a 64 y.o. female is here for   Chief Complaint   Patient presents with   • Follow-up     pneumonia        History of Present Illness     Patient is here for pneumonia follow-up.  She had pneumonia last week and was put on Levaquin.  Her chest x-ray was abnormal.  She says she is feeling much better now.  She has no shortness of breath.  No cough.  No sputum production.  No fever.    The following portions of the patient's history were reviewed and updated as appropriate: allergies, current medications, past family history, past medical history, past social history, past surgical history and problem list.     reports that she has never smoked. She has never used smokeless tobacco. She reports that she drinks alcohol. She reports that she does not use drugs.    Review of Systems   Constitutional: Positive for fatigue. Negative for activity change and unexpected weight change.   HENT: Negative for congestion.    Respiratory: Positive for cough. Negative for shortness of breath and wheezing.    Cardiovascular: Negative for chest pain and palpitations.   Gastrointestinal: Negative for abdominal pain, blood in stool and constipation.   Genitourinary: Negative for difficulty urinating and hematuria.   Musculoskeletal: Negative for gait problem.   Skin: Negative for color change and rash.        PHQ-9 Depression Screening  Little interest or pleasure in doing things? 0   Feeling down, depressed, or hopeless? 0   Trouble falling or staying asleep, or sleeping too much? 0   Feeling tired or having little energy? 0   Poor appetite or overeating? 0   Feeling bad about yourself - or that you are a failure or have let yourself or your family down? 0   Trouble concentrating on things, such as reading the newspaper or watching television? 0   Moving or speaking so slowly that other people could have noticed? Or the opposite - being so fidgety or restless that you have been moving around a lot more  VTE prophylaxis initially contraindicated secondary to elevated bleeding risk.  6/6 Left lower extremity DVT identified. Heparin gtt initiated.   6/12 heparin stopped for surgery.  Dr. Briscoe would like drip to remain off until removal of Hemovac.   "than usual? 0   Thoughts that you would be better off dead, or of hurting yourself in some way? 0   PHQ-9 Total Score 0   If you checked off any problems, how difficult have these problems made it for you to do your work, take care of things at home, or get along with other people? Not difficult at all         Objective   /62 (BP Location: Left arm, Patient Position: Sitting, Cuff Size: Adult)   Pulse 120   Temp 97.4 °F (36.3 °C) (Oral)   Resp 16   Ht 165.1 cm (65\")   Wt 56.7 kg (125 lb)   SpO2 98%   BMI 20.80 kg/m²   Physical Exam   Constitutional: She is oriented to person, place, and time. She appears well-developed and well-nourished. No distress.   HENT:   Head: Normocephalic and atraumatic.   Right Ear: External ear normal.   Left Ear: External ear normal.   Nose: Nose normal.   Mouth/Throat: Oropharynx is clear and moist. No oropharyngeal exudate.   Eyes: Lids are normal. Right eye exhibits no discharge. Left eye exhibits no discharge. No scleral icterus.   Neck: Trachea normal, normal range of motion and full passive range of motion without pain. Neck supple. No tracheal deviation and no edema present. No thyromegaly present.   Cardiovascular: Normal rate, regular rhythm, normal heart sounds and intact distal pulses. Exam reveals no gallop and no friction rub.   No murmur heard.  Pulmonary/Chest: Effort normal and breath sounds normal. No stridor. No tachypnea and no bradypnea. No respiratory distress. She has no decreased breath sounds. She has no wheezes. She has no rales. She exhibits no tenderness.   Abdominal: Normal appearance. There is no hepatosplenomegaly.   Musculoskeletal: She exhibits no edema.   Lymphadenopathy:        Head (right side): No submental, no submandibular, no tonsillar, no preauricular, no posterior auricular and no occipital adenopathy present.        Head (left side): No submental, no submandibular, no tonsillar, no preauricular, no posterior auricular and no " occipital adenopathy present.     She has no cervical adenopathy.        Right cervical: No superficial cervical, no deep cervical and no posterior cervical adenopathy present.       Left cervical: No superficial cervical, no deep cervical and no posterior cervical adenopathy present.   Neurological: She is alert and oriented to person, place, and time. She has normal strength and normal reflexes. She is not disoriented.   Skin: Skin is warm, dry and intact. Capillary refill takes less than 2 seconds. No rash noted. She is not diaphoretic. No cyanosis or erythema. No pallor. Nails show no clubbing.   Psychiatric: She has a normal mood and affect. Her behavior is normal. Cognition and memory are normal.   Nursing note and vitals reviewed.      Procedures    Assessment/Plan   Diagnoses and all orders for this visit:    1. Pneumonia due to infectious organism, unspecified laterality, unspecified part of lung (Primary)  Comments:  CXR next week to ensure resolution and no underlying disease.  Orders:  -     XR Chest PA & Lateral           I strongly advised the patient, during her office visit with me, to see a cardiologist as soon as possible and that I would like to get an echocardiogram as soon as possible. The patient said she was quite certain she did not have any problems with her heart and refused the referral and the echocardiogram.

## 2024-07-25 ENCOUNTER — TELEPHONE (OUTPATIENT)
Dept: FAMILY MEDICINE CLINIC | Facility: CLINIC | Age: 69
End: 2024-07-25
Payer: MEDICARE

## 2024-07-25 NOTE — TELEPHONE ENCOUNTER
.Caller: Camille Pantoja    Relationship: Self    Best call back number: [phone]    What is the medical concern/diagnosis:     What specialty or service is being requested: NEUROLOGY     What is the provider, practice or medical service name:     What is the office location:     What is the office phone number:     Any additional details: PATIENT WOULD LIKE A REFERRAL TO A NEUROLOGIST SHE WOULD LIKE A SECOND OPINION

## 2024-08-18 DIAGNOSIS — E55.9 VITAMIN D DEFICIENCY: ICD-10-CM

## 2024-08-19 RX ORDER — ERGOCALCIFEROL 1.25 MG/1
50000 CAPSULE ORAL WEEKLY
Qty: 5 CAPSULE | Refills: 2 | Status: SHIPPED | OUTPATIENT
Start: 2024-08-19

## 2024-08-22 ENCOUNTER — OFFICE VISIT (OUTPATIENT)
Dept: FAMILY MEDICINE CLINIC | Facility: CLINIC | Age: 69
End: 2024-08-22
Payer: MEDICARE

## 2024-08-22 VITALS
SYSTOLIC BLOOD PRESSURE: 102 MMHG | HEIGHT: 65 IN | HEART RATE: 57 BPM | OXYGEN SATURATION: 97 % | WEIGHT: 131 LBS | BODY MASS INDEX: 21.83 KG/M2 | DIASTOLIC BLOOD PRESSURE: 64 MMHG

## 2024-08-22 DIAGNOSIS — R26.9 ABNORMAL GAIT: ICD-10-CM

## 2024-08-22 DIAGNOSIS — G93.89 CEREBRAL VENTRICULOMEGALY: ICD-10-CM

## 2024-08-22 DIAGNOSIS — I63.412 CEREBROVASCULAR ACCIDENT (CVA) DUE TO EMBOLISM OF LEFT MIDDLE CEREBRAL ARTERY: ICD-10-CM

## 2024-08-22 DIAGNOSIS — Z00.00 MEDICARE ANNUAL WELLNESS VISIT, SUBSEQUENT: Primary | ICD-10-CM

## 2024-08-22 PROBLEM — G93.41 METABOLIC ENCEPHALOPATHY: Status: RESOLVED | Noted: 2020-01-10 | Resolved: 2024-08-22

## 2024-08-22 PROBLEM — F10.20 ALCOHOLISM: Status: RESOLVED | Noted: 2020-08-12 | Resolved: 2024-08-22

## 2024-08-22 PROBLEM — Z51.89 AFTERCARE: Status: RESOLVED | Noted: 2020-01-16 | Resolved: 2024-08-22

## 2024-08-22 PROBLEM — G95.9 MYELOPATHY: Status: RESOLVED | Noted: 2023-10-31 | Resolved: 2024-08-22

## 2024-08-22 PROBLEM — Z87.19 HISTORY OF GASTROESOPHAGEAL REFLUX (GERD): Status: RESOLVED | Noted: 2020-07-22 | Resolved: 2024-08-22

## 2024-08-22 NOTE — PROGRESS NOTES
Subjective   The ABCs of the Annual Wellness Visit  Medicare Wellness Visit      Camille Pantoja is a 68 y.o. patient who presents for a Medicare Wellness Visit.    The following portions of the patient's history were reviewed and   updated as appropriate: allergies, current medications, past family history, past medical history, past social history, past surgical history, and problem list.    Compared to one year ago, the patient's physical   health is worse.  Having to use her walker more than she used to.  Feels like she is having decrease of balance.  Worried about falling.      Compared to one year ago, the patient's mental   health is the same.    Recent Hospitalizations:  She was not admitted to the hospital during the last year.     Current Medical Providers:  Patient Care Team:  Gurjit Rodriguez DO as PCP - General (Family Medicine)  Jovita Brown MD as Consulting Physician (Cardiology)  Raji Rodriguez MD as Consulting Physician (Neurology)    Outpatient Medications Prior to Visit   Medication Sig Dispense Refill    apixaban (Eliquis) 5 MG tablet tablet Take 1 tablet by mouth Every 12 (Twelve) Hours. 180 tablet 2    atorvastatin (LIPITOR) 40 MG tablet Take 1 tablet by mouth Every Night. 90 tablet 2    metoprolol succinate XL (TOPROL-XL) 50 MG 24 hr tablet Take 1 tablet by mouth Daily. 90 tablet 3    oxybutynin (DITROPAN) 5 MG tablet Take 1 tablet by mouth 2 (Two) Times a Day. 180 tablet 2    vitamin D (ERGOCALCIFEROL) 1.25 MG (17115 UT) capsule capsule TAKE 1 CAPSULE BY MOUTH ONCE WEEKLY 5 capsule 2    alendronate (FOSAMAX) 10 MG tablet TAKE ONE TABLET BY MOUTH EVERY MORNING BEFORE BREAKFAST (Patient not taking: Reported on 8/22/2024) 90 tablet 3     No facility-administered medications prior to visit.     No opioid medication identified on active medication list. I have reviewed chart for other potential  high risk medication/s and harmful drug interactions in the elderly.      Aspirin is not on  "active medication list.  Aspirin use is not indicated based on review of current medical condition/s. Risk of harm outweighs potential benefits.  .    Patient Active Problem List   Diagnosis    Hyperlipidemia    Hypertension    Vitamin D deficiency    Osteoarthritis    Paroxysmal atrial fibrillation with rapid ventricular response    Cerebral ventriculomegaly    Right hemiparesis    Bladder spasms    Functional diarrhea    Abnormal gait    NICM (nonischemic cardiomyopathy)    Gastroesophageal reflux disease without esophagitis    Dense breast tissue on mammogram    Cerebrovascular accident (CVA) due to embolism of left middle cerebral artery    Osteoporosis    At high risk for falls    Alcoholism in remission    Idiopathic peripheral neuropathy     Advance Care Planning Advance Directive is not on file.  ACP discussion was held with the patient during this visit. Patient does not have an advance directive, information provided.            Objective   Vitals:    08/22/24 1435   BP: 102/64   Pulse: 57   SpO2: 97%   Weight: 59.4 kg (131 lb)   Height: 165.1 cm (65\")       Estimated body mass index is 21.8 kg/m² as calculated from the following:    Height as of this encounter: 165.1 cm (65\").    Weight as of this encounter: 59.4 kg (131 lb).    BMI is within normal parameters. No other follow-up for BMI required.       Does the patient have evidence of cognitive impairment? No                                                                                               Health  Risk Assessment    Smoking Status:  Social History     Tobacco Use   Smoking Status Never    Passive exposure: Never   Smokeless Tobacco Never     Alcohol Consumption:  Social History     Substance and Sexual Activity   Alcohol Use Not Currently    Comment: Reports a hx of drinking everyday for 18 years atleast. She says that she quit May 2018, but says that she drinks occasionally now.        Fall Risk Screen  FARRAH Fall Risk Assessment was " completed, and patient is at HIGH risk for falls. Assessment completed on:2024    Depression Screenin/22/2024     2:42 PM   PHQ-2/PHQ-9 Depression Screening   Little Interest or Pleasure in Doing Things 0-->not at all   Feeling Down, Depressed or Hopeless 0-->not at all   PHQ-9: Brief Depression Severity Measure Score 0     Health Habits and Functional and Cognitive Screenin/22/2024     2:42 PM   Functional & Cognitive Status   Do you have difficulty preparing food and eating? No   Do you have difficulty bathing yourself, getting dressed or grooming yourself? No   Do you have difficulty using the toilet? No   Do you have difficulty moving around from place to place? No   Do you have trouble with steps or getting out of a bed or a chair? Yes   Current Diet Well Balanced Diet   Dental Exam Unknown   Eye Exam Not up to date   Exercise (times per week) 0 times per week   Current Exercises Include No Regular Exercise   Do you need help using the phone?  No   Are you deaf or do you have serious difficulty hearing?  No   Do you need help to go to places out of walking distance? No   Do you need help shopping? No   Do you need help preparing meals?  No   Do you need help with housework?  No   Do you need help with laundry? No   Do you need help taking your medications? No   Do you need help managing money? No   Do you ever drive or ride in a car without wearing a seat belt? No   Have you felt unusual stress, anger or loneliness in the last month? Yes   Who do you live with? Alone   If you need help, do you have trouble finding someone available to you? No   Do you have difficulty concentrating, remembering or making decisions? Yes           Age-appropriate Screening Schedule:  Refer to the list below for future screening recommendations based on patient's age, sex and/or medical conditions. Orders for these recommended tests are listed in the plan section. The patient has been provided with a written  plan.    Health Maintenance List  Health Maintenance   Topic Date Due    Pneumococcal Vaccine 65+ (1 of 2 - PCV) Never done    TDAP/TD VACCINES (1 - Tdap) Never done    ZOSTER VACCINE (1 of 2) Never done    INFLUENZA VACCINE  08/01/2024    COVID-19 Vaccine (3 - 2023-24 season) 09/01/2024    ANNUAL WELLNESS VISIT  12/01/2024    LIPID PANEL  04/24/2025    DXA SCAN  04/26/2025    MAMMOGRAM  10/03/2025    COLORECTAL CANCER SCREENING  10/05/2025    HEPATITIS C SCREENING  Completed    PAP SMEAR  Discontinued                                                                                                                                                CMS Preventative Services Quick Reference  Risk Factors Identified During Encounter  None Identified    The above risks/problems have been discussed with the patient.  Pertinent information has been shared with the patient in the After Visit Summary.  An After Visit Summary and PPPS were made available to the patient.    Follow Up:   Next Medicare Wellness visit to be scheduled in 1 year.     Assessment & Plan  Medicare annual wellness visit, subsequent    Cerebral ventriculomegaly    Abnormal gait    Cerebrovascular accident (CVA) due to embolism of left middle cerebral artery      Assessment & Plan  1. Memory issues.  Her memory appears to be functioning well, with no instances of repetition during the conversation. She was able to recall two out of three words with some prompting. A referral to neurology will be made for further evaluation.    2. Transportation difficulties.  She has difficulty traveling to her current neurologist due to transportation issues. A referral to a closer neurologist, Dr. Crawford, will be made to address this concern.    3. Fear of stroke recurrence.  She expressed fear of having another stroke, similar to the one she experienced last year. This concern will be communicated to the neurologist for further evaluation and management.    4.  Medication Management.  She plans to restart Fosamax on Monday due to previous issues with Coumadin. She is aware of the need to take it on an empty stomach and wait 30 minutes before eating.    Follow-up  A follow-up visit is scheduled for 10/29/2024.      Orders Placed This Encounter   Procedures    Ambulatory Referral to Neurology             Follow Up:   No follow-ups on file.

## 2024-10-11 ENCOUNTER — OFFICE VISIT (OUTPATIENT)
Dept: CARDIOLOGY | Facility: CLINIC | Age: 69
End: 2024-10-11
Payer: MEDICARE

## 2024-10-11 VITALS
HEART RATE: 65 BPM | DIASTOLIC BLOOD PRESSURE: 80 MMHG | SYSTOLIC BLOOD PRESSURE: 126 MMHG | BODY MASS INDEX: 21.44 KG/M2 | WEIGHT: 128.7 LBS | HEIGHT: 65 IN | OXYGEN SATURATION: 97 %

## 2024-10-11 DIAGNOSIS — I42.8 NICM (NONISCHEMIC CARDIOMYOPATHY): Primary | ICD-10-CM

## 2024-10-11 DIAGNOSIS — E78.2 MIXED HYPERLIPIDEMIA: ICD-10-CM

## 2024-10-11 DIAGNOSIS — I10 PRIMARY HYPERTENSION: ICD-10-CM

## 2024-10-11 DIAGNOSIS — I48.0 PAROXYSMAL ATRIAL FIBRILLATION WITH RAPID VENTRICULAR RESPONSE: ICD-10-CM

## 2024-10-11 NOTE — PROGRESS NOTES
Date of Office Visit: 10/11/2024  Encounter Provider: JOHN Perez  Place of Service: [facility] CARDIOLOGY  Established cardiologist: Jovita Brown MD  Patient Name: Camille Pantoja  :1955      Patient ID:  Camille Pantoja is a 68 y.o. female is here for  followup    With a pertinent medical history of DVT, alcohol abuse, hypertension, GERD, atrial fibrillation, cardiomyopathy, stroke and hyperlipidemia     History of Present Illness   diagnosed with LLL pneumonia treated with Levaquin developed weakness and diarrhea.  Vladimir presented to [facility] later that month with altered mental status and found to be in A-fib RVR.  Troponin was elevated.  Echocardiogram showed EF of 27%, mild LVH, severe biatrial enlargement, moderate RV dilation with moderately reduced RV systolic function, mild mitral and tricuspid insufficiency.  She was transferred to [facility] MRI showed acute left hemisphere subcortical stroke.  She had a repeat MRI for further mental status change which showed expansion of the stroke.  She then developed decreased LOC and a third MRI showed new areas of infarcts in the right cerebellum.  Cardiac catheterization 1/10/2020 with normal coronary arteries.  She was discharged to rehab on apixaban.     Echocardiogram 2021 EF 56%, trace AR, trace MR.     She was seen by Dr. Brown 2023.  She was in normal sinus rhythm that day.    Camille is feeling well today.  She continues to have gait disturbances, intermittent visual changes, she is no longer driving because of that.  She does follow with neurology for her history of multiple strokes and questionable seizure disorder.  She tells me she will actually be establishing with a new neurologist out here in [place] since she is no longer driving it is more difficult for her to make it to [place] appointments.  She no longer drinks alcohol heavily since .  [facility]  [facility].  She has had no chest pain or pressure, shortness of breath, lightheadedness, dizziness, FIELD, PND, heart racing, palpitations, presyncope/syncope, or leg edema.     Current Outpatient Medications on File Prior to Visit   Medication Sig Dispense Refill    apixaban (Eliquis) 5 MG tablet tablet Take 1 tablet by mouth Every 12 (Twelve) Hours. 180 tablet 2    atorvastatin (LIPITOR) 40 MG tablet Take 1 tablet by mouth Every Night. 90 tablet 2    metoprolol succinate XL (TOPROL-XL) 50 MG 24 hr tablet Take 1 tablet by mouth Daily. 90 tablet 3    oxybutynin (DITROPAN) 5 MG tablet Take 1 tablet by mouth 2 (Two) Times a Day. 180 tablet 2    vitamin D (ERGOCALCIFEROL) 1.25 MG (65764 UT) capsule capsule TAKE 1 CAPSULE BY MOUTH ONCE WEEKLY 5 capsule 2    alendronate (FOSAMAX) 10 MG tablet TAKE ONE TABLET BY MOUTH EVERY MORNING BEFORE BREAKFAST (Patient not taking: Reported on 8/22/2024) 90 tablet 3     No current facility-administered medications on file prior to visit.         Procedures    ECG 12 Lead    Date/Time: 10/11/2024 1:44 PM  Performed by: An Rose APRN    Authorized by: An Rose APRN  Comparison: compared with previous ECG from 9/29/2023  Similar to previous ECG  Rhythm: sinus rhythm  BPM: 64              Objective:      Vitals:    10/11/24 1318   BP: 126/80   Pulse: 65   SpO2: 97%   Weight: 58.4 kg (128 lb 11.2 oz)   Height: 165.1 cm (65\")     Body mass index is 21.42 kg/m².  Wt Readings from Last 3 Encounters:   10/11/24 58.4 kg (128 lb 11.2 oz)   08/22/24 59.4 kg (131 lb)   04/24/24 58.5 kg (129 lb)         Constitutional:       Comments: Camille is a 68-year-old  female who is frail, in no acute distress today.   Pulmonary:      Effort: Pulmonary effort is normal.      Breath sounds: Normal breath sounds.   Cardiovascular:      Normal rate. Regular rhythm.      Murmurs: There is no murmur.   Pulses:     Radial: 2+ " bilaterally.     Dorsalis pedis: 2+ bilaterally.     Posterior tibial: 2+ bilaterally.  Edema:     Peripheral edema absent.   Skin:     General: Skin is warm and dry.   Neurological:      Mental Status: Alert and oriented to person, place and time.       Lab Review:      Lab Results   Component Value Date    TSH 0.994 11/17/2023       Lab Results   Component Value Date    CHOL 130 11/17/2023    CHOL 106 01/10/2020    CHLPL 127 04/24/2024    CHLPL 135 03/02/2023    CHLPL 118 06/08/2022     Lab Results   Component Value Date    TRIG 88 04/24/2024    TRIG 64 11/17/2023    TRIG 69 03/02/2023     Lab Results   Component Value Date    HDL 55 04/24/2024    HDL 55 11/17/2023    HDL 48 03/02/2023     Lab Results   Component Value Date    LDL 55 04/24/2024    LDL 62 11/17/2023    LDL 73 03/02/2023       Lab Results   Component Value Date    WBC 6.01 04/24/2024    HGB 11.3 (L) 04/24/2024    HCT 35.5 04/24/2024    MCV 93.7 04/24/2024     04/24/2024       Lab Results   Component Value Date    GLUCOSE 110 (H) 04/24/2024    BUN 12 04/24/2024    CREATININE 0.91 04/24/2024    EGFRRESULT 68.0 04/24/2024    EGFR 90.6 11/17/2023    BCR 13.2 04/24/2024    K 4.5 04/24/2024    CO2 22.5 04/24/2024    CALCIUM 10.1 04/24/2024    PROTENTOTREF 7.6 04/24/2024    ALBUMIN 4.6 04/24/2024    BILITOT 0.8 04/24/2024    AST 16 04/24/2024    ALT 10 04/24/2024       Lab Results   Component Value Date    HGBA1C 5.70 (H) 04/24/2024       Assessment:     1. NICM (nonischemic cardiomyopathy)    2. Paroxysmal atrial fibrillation with rapid ventricular response    3. Primary hypertension    4. Mixed hyperlipidemia      NICM EF 27% on TTE 1/2020 (had pna, afib rvr, and multiple CVA at that time), now recovered EF 56% on TTE 7/2021. No HF.   PAF in normal sinus rhythm today.  Continues on metoprolol and AC on apixaban.  HTN this is well-controlled  HLD on statin therapy  CVA - multiple strokes 2020 follows with neurology  Hx seizures  Hx ETOH abuse      Plan:   No medication changes were made  From a cardiac standpoint she is stable today.  Without any acute complaints.  Return in about 1 year (around 10/11/2025) for Dr. Brown.    Thank you for allowing me to participate in this patient's care. Please call with any questions or concerns.          Dragon dictation device was utilized in this note.

## 2024-10-29 ENCOUNTER — OFFICE VISIT (OUTPATIENT)
Dept: FAMILY MEDICINE CLINIC | Facility: CLINIC | Age: 69
End: 2024-10-29
Payer: MEDICARE

## 2024-10-29 VITALS
DIASTOLIC BLOOD PRESSURE: 74 MMHG | SYSTOLIC BLOOD PRESSURE: 108 MMHG | HEART RATE: 60 BPM | RESPIRATION RATE: 18 BRPM | BODY MASS INDEX: 21.99 KG/M2 | HEIGHT: 65 IN | WEIGHT: 132 LBS | OXYGEN SATURATION: 97 %

## 2024-10-29 DIAGNOSIS — E78.2 MIXED HYPERLIPIDEMIA: Primary | ICD-10-CM

## 2024-10-29 DIAGNOSIS — I10 PRIMARY HYPERTENSION: ICD-10-CM

## 2024-10-29 DIAGNOSIS — R73.03 PREDIABETES: ICD-10-CM

## 2024-10-29 PROCEDURE — 99213 OFFICE O/P EST LOW 20 MIN: CPT | Performed by: STUDENT IN AN ORGANIZED HEALTH CARE EDUCATION/TRAINING PROGRAM

## 2024-10-29 PROCEDURE — 3074F SYST BP LT 130 MM HG: CPT | Performed by: STUDENT IN AN ORGANIZED HEALTH CARE EDUCATION/TRAINING PROGRAM

## 2024-10-29 PROCEDURE — 1125F AMNT PAIN NOTED PAIN PRSNT: CPT | Performed by: STUDENT IN AN ORGANIZED HEALTH CARE EDUCATION/TRAINING PROGRAM

## 2024-10-29 PROCEDURE — 3078F DIAST BP <80 MM HG: CPT | Performed by: STUDENT IN AN ORGANIZED HEALTH CARE EDUCATION/TRAINING PROGRAM

## 2024-10-29 PROCEDURE — 1160F RVW MEDS BY RX/DR IN RCRD: CPT | Performed by: STUDENT IN AN ORGANIZED HEALTH CARE EDUCATION/TRAINING PROGRAM

## 2024-10-29 PROCEDURE — 36415 COLL VENOUS BLD VENIPUNCTURE: CPT | Performed by: STUDENT IN AN ORGANIZED HEALTH CARE EDUCATION/TRAINING PROGRAM

## 2024-10-29 PROCEDURE — 1159F MED LIST DOCD IN RCRD: CPT | Performed by: STUDENT IN AN ORGANIZED HEALTH CARE EDUCATION/TRAINING PROGRAM

## 2024-10-29 NOTE — PROGRESS NOTES
"    Gurjit Rodriguez DO  [facility] PRIMARY [facility]  [address] [place] [address] [address]  [place] [id number] [phone]    Subjective      Name Camille Pantoja MRN [id number]    1955 AGE/SEX 68 y.o. / female      Chief Complaint Chief Complaint   Patient presents with    Hyperlipidemia     6 month check up          Visit History for  10/29/2024    Camille Pantoja is a 68 y.o. female who presented today for Hyperlipidemia (6 month check up )       History of Present Illness  The patient presents for a routine checkup.    She reports that her blood pressure readings have been within the normal range today. She has recently refilled her medications and does not require any additional refills at this time. She mentions that she is not currently taking Fosamax due to some adverse effects she experienced with it.       Medications and Allergies   Current Outpatient Medications   Medication Instructions    apixaban (ELIQUIS) 5 mg, Oral, Every 12 Hours    atorvastatin (LIPITOR) 40 mg, Oral, Nightly    metoprolol succinate XL (TOPROL-XL) 50 mg, Oral, Daily    oxybutynin (DITROPAN) 5 mg, Oral, 2 Times Daily    vitamin D (ERGOCALCIFEROL) 50,000 Units, Oral, Weekly     Allergies   Allergen Reactions    Cephalexin Other (See Comments)     Reports she doesn't remember having an allergic reaction.     Melatonin Other (See Comments)     Not Efficacious    Beef-Derived Products Rash    Citrus Rash    Duricef [Cefadroxil] Rash    Fish Allergy Rash    Garlic Rash    Green Beans Rash    Onion Rash    Other Rash    Peach [Prunus Persica] Rash    Wheat Rash    Wheat Rash      I have reviewed the above medications and allergies     Objective:      Vitals Vitals:    10/29/24 1411   BP: 108/74   BP Location: Right arm   Patient Position: Sitting   Cuff Size: Adult   Pulse: 60   Resp: 18   SpO2: 97%   Weight: 59.9 kg (132 lb)   Height: 165.1 cm (65\")     Body mass index is 21.97 kg/m².    Physical Exam  Vitals reviewed. "   Constitutional:       General: She is not in acute distress.     Appearance: She is not ill-appearing.   Pulmonary:      Effort: Pulmonary effort is normal.   Psychiatric:         Mood and Affect: Mood normal.         Behavior: Behavior normal.         Thought Content: Thought content normal.         Judgment: Judgment normal.          Physical Exam       Results       Assessment/Plan   Issues Addressed/ Plan   Diagnosis Plan   1. Mixed hyperlipidemia  Lipid Panel      2. Primary hypertension  Comprehensive Metabolic Panel      3. Prediabetes  Hemoglobin A1c         Assessment & Plan  1. Routine checkup.  Her blood pressure readings are within the normal range. Laboratory tests will be conducted today.    2. Medication Management.  She does not need any refills on her medications as she picked up two on Sunday and her friend is picking up two today. She has stopped taking Fosamax due to adverse effects.       BMI is within normal parameters. No other follow-up for BMI required.     There are no Patient Instructions on file for this visit.   Follow up  recommended Return in about 3 months (around 1/29/2025).   - Dragon voice recognition software was utilized to complete this chart.  Every reasonable attempt was made to edit and correct the text, however some incorrect words may remain.   Gurjit Rodriguez DO    Patient or patient representative verbalized consent for the use of Ambient Listening during the visit with  Gurjit Rodriguez DO for chart documentation. 11/10/2024  18:42 EST

## 2024-10-29 NOTE — PROGRESS NOTES
Venipuncture Blood Specimen Collection  Venipuncture performed in left arm by Kamilah Call MA with good hemostasis. Patient tolerated the procedure well without complications.   10/29/24   Kamilah Call MA

## 2024-10-30 LAB
ALBUMIN SERPL-MCNC: 4.3 G/DL (ref 3.9–4.9)
ALP SERPL-CCNC: 77 IU/L (ref 44–121)
ALT SERPL-CCNC: 13 IU/L (ref 0–32)
AST SERPL-CCNC: 18 IU/L (ref 0–40)
BILIRUB SERPL-MCNC: 0.7 MG/DL (ref 0–1.2)
BUN SERPL-MCNC: 18 MG/DL (ref 8–27)
BUN/CREAT SERPL: 21 (ref 12–28)
CALCIUM SERPL-MCNC: 10 MG/DL (ref 8.7–10.3)
CHLORIDE SERPL-SCNC: 104 MMOL/L (ref 96–106)
CHOLEST SERPL-MCNC: 123 MG/DL (ref 100–199)
CO2 SERPL-SCNC: 24 MMOL/L (ref 20–29)
CREAT SERPL-MCNC: 0.87 MG/DL (ref 0.57–1)
EGFRCR SERPLBLD CKD-EPI 2021: 72 ML/MIN/1.73
GLOBULIN SER CALC-MCNC: 2.5 G/DL (ref 1.5–4.5)
GLUCOSE SERPL-MCNC: 91 MG/DL (ref 70–99)
HBA1C MFR BLD: 5.8 % (ref 4.8–5.6)
HDLC SERPL-MCNC: 55 MG/DL
LDLC SERPL CALC-MCNC: 53 MG/DL (ref 0–99)
POTASSIUM SERPL-SCNC: 4.2 MMOL/L (ref 3.5–5.2)
PROT SERPL-MCNC: 6.8 G/DL (ref 6–8.5)
SODIUM SERPL-SCNC: 140 MMOL/L (ref 134–144)
TRIGL SERPL-MCNC: 73 MG/DL (ref 0–149)
VLDLC SERPL CALC-MCNC: 15 MG/DL (ref 5–40)

## 2024-11-14 ENCOUNTER — LAB (OUTPATIENT)
Dept: LAB | Facility: HOSPITAL | Age: 69
End: 2024-11-14
Payer: MEDICARE

## 2024-11-14 ENCOUNTER — OFFICE VISIT (OUTPATIENT)
Dept: NEUROLOGY | Facility: CLINIC | Age: 69
End: 2024-11-14
Payer: MEDICARE

## 2024-11-14 VITALS
WEIGHT: 134.8 LBS | BODY MASS INDEX: 22.43 KG/M2 | SYSTOLIC BLOOD PRESSURE: 130 MMHG | OXYGEN SATURATION: 97 % | DIASTOLIC BLOOD PRESSURE: 72 MMHG | HEART RATE: 60 BPM

## 2024-11-14 DIAGNOSIS — Z86.73 HISTORY OF CVA (CEREBROVASCULAR ACCIDENT): ICD-10-CM

## 2024-11-14 DIAGNOSIS — G93.89 CEREBRAL VENTRICULOMEGALY: ICD-10-CM

## 2024-11-14 DIAGNOSIS — G62.9 POLYNEUROPATHY: ICD-10-CM

## 2024-11-14 DIAGNOSIS — G62.9 POLYNEUROPATHY: Primary | ICD-10-CM

## 2024-11-14 LAB — VIT B12 BLD-MCNC: 246 PG/ML (ref 211–946)

## 2024-11-14 PROCEDURE — 36415 COLL VENOUS BLD VENIPUNCTURE: CPT

## 2024-11-14 PROCEDURE — 83921 ORGANIC ACID SINGLE QUANT: CPT

## 2024-11-14 PROCEDURE — 82607 VITAMIN B-12: CPT

## 2024-11-14 NOTE — PROGRESS NOTES
Notes by MA:  Ms Pantoja is here today to establish care with Dr Hein. She has been a patient with Dr Rodriguez, but the [place] office is closer for her.      Subjective:   Cervical and thoracic spine MRIs reviewed.  There is no significant cord compromise.  She denies any incontinence since starting on Ditropan.  She has had falls in the past but none since April.  She is using a walker for ambulatory support and is getting around fairly well, stable from that perspective.  Patient ID: Camille Pantoja is a 68 y.o. female.    Neurologic Problem    The following portions of the patient's history were reviewed and updated as appropriate: allergies, current medications, past family history, past medical history, past social history, past surgical history, and problem list.    Review of Systems   Musculoskeletal:  Positive for gait problem.        Objective:    Neurological Exam   Awake alert pleasant cooperative and cognitively preserved with fluent speech and reasonable speech comprehension.    Cranial nerves are symmetric.    Motor exam reveals increased tone in the lower extremities with mild right hemiparesis.    Sensory exam reveals a modest decrease in light touch distally in the feet while vibration sensation is relatively preserved.    Coordination testing reveals intact and accurate finger-nose-finger and rhythmic rapid alternating movements.    She still has a spastic gait pattern with circumduction of the left foot.  Using a walker for ambulatory support.  Physical Exam  Assessment/Plan:     Diagnoses and all orders for this visit:    1. Polyneuropathy (Primary)  -     Vitamin B12; Future  -     Methylmalonic Acid, Serum; Future    2. Cerebral ventriculomegaly    3. History of CVA (cerebrovascular accident)     Reviewed her prior notes and thorough workups with neurology.  Continuing on anticoagulation for stroke prophylaxis.  Gait abnormality persists.  I reviewed her thorough workup for myelopathic  sources which was negative.  She does have ventriculomegaly but I agree that this is probably not significant enough to be contributing to NPH type presentation.  She still does not have any cognitive decline or bladder incontinence.  With regard to her polyneuropathy I reviewed her last laboratory workup which was mostly unremarkable including vitamin D, heavy metals, copper level, hemoglobin A1c, RPR, serum protein electrophoresis, sed rate, and thyroid panel.  However her B12 level was borderline low and I am repeating them along with a methylmalonic acid and we will review the results and take any further measures that may be necessary.  For now she is doing well with ambulatory support aids

## 2024-11-19 LAB — METHYLMALONATE SERPL-SCNC: 410 NMOL/L (ref 0–378)

## 2024-11-23 ENCOUNTER — HOSPITAL ENCOUNTER (INPATIENT)
Facility: HOSPITAL | Age: 69
LOS: 2 days | Discharge: HOME | End: 2024-11-26
Admitting: STUDENT IN AN ORGANIZED HEALTH CARE EDUCATION/TRAINING PROGRAM
Payer: MEDICARE

## 2024-11-23 ENCOUNTER — APPOINTMENT (OUTPATIENT)
Dept: GENERAL RADIOLOGY | Facility: HOSPITAL | Age: 69
End: 2024-11-23
Payer: MEDICARE

## 2024-11-23 DIAGNOSIS — I48.91 ATRIAL FIBRILLATION WITH RAPID VENTRICULAR RESPONSE: Primary | ICD-10-CM

## 2024-11-23 LAB
ALBUMIN SERPL-MCNC: 4.3 G/DL (ref 3.5–5.2)
ALBUMIN/GLOB SERPL: 1.3 G/DL
ALP SERPL-CCNC: 88 U/L (ref 39–117)
ALT SERPL W P-5'-P-CCNC: 14 U/L (ref 1–33)
ANION GAP SERPL CALCULATED.3IONS-SCNC: 15.7 MMOL/L (ref 5–15)
AST SERPL-CCNC: 21 U/L (ref 1–32)
BACTERIA UR QL AUTO: ABNORMAL /HPF
BASOPHILS # BLD AUTO: 0.03 10*3/MM3 (ref 0–0.2)
BASOPHILS NFR BLD AUTO: 0.4 % (ref 0–1.5)
BILIRUB SERPL-MCNC: 1.1 MG/DL (ref 0–1.2)
BILIRUB UR QL STRIP: NEGATIVE
BUN SERPL-MCNC: 13 MG/DL (ref 8–23)
BUN/CREAT SERPL: 17.8 (ref 7–25)
CALCIUM SPEC-SCNC: 10.1 MG/DL (ref 8.6–10.5)
CHLORIDE SERPL-SCNC: 99 MMOL/L (ref 98–107)
CLARITY UR: CLEAR
CO2 SERPL-SCNC: 22.3 MMOL/L (ref 22–29)
COLOR UR: YELLOW
CREAT SERPL-MCNC: 0.73 MG/DL (ref 0.57–1)
DEPRECATED RDW RBC AUTO: 46.5 FL (ref 37–54)
EGFRCR SERPLBLD CKD-EPI 2021: 88.6 ML/MIN/1.73
EOSINOPHIL # BLD AUTO: 0.01 10*3/MM3 (ref 0–0.4)
EOSINOPHIL NFR BLD AUTO: 0.1 % (ref 0.3–6.2)
ERYTHROCYTE [DISTWIDTH] IN BLOOD BY AUTOMATED COUNT: 13.7 % (ref 12.3–15.4)
GEN 5 2HR TROPONIN T REFLEX: 32 NG/L
GLOBULIN UR ELPH-MCNC: 3.4 GM/DL
GLUCOSE SERPL-MCNC: 104 MG/DL (ref 65–99)
GLUCOSE UR STRIP-MCNC: NEGATIVE MG/DL
HCT VFR BLD AUTO: 39.8 % (ref 34–46.6)
HGB BLD-MCNC: 13.2 G/DL (ref 12–15.9)
HGB UR QL STRIP.AUTO: ABNORMAL
HOLD SPECIMEN: NORMAL
HOLD SPECIMEN: NORMAL
HYALINE CASTS UR QL AUTO: ABNORMAL /LPF
IMM GRANULOCYTES # BLD AUTO: 0.03 10*3/MM3 (ref 0–0.05)
IMM GRANULOCYTES NFR BLD AUTO: 0.4 % (ref 0–0.5)
KETONES UR QL STRIP: ABNORMAL
LEUKOCYTE ESTERASE UR QL STRIP.AUTO: ABNORMAL
LIPASE SERPL-CCNC: 36 U/L (ref 13–60)
LYMPHOCYTES # BLD AUTO: 0.79 10*3/MM3 (ref 0.7–3.1)
LYMPHOCYTES NFR BLD AUTO: 10 % (ref 19.6–45.3)
MAGNESIUM SERPL-MCNC: 1.6 MG/DL (ref 1.6–2.4)
MCH RBC QN AUTO: 30.5 PG (ref 26.6–33)
MCHC RBC AUTO-ENTMCNC: 33.2 G/DL (ref 31.5–35.7)
MCV RBC AUTO: 91.9 FL (ref 79–97)
MONOCYTES # BLD AUTO: 0.68 10*3/MM3 (ref 0.1–0.9)
MONOCYTES NFR BLD AUTO: 8.6 % (ref 5–12)
NEUTROPHILS NFR BLD AUTO: 6.38 10*3/MM3 (ref 1.7–7)
NEUTROPHILS NFR BLD AUTO: 80.5 % (ref 42.7–76)
NITRITE UR QL STRIP: NEGATIVE
NRBC BLD AUTO-RTO: 0 /100 WBC (ref 0–0.2)
PH UR STRIP.AUTO: 6 [PH] (ref 4.5–8)
PLATELET # BLD AUTO: 172 10*3/MM3 (ref 140–450)
PMV BLD AUTO: 11.5 FL (ref 6–12)
POTASSIUM SERPL-SCNC: 3.9 MMOL/L (ref 3.5–5.2)
PROT SERPL-MCNC: 7.7 G/DL (ref 6–8.5)
PROT UR QL STRIP: NEGATIVE
RBC # BLD AUTO: 4.33 10*6/MM3 (ref 3.77–5.28)
RBC # UR STRIP: ABNORMAL /HPF
REF LAB TEST METHOD: ABNORMAL
SODIUM SERPL-SCNC: 137 MMOL/L (ref 136–145)
SP GR UR STRIP: 1.01 (ref 1–1.03)
SQUAMOUS #/AREA URNS HPF: ABNORMAL /HPF
TROPONIN T DELTA: -2 NG/L
TROPONIN T SERPL HS-MCNC: 34 NG/L
TSH SERPL DL<=0.05 MIU/L-ACNC: 0.59 UIU/ML (ref 0.27–4.2)
UROBILINOGEN UR QL STRIP: ABNORMAL
WBC # UR STRIP: ABNORMAL /HPF
WBC NRBC COR # BLD AUTO: 7.92 10*3/MM3 (ref 3.4–10.8)
WHOLE BLOOD HOLD COAG: NORMAL
WHOLE BLOOD HOLD SPECIMEN: NORMAL

## 2024-11-23 PROCEDURE — 93010 ELECTROCARDIOGRAM REPORT: CPT | Performed by: STUDENT IN AN ORGANIZED HEALTH CARE EDUCATION/TRAINING PROGRAM

## 2024-11-23 PROCEDURE — 99222 1ST HOSP IP/OBS MODERATE 55: CPT | Performed by: FAMILY MEDICINE

## 2024-11-23 PROCEDURE — 99285 EMERGENCY DEPT VISIT HI MDM: CPT

## 2024-11-23 PROCEDURE — 71045 X-RAY EXAM CHEST 1 VIEW: CPT

## 2024-11-23 PROCEDURE — 84443 ASSAY THYROID STIM HORMONE: CPT

## 2024-11-23 PROCEDURE — 93005 ELECTROCARDIOGRAM TRACING: CPT

## 2024-11-23 PROCEDURE — 25810000003 SODIUM CHLORIDE 0.9 % SOLUTION

## 2024-11-23 PROCEDURE — 83690 ASSAY OF LIPASE: CPT

## 2024-11-23 PROCEDURE — 83735 ASSAY OF MAGNESIUM: CPT

## 2024-11-23 PROCEDURE — 80053 COMPREHEN METABOLIC PANEL: CPT

## 2024-11-23 PROCEDURE — 84484 ASSAY OF TROPONIN QUANT: CPT

## 2024-11-23 PROCEDURE — 85025 COMPLETE CBC W/AUTO DIFF WBC: CPT

## 2024-11-23 PROCEDURE — 25010000002 DIGOXIN PER 500 MCG

## 2024-11-23 PROCEDURE — 94799 UNLISTED PULMONARY SVC/PX: CPT

## 2024-11-23 PROCEDURE — G0378 HOSPITAL OBSERVATION PER HR: HCPCS

## 2024-11-23 PROCEDURE — 81001 URINALYSIS AUTO W/SCOPE: CPT

## 2024-11-23 RX ORDER — METOPROLOL SUCCINATE 50 MG/1
50 TABLET, EXTENDED RELEASE ORAL DAILY
Status: DISCONTINUED | OUTPATIENT
Start: 2024-11-24 | End: 2024-11-25

## 2024-11-23 RX ORDER — ACETAMINOPHEN 650 MG/1
650 SUPPOSITORY RECTAL EVERY 4 HOURS PRN
Status: DISCONTINUED | OUTPATIENT
Start: 2024-11-23 | End: 2024-11-26 | Stop reason: HOSPADM

## 2024-11-23 RX ORDER — AMOXICILLIN 250 MG
2 CAPSULE ORAL 2 TIMES DAILY
Status: DISCONTINUED | OUTPATIENT
Start: 2024-11-23 | End: 2024-11-26 | Stop reason: HOSPADM

## 2024-11-23 RX ORDER — ALUMINA, MAGNESIA, AND SIMETHICONE 2400; 2400; 240 MG/30ML; MG/30ML; MG/30ML
15 SUSPENSION ORAL EVERY 6 HOURS PRN
Status: DISCONTINUED | OUTPATIENT
Start: 2024-11-23 | End: 2024-11-26 | Stop reason: HOSPADM

## 2024-11-23 RX ORDER — SODIUM CHLORIDE 0.9 % (FLUSH) 0.9 %
10 SYRINGE (ML) INJECTION EVERY 12 HOURS SCHEDULED
Status: DISCONTINUED | OUTPATIENT
Start: 2024-11-23 | End: 2024-11-26 | Stop reason: HOSPADM

## 2024-11-23 RX ORDER — NITROGLYCERIN 0.4 MG/1
0.4 TABLET SUBLINGUAL
Status: DISCONTINUED | OUTPATIENT
Start: 2024-11-23 | End: 2024-11-26 | Stop reason: HOSPADM

## 2024-11-23 RX ORDER — ACETAMINOPHEN 325 MG/1
650 TABLET ORAL EVERY 4 HOURS PRN
Status: DISCONTINUED | OUTPATIENT
Start: 2024-11-23 | End: 2024-11-26 | Stop reason: HOSPADM

## 2024-11-23 RX ORDER — ONDANSETRON 2 MG/ML
4 INJECTION INTRAMUSCULAR; INTRAVENOUS EVERY 6 HOURS PRN
Status: DISCONTINUED | OUTPATIENT
Start: 2024-11-23 | End: 2024-11-26 | Stop reason: HOSPADM

## 2024-11-23 RX ORDER — SODIUM CHLORIDE 9 MG/ML
40 INJECTION, SOLUTION INTRAVENOUS AS NEEDED
Status: DISCONTINUED | OUTPATIENT
Start: 2024-11-23 | End: 2024-11-26 | Stop reason: HOSPADM

## 2024-11-23 RX ORDER — CALCIUM CARBONATE 500 MG/1
1 TABLET, CHEWABLE ORAL 2 TIMES DAILY PRN
Status: DISCONTINUED | OUTPATIENT
Start: 2024-11-23 | End: 2024-11-26 | Stop reason: HOSPADM

## 2024-11-23 RX ORDER — ONDANSETRON 4 MG/1
4 TABLET, ORALLY DISINTEGRATING ORAL EVERY 6 HOURS PRN
Status: DISCONTINUED | OUTPATIENT
Start: 2024-11-23 | End: 2024-11-26 | Stop reason: HOSPADM

## 2024-11-23 RX ORDER — SODIUM CHLORIDE 0.9 % (FLUSH) 0.9 %
10 SYRINGE (ML) INJECTION AS NEEDED
Status: DISCONTINUED | OUTPATIENT
Start: 2024-11-23 | End: 2024-11-26 | Stop reason: HOSPADM

## 2024-11-23 RX ORDER — ATORVASTATIN CALCIUM 40 MG/1
40 TABLET, FILM COATED ORAL NIGHTLY
Status: DISCONTINUED | OUTPATIENT
Start: 2024-11-23 | End: 2024-11-26 | Stop reason: HOSPADM

## 2024-11-23 RX ORDER — DIGOXIN 0.25 MG/ML
250 INJECTION INTRAMUSCULAR; INTRAVENOUS ONCE
Status: COMPLETED | OUTPATIENT
Start: 2024-11-23 | End: 2024-11-23

## 2024-11-23 RX ORDER — ACETAMINOPHEN 160 MG/5ML
650 SOLUTION ORAL EVERY 4 HOURS PRN
Status: DISCONTINUED | OUTPATIENT
Start: 2024-11-23 | End: 2024-11-26 | Stop reason: HOSPADM

## 2024-11-23 RX ORDER — BISACODYL 10 MG
10 SUPPOSITORY, RECTAL RECTAL DAILY PRN
Status: DISCONTINUED | OUTPATIENT
Start: 2024-11-23 | End: 2024-11-26 | Stop reason: HOSPADM

## 2024-11-23 RX ORDER — POLYETHYLENE GLYCOL 3350 17 G/17G
17 POWDER, FOR SOLUTION ORAL DAILY PRN
Status: DISCONTINUED | OUTPATIENT
Start: 2024-11-23 | End: 2024-11-26 | Stop reason: HOSPADM

## 2024-11-23 RX ORDER — METOPROLOL SUCCINATE 25 MG/1
50 TABLET, EXTENDED RELEASE ORAL ONCE
Status: COMPLETED | OUTPATIENT
Start: 2024-11-23 | End: 2024-11-23

## 2024-11-23 RX ORDER — BISACODYL 5 MG/1
5 TABLET, DELAYED RELEASE ORAL DAILY PRN
Status: DISCONTINUED | OUTPATIENT
Start: 2024-11-23 | End: 2024-11-26 | Stop reason: HOSPADM

## 2024-11-23 RX ORDER — OXYBUTYNIN CHLORIDE 5 MG/1
5 TABLET ORAL 2 TIMES DAILY
Status: DISCONTINUED | OUTPATIENT
Start: 2024-11-23 | End: 2024-11-26 | Stop reason: HOSPADM

## 2024-11-23 RX ADMIN — DIGOXIN 250 MCG: 0.25 INJECTION INTRAMUSCULAR; INTRAVENOUS at 23:02

## 2024-11-23 RX ADMIN — METOPROLOL SUCCINATE 50 MG: 25 TABLET, EXTENDED RELEASE ORAL at 20:24

## 2024-11-23 RX ADMIN — METOPROLOL TARTRATE 2.5 MG: 5 INJECTION INTRAVENOUS at 21:59

## 2024-11-23 RX ADMIN — METOROPROLOL TARTRATE 2.5 MG: 5 INJECTION, SOLUTION INTRAVENOUS at 20:24

## 2024-11-23 RX ADMIN — SODIUM CHLORIDE 1000 ML: 9 INJECTION, SOLUTION INTRAVENOUS at 22:50

## 2024-11-23 RX ADMIN — SODIUM CHLORIDE 1000 ML: 9 INJECTION, SOLUTION INTRAVENOUS at 20:24

## 2024-11-24 LAB
QT INTERVAL: 315 MS
QTC INTERVAL: 491 MS
TROPONIN T SERPL HS-MCNC: 37 NG/L

## 2024-11-24 PROCEDURE — 94799 UNLISTED PULMONARY SVC/PX: CPT

## 2024-11-24 PROCEDURE — 25010000002 DIGOXIN PER 500 MCG: Performed by: STUDENT IN AN ORGANIZED HEALTH CARE EDUCATION/TRAINING PROGRAM

## 2024-11-24 PROCEDURE — 84484 ASSAY OF TROPONIN QUANT: CPT | Performed by: FAMILY MEDICINE

## 2024-11-24 PROCEDURE — 99232 SBSQ HOSP IP/OBS MODERATE 35: CPT | Performed by: STUDENT IN AN ORGANIZED HEALTH CARE EDUCATION/TRAINING PROGRAM

## 2024-11-24 RX ORDER — DIGOXIN 0.25 MG/ML
250 INJECTION INTRAMUSCULAR; INTRAVENOUS ONCE
Status: COMPLETED | OUTPATIENT
Start: 2024-11-24 | End: 2024-11-24

## 2024-11-24 RX ORDER — DIGOXIN 0.25 MG/ML
250 INJECTION INTRAMUSCULAR; INTRAVENOUS EVERY 6 HOURS
Status: COMPLETED | OUTPATIENT
Start: 2024-11-24 | End: 2024-11-24

## 2024-11-24 RX ADMIN — OXYBUTYNIN CHLORIDE 5 MG: 5 TABLET ORAL at 20:44

## 2024-11-24 RX ADMIN — DIGOXIN 250 MCG: 0.25 INJECTION INTRAMUSCULAR; INTRAVENOUS at 11:42

## 2024-11-24 RX ADMIN — APIXABAN 5 MG: 2.5 TABLET, FILM COATED ORAL at 20:44

## 2024-11-24 RX ADMIN — Medication 10 ML: at 08:21

## 2024-11-24 RX ADMIN — METOPROLOL SUCCINATE 50 MG: 50 TABLET, EXTENDED RELEASE ORAL at 08:19

## 2024-11-24 RX ADMIN — DIGOXIN 250 MCG: 0.25 INJECTION INTRAMUSCULAR; INTRAVENOUS at 23:51

## 2024-11-24 RX ADMIN — METOPROLOL TARTRATE 5 MG: 5 INJECTION INTRAVENOUS at 03:57

## 2024-11-24 RX ADMIN — METOPROLOL TARTRATE 5 MG: 5 INJECTION INTRAVENOUS at 11:36

## 2024-11-24 RX ADMIN — ATORVASTATIN CALCIUM 40 MG: 40 TABLET, FILM COATED ORAL at 00:07

## 2024-11-24 RX ADMIN — APIXABAN 5 MG: 2.5 TABLET, FILM COATED ORAL at 08:16

## 2024-11-24 RX ADMIN — Medication 10 ML: at 20:44

## 2024-11-24 RX ADMIN — ATORVASTATIN CALCIUM 40 MG: 40 TABLET, FILM COATED ORAL at 20:44

## 2024-11-24 RX ADMIN — DIGOXIN 250 MCG: 0.25 INJECTION INTRAMUSCULAR; INTRAVENOUS at 18:14

## 2024-11-24 RX ADMIN — Medication 10 ML: at 00:07

## 2024-11-24 RX ADMIN — SENNOSIDES AND DOCUSATE SODIUM 2 TABLET: 50; 8.6 TABLET ORAL at 20:44

## 2024-11-24 RX ADMIN — OXYBUTYNIN CHLORIDE 5 MG: 5 TABLET ORAL at 08:16

## 2024-11-24 RX ADMIN — OXYBUTYNIN CHLORIDE 5 MG: 5 TABLET ORAL at 00:07

## 2024-11-24 RX ADMIN — APIXABAN 5 MG: 2.5 TABLET, FILM COATED ORAL at 00:07

## 2024-11-24 NOTE — PLAN OF CARE
Goal Outcome Evaluation:  Plan of Care Reviewed With: patient        Progress: improving  Outcome Evaluation: pt a&o x4. pt with tele, afib, HR 's. metoprolol iv and digoxin given. metoprolol/digoxin now scheduled.  pt denies chest pain. diet advanced to cardiac diet. pt currently resting in room.

## 2024-11-24 NOTE — CASE MANAGEMENT/SOCIAL WORK
Continued Stay Note  TANMAY Merino     Patient Name: Camille Pantoja  MRN: [id number]  Today's Date: 11/24/2024    Admit Date: 11/23/2024    Plan: Home w/HH?   Discharge Plan       Row Name 11/24/24 0767       Plan    Plan Home w/HH?    Plan Comments Met with pt introduced self, role and discussed dc plans, needs.  Pt confirmed her addres and PCP. Pt lives alone in a 1 story home w/walkout basement.  pt says she seldom goes to the basement.  She uses a cane and a rolling walker.  She says she is [employer], cooks and does some cleaning.  Her bathroom is a walk in shower with a chair. She does not drive.  Her friend and neighbors take her to and from appointments.  Her pharmacy is [facility] in [place] and has no issues with medication copays.  She denies abuse, no problems buying food or paying utilities.  She has used HH before, unsure of the agency name.  she has not been to IP rehab.  She is agreeable to use home therapy on dc.  She does not want to go to rehab.  She wants to get home to her dog. Will follow. Bakari, Eden PETERSON                   Discharge Codes    No documentation.                       Eden Welsh

## 2024-11-24 NOTE — ED PROVIDER NOTES
Subjective   History of Present Illness    68-year-old female with history of hyperlipidemia, hypertension, vitamin D deficiency, atrial fibrillation, right-sided hemiparesis, nonischemic cardiomyopathy, GERD, previous stroke, alcohol dependence, presenting to the emergency department for evaluation of generalized weakness.  Patient reports she woke up generally weak today with some abdominal cramping that improved with eating, but has not taken her medications today.  She called the ambulance because she was still feeling bad this evening.  Has not really eaten much today other than the food when she woke up.  Denies any cough or shortness of breath or nausea or vomiting, she thinks she had loose stools this morning but is not sure.  Reports she is otherwise compliant with her medications regularly.    Review of Systems    ROS as specified in HPI.      Past Medical History:   Diagnosis Date    CHF (congestive heart failure)     Hyperlipidemia     Hypertension     Irregular heart beat     Osteoporosis     Skin cancer     Stroke        Allergies   Allergen Reactions    Cephalexin Other (See Comments)     Reports she doesn't remember having an allergic reaction.     Melatonin Other (See Comments)     Not Efficacious    Beef-Derived Products Rash    Citrus Rash    Duricef [Cefadroxil] Rash    Fish Allergy Rash    Garlic Rash    Green Beans Rash    Onion Rash    Other Rash    Peach [Prunus Persica] Rash    Wheat Rash    Wheat Rash       Past Surgical History:   Procedure Laterality Date    CARDIAC CATHETERIZATION N/A 01/10/2020    Procedure: Left Heart Cath;  Surgeon: Cain Mcneil MD;  Location: [facility] INVASIVE LOCATION;  Service: Cardiovascular    CARDIAC CATHETERIZATION N/A 01/10/2020    Procedure: Coronary angiography;  Surgeon: Cain Mcneil MD;  Location: [facility] INVASIVE LOCATION;  Service: Cardiovascular    FINGER SURGERY      HYSTERECTOMY      JOINT REPLACEMENT      ORIF SHOULDER DISLOCATION  W/ HUMERAL FRACTURE      TIBIA FRACTURE SURGERY         Family History   Problem Relation Age of Onset    Breast cancer Brother     Macular degeneration Mother     Heart disease Mother     Heart disease Father     Heart disease Maternal Uncle        Social History     Socioeconomic History    Marital status:    Tobacco Use    Smoking status: Never     Passive exposure: Never    Smokeless tobacco: Never   Vaping Use    Vaping status: Never Used   Substance and Sexual Activity    Alcohol use: Not Currently     Comment: Reports a hx of drinking everyday for 18 years atleast. She says that she quit May 2018, but says that she drinks occasionally now.     Drug use: No     Comment: caffeine use: 2 cups daily.     Sexual activity: Not Currently           Objective   Physical Exam  Vitals reviewed.   Constitutional:       General: She is not in acute distress.     Appearance: She is normal weight. She is not toxic-appearing.   HENT:      Head: Normocephalic and atraumatic.      Nose: Nose normal. No congestion.      Mouth/Throat:      Mouth: Mucous membranes are dry.      Pharynx: Oropharynx is clear.   Eyes:      General: No scleral icterus.     Extraocular Movements: Extraocular movements intact.      Conjunctiva/sclera: Conjunctivae normal.      Pupils: Pupils are equal, round, and reactive to light.   Cardiovascular:      Rate and Rhythm: Normal rate and regular rhythm. Tachycardia present. Rhythm irregular.      Pulses: Normal pulses.      Heart sounds: No murmur heard.  Pulmonary:      Effort: Pulmonary effort is normal. No respiratory distress.      Breath sounds: No wheezing, rhonchi or rales.   Abdominal:      General: There is no distension.      Palpations: Abdomen is soft.      Tenderness: There is no abdominal tenderness.   Musculoskeletal:         General: No swelling. Normal range of motion.      Cervical back: Normal range of motion and neck supple. No rigidity.   Skin:     General: Skin is warm and  dry.      Capillary Refill: Capillary refill takes less than 2 seconds.      Coloration: Skin is not jaundiced.   Neurological:      Mental Status: She is alert and oriented to person, place, and time. Mental status is at baseline.   Psychiatric:         Mood and Affect: Mood normal.         Behavior: Behavior normal.         Procedures           ED Course                                                       Medical Decision Making      Final diagnoses:   Atrial fibrillation with rapid ventricular response       ED Disposition  ED Disposition       ED Disposition   Decision to Admit    Condition   --    Comment   Level of Care: Telemetry [5]   Diagnosis: Atrial fibrillation with rapid ventricular response [204636]   Admitting Physician: NICOLAS BELLAMY [[id number]]   Attending Physician: NICOLAS BELLAMY [[id number]]                 No follow-up provider specified.       Medication List      No changes were made to your prescriptions during this visit.       ED Course: 68-year-old female presenting to the emergency department for generalized weakness.  Found to be in A-fib RVR, blood pressure stable, clinically well-appearing.  Did not take her metoprolol today.  Lab work showed elevated but flat troponin, likely strain pattern secondary to uncontrolled heart rate.  Gave her home metoprolol as well as 2 doses of Lopressor without much control, did improve from the 140s to the 120s but unable to get any lower.  Also gave some fluids as she appears clinically dry.  Given that we are unable to control her heart rate here in the ED with evidence of strain, will admit for further management.  Internal medicine service requested 1 dose of digoxin in the ED.    EKG: Left axis deviation with left anterior fascicular block, atrial fibrillation with rapid ventricular response, borderline ST depressions anteroseptal leads, likely rate related, no STEMI.    Consults: None    Incidental Findings: None         Nimesh  Enrique ELLIS DO  11/23/24 2245

## 2024-11-24 NOTE — PLAN OF CARE
Goal Outcome Evaluation:  Plan of Care Reviewed With: patient        Progress: no change  Outcome Evaluation: Admitted from ED, with complaints of generalized weakness, Afib with RVR on EKG/telemetry, patient denies chest pain. HR ranging from 110 to 131 bpm per telemetry, given PRN dose of Metoprolol 5 mg IV.  Patient is alert and oriented x4, assist x1 to BSC with a walker.

## 2024-11-24 NOTE — H&P
HISTORY AND PHYSICAL      Patient Care Team:  Gurjit Rodriguez DO as PCP - General (Family Medicine)  Jovita Brown MD as Consulting Physician (Cardiology)  Raji Rodriguez MD as Consulting Physician (Neurology)    CHIEF COMPLAINT: Headache and generalized weakness    HISTORY OF PRESENT ILLNESS:    68-year-old white female history of paroxysmal atrial fibrillation on anticoagulation , hypertension, hyperlipidemia who presented emergency room with a 12-hour history of just not feeling well.  She woke this morning generalized weakness and some abdominal discomfort which improved.  She was nauseated did not take any of her medications.  She continued to feel not herself and therefore she came to emergency room.  She denies any chest pain shortness of breath palpitations vomiting or diarrhea.  She had a headache which has been persistent since morning.    Past Medical History:   Diagnosis Date    CHF (congestive heart failure)     Hyperlipidemia     Hypertension     Irregular heart beat     Osteoporosis     Skin cancer     Stroke      Past Surgical History:   Procedure Laterality Date    CARDIAC CATHETERIZATION N/A 01/10/2020    Procedure: Left Heart Cath;  Surgeon: Cain Mcneil MD;  Location:  [facility];  Service: Cardiovascular    CARDIAC CATHETERIZATION N/A 01/10/2020    Procedure: Coronary angiography;  Surgeon: Cain Mcneil MD;  Location:  [facility];  Service: Cardiovascular    FINGER SURGERY      HYSTERECTOMY      JOINT REPLACEMENT      ORIF SHOULDER DISLOCATION W/ HUMERAL FRACTURE      TIBIA FRACTURE SURGERY       Family History   Problem Relation Age of Onset    Breast cancer Brother     Macular degeneration Mother     Heart disease Mother     Heart disease Father     Heart disease Maternal Uncle      Social History     Tobacco Use    Smoking status: Never     Passive exposure: Never    Smokeless tobacco: Never   Vaping Use    Vaping status: Never Used  "  Substance Use Topics    Alcohol use: Not Currently     Comment: Reports a hx of drinking everyday for 18 years atleast. She says that she quit May 2018, but says that she drinks occasionally now.     Drug use: No     Comment: caffeine use: 2 cups daily.      (Not in a hospital admission)    Allergies:  Cephalexin, Melatonin, Beef-derived products, Citrus, Duricef [cefadroxil], Fish allergy, Garlic, Green beans, Onion, Other, Peach [prunus persica], Wheat, and Wheat     Review of Systems   Constitutional:  Positive for activity change and fatigue. Negative for appetite change.   HENT:  Negative for congestion.    Respiratory:  Negative for cough, chest tightness, shortness of breath and wheezing.    Cardiovascular:  Negative for chest pain.   Gastrointestinal:  Negative for abdominal distention, abdominal pain, diarrhea, nausea and vomiting.   Endocrine: Negative for polyphagia and polyuria.   Genitourinary:  Negative for frequency.   Skin:  Negative for rash.   Neurological:  Positive for weakness and headaches. Negative for light-headedness.   Hematological:  Does not bruise/bleed easily.   Psychiatric/Behavioral:  Negative for agitation and behavioral problems.        Objective       Vital Signs  Temp:  [99.9 °F (37.7 °C)] 99.9 °F (37.7 °C)  Heart Rate:  [109-163] 113  Resp:  [16-18] 16  BP: (101-123)/(60-96) 122/77  Oxygen Therapy  SpO2: 95 %  Pulse Oximetry Type: Continuous  Device (Oxygen Therapy): room air}  Body mass index is 22.96 kg/m².  Flowsheet Rows      Flowsheet Row First Filed Value   Admission Height 165.1 cm (65\") Documented at 11/23/2024 2005   Admission Weight 62.6 kg (138 lb) Documented at 11/23/2024 2005                   Physical Exam  Vitals and nursing note reviewed.   Constitutional:       Appearance: She is well-developed.   HENT:      Head: Normocephalic.      Mouth/Throat:      Mouth: Mucous membranes are moist.      Dentition: Abnormal dentition (Very poor oral hygiene with caries " teeth).   Eyes:      Conjunctiva/sclera: Conjunctivae normal.   Neck:      Thyroid: No thyromegaly.      Vascular: No JVD.   Cardiovascular:      Rate and Rhythm: Normal rate and regular rhythm. Tachycardia present. Rhythm irregularly irregular.      Heart sounds: Normal heart sounds. No murmur heard.  Pulmonary:      Effort: Pulmonary effort is normal. No respiratory distress.      Breath sounds: Normal breath sounds. No wheezing or rales.   Abdominal:      General: Bowel sounds are normal. There is no distension.      Palpations: Abdomen is soft.      Tenderness: There is no abdominal tenderness. There is no guarding.   Skin:     General: Skin is warm and dry.      Findings: No rash.   Neurological:      Mental Status: She is alert and oriented to person, place, and time.      Cranial Nerves: Cranial nerves 2-12 are intact.      Motor: Weakness (Mild right upper and lower extremity weakness) present.          Debilities/Disabilities Identified: None    Emotional Behavior: Appropriate    Result Review        I have personally reviewed the results from the time of this admission to 11/23/2024 23:10 EST and agree with these findings:  [x]  Laboratory  []  Microbiology  [x]  Radiology  []  EKG/Telemetry   [x]  Cardiology/Vascular   []  Pathology  []  Old records  []  Other:          Results Review:    I reviewed the patient's new clinical results.  Lab Results (most recent)       Procedure Component Value Units Date/Time    High Sensitivity Troponin T 2Hr [[id number]]  (Abnormal) Collected: 11/23/24 2159    Specimen: Blood Updated: 11/23/24 2223     HS Troponin T 32 ng/L      Troponin T Delta -2 ng/L     Narrative:      High Sensitive Troponin T Reference Range:  <14.0 ng/L- Negative Female for AMI  <22.0 ng/L- Negative Male for AMI  >=14 - Abnormal Female indicating possible myocardial injury.  >=22 - Abnormal Male indicating possible myocardial injury.   Clinicians would have to utilize clinical acumen, EKG,  Troponin, and serial changes to determine if it is an Acute Myocardial Infarction or myocardial injury due to an underlying chronic condition.         Urinalysis, Microscopic Only - Urine, Clean Catch [[id number]]  (Abnormal) Collected: 11/23/24 2148    Specimen: Urine, Clean Catch Updated: 11/23/24 2155     RBC, UA 3-5 /HPF      WBC, UA 3-5 /HPF      Comment: Urine culture not indicated.        Bacteria, UA None Seen /HPF      Squamous Epithelial Cells, UA 3-6 /HPF      Hyaline Casts, UA None Seen /LPF      Methodology Manual Light Microscopy    Urinalysis With Culture If Indicated - Urine, Clean Catch [[id number]]  (Abnormal) Collected: 11/23/24 2148    Specimen: Urine, Clean Catch Updated: 11/23/24 2153     Color, UA Yellow     Appearance, UA Clear     pH, UA 6.0     Specific Gravity, UA 1.010     Glucose, UA Negative     Ketones, UA 40 mg/dL (2+)     Bilirubin, UA Negative     Blood, UA Small (1+)     Protein, UA Negative     Leuk Esterase, UA Trace     Nitrite, UA Negative     Urobilinogen, UA 0.2 E.U./dL    Narrative:      In absence of clinical symptoms, the presence of pyuria, bacteria, and/or nitrites on the urinalysis result does not correlate with infection.    Lipase [[id number]]  (Normal) Collected: 11/23/24 2009    Specimen: Blood Updated: 11/23/24 2050     Lipase 36 U/L     TSH Rfx On Abnormal To Free T4 [[id number]]  (Normal) Collected: 11/23/24 2009    Specimen: Blood Updated: 11/23/24 2050     TSH 0.591 uIU/mL     Magnesium [[id number]]  (Normal) Collected: 11/23/24 2009    Specimen: Blood Updated: 11/23/24 2050     Magnesium 1.6 mg/dL     Comprehensive Metabolic Panel [[id number]]  (Abnormal) Collected: 11/23/24 2009    Specimen: Blood Updated: 11/23/24 2031     Glucose 104 mg/dL      BUN 13 mg/dL      Creatinine 0.73 mg/dL      Sodium 137 mmol/L      Potassium 3.9 mmol/L      Chloride 99 mmol/L      CO2 22.3 mmol/L      Calcium 10.1 mg/dL      Total Protein 7.7 g/dL      Albumin 4.3 g/dL      ALT  (SGPT) 14 U/L      AST (SGOT) 21 U/L      Alkaline Phosphatase 88 U/L      Total Bilirubin 1.1 mg/dL      Globulin 3.4 gm/dL      A/G Ratio 1.3 g/dL      BUN/Creatinine Ratio 17.8     Anion Gap 15.7 mmol/L      eGFR 88.6 mL/min/1.73     Narrative:      GFR Normal >60  Chronic Kidney Disease <60  Kidney Failure <15      High Sensitivity Troponin T [[id number]]  (Abnormal) Collected: 11/23/24 2009    Specimen: Blood Updated: 11/23/24 2031     HS Troponin T 34 ng/L     Narrative:      High Sensitive Troponin T Reference Range:  <14.0 ng/L- Negative Female for AMI  <22.0 ng/L- Negative Male for AMI  >=14 - Abnormal Female indicating possible myocardial injury.  >=22 - Abnormal Male indicating possible myocardial injury.   Clinicians would have to utilize clinical acumen, EKG, Troponin, and serial changes to determine if it is an Acute Myocardial Infarction or myocardial injury due to an underlying chronic condition.         [place] Draw [[id number]] Collected: 11/23/24 2009    Specimen: Blood Updated: 11/23/24 2016    Narrative:      The following orders were created for panel order [place] Draw.  Procedure                               Abnormality         Status                     ---------                               -----------         ------                     Green Top (Gel)[[id number]]                                  Final result               Lavender Top[[id number]]                                     Final result               Gold Top - SST[[id number]]                                   Final result               Light Blue Top[[id number]]                                   Final result                 Please view results for these tests on the individual orders.    Green Top (Gel) [[id number]] Collected: 11/23/24 2009    Specimen: Blood Updated: 11/23/24 2016     Extra Tube Hold for add-ons.     Comment: Auto resulted.       Lavender Top [[id number]] Collected: 11/23/24 2009    Specimen: Blood Updated: 11/23/24  2016     Extra Tube hold for add-on     Comment: Auto resulted       Gold Top - SST [[id number]] Collected: 11/23/24 2009    Specimen: Blood Updated: 11/23/24 2016     Extra Tube Hold for add-ons.     Comment: Auto resulted.       Light Blue Top [[id number]] Collected: 11/23/24 2009    Specimen: Blood Updated: 11/23/24 2016     Extra Tube Hold for add-ons.     Comment: Auto resulted       CBC & Differential [[id number]]  (Abnormal) Collected: 11/23/24 2009    Specimen: Blood Updated: 11/23/24 2013    Narrative:      The following orders were created for panel order CBC & Differential.  Procedure                               Abnormality         Status                     ---------                               -----------         ------                     CBC Auto Differential[[id number]]        Abnormal            Final result                 Please view results for these tests on the individual orders.    CBC Auto Differential [[id number]]  (Abnormal) Collected: 11/23/24 2009    Specimen: Blood Updated: 11/23/24 2013     WBC 7.92 10*3/mm3      RBC 4.33 10*6/mm3      Hemoglobin 13.2 g/dL      Hematocrit 39.8 %      MCV 91.9 fL      MCH 30.5 pg      MCHC 33.2 g/dL      RDW 13.7 %      RDW-SD 46.5 fl      MPV 11.5 fL      Platelets 172 10*3/mm3      Neutrophil % 80.5 %      Lymphocyte % 10.0 %      Monocyte % 8.6 %      Eosinophil % 0.1 %      Basophil % 0.4 %      Immature Grans % 0.4 %      Neutrophils, Absolute 6.38 10*3/mm3      Lymphocytes, Absolute 0.79 10*3/mm3      Monocytes, Absolute 0.68 10*3/mm3      Eosinophils, Absolute 0.01 10*3/mm3      Basophils, Absolute 0.03 10*3/mm3      Immature Grans, Absolute 0.03 10*3/mm3      nRBC 0.0 /100 WBC             Imaging Results (Most Recent)       Procedure Component Value Units Date/Time    XR Chest 1 View [[id number]] Collected: 11/23/24 2046     Updated: 11/23/24 2050    Narrative:      XR CHEST 1 VW    Date of Exam: 11/23/2024 8:28 PM EST    Indication: Chest Pain  Triage Protocol    Comparison: Chest radiograph 1/9/2020    Findings:  The heart size and pulmonary vascular markings are normal. There is mild diffuse emphysema. There are postsurgical changes of the right shoulder. There are healed posterior rib fractures on the left. There are no focal limitations.      Impression:      Impression:  Emphysema. No active disease.        Electronically Signed: Vaibhav Cole MD    11/23/2024 8:47 PM EST    Workstation ID: [id number]          reviewed    ECG/EMG Results (most recent)       Procedure Component Value Units Date/Time    ECG 12 Lead ED Triage Standing Order; Chest Pain [[id number]] Collected: 11/23/24 2001     Updated: 11/23/24 2003     QT Interval 315 ms      QTC Interval 491 ms     Narrative:      HEART XPDV=466  bpm  RR Myyowrfh=294  ms  MA Interval=  ms  P Horizontal Axis=  deg  P Front Axis=  deg  QRSD Interval=82  ms  QT Cfonlbyh=609  ms  GWkW=237  ms  QRS Axis=-47  deg  T Wave Axis=80  deg  - ABNORMAL ECG -  Atrial flutter with predominant 2:1 AV block  Left anterior fascicular block  ST depression, probably rate related  Borderline prolonged QT interval  Date and Time of Study:2024-11-23 20:01:49          reviewed    Assessment & Plan       .  Recurrent paroxysmal atrial fibs/flutter with rapid ventricular response patient's heart rate initially was in the 140s she is now on the 120s will give her 1 dose of IV digoxin and continue with as needed metoprolol.  Her last echocardiogram was in 2020 will repeat her troponin is slightly elevated may be due to mild myocardial ischemia from rapid heart rate.  Continue with home Eliquis.  Will repeat in a.m. cardiology be consulted    .  Hypertension at goal continue with home medications    .  Hyperlipidemia nothing acute continue with home statin    .  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  History of nonischemic cardiomyopathy with recovered left ventricular function last  echocardiogram showed normal ejection fraction    I discussed the patients findings and my recommendations with patient     Shar Fisher MD  11/23/24  23:10 EST        Much of this encounter note is an electronic transcription/translation of spoken language to printed text. The electronic translation of spoken language may permit erroneous, or at times, nonsensical words or phrases to be inadvertently transcribed; Although I have reviewed the note for such errors, some may still exist.    Note Disclaimer: At [facility], we believe that sharing information builds trust and better relationships. You are receiving this note because you recently visited [facility]. It is possible you will see health information before a provider has talked with you about it. This kind of information can be easy to misunderstand. To help you fully understand what it means for your health, we urge you to discuss this note with your provider.

## 2024-11-24 NOTE — ED NOTES
Nursing report ED to floor  Camille Pantoja  68 y.o.  female    HPI :   Chief Complaint   Patient presents with    Fatigue     Hx Afib, afib en route per EMS        Admitting doctor:   Shar Fisher MD    Admitting diagnosis:   The encounter diagnosis was Atrial fibrillation with rapid ventricular response.    Code status:   Current Code Status       Date Active Code Status Order ID Comments User Context       11/23/2024 2308 CPR (Attempt to Resuscitate) [id number] Verify CODE STATUS with family Shar Fisher MD ED        Question Answer    Code Status (Patient has no pulse and is not breathing) CPR (Attempt to Resuscitate)    Medical Interventions (Patient has pulse or is breathing) Full Support                    Allergies:   Cephalexin, Melatonin, Beef-derived products, Citrus, Duricef [cefadroxil], Fish allergy, Garlic, Green beans, Onion, Other, Peach [prunus persica], Wheat, and Wheat    Isolation:   No active isolations    Intake and Output    Intake/Output Summary (Last 24 hours) at 11/23/2024 2318  Last data filed at 11/23/2024 2245  Gross per 24 hour   Intake 1000 ml   Output --   Net 1000 ml       Weight:       11/23/24 2005   Weight: 62.6 kg (138 lb)       Most recent vitals:   Vitals:    11/23/24 2230 11/23/24 2301 11/23/24 2304 11/23/24 2317   BP: 113/82 122/77     BP Location:       Patient Position:       Pulse: 114  113 112   Resp:       Temp:       TempSrc:       SpO2: 95% 96% 95% 97%   Weight:       Height:           Active LDAs/IV Access:   Lines, Drains & Airways       Active LDAs       Name Placement date Placement time Site Days    Peripheral IV 11/23/24 2005 Left Antecubital 11/23/24 2005  Antecubital  less than 1                    Labs (abnormal labs have a star):   Labs Reviewed   COMPREHENSIVE METABOLIC PANEL - Abnormal; Notable for the following components:       Result Value    Glucose 104 (*)     Anion Gap 15.7 (*)     All other components within normal limits     Narrative:     GFR Normal >60  Chronic Kidney Disease <60  Kidney Failure <15     TROPONIN - Abnormal; Notable for the following components:    HS Troponin T 34 (*)     All other components within normal limits    Narrative:     High Sensitive Troponin T Reference Range:  <14.0 ng/L- Negative Female for AMI  <22.0 ng/L- Negative Male for AMI  >=14 - Abnormal Female indicating possible myocardial injury.  >=22 - Abnormal Male indicating possible myocardial injury.   Clinicians would have to utilize clinical acumen, EKG, Troponin, and serial changes to determine if it is an Acute Myocardial Infarction or myocardial injury due to an underlying chronic condition.        CBC WITH AUTO DIFFERENTIAL - Abnormal; Notable for the following components:    Neutrophil % 80.5 (*)     Lymphocyte % 10.0 (*)     Eosinophil % 0.1 (*)     All other components within normal limits   URINALYSIS W/ CULTURE IF INDICATED - Abnormal; Notable for the following components:    Ketones, UA 40 mg/dL (2+) (*)     Blood, UA Small (1+) (*)     Leuk Esterase, UA Trace (*)     All other components within normal limits    Narrative:     In absence of clinical symptoms, the presence of pyuria, bacteria, and/or nitrites on the urinalysis result does not correlate with infection.   HIGH SENSITIVITIY TROPONIN T 2HR - Abnormal; Notable for the following components:    HS Troponin T 32 (*)     All other components within normal limits    Narrative:     High Sensitive Troponin T Reference Range:  <14.0 ng/L- Negative Female for AMI  <22.0 ng/L- Negative Male for AMI  >=14 - Abnormal Female indicating possible myocardial injury.  >=22 - Abnormal Male indicating possible myocardial injury.   Clinicians would have to utilize clinical acumen, EKG, Troponin, and serial changes to determine if it is an Acute Myocardial Infarction or myocardial injury due to an underlying chronic condition.        URINALYSIS, MICROSCOPIC ONLY - Abnormal; Notable for the  following components:    RBC, UA 3-5 (*)     WBC, UA 3-5 (*)     Squamous Epithelial Cells, UA 3-6 (*)     All other components within normal limits   LIPASE - Normal   TSH RFX ON ABNORMAL TO FREE T4 - Normal   MAGNESIUM - Normal   RAINBOW DRAW    Narrative:     The following orders were created for panel order [place] Draw.  Procedure                               Abnormality         Status                     ---------                               -----------         ------                     Green Top (Gel)[[id number]]                                  Final result               Lavender Top[[id number]]                                     Final result               Gold Top - SST[[id number]]                                   Final result               Light Blue Top[[id number]]                                   Final result                 Please view results for these tests on the individual orders.   TROPONIN   CBC AND DIFFERENTIAL    Narrative:     The following orders were created for panel order CBC & Differential.  Procedure                               Abnormality         Status                     ---------                               -----------         ------                     CBC Auto Differential[[id number]]        Abnormal            Final result                 Please view results for these tests on the individual orders.   GREEN TOP   LAVENDER TOP   GOLD TOP - SST   LIGHT BLUE TOP       Results       Procedure Component Value Ref Range Date/Time    High Sensitivity Troponin T [[id number]]     Order Status: Sent Specimen: Blood     High Sensitivity Troponin T 2Hr [[id number]]  (Abnormal) Collected: 11/23/24 2159    Order Status: Completed Specimen: Blood Updated: 11/23/24 2223     HS Troponin T 32 <14 ng/L      Troponin T Delta -2 >=-4 - <+4 ng/L     Narrative:      High Sensitive Troponin T Reference Range:  <14.0 ng/L- Negative Female for AMI  <22.0 ng/L- Negative Male for AMI  >=14 - Abnormal  Female indicating possible myocardial injury.  >=22 - Abnormal Male indicating possible myocardial injury.   Clinicians would have to utilize clinical acumen, EKG, Troponin, and serial changes to determine if it is an Acute Myocardial Infarction or myocardial injury due to an underlying chronic condition.         Urinalysis, Microscopic Only - Urine, Clean Catch [[id number]]  (Abnormal) Collected: 11/23/24 2148    Order Status: Completed Specimen: Urine, Clean Catch Updated: 11/23/24 2155     RBC, UA 3-5 None Seen, 0-2 /HPF      WBC, UA 3-5 None Seen, 0-2 /HPF      Comment: Urine culture not indicated.        Bacteria, UA None Seen None Seen /HPF      Squamous Epithelial Cells, UA 3-6 None Seen, 0-2 /HPF      Hyaline Casts, UA None Seen None Seen /LPF      Methodology Manual Light Microscopy    Urinalysis With Culture If Indicated - Urine, Clean Catch [[id number]]  (Abnormal) Collected: 11/23/24 2148    Order Status: Completed Specimen: Urine, Clean Catch Updated: 11/23/24 2153     Color, UA Yellow Yellow, Straw      Appearance, UA Clear Clear      pH, UA 6.0 4.5 - 8.0      Specific Gravity, UA 1.010 1.003 - 1.030      Glucose, UA Negative Negative      Ketones, UA 40 mg/dL (2+) Negative      Bilirubin, UA Negative Negative      Blood, UA Small (1+) Negative      Protein, UA Negative Negative      Leuk Esterase, UA Trace Negative      Nitrite, UA Negative Negative      Urobilinogen, UA 0.2 E.U./dL 0.2 - 1.0 E.U./dL     Narrative:      In absence of clinical symptoms, the presence of pyuria, bacteria, and/or nitrites on the urinalysis result does not correlate with infection.    Lipase [[id number]]  (Normal) Collected: 11/23/24 2009    Order Status: Completed Specimen: Blood Updated: 11/23/24 2050     Lipase 36 13 - 60 U/L     TSH Rfx On Abnormal To Free T4 [[id number]]  (Normal) Collected: 11/23/24 2009    Order Status: Completed Specimen: Blood Updated: 11/23/24 2050     TSH 0.591 0.270 - 4.200 uIU/mL     Magnesium  [[id number]]  (Normal) Collected: 11/23/24 2009    Order Status: Completed Specimen: Blood Updated: 11/23/24 2050     Magnesium 1.6 1.6 - 2.4 mg/dL     Comprehensive Metabolic Panel [[id number]]  (Abnormal) Collected: 11/23/24 2009    Order Status: Completed Specimen: Blood Updated: 11/23/24 2031     Glucose 104 65 - 99 mg/dL      BUN 13 8 - 23 mg/dL      Creatinine 0.73 0.57 - 1.00 mg/dL      Sodium 137 136 - 145 mmol/L      Potassium 3.9 3.5 - 5.2 mmol/L      Chloride 99 98 - 107 mmol/L      CO2 22.3 22.0 - 29.0 mmol/L      Calcium 10.1 8.6 - 10.5 mg/dL      Total Protein 7.7 6.0 - 8.5 g/dL      Albumin 4.3 3.5 - 5.2 g/dL      ALT (SGPT) 14 1 - 33 U/L      AST (SGOT) 21 1 - 32 U/L      Alkaline Phosphatase 88 39 - 117 U/L      Total Bilirubin 1.1 0.0 - 1.2 mg/dL      Globulin 3.4 gm/dL      A/G Ratio 1.3 g/dL      BUN/Creatinine Ratio 17.8 7.0 - 25.0      Anion Gap 15.7 5.0 - 15.0 mmol/L      eGFR 88.6 >60.0 mL/min/1.73     Narrative:      GFR Normal >60  Chronic Kidney Disease <60  Kidney Failure <15      High Sensitivity Troponin T [[id number]]  (Abnormal) Collected: 11/23/24 2009    Order Status: Completed Specimen: Blood Updated: 11/23/24 2031     HS Troponin T 34 <14 ng/L     Narrative:      High Sensitive Troponin T Reference Range:  <14.0 ng/L- Negative Female for AMI  <22.0 ng/L- Negative Male for AMI  >=14 - Abnormal Female indicating possible myocardial injury.  >=22 - Abnormal Male indicating possible myocardial injury.   Clinicians would have to utilize clinical acumen, EKG, Troponin, and serial changes to determine if it is an Acute Myocardial Infarction or myocardial injury due to an underlying chronic condition.         [place] Draw [[id number]] Collected: 11/23/24 2009    Order Status: Completed Specimen: Blood Updated: 11/23/24 2016    Narrative:      The following orders were created for panel order [place] Draw.  Procedure                               Abnormality         Status                      ---------                               -----------         ------                     Green Top (Gel)[[id number]]                                  Final result               Lavender Top[[id number]]                                     Final result               Gold Top - SST[[id number]]                                   Final result               Light Blue Top[[id number]]                                   Final result                 Please view results for these tests on the individual orders.    CBC Auto Differential [[id number]]  (Abnormal) Collected: 11/23/24 2009    Order Status: Completed Specimen: Blood Updated: 11/23/24 2013     WBC 7.92 3.40 - 10.80 10*3/mm3      RBC 4.33 3.77 - 5.28 10*6/mm3      Hemoglobin 13.2 12.0 - 15.9 g/dL      Hematocrit 39.8 34.0 - 46.6 %      MCV 91.9 79.0 - 97.0 fL      MCH 30.5 26.6 - 33.0 pg      MCHC 33.2 31.5 - 35.7 g/dL      RDW 13.7 12.3 - 15.4 %      RDW-SD 46.5 37.0 - 54.0 fl      MPV 11.5 6.0 - 12.0 fL      Platelets 172 140 - 450 10*3/mm3      Neutrophil % 80.5 42.7 - 76.0 %      Lymphocyte % 10.0 19.6 - 45.3 %      Monocyte % 8.6 5.0 - 12.0 %      Eosinophil % 0.1 0.3 - 6.2 %      Basophil % 0.4 0.0 - 1.5 %      Immature Grans % 0.4 0.0 - 0.5 %      Neutrophils, Absolute 6.38 1.70 - 7.00 10*3/mm3      Lymphocytes, Absolute 0.79 0.70 - 3.10 10*3/mm3      Monocytes, Absolute 0.68 0.10 - 0.90 10*3/mm3      Eosinophils, Absolute 0.01 0.00 - 0.40 10*3/mm3      Basophils, Absolute 0.03 0.00 - 0.20 10*3/mm3      Immature Grans, Absolute 0.03 0.00 - 0.05 10*3/mm3      nRBC 0.0 0.0 - 0.2 /100 WBC              EKG:   ECG 12 Lead ED Triage Standing Order; Chest Pain   Preliminary Result   HEART PUCP=561  bpm   RR Uowxhgis=949  ms   ME Interval=  ms   P Horizontal Axis=  deg   P Front Axis=  deg   QRSD Interval=82  ms   QT Zsumbuol=907  ms   DCqH=983  ms   QRS Axis=-47  deg   T Wave Axis=80  deg   - ABNORMAL ECG -   Atrial flutter with predominant 2:1 AV block   Left anterior  fascicular block   ST depression, probably rate related   Borderline prolonged QT interval   Date and Time of Study:2024-11-23 20:01:49          Meds given in ED:   Medications   sodium chloride 0.9 % flush 10 mL (has no administration in time range)   sodium chloride 0.9 % bolus 1,000 mL (1,000 mL Intravenous New Bag 11/23/24 7527)   sodium chloride 0.9 % flush 10 mL (has no administration in time range)   sodium chloride 0.9 % flush 10 mL (has no administration in time range)   sodium chloride 0.9 % infusion 40 mL (has no administration in time range)   acetaminophen (TYLENOL) tablet 650 mg (has no administration in time range)     Or   acetaminophen (TYLENOL) 160 MG/5ML oral solution 650 mg (has no administration in time range)     Or   acetaminophen (TYLENOL) suppository 650 mg (has no administration in time range)   calcium carbonate (TUMS) chewable tablet 500 mg (200 mg elemental) (has no administration in time range)   aluminum-magnesium hydroxide-simethicone (MAALOX MAX) 400-400-40 MG/5ML suspension 15 mL (has no administration in time range)   ondansetron ODT (ZOFRAN-ODT) disintegrating tablet 4 mg (has no administration in time range)     Or   ondansetron (ZOFRAN) injection 4 mg (has no administration in time range)   sennosides-docusate (PERICOLACE) 8.6-50 MG per tablet 2 tablet (has no administration in time range)     And   polyethylene glycol (MIRALAX) packet 17 g (has no administration in time range)     And   bisacodyl (DULCOLAX) EC tablet 5 mg (has no administration in time range)     And   bisacodyl (DULCOLAX) suppository 10 mg (has no administration in time range)   apixaban (ELIQUIS) tablet 5 mg (has no administration in time range)   atorvastatin (LIPITOR) tablet 40 mg (has no administration in time range)   metoprolol succinate XL (TOPROL-XL) 24 hr tablet 50 mg (has no administration in time range)   oxybutynin (DITROPAN) tablet 5 mg (has no administration in time range)   nitroglycerin  (NITROSTAT) SL tablet 0.4 mg (has no administration in time range)   metoprolol tartrate (LOPRESSOR) injection 5 mg (has no administration in time range)   sodium chloride 0.9 % bolus 1,000 mL (0 mL Intravenous Stopped 11/23/24 2245)   metoprolol tartrate (LOPRESSOR) injection 2.5 mg (2.5 mg Intravenous Given 11/23/24 2024)   metoprolol succinate XL (TOPROL-XL) 24 hr tablet 50 mg (50 mg Oral Given 11/23/24 2024)   metoprolol tartrate (LOPRESSOR) injection 2.5 mg (2.5 mg Intravenous Given 11/23/24 2159)   digoxin (LANOXIN) injection 250 mcg (250 mcg Intravenous Given 11/23/24 2302)       Imaging results:  XR Chest 1 View    Result Date: 11/23/2024  Impression: Emphysema. No active disease. Electronically Signed: Vaibhav Cole MD  11/23/2024 8:47 PM EST  Workstation ID: [id number]     Ambulatory status:   - up with assist    Social issues:   Social History     Socioeconomic History    Marital status:    Tobacco Use    Smoking status: Never     Passive exposure: Never    Smokeless tobacco: Never   Vaping Use    Vaping status: Never Used   Substance and Sexual Activity    Alcohol use: Not Currently     Comment: Reports a hx of drinking everyday for 18 years atleast. She says that she quit May 2018, but says that she drinks occasionally now.     Drug use: No     Comment: caffeine use: 2 cups daily.     Sexual activity: Not Currently       NIH Stroke Scale: LUZ MARINA Dangelo RN  11/23/24 23:18 EST

## 2024-11-24 NOTE — PROGRESS NOTES
"Hospitalist Team      Patient Care Team:  Gurjit Rodriguez DO as PCP - General (Family Medicine)  Jovita Brown MD as Consulting Physician (Cardiology)  Raji Rodriguez MD as Consulting Physician (Neurology)        Chief Complaint:  Follow up for afib    Subjective  Patient seen on morning rounds after being admitted overnight for uncontrolled afib with RVR requiring multiple IV metoprolol pushes and digoxin x1.   She feels well this morning, denies palpitations, dizziness/light-headedness, chest pain or shortness of breath. Tells me she has had afib since 2020 and usually takes her meds but she hasn't taken them since Friday. Says she is to be evaluated by cardiology tomorrow and cannot eat food until then.       Objective    Vital Signs  Temp:  [97.5 °F (36.4 °C)-99.9 °F (37.7 °C)] 97.6 °F (36.4 °C)  Heart Rate:  [] 130  Resp:  [16-20] 18  BP: (101-141)/(60-96) 108/80  Oxygen Therapy  SpO2: 95 %  Pulse Oximetry Type: Continuous  Device (Oxygen Therapy): room air}  Flowsheet Rows      Flowsheet Row First Filed Value   Admission Height 165.1 cm (65\") Documented at 11/23/2024 2005   Admission Weight 62.6 kg (138 lb) Documented at 11/23/2024 2005              Physical Exam:  Physical Exam  Constitutional:       General: She is not in acute distress.     Appearance: Normal appearance.   HENT:      Head: Normocephalic and atraumatic.      Mouth/Throat:      Mouth: Mucous membranes are moist.      Pharynx: Oropharynx is clear.   Eyes:      Extraocular Movements: Extraocular movements intact.      Pupils: Pupils are equal, round, and reactive to light.   Cardiovascular:      Rate and Rhythm: Tachycardia present. Rhythm irregular.   Pulmonary:      Effort: Pulmonary effort is normal. No respiratory distress.   Musculoskeletal:         General: No deformity or signs of injury.      Cervical back: Normal range of motion.   Skin:     Findings: No lesion or rash.   Neurological:      General: No focal deficit " present.      Mental Status: She is alert.   Psychiatric:         Mood and Affect: Mood normal.         Judgment: Judgment normal.          Results Review:     I reviewed the patient's new clinical results.    Lab Results (last 24 hours)       Procedure Component Value Units Date/Time    High Sensitivity Troponin T [[id number]]  (Abnormal) Collected: 11/24/24 0548    Specimen: Blood Updated: 11/24/24 0647     HS Troponin T 37 ng/L     Narrative:      High Sensitive Troponin T Reference Range:  <14.0 ng/L- Negative Female for AMI  <22.0 ng/L- Negative Male for AMI  >=14 - Abnormal Female indicating possible myocardial injury.  >=22 - Abnormal Male indicating possible myocardial injury.   Clinicians would have to utilize clinical acumen, EKG, Troponin, and serial changes to determine if it is an Acute Myocardial Infarction or myocardial injury due to an underlying chronic condition.         High Sensitivity Troponin T 2Hr [[id number]]  (Abnormal) Collected: 11/23/24 2159    Specimen: Blood Updated: 11/23/24 2223     HS Troponin T 32 ng/L      Troponin T Delta -2 ng/L     Narrative:      High Sensitive Troponin T Reference Range:  <14.0 ng/L- Negative Female for AMI  <22.0 ng/L- Negative Male for AMI  >=14 - Abnormal Female indicating possible myocardial injury.  >=22 - Abnormal Male indicating possible myocardial injury.   Clinicians would have to utilize clinical acumen, EKG, Troponin, and serial changes to determine if it is an Acute Myocardial Infarction or myocardial injury due to an underlying chronic condition.         Urinalysis, Microscopic Only - Urine, Clean Catch [[id number]]  (Abnormal) Collected: 11/23/24 2148    Specimen: Urine, Clean Catch Updated: 11/23/24 2155     RBC, UA 3-5 /HPF      WBC, UA 3-5 /HPF      Comment: Urine culture not indicated.        Bacteria, UA None Seen /HPF      Squamous Epithelial Cells, UA 3-6 /HPF      Hyaline Casts, UA None Seen /LPF      Methodology Manual Light Microscopy     Urinalysis With Culture If Indicated - Urine, Clean Catch [[id number]]  (Abnormal) Collected: 11/23/24 2148    Specimen: Urine, Clean Catch Updated: 11/23/24 2153     Color, UA Yellow     Appearance, UA Clear     pH, UA 6.0     Specific Gravity, UA 1.010     Glucose, UA Negative     Ketones, UA 40 mg/dL (2+)     Bilirubin, UA Negative     Blood, UA Small (1+)     Protein, UA Negative     Leuk Esterase, UA Trace     Nitrite, UA Negative     Urobilinogen, UA 0.2 E.U./dL    Narrative:      In absence of clinical symptoms, the presence of pyuria, bacteria, and/or nitrites on the urinalysis result does not correlate with infection.    Lipase [[id number]]  (Normal) Collected: 11/23/24 2009    Specimen: Blood Updated: 11/23/24 2050     Lipase 36 U/L     TSH Rfx On Abnormal To Free T4 [[id number]]  (Normal) Collected: 11/23/24 2009    Specimen: Blood Updated: 11/23/24 2050     TSH 0.591 uIU/mL     Magnesium [[id number]]  (Normal) Collected: 11/23/24 2009    Specimen: Blood Updated: 11/23/24 2050     Magnesium 1.6 mg/dL     Comprehensive Metabolic Panel [[id number]]  (Abnormal) Collected: 11/23/24 2009    Specimen: Blood Updated: 11/23/24 2031     Glucose 104 mg/dL      BUN 13 mg/dL      Creatinine 0.73 mg/dL      Sodium 137 mmol/L      Potassium 3.9 mmol/L      Chloride 99 mmol/L      CO2 22.3 mmol/L      Calcium 10.1 mg/dL      Total Protein 7.7 g/dL      Albumin 4.3 g/dL      ALT (SGPT) 14 U/L      AST (SGOT) 21 U/L      Alkaline Phosphatase 88 U/L      Total Bilirubin 1.1 mg/dL      Globulin 3.4 gm/dL      A/G Ratio 1.3 g/dL      BUN/Creatinine Ratio 17.8     Anion Gap 15.7 mmol/L      eGFR 88.6 mL/min/1.73     Narrative:      GFR Normal >60  Chronic Kidney Disease <60  Kidney Failure <15      High Sensitivity Troponin T [[id number]]  (Abnormal) Collected: 11/23/24 2009    Specimen: Blood Updated: 11/23/24 2031     HS Troponin T 34 ng/L     Narrative:      High Sensitive Troponin T Reference Range:  <14.0 ng/L- Negative Female  for AMI  <22.0 ng/L- Negative Male for AMI  >=14 - Abnormal Female indicating possible myocardial injury.  >=22 - Abnormal Male indicating possible myocardial injury.   Clinicians would have to utilize clinical acumen, EKG, Troponin, and serial changes to determine if it is an Acute Myocardial Infarction or myocardial injury due to an underlying chronic condition.         [place] Draw [[id number]] Collected: 11/23/24 2009    Specimen: Blood Updated: 11/23/24 2016    Narrative:      The following orders were created for panel order [place] Draw.  Procedure                               Abnormality         Status                     ---------                               -----------         ------                     Green Top (Gel)[[id number]]                                  Final result               Lavender Top[[id number]]                                     Final result               Gold Top - SST[[id number]]                                   Final result               Light Blue Top[[id number]]                                   Final result                 Please view results for these tests on the individual orders.    Green Top (Gel) [[id number]] Collected: 11/23/24 2009    Specimen: Blood Updated: 11/23/24 2016     Extra Tube Hold for add-ons.     Comment: Auto resulted.       Lavender Top [[id number]] Collected: 11/23/24 2009    Specimen: Blood Updated: 11/23/24 2016     Extra Tube hold for add-on     Comment: Auto resulted       Gold Top - SST [[id number]] Collected: 11/23/24 2009    Specimen: Blood Updated: 11/23/24 2016     Extra Tube Hold for add-ons.     Comment: Auto resulted.       Light Blue Top [[id number]] Collected: 11/23/24 2009    Specimen: Blood Updated: 11/23/24 2016     Extra Tube Hold for add-ons.     Comment: Auto resulted       CBC & Differential [[id number]]  (Abnormal) Collected: 11/23/24 2009    Specimen: Blood Updated: 11/23/24 2013    Narrative:      The following orders were created for  panel order CBC & Differential.  Procedure                               Abnormality         Status                     ---------                               -----------         ------                     CBC Auto Differential[[id number]]        Abnormal            Final result                 Please view results for these tests on the individual orders.    CBC Auto Differential [[id number]]  (Abnormal) Collected: 11/23/24 2009    Specimen: Blood Updated: 11/23/24 2013     WBC 7.92 10*3/mm3      RBC 4.33 10*6/mm3      Hemoglobin 13.2 g/dL      Hematocrit 39.8 %      MCV 91.9 fL      MCH 30.5 pg      MCHC 33.2 g/dL      RDW 13.7 %      RDW-SD 46.5 fl      MPV 11.5 fL      Platelets 172 10*3/mm3      Neutrophil % 80.5 %      Lymphocyte % 10.0 %      Monocyte % 8.6 %      Eosinophil % 0.1 %      Basophil % 0.4 %      Immature Grans % 0.4 %      Neutrophils, Absolute 6.38 10*3/mm3      Lymphocytes, Absolute 0.79 10*3/mm3      Monocytes, Absolute 0.68 10*3/mm3      Eosinophils, Absolute 0.01 10*3/mm3      Basophils, Absolute 0.03 10*3/mm3      Immature Grans, Absolute 0.03 10*3/mm3      nRBC 0.0 /100 WBC             Imaging Results (Last 24 Hours)       Procedure Component Value Units Date/Time    XR Chest 1 View [[id number]] Collected: 11/23/24 2046     Updated: 11/23/24 2050    Narrative:      XR CHEST 1 VW    Date of Exam: 11/23/2024 8:28 PM EST    Indication: Chest Pain Triage Protocol    Comparison: Chest radiograph 1/9/2020    Findings:  The heart size and pulmonary vascular markings are normal. There is mild diffuse emphysema. There are postsurgical changes of the right shoulder. There are healed posterior rib fractures on the left. There are no focal limitations.      Impression:      Impression:  Emphysema. No active disease.        Electronically Signed: Vaibhav Cole MD    11/23/2024 8:47 PM EST    Workstation ID: [id number]          ECG/EMG Results (most recent)       Procedure Component Value Units Date/Time     ECG 12 Lead ED Triage Standing Order; Chest Pain [[id number]] Collected: 11/23/24 2001     Updated: 11/24/24 1421     QT Interval 315 ms      QTC Interval 491 ms     Narrative:      HEART IASH=773  bpm  RR Rklzpspp=249  ms  [place] Interval=  ms  P Horizontal Axis=  deg  P Front Axis=  deg  QRSD Interval=82  ms  QT Lvnqqygs=198  ms  CEkS=624  ms  QRS Axis=-47  deg  T Wave Axis=80  deg  - ABNORMAL ECG -  Atrial fibrillation with rapid V-rate  Left anterior fascicular block  ST depression, probably rate related  Borderline  prolonged QT interval  When compared with ECG of 07-Aug-2020 20:39:51,  Significant change in rhythm: previously atrial fibrillation  Electronically Signed By: Adria Estevez ([facility]) 2024-11-24 14:21:20  Date and Time of Study:2024-11-23 20:01:49            Medication Review:   I have reviewed the patient's current medication list    Current Facility-Administered Medications:     acetaminophen (TYLENOL) tablet 650 mg, 650 mg, Oral, Q4H PRN **OR** acetaminophen (TYLENOL) 160 MG/5ML oral solution 650 mg, 650 mg, Oral, Q4H PRN **OR** acetaminophen (TYLENOL) suppository 650 mg, 650 mg, Rectal, Q4H PRN, Shar Fisher MD    aluminum-magnesium hydroxide-simethicone (MAALOX MAX) 400-400-40 MG/5ML suspension 15 mL, 15 mL, Oral, Q6H PRN, Shar Fisher MD    apixaban (ELIQUIS) tablet 5 mg, 5 mg, Oral, Q12H, Katheryn Alvarez MD, 5 mg at 11/24/24 0816    atorvastatin (LIPITOR) tablet 40 mg, 40 mg, Oral, Nightly, Shar Fisher MD, 40 mg at 11/24/24 0007    sennosides-docusate (PERICOLACE) 8.6-50 MG per tablet 2 tablet, 2 tablet, Oral, BID **AND** polyethylene glycol (MIRALAX) packet 17 g, 17 g, Oral, Daily PRN **AND** bisacodyl (DULCOLAX) EC tablet 5 mg, 5 mg, Oral, Daily PRN **AND** bisacodyl (DULCOLAX) suppository 10 mg, 10 mg, Rectal, Daily PRN, Shar Fisher MD    calcium carbonate (TUMS) chewable tablet 500 mg (200 mg elemental), 1 tablet, Oral, BID PRN, Natalia,  MD Shar    digoxin (LANOXIN) injection 250 mcg, 250 mcg, Intravenous, Q6H, Katheryn Alvarez MD    influenza vac split high-dose (FLUZONE HIGH DOSE) injection 0.5 mL, 0.5 mL, Intramuscular, During Hospitalization, Shar Fisher MD    metoprolol succinate XL (TOPROL-XL) 24 hr tablet 50 mg, 50 mg, Oral, Daily, Shar Fisher MD, 50 mg at 11/24/24 0819    metoprolol tartrate (LOPRESSOR) injection 5 mg, 5 mg, Intravenous, Q4H PRN, Shar Fisher MD, 5 mg at 11/24/24 1136    nitroglycerin (NITROSTAT) SL tablet 0.4 mg, 0.4 mg, Sublingual, Q5 Min PRN, Shar Fisher MD    ondansetron ODT (ZOFRAN-ODT) disintegrating tablet 4 mg, 4 mg, Oral, Q6H PRN **OR** ondansetron (ZOFRAN) injection 4 mg, 4 mg, Intravenous, Q6H PRN, Shar Fisher MD    oxybutynin (DITROPAN) tablet 5 mg, 5 mg, Oral, BID, Shar Fisher MD, 5 mg at 11/24/24 0816    sodium chloride 0.9 % flush 10 mL, 10 mL, Intravenous, PRN, Enrique Beavers,     sodium chloride 0.9 % flush 10 mL, 10 mL, Intravenous, Q12H, Shar Fisher MD, 10 mL at 11/24/24 0821    sodium chloride 0.9 % flush 10 mL, 10 mL, Intravenous, PRN, Shar Fisher MD    sodium chloride 0.9 % infusion 40 mL, 40 mL, Intravenous, PRN, Shar Fisher MD      Assessment & Plan   Persistent uncontrolled atrial fib/flutter with RVR-  -Received 10mg IV metop total and 250 dig overnight which did convert to sinus for a little and she was rate controlled early this morning but on my eval HR is 120-130's and variable which supports afib more than flutter.   -She has not taken home meds since Friday which likely includes DOAC so I do not want to use amio or any chemical converting agent. Continue home BB, would ideally like for it to be lopressor Q6H instead of toprol XL while uncontrolled.   -Since she got 1 dig push I will complete dig load with 250 x3 Q6H for 4 total doses and plan to start  PO tomorrow after am dig level.   Cardiology plans to see pt in the morning, appreciate assistance.     HTN- Continue home meds with holding parameters    HLD- continue home statin    Hx of multiple strokes- no acute changes here    Hx of non-ischemic cardiomyopathy EF 27% now recovered- follows with cardiology last seen 1 month prior.       Katheryn Alvarez MD  11/24/24  15:52 EST      Time:  30 minutes

## 2024-11-25 LAB
DIGOXIN SERPL-MCNC: 2.09 NG/ML (ref 0.6–1.2)
DIGOXIN SERPL-MCNC: 3.04 NG/ML (ref 0.6–1.2)
QT INTERVAL: 349 MS
QTC INTERVAL: 380 MS

## 2024-11-25 PROCEDURE — 99222 1ST HOSP IP/OBS MODERATE 55: CPT | Performed by: INTERNAL MEDICINE

## 2024-11-25 PROCEDURE — 80162 ASSAY OF DIGOXIN TOTAL: CPT | Performed by: STUDENT IN AN ORGANIZED HEALTH CARE EDUCATION/TRAINING PROGRAM

## 2024-11-25 PROCEDURE — 99232 SBSQ HOSP IP/OBS MODERATE 35: CPT | Performed by: FAMILY MEDICINE

## 2024-11-25 PROCEDURE — 97165 OT EVAL LOW COMPLEX 30 MIN: CPT

## 2024-11-25 PROCEDURE — 93005 ELECTROCARDIOGRAM TRACING: CPT | Performed by: INTERNAL MEDICINE

## 2024-11-25 PROCEDURE — 97161 PT EVAL LOW COMPLEX 20 MIN: CPT

## 2024-11-25 PROCEDURE — 80162 ASSAY OF DIGOXIN TOTAL: CPT | Performed by: HOSPITALIST

## 2024-11-25 PROCEDURE — 25810000003 SODIUM CHLORIDE 0.9 % SOLUTION: Performed by: INTERNAL MEDICINE

## 2024-11-25 PROCEDURE — 93010 ELECTROCARDIOGRAM REPORT: CPT | Performed by: INTERNAL MEDICINE

## 2024-11-25 RX ORDER — SODIUM CHLORIDE 9 MG/ML
100 INJECTION, SOLUTION INTRAVENOUS CONTINUOUS
Status: ACTIVE | OUTPATIENT
Start: 2024-11-25 | End: 2024-11-26

## 2024-11-25 RX ORDER — ATENOLOL 25 MG/1
25 TABLET ORAL 2 TIMES DAILY
Status: DISCONTINUED | OUTPATIENT
Start: 2024-11-25 | End: 2024-11-26 | Stop reason: HOSPADM

## 2024-11-25 RX ADMIN — OXYBUTYNIN CHLORIDE 5 MG: 5 TABLET ORAL at 20:18

## 2024-11-25 RX ADMIN — OXYBUTYNIN CHLORIDE 5 MG: 5 TABLET ORAL at 09:48

## 2024-11-25 RX ADMIN — ATENOLOL 25 MG: 25 TABLET ORAL at 20:18

## 2024-11-25 RX ADMIN — SENNOSIDES AND DOCUSATE SODIUM 2 TABLET: 50; 8.6 TABLET ORAL at 20:18

## 2024-11-25 RX ADMIN — APIXABAN 5 MG: 2.5 TABLET, FILM COATED ORAL at 09:48

## 2024-11-25 RX ADMIN — SODIUM CHLORIDE 100 ML/HR: 9 INJECTION, SOLUTION INTRAVENOUS at 11:02

## 2024-11-25 RX ADMIN — METOPROLOL TARTRATE 5 MG: 5 INJECTION INTRAVENOUS at 11:02

## 2024-11-25 RX ADMIN — ATENOLOL 25 MG: 25 TABLET ORAL at 11:02

## 2024-11-25 RX ADMIN — Medication 10 ML: at 20:18

## 2024-11-25 RX ADMIN — ATORVASTATIN CALCIUM 40 MG: 40 TABLET, FILM COATED ORAL at 20:18

## 2024-11-25 RX ADMIN — SODIUM CHLORIDE 100 ML/HR: 9 INJECTION, SOLUTION INTRAVENOUS at 20:18

## 2024-11-25 RX ADMIN — APIXABAN 5 MG: 2.5 TABLET, FILM COATED ORAL at 20:18

## 2024-11-25 RX ADMIN — Medication 10 ML: at 09:49

## 2024-11-25 NOTE — PLAN OF CARE
Goal Outcome Evaluation:  Plan of Care Reviewed With: patient        Progress: improving  Outcome Evaluation: Pt VSS, am labs pending. Continues tele, afib, HR 80's and up, no sustaining over 120's, see flowsheets. Continues room air overnight. Pt denies pain, denies n/v/d, reports tolerating diet well. Pt up with assist, walker, to restroom. Pt resting at this time.  Plan for Cardiology to see in am.

## 2024-11-25 NOTE — PLAN OF CARE
Goal Outcome Evaluation:  Plan of Care Reviewed With: patient        Progress: improving  Outcome Evaluation: Patient now in nsr. No c/o pain. Started on atenolol

## 2024-11-25 NOTE — CONSULTS
Date of Hospital Visit: 2024  Date of consult: 2024  Encounter Provider: Jovanny Hill MD  Place of Service: [facility] CARDIOLOGY  Patient Name: Camille Pantoja  :1955  Referral Provider: No ref. provider found    Chief complaint: Weakness    Reason for consult: Atrial fibrillation with RVR    History of Present Illness  This is a 68-year-old female who follows with Dr. Brown in the office.  She has past medical history significant for DVT, alcohol abuse, hypertension, GERD, atrial fibrillation, cardiomyopathy, stroke, and hyperlipidemia.    In , she was diagnosed with left lower lobe pneumonia treated with Levaquin and subsequently developed weakness and diarrhea.  Later that same month, she presented to [facility] with altered mental status and found to be in A-fib with RVR.  She had an echocardiogram done at that time that revealed an EF of 27%, mild LVH, severe biatrial enlargement, moderate RV dilation with moderately reduced RV systolic function, mild mitral and tricuspid insufficiency.  She was transferred to [facility] where she underwent an MRI that showed an acute left hemisphere subcortical stroke.  She developed decreased consciousness and an MRI was repeated that showed new areas of infarcts in the right cerebellum.  When she was stabilized she went for heart catheterization that showed normal coronaries.  She was discharged to a rehab facility with a new prescription for apixaban.    Echocardiogram done in 2021 revealed an EF of 56%, trace aortic and mitral insufficiency.    She was last seen in the office on 10/11/2024 by An.  At that time she reported that she continues to have gait disturbances and intermittent visual changes.  She no longer drinks alcohol except with communion at [employer] once weekly.  She reported that she has had no chest pain, shortness of breath, lightheadedness, dizziness,  palpitations, presyncope/syncope, or leg edema.  She was stable from a cardiac standpoint and there were no medication changes made.    She presented to the emergency department on 11/23/2024 with complaints of generalized weakness.  She reported decreased appetite, and reported that she had not taken her medications in the morning.  Upon arrival to the emergency department, she was found to be in atrial fibrillation with RVR with a rate of 143.  She was given a total of 500 mcg of IV digoxin.  Patient remains anticoagulated on apixaban.    The remainder of her workup was significant for slightly elevated but flat troponins (34, 32, 37), unremarkable CMP, normal TSH, normal CBC.  Chest x-ray was negative for any acute process.        ECHO 7/1/2021  Calculated left ventricular EF = 56% Estimated left ventricular EF was in agreement with the calculated left ventricular EF. Left ventricular systolic function is normal.  Left ventricular diastolic function was normal.     Cath 1/10/2020  Conclusions:  Angiographically normal coronary arteries with no significant coronary artery disease.  Compensated left ventricular filling pressures of 12 mmHg     Recommendations:   Will continue optimal medical therapy for management of congestive heart failure with continuation of beta-blocker and ARB.  Will continue to work on rate controlling of atrial fibrillation.  Given her recent stroke which was found incidentally on cardiac MRI would be hesitant to consider cardioversion due to risk of potentiating stroke but can reassess needs for that based upon her response to up titration of her beta-blocker.  Restarting Coumadin and would be reasonable to bridge with Coumadin given her recent stroke patient presented with subtherapeutic INR but her wound and has been recently discontinued due to concomitant antibiotic use.    Past Medical History:   Diagnosis Date    A-fib     CHF (congestive heart failure)     GERD (gastroesophageal  reflux disease)     History of ETOH abuse     Hx of seizure disorder     Hyperlipidemia     Hypertension     Irregular heart beat     Memory loss     after strokes Jan 2020    Non compliance w medication regimen     Non-ischemic cardiomyopathy     EF 27%    Osteoporosis     Skin cancer     Stroke     multiple strokes Jan 2020; R side hemiparesis       Past Surgical History:   Procedure Laterality Date    CARDIAC CATHETERIZATION N/A 01/10/2020    Procedure: Left Heart Cath;  Surgeon: Cain Mcneil MD;  Location:  [facility] CATH INVASIVE [facility];  Service: Cardiovascular    CARDIAC CATHETERIZATION N/A 01/10/2020    Procedure: Coronary angiography;  Surgeon: Cain Mcneil MD;  Location:  [facility] CATH INVASIVE [facility];  Service: Cardiovascular    FINGER SURGERY      HYSTERECTOMY      JOINT REPLACEMENT      ORIF SHOULDER DISLOCATION W/ HUMERAL FRACTURE      TIBIA FRACTURE SURGERY         Medications Prior to Admission   Medication Sig Dispense Refill Last Dose/Taking    apixaban (Eliquis) 5 MG tablet tablet Take 1 tablet by mouth Every 12 (Twelve) Hours. 180 tablet 2 11/22/2024 at 10:00 PM    atorvastatin (LIPITOR) 40 MG tablet Take 1 tablet by mouth Every Night. 90 tablet 2 11/22/2024 at 10:00 PM    metoprolol succinate XL (TOPROL-XL) 50 MG 24 hr tablet Take 1 tablet by mouth Daily. 90 tablet 3 11/22/2024 Morning    oxybutynin (DITROPAN) 5 MG tablet Take 1 tablet by mouth 2 (Two) Times a Day. 180 tablet 2 11/22/2024 Evening    vitamin D (ERGOCALCIFEROL) 1.25 MG (41357 UT) capsule capsule TAKE 1 CAPSULE BY MOUTH ONCE WEEKLY 5 capsule 2 11/20/2024 Morning       Current Meds  Scheduled Meds:apixaban, 5 mg, Oral, Q12H  atorvastatin, 40 mg, Oral, Nightly  metoprolol succinate XL, 50 mg, Oral, Daily  oxybutynin, 5 mg, Oral, BID  senna-docusate sodium, 2 tablet, Oral, BID  sodium chloride, 10 mL, Intravenous, Q12H      Continuous Infusions:   PRN Meds:.  acetaminophen **OR** acetaminophen **OR** acetaminophen     "aluminum-magnesium hydroxide-simethicone    senna-docusate sodium **AND** polyethylene glycol **AND** bisacodyl **AND** bisacodyl    calcium carbonate    influenza vaccine    metoprolol tartrate    nitroglycerin    ondansetron ODT **OR** ondansetron    sodium chloride    sodium chloride    sodium chloride    Allergies as of 11/23/2024 - Reviewed 11/23/2024   Allergen Reaction Noted    Cephalexin Other (See Comments) 05/31/2016    Melatonin Other (See Comments) 07/22/2020    Beef-derived products Rash 01/13/2020    [place] Rash 05/31/2016    Duricef [cefadroxil] Rash 05/22/2019    Fish allergy Rash 05/31/2016    Garlic Rash 01/13/2020    Green beans Rash 01/16/2020    Onion Rash 01/13/2020    Other Rash 01/16/2020    Peach [prunus persica] Rash 01/13/2020    Wheat Rash 01/13/2020    Wheat Rash 01/13/2020       Social History     Socioeconomic History    Marital status:    Tobacco Use    Smoking status: Never     Passive exposure: Never    Smokeless tobacco: Never   Vaping Use    Vaping status: Never Used   Substance and Sexual Activity    Alcohol use: Not Currently     Comment: Reports a hx of drinking everyday for 18 years atleast. She says that she quit May 2018, but says that she drinks occasionally now.     Drug use: No     Comment: caffeine use: 2 cups daily.     Sexual activity: Not Currently       Family History   Problem Relation Age of Onset    Breast cancer Brother     Macular degeneration Mother     Heart disease Mother     Heart disease Father     Heart disease Maternal Uncle        REVIEW OF SYSTEMS:   All systems reviewed and pertinent positives include in HPI otherwise negative review of systems.       Objective:   Temp:  [97.5 °F (36.4 °C)-99 °F (37.2 °C)] 98.3 °F (36.8 °C)  Heart Rate:  [] 81  Resp:  [18-20] 18  BP: (102-130)/(58-80) 128/58  Body mass index is 21.23 kg/m².  Flowsheet Rows      Flowsheet Row First Filed Value   Admission Height 165.1 cm (65\") Documented at 11/23/2024 2005 "   Admission Weight 62.6 kg (138 lb) Documented at 11/23/2024 2005          Vitals:    11/25/24 0734   BP: 128/58   Pulse: 81   Resp: 18   Temp: 98.3 °F (36.8 °C)   SpO2: 96%       General Appearance:    Alert, cooperative, in no acute distress   Head:    Normocephalic, without obvious abnormality, atraumatic   Eyes:            Lids and lashes normal, conjunctivae and sclerae normal, no   icterus, no pallor, corneas clear, PERRLA   Ears:    Ears appear intact with no abnormalities noted   Throat:   No oral lesions, no thrush, oral mucosa moist   Neck:   No adenopathy, supple, trachea midline, no thyromegaly, no   carotid bruit, no JVD   Back:     No kyphosis present, no scoliosis present, no skin lesions, erythema or scars, no tenderness to percussion or palpation, range of motion normal   Lungs:     Clear to auscultation,respirations regular, even and unlabored    Heart:  Irregularly irregular and tachycardic, normal S1 and S2, no murmur, no gallop, no rub, no click   Chest Wall:    No abnormalities observed   Abdomen:     Normal bowel sounds, no masses, no organomegaly, soft nontender, nondistended, no guarding, no rebound  tenderness   Extremities:   Moves all extremities well, no edema, no cyanosis, no redness   Pulses:   Pulses palpable and equal bilaterally. Normal radial, carotid, femoral, dorsalis pedis and posterior tibial pulses bilaterally. Normal abdominal aorta   Skin:  Neurology:   Psychiatric:   No bleeding, bruising or rash   Normal speech and cranial nerve exam, no focal deficit   Alert and oriented x 3, normal mood and affect             Review of Data:      Results from last 7 days   Lab Units 11/23/24 2009   SODIUM mmol/L 137   POTASSIUM mmol/L 3.9   CHLORIDE mmol/L 99   CO2 mmol/L 22.3   BUN mg/dL 13   CREATININE mg/dL 0.73   CALCIUM mg/dL 10.1   BILIRUBIN mg/dL 1.1   ALK PHOS U/L 88   ALT (SGPT) U/L 14   AST (SGOT) U/L 21   GLUCOSE mg/dL 104*     Results from last 7 days   Lab Units  11/24/24  0548 11/23/24  2159 11/23/24 2009   HSTROP T ng/L 37* 32* 34*     @LABRCNTbnp@  Results from last 7 days   Lab Units 11/23/24 2009   WBC 10*3/mm3 7.92   HEMOGLOBIN g/dL 13.2   HEMATOCRIT % 39.8   PLATELETS 10*3/mm3 172         Results from last 7 days   Lab Units 11/23/24 2009   MAGNESIUM mg/dL 1.6     @LABRCNTIP(chol,trig,hdl,ldl)                  I personally viewed and interpreted the patient's EKG/Telemetry data  )   Atrial fibrillation with rapid ventricular response        Assessment and Plan:    Symptomatic paroxysmal atrial fibrillation-prior history of tachycardia induced cardiomyopathy with recovered left ventricular ejection fraction, alcohol abuse,  bilateral CVA from atrial fibrillation on chronic anticoagulation.  She was feeling unwell with decreased oral intake last week and stopped all cardiac medications.  She came to the ER on 11/23/2024 with worsening generalized body weakness.  Noted to be in A-fib with RVR.  Borderline elevated troponin and EKG nonischemic.  She may have contracted some viral illness that provoked A-fib.  She was euvolemic on exam.  Atrial fibrillation with rapid ventricular response  Constitutional symptoms with loss of appetite and decreased oral intake  History of cardioembolic stroke from A-fib    IV fluid hydration  I will give a dose of IV metoprolol and start atenolol 25 mg p.o. twice daily  Continue anticoagulation    Jovanny Hill MD  11/25/24  08:51 EST.      Time spent in reviewing chart, discussion and examination:

## 2024-11-25 NOTE — THERAPY DISCHARGE NOTE
Patient Name: Camille Pantoja  : 1955    MRN: [id number]                              Today's Date: 2024       Admit Date: 2024    Visit Dx:     ICD-10-CM ICD-9-CM   1. Atrial fibrillation with rapid ventricular response  I48.91 427.31     Patient Active Problem List   Diagnosis    Hyperlipidemia    Hypertension    Vitamin D deficiency    Osteoarthritis    Paroxysmal atrial fibrillation with rapid ventricular response    Cerebral ventriculomegaly    Right hemiparesis    Bladder spasms    Functional diarrhea    Abnormal gait    NICM (nonischemic cardiomyopathy)    Gastroesophageal reflux disease without esophagitis    Dense breast tissue on mammogram    Cerebrovascular accident (CVA) due to embolism of left middle cerebral artery    Osteoporosis    At high risk for falls    Alcoholism in remission    Idiopathic peripheral neuropathy    Atrial fibrillation with rapid ventricular response     Past Medical History:   Diagnosis Date    A-     CHF (congestive heart failure)     GERD (gastroesophageal reflux disease)     History of ETOH abuse     Hx of seizure disorder     Hyperlipidemia     Hypertension     Irregular heart beat     Memory loss     after strokes 2020    Non compliance w medication regimen     Non-ischemic cardiomyopathy     EF 27%    Osteoporosis     Skin cancer     Stroke     multiple strokes 2020; R side hemiparesis     Past Surgical History:   Procedure Laterality Date    CARDIAC CATHETERIZATION N/A 01/10/2020    Procedure: Left Heart Cath;  Surgeon: Cain Mcneil MD;  Location:  [facility];  Service: Cardiovascular    CARDIAC CATHETERIZATION N/A 01/10/2020    Procedure: Coronary angiography;  Surgeon: Cain Mcneil MD;  Location:  [facility];  Service: Cardiovascular    FINGER SURGERY      HYSTERECTOMY      JOINT REPLACEMENT      ORIF SHOULDER DISLOCATION W/ HUMERAL FRACTURE      TIBIA FRACTURE SURGERY        General  Information       Row Name 11/25/24 0823          Physical Therapy Time and Intention    Document Type discharge evaluation/summary  -     Mode of Treatment physical therapy  -       Row Name 11/25/24 0823          General Information    Patient Profile Reviewed yes  pt admitted with headache/weakness  -     Prior Level of Function independent:;all household mobility  -     Existing Precautions/Restrictions fall  -     Barriers to Rehab none identified  -       Row Name 11/25/24 0823          Living Environment    People in Home alone  -       Row Name 11/25/24 0823          Home Main Entrance    Number of Stairs, Main Entrance five  -     Stair Railings, Main Entrance other (see comments)  1 handrail  -       Row Name 11/25/24 0823          Stairs Within Home, Primary    Stairs, Within Home, Primary single story house  -       Row Name 11/25/24 0823          Cognition    Orientation Status (Cognition) oriented x 3  -               User Key  (r) = Recorded By, (t) = Taken By, (c) = Cosigned By      Initials Name Provider Type     Kennedi Gay, PT Physical Therapist                   Mobility       Row Name 11/25/24 0823          Bed Mobility    Bed Mobility supine-sit  -     Supine-Sit [place] (Bed Mobility) supervision  -     Assistive Device (Bed Mobility) bed rails;head of bed elevated  -       Row Name 11/25/24 0823          Sit-Stand Transfer    Sit-Stand [place] (Transfers) standby assist  -     Assistive Device (Sit-Stand Transfers) walker, front-wheeled  -       Row Name 11/25/24 0823          Gait/Stairs (Locomotion)    [place] Level (Gait) standby assist  -     Assistive Device (Gait) walker, front-wheeled  -     Distance in Feet (Gait) 125  -JW     Bilateral Gait Deviations forward flexed posture  -     Comment, (Gait/Stairs) pt performs gait with rolling walker, noted right heel does not contact the floor during stance.  pt reports this deficit is  "pre-existing  -               User Key  (r) = Recorded By, (t) = Taken By, (c) = Cosigned By      Initials Name Provider Type    Kennedi Davalos, PT Physical Therapist                   Obj/Interventions       Row Name 11/25/24 0823          Range of Motion Comprehensive    Comment, General Range of Motion knee/hip ROM WFL bilaterally  -[facility] Name 11/25/24 0823          Strength Comprehensive (MMT)    Comment, General Manual Muscle Testing (MMT) Assessment knee/hip strength 4/5 bilaterally  -       Row Name 11/25/24 0823          Balance    Comment, Balance SBA for standing balance with rolling walker  -               User Key  (r) = Recorded By, (t) = Taken By, (c) = Cosigned By      Initials Name Provider Type    Kennedi Davalos, PT Physical Therapist                   Goals/Plan    No documentation.                  Clinical Impression       [facility] Name 11/25/24 0823          Pain    Pretreatment Pain Rating 3/10  -     Posttreatment Pain Rating 4/10  -     Pain Location head  -     Pre/Posttreatment Pain Comment pt reports headache has improved since admission  -[facility] Name 11/25/24 0823          Plan of Care Review    Plan of Care Reviewed With patient  -     Outcome Evaluation Physical therapy evaluation complete.  Patient performs supine to sit with SBA and sit to stand with SBA.  Patient performs gait with rolling walker x120 feet, SBA.  Patient with pre existing right ankle issues that impact gait, reports current mobility is at baseline.  Patient reports no concerns regarding return home at this time.  No additional PT needs at this time.  -[facility] Name 11/25/24 0823          Therapy Assessment/Plan (PT)    Patient/Family Therapy Goals Statement (PT) \"go home\"  -     Criteria for Skilled Interventions Met (PT) no problems identified which require skilled intervention  -     Therapy Frequency (PT) evaluation only  -       Row Name 11/25/24 0823          Positioning " and Restraints    Pre-Treatment Position in bed  -JW     Post Treatment Position chair  -JW     In Chair sitting;call light within reach;encouraged to call for assist  -               User Key  (r) = Recorded By, (t) = Taken By, (c) = Cosigned By      Initials Name Provider Type    JW Kennedi Gay, FRAN Physical Therapist                   Outcome Measures       Row Name 11/25/24 0823 11/25/24 0752       How much help from another person do you currently need...    Turning from your back to your side while in flat bed without using bedrails? 4  -JW 3  -PB    Moving from lying on back to sitting on the side of a flat bed without bedrails? 3  -JW 3  -PB    Moving to and from a bed to a chair (including a wheelchair)? 3  -JW 3  -PB    Standing up from a chair using your arms (e.g., wheelchair, bedside chair)? 3  - 3  -PB    Climbing 3-5 steps with a railing? 3  - 2  -PB    To walk in hospital room? 3  - 2  -PB    AM-PAC 6 Clicks Score (PT) 19  - 16  -PB    Highest Level of Mobility Goal 6 --> Walk 10 steps or more  - 5 --> Static standing  -PB      Row Name 11/25/24 0823          Functional Assessment    Outcome Measure Options AM-PAC 6 Clicks Basic Mobility (PT)  -               User Key  (r) = Recorded By, (t) = Taken By, (c) = Cosigned By      Initials Name Provider Type    PB Stephany Patel, RN Registered Nurse    Kennedi Davalos, FRAN Physical Therapist                  Physical Therapy Education       Title: PT OT SLP Therapies (Resolved)       Topic: Physical Therapy (Resolved)       Point: Mobility training (Resolved)       Learning Progress Summary            Patient Acceptance, E,TB, VU by  at 11/25/2024 0912                                      User Key       Initials Effective Dates Name Provider Type Discipline     06/16/21 -  Kennedi Gay, FRAN Physical Therapist PT                  PT Recommendation and Plan     Outcome Evaluation: Physical therapy evaluation complete.  Patient  performs supine to sit with SBA and sit to stand with SBA.  Patient performs gait with rolling walker x120 feet, SBA.  Patient with pre existing right ankle issues that impact gait, reports current mobility is at baseline.  Patient reports no concerns regarding return home at this time.  No additional PT needs at this time.     Time Calculation:   PT Evaluation Complexity  History, PT Evaluation Complexity: 1-2 personal factors and/or comorbidities  Examination of Body Systems (PT Eval Complexity): 1-2 elements  Clinical Presentation (PT Evaluation Complexity): stable  Clinical Decision Making (PT Evaluation Complexity): low complexity  Overall Complexity (PT Evaluation Complexity): low complexity     PT Charges       Row Name 11/25/24 0913             Time Calculation    Start Time 0823  -      Stop Time 0837  -      Time Calculation (min) 14 min  -      PT Received On 11/25/24  -                User Key  (r) = Recorded By, (t) = Taken By, (c) = Cosigned By      Initials Name Provider Type    Kennedi Davalos, PT Physical Therapist                  Therapy Charges for Today       Code Description Service Date Service Provider Modifiers Qty    [id number]  PT EVAL LOW COMPLEXITY 1 11/25/2024 Kennedi Gay, PT GP 1            PT G-Codes  Outcome Measure Options: AM-PAC 6 Clicks Basic Mobility (PT)  AM-PAC 6 Clicks Score (PT): 19    PT Discharge Summary  Anticipated Discharge Disposition (PT): home    Kennedi Gay PT  11/25/2024

## 2024-11-25 NOTE — PLAN OF CARE
Problem: Adult Inpatient Plan of Care  Goal: Plan of Care Review  Recent Flowsheet Documentation  Taken 11/25/2024 1107 by Reinaldo Raya OTR  Outcome Evaluation: Occupational therapy evaluation completed. Pt admitted with c/o a heachache and generalized weakness. Pt reports feeling better than at time of admission. Pt managed bed mobility with supervision. Pt was able to bed down to adjust her non-skid socks from bed independently. Pt managed transfers and functional mobility with SBA using a rolling walker for support. Pt reports no concerns for management of daily tasks upon discharge to home. Pt states she is functionally at her baseline status. No OT services recommended.

## 2024-11-25 NOTE — PLAN OF CARE
Goal Outcome Evaluation:  Plan of Care Reviewed With: patient           Outcome Evaluation: Physical therapy evaluation complete.  Patient performs supine to sit with SBA and sit to stand with SBA.  Patient performs gait with rolling walker x120 feet, SBA.  Patient with pre existing right ankle issues that impact gait, reports current mobility is at baseline.  Patient reports no concerns regarding return home at this time.  No additional PT needs at this time.    Anticipated Discharge Disposition (PT): home

## 2024-11-25 NOTE — SIGNIFICANT NOTE
11/25/24 0509   Clinician Notification   Reason for Communication Critical lab value  (Dig 3.04)   Clinician Name Dr. Navarro   Clinician Role Hospitalist   Method of Communication Call   Response See orders  (don't give anymore dig. recheck level at 1200)

## 2024-11-25 NOTE — PROGRESS NOTES
"SERVICE: [facility] HOSPITALIST    CONSULTANTS:  Cardiology     CHIEF COMPLAINT:    SUBJECTIVE: Patient seen and examined at bedside. Patient still feels her heart racing at times; having some mild nausea and a headache; denies chest pain or SOB    OBJECTIVE:    Physical exam is largely unchanged from previous exam, except where documented below, examination is accurate as of 11/25/2024    Physical Exam:  General: Patient is awake and alert. Sitting up in chair; mild distress   HENT: Head is atraumatic, normocephalic  Eyes: Vision is grossly intact. Pupils appear equal and round.   Cardiovascular: Irregularly irregular rhythm; improved HR  Respiratory: Lungs are clear to ausculation without wheezes, rhonchi or rales.   Abdominal/GI: Soft, mildly distended; NT  Extremities: No peripheral edema noted.   Musculoskeletal: Spontaneous movement of bilateral upper and lower extremities against gravity noted. No signs of injury or deformity noted.   Skin: Warm and dry.       /69 (BP Location: Right arm, Patient Position: Lying)   Pulse 73   Temp 98.2 °F (36.8 °C) (Oral)   Resp 20   Ht 165.1 cm (65\")   Wt 57.9 kg (127 lb 9.6 oz)   SpO2 97%   BMI 21.23 kg/m²     MEDS/LABS REVIEWED AND ORDERED    apixaban, 5 mg, Oral, Q12H  atenolol, 25 mg, Oral, BID  atorvastatin, 40 mg, Oral, Nightly  oxybutynin, 5 mg, Oral, BID  senna-docusate sodium, 2 tablet, Oral, BID  sodium chloride, 10 mL, Intravenous, Q12H          LAB/DIAGNOSTICS:    Lab Results (last 24 hours)       Procedure Component Value Units Date/Time    Digoxin Level [[id number]]  (Abnormal) Collected: 11/25/24 1148    Specimen: Blood Updated: 11/25/24 1237     Digoxin 2.09 ng/mL     Digoxin Level [[id number]]  (Abnormal) Collected: 11/25/24 0315    Specimen: Blood Updated: 11/25/24 0503     Digoxin 3.04 ng/mL           ECG 12 Lead Rhythm Change   Final Result   HEART RATE=71  bpm   RR Ghskzump=798  ms   GA Jizaoplf=859  ms   P Horizontal Axis=84 "  deg   P Front Axis=6  deg   QRSD Interval=84  ms   QT Vjecymqw=990  ms   UBpU=321  ms   QRS Axis=-51  deg   T Wave Axis=61  deg   - ABNORMAL ECG -   Sinus rhythm   Left anterior fascicular block   Low voltage, precordial leads   Borderline  T wave abnormalities   SINCE PREVIOUS TRACING, HR is slower   Electronically Signed By: Chante Mondragon ([facility]) 2024-11-25 14:00:19   Date and Time of Study:2024-11-25 13:56:30      ECG 12 Lead ED Triage Standing Order; Chest Pain   Final Result   HEART BYSC=500  bpm   RR Zcftpswb=561  ms   AR Interval=  ms   P Horizontal Axis=  deg   P Front Axis=  deg   QRSD Interval=82  ms   QT Qgqnozlv=521  ms   KPxC=154  ms   QRS Axis=-47  deg   T Wave Axis=80  deg   - ABNORMAL ECG -   Atrial fibrillation with rapid V-rate   Left anterior fascicular block   ST depression, probably rate related   Borderline  prolonged QT interval   When compared with ECG of 07-Aug-2020 20:39:51,   Significant change in rhythm: previously atrial fibrillation   Electronically Signed By: Adria Estevez ([facility]) 2024-11-24 14:21:20   Date and Time of Study:2024-11-23 20:01:49        Results for orders placed during the hospital encounter of 07/01/21    Adult Transthoracic Echo Complete W/ Cont if Necessary Per Protocol    Interpretation Summary  · Calculated left ventricular EF = 56% Estimated left ventricular EF was in agreement with the calculated left ventricular EF. Left ventricular systolic function is normal.  · Left ventricular diastolic function was normal.    XR Chest 1 View    Result Date: 11/23/2024  Impression: Emphysema. No active disease. Electronically Signed: Vaibhav Cole MD  11/23/2024 8:47 PM EST  Workstation ID: [id number]       ASSESSMENT/PLAN:    Persistent uncontrolled atrial fib/flutter with RVR-  -Received 10mg IV metop total and 250 dig overnight which did convert to sinus for a little bit, but now rate back elevated  Seen by Cardiology today; plan to start atenolol and give IV fluids  Monitor on  "telemetry  Continue Eliquis       HTN- Continue home meds with holding parameters     HLD- continue home statin     Hx of multiple strokes- no acute changes here     Hx of non-ischemic cardiomyopathy EF 27% now recovered- follows with cardiology last seen 1 month prior.           PLAN FOR DISPOSITION: Likely d/c home in 1-2 days pending improvement in A.fib with RVR    Mario Hammonds, DO  11/25/24  16:06 EST    At [facility], we believe that sharing information builds trust and better relationships. You are receiving this note because you recently visited [facility]. It is possible you will see health information before a provider has talked with you about it. This kind of information can be easy to misunderstand. To help you fully understand what it means for your health, we urge you to discuss this note with your provider.    \"Dictated utilizing Dragon dictation\"  "

## 2024-11-25 NOTE — THERAPY DISCHARGE NOTE
Acute Care - Occupational Therapy Discharge   Rebeca Cochran    Patient Name: Camille Pantoja  : 1955    MRN: [id number]                              Today's Date: 2024       Admit Date: 2024    Visit Dx:     ICD-10-CM ICD-9-CM   1. Atrial fibrillation with rapid ventricular response  I48.91 427.31     Patient Active Problem List   Diagnosis    Hyperlipidemia    Hypertension    Vitamin D deficiency    Osteoarthritis    Paroxysmal atrial fibrillation with rapid ventricular response    Cerebral ventriculomegaly    Right hemiparesis    Bladder spasms    Functional diarrhea    Abnormal gait    NICM (nonischemic cardiomyopathy)    Gastroesophageal reflux disease without esophagitis    Dense breast tissue on mammogram    Cerebrovascular accident (CVA) due to embolism of left middle cerebral artery    Osteoporosis    At high risk for falls    Alcoholism in remission    Idiopathic peripheral neuropathy    Atrial fibrillation with rapid ventricular response     Past Medical History:   Diagnosis Date    A-     CHF (congestive heart failure)     GERD (gastroesophageal reflux disease)     History of ETOH abuse     Hx of seizure disorder     Hyperlipidemia     Hypertension     Irregular heart beat     Memory loss     after strokes 2020    Non compliance w medication regimen     Non-ischemic cardiomyopathy     EF 27%    Osteoporosis     Skin cancer     Stroke     multiple strokes 2020; R side hemiparesis     Past Surgical History:   Procedure Laterality Date    CARDIAC CATHETERIZATION N/A 01/10/2020    Procedure: Left Heart Cath;  Surgeon: Cain Mcneil MD;  Location:  [facility];  Service: Cardiovascular    CARDIAC CATHETERIZATION N/A 01/10/2020    Procedure: Coronary angiography;  Surgeon: Cain Mcneil MD;  Location:  [facility];  Service: Cardiovascular    FINGER SURGERY      HYSTERECTOMY      JOINT REPLACEMENT      ORIF SHOULDER DISLOCATION W/ HUMERAL  FRACTURE      TIBIA FRACTURE SURGERY        General Information       Row Name 11/25/24 1050          OT Time and Intention    Subjective Information complains of  Pt c/o a headache. She does report the headache is improved since yesterday.  -SD     Document Type discharge evaluation/summary  -SD     Mode of Treatment occupational therapy  -SD     Total Minutes, Occupational Therapy 23  -SD     Patient Effort good  -SD     Symptoms Noted During/After Treatment none  -SD       Row Name 11/25/24 1050          General Information    Patient Profile Reviewed yes  pt admitted with headache/weakness  -SD     Prior Level of Function independent:;ADL's;all household mobility  -SD     Existing Precautions/Restrictions fall  -SD     Barriers to Rehab none identified  -SD       Row Name 11/25/24 1050          Occupational Profile    Reason for Services/Referral (Occupational Profile) decreased adl status  -SD     Successful Occupations (Occupational Profile) I with adl's and mobility (using a walker) at home  -SD     Environmental Supports and Barriers (Occupational Profile) pt lives alone. Friends/neighbors provide support  -SD     Patient Goals (Occupational Profile) go home  -SD       Row Name 11/25/24 1050          Living Environment    People in Home alone  -SD       Row Name 11/25/24 1050          Home Main Entrance    Number of Stairs, Main Entrance five  -SD     Stair Railings, Main Entrance other (see comments)  1 handrail  -SD       Row Name 11/25/24 1050          Stairs Within Home, Primary    Stairs, Within Home, Primary walkout ranch  -SD       Row Name 11/25/24 1050          Cognition    Orientation Status (Cognition) oriented x 3  -SD               User Key  (r) = Recorded By, (t) = Taken By, (c) = Cosigned By      Initials Name Provider Type    SD Reinaldo Raya, OTR Occupational Therapist                   Mobility/ADL's       Row Name 11/25/24 1053          Bed Mobility    Bed Mobility supine-sit  -SD      Supine-Sit [place] (Bed Mobility) supervision  -SD     Assistive Device (Bed Mobility) bed rails;head of bed elevated  -SD       Row Name 11/25/24 1053          Transfers    Transfers sit-stand transfer;stand-sit transfer  -[facility] Name 11/25/24 1053          Sit-Stand Transfer    Sit-Stand [place] (Transfers) standby assist  -SD     Assistive Device (Sit-Stand Transfers) walker, front-wheeled  -SD       Row Name 11/25/24 1053          Stand-Sit Transfer    Stand-Sit [place] (Transfers) standby assist  -SD     Assistive Device (Stand-Sit Transfers) walker, front-wheeled  -SD       Row Name 11/25/24 1053          Functional Mobility    Functional Mobility- Ind. Level standby assist  -SD     Functional Mobility- Device walker, front-wheeled  -SD     Functional Mobility-Distance (Feet) 120  -[facility] Name 11/25/24 1053          Activities of Daily Living    BADL Assessment/Intervention other (see comments);lower body dressing  Pt reports no concerns for management of daily tasks upon discharge to home.  -SD       Row Name 11/25/24 1053          Lower Body Dressing Assessment/Training    [place] Level (Lower Body Dressing) lower body dressing skills;don;socks  -SD     Position (Lower Body Dressing) long sitting  -SD               User Key  (r) = Recorded By, (t) = Taken By, (c) = Cosigned By      Initials Name Provider Type    SD Reinaldo Raya OTR Occupational Therapist                   Obj/Interventions       Row Name 11/25/24 1058          Vision Assessment/Intervention    Visual Impairment/Limitations WFL  -[facility] Name 11/25/24 1058          Range of Motion Comprehensive    Comment, General Range of Motion pt has hx of chronic right shoulder rom limitations (< 90 arom).  BUE rom wfl other than right shoulder.  -SD       Row Name 11/25/24 1058          Strength Comprehensive (MMT)    Comment, General Manual Muscle Testing (MMT) Assessment BUE strength functional for tasks  during evaluation.  -SD       Row Name 11/25/24 1058          Balance    Comment, Balance I with sitting balance and SBA for standing balance using rolling walker for support.  -SD               User Key  (r) = Recorded By, (t) = Taken By, (c) = Cosigned By      Initials Name Provider Type    Reinaldo Solano OTR Occupational Therapist                   Goals/Plan    No documentation.                  Clinical Impression       Row Name 11/25/24 1107          Pain Assessment    Pretreatment Pain Rating 3/10  -SD     Posttreatment Pain Rating 4/10  -SD     Pain Location head  -SD     Pre/Posttreatment Pain Comment Pt reports a heachache since prior to admission. She states her headache is improving and no intervention was needed.  -SD       Row Name 11/25/24 1107          Plan of Care Review    Plan of Care Reviewed With patient  -SD     Outcome Evaluation Occupational therapy evaluation completed. Pt admitted with c/o a heachache and generalized weakness. Pt reports feeling better than at time of admission. Pt managed bed mobility with supervision. Pt as able to bed down to adjust her non-skid socks from bed independently. Pt managed transfers and functional mobility with SBA using a rolling walker for support. Pt reports no concerns for management of daily tasks upon discharge to home. Pt states she is functionally at her baseline status. No OT services recommended.  -SD       Row Name 11/25/24 1107          Therapy Assessment/Plan (OT)    Patient/Family Therapy Goal Statement (OT) go home  -SD     Criteria for Skilled Therapeutic Interventions Met (OT) no problems identified which require skilled intervention  -SD     Therapy Frequency (OT) evaluation only  -SD       Row Name 11/25/24 1107          Therapy Plan Review/Discharge Plan (OT)    Anticipated Discharge Disposition (OT) home  -SD       Row Name 11/25/24 1107          Positioning and Restraints    Pre-Treatment Position in bed  -SD     Post Treatment  Position chair  -SD     In Chair sitting;call light within reach;encouraged to call for assist  -SD               User Key  (r) = Recorded By, (t) = Taken By, (c) = Cosigned By      Initials Name Provider Type    Reinaldo Solano, OTR Occupational Therapist                   Outcome Measures       Row Name 11/25/24 1111          How much help from another is currently needed...    Putting on and taking off regular lower body clothing? 4  -SD     Bathing (including washing, rinsing, and drying) 3  -SD     Toileting (which includes using toilet bed pan or urinal) 3  -SD     Putting on and taking off regular upper body clothing 4  -SD     Taking care of personal grooming (such as brushing teeth) 4  -SD     Eating meals 4  -SD     AM-PAC 6 Clicks Score (OT) 22  -SD       Row Name 11/25/24 0823 11/25/24 0752       How much help from another person do you currently need...    Turning from your back to your side while in flat bed without using bedrails? 4  -JW 3  -PB    Moving from lying on back to sitting on the side of a flat bed without bedrails? 3  -JW 3  -PB    Moving to and from a bed to a chair (including a wheelchair)? 3  -JW 3  -PB    Standing up from a chair using your arms (e.g., wheelchair, bedside chair)? 3  -JW 3  -PB    Climbing 3-5 steps with a railing? 3  -JW 2  -PB    To walk in hospital room? 3  -JW 2  -PB    AM-PAC 6 Clicks Score (PT) 19  -JW 16  -PB    Highest Level of Mobility Goal 6 --> Walk 10 steps or more  - 5 --> Static standing  -PB      Row Name 11/25/24 1111 11/25/24 0823       Functional Assessment    Outcome Measure Options AM-PAC 6 Clicks Daily Activity (OT)  -SD AM-PAC 6 Clicks Basic Mobility (PT)  -JW              User Key  (r) = Recorded By, (t) = Taken By, (c) = Cosigned By      Initials Name Provider Type    Stephany Zaidi, RN Registered Nurse    Reinaldo Solano, OTR Occupational Therapist    Kennedi Davalos, PT Physical Therapist                  Occupational Therapy  Education       Title: PT OT SLP Therapies (Resolved)       Topic: Occupational Therapy (Resolved)       Point: ADL training (Resolved)       Description:   Instruct learner(s) on proper safety adaptation and remediation techniques during self care or transfers.   Instruct in proper use of assistive devices.                  Learning Progress Summary            Patient Acceptance, E, VU by SD at 11/25/2024 1112    Comment: Education regarding OT services, benefits of activity and safety with bed mobility, transfers and functional mobility.                                      User Key       Initials Effective Dates Name Provider Type Discipline    SD 06/16/21 -  Reinaldo Raya OTR Occupational Therapist OT                  OT Recommendation and Plan  Therapy Frequency (OT): evaluation only  Plan of Care Review  Plan of Care Reviewed With: patient  Outcome Evaluation: Occupational therapy evaluation completed. Pt admitted with c/o a heachache and generalized weakness. Pt reports feeling better than at time of admission. Pt managed bed mobility with supervision. Pt as able to bed down to adjust her non-skid socks from bed independently. Pt managed transfers and functional mobility with SBA using a rolling walker for support. Pt reports no concerns for management of daily tasks upon discharge to home. Pt states she is functionally at her baseline status. No OT services recommended.     Time Calculation:   Evaluation Complexity (OT)  Review Occupational Profile/Medical/Therapy History Complexity: brief/low complexity  Assessment, Occupational Performance/Identification of Deficit Complexity: 1-3 performance deficits  Clinical Decision Making Complexity (OT): problem focused assessment/low complexity  Overall Complexity of Evaluation (OT): low complexity     Time Calculation- OT       Row Name 11/25/24 1114             Time Calculation- OT    OT Start Time 0810  -SD         Untimed Charges    OT Eval/Re-eval Minutes  23  -SD         Total Minutes    Untimed Charges Total Minutes 23  -SD       Total Minutes 23  -SD                User Key  (r) = Recorded By, (t) = Taken By, (c) = Cosigned By      Initials Name Provider Type    Reinaldo Solano OTR Occupational Therapist                  Therapy Charges for Today       Code Description Service Date Service Provider Modifiers Qty    [id number]  OT EVAL LOW COMPLEXITY 2 11/25/2024 Reinaldo Raya OTR GO 1               OT Discharge Summary  Anticipated Discharge Disposition (OT): home  Reason for Discharge: At baseline function    DALILA Razo  11/25/2024

## 2024-11-26 ENCOUNTER — READMISSION MANAGEMENT (OUTPATIENT)
Dept: CALL CENTER | Facility: HOSPITAL | Age: 69
End: 2024-11-26
Payer: MEDICARE

## 2024-11-26 ENCOUNTER — NURSE TRIAGE (OUTPATIENT)
Dept: CALL CENTER | Facility: HOSPITAL | Age: 69
End: 2024-11-26
Payer: MEDICARE

## 2024-11-26 VITALS
HEIGHT: 65 IN | OXYGEN SATURATION: 97 % | HEART RATE: 62 BPM | BODY MASS INDEX: 21.26 KG/M2 | RESPIRATION RATE: 20 BRPM | DIASTOLIC BLOOD PRESSURE: 70 MMHG | WEIGHT: 127.6 LBS | TEMPERATURE: 97.8 F | SYSTOLIC BLOOD PRESSURE: 109 MMHG

## 2024-11-26 PROCEDURE — 99232 SBSQ HOSP IP/OBS MODERATE 35: CPT | Performed by: STUDENT IN AN ORGANIZED HEALTH CARE EDUCATION/TRAINING PROGRAM

## 2024-11-26 PROCEDURE — 25810000003 SODIUM CHLORIDE 0.9 % SOLUTION: Performed by: INTERNAL MEDICINE

## 2024-11-26 PROCEDURE — 99239 HOSP IP/OBS DSCHRG MGMT >30: CPT | Performed by: FAMILY MEDICINE

## 2024-11-26 RX ORDER — ATENOLOL 25 MG/1
25 TABLET ORAL 2 TIMES DAILY
Qty: 60 TABLET | Refills: 0 | Status: SHIPPED | OUTPATIENT
Start: 2024-11-26

## 2024-11-26 RX ADMIN — SODIUM CHLORIDE 100 ML/HR: 9 INJECTION, SOLUTION INTRAVENOUS at 04:39

## 2024-11-26 RX ADMIN — APIXABAN 5 MG: 2.5 TABLET, FILM COATED ORAL at 09:58

## 2024-11-26 RX ADMIN — OXYBUTYNIN CHLORIDE 5 MG: 5 TABLET ORAL at 09:58

## 2024-11-26 RX ADMIN — Medication 10 ML: at 09:58

## 2024-11-26 RX ADMIN — ATENOLOL 25 MG: 25 TABLET ORAL at 09:58

## 2024-11-26 NOTE — CASE MANAGEMENT/SOCIAL WORK
Case Management Discharge Note      Final Note: dc home    Provided Post Acute Provider List?: N/A  Provided Post Acute Provider Quality & Resource List?: N/A    Selected Continued Care - Discharged on 11/26/2024 Admission date: 11/23/2024 - Discharge disposition: Home or Self Care      Destination    No services have been selected for the patient.                Durable Medical Equipment    No services have been selected for the patient.                Dialysis/Infusion    No services have been selected for the patient.                Home Medical Care    No services have been selected for the patient.                Therapy    No services have been selected for the patient.                Community Resources    No services have been selected for the patient.                Community & DME    No services have been selected for the patient.                         Final Discharge Disposition Code: 01 - home or self-care

## 2024-11-26 NOTE — TELEPHONE ENCOUNTER
Discharged today   New medication  atenolol (TENORMIN) 25 mg BID  Went to [facility] pharmacy for medication. Medication is on order Will be avialable tomorrow.  Call transfer to Const[facility] at [place].        Reason for Disposition   [1] Prescription not at pharmacy AND [2] was prescribed by PCP recently (Exception: [facility] has access to EMR and prescription is recorded there. Go to Home Care and confirm for pharmacy.)    Additional Information   Negative: [1] Intentional drug overdose AND [2] suicidal thoughts or ideas   Negative: Drug overdose and triager unable to answer question   Negative: Caller requesting a renewal or refill of a medicine patient is currently taking   Negative: Caller requesting information unrelated to medicine   Negative: Caller requesting information about COVID-19 Vaccine   Negative: Caller requesting information about Emergency Contraception   Negative: Caller requesting information about Combined Birth Control Pills   Negative: Caller requesting information about Progestin Birth Control Pills   Negative: Caller requesting information about Post-Op pain or medicines   Negative: Caller requesting a prescription antibiotic (such as Penicillin) for Strep throat and has a positive culture result   Negative: Caller requesting a prescription anti-viral med (such as Tamiflu) and has influenza (flu) symptoms   Negative: Immunization reaction suspected   Negative: Rash while taking a medicine or within 3 days of stopping it   Negative: [1] Asthma and [2] having symptoms of asthma (cough, wheezing, etc.)   Negative: [1] Symptom of illness (e.g., headache, abdominal pain, earache, vomiting) AND [2] more than mild   Negative: Breastfeeding questions about mother's medicines and diet   Negative: MORE THAN A DOUBLE DOSE of a prescription or over-the-counter (OTC) drug   Negative: [1] DOUBLE DOSE (an extra dose or lesser amount) of prescription drug AND [2] any symptoms (e.g., dizziness, nausea, pain,  "sleepiness)   Negative: [1] DOUBLE DOSE (an extra dose or lesser amount) of over-the-counter (OTC) drug AND [2] any symptoms (e.g., dizziness, nausea, pain, sleepiness)   Negative: Took another person's prescription drug   Negative: [1] DOUBLE DOSE (an extra dose or lesser amount) of prescription drug AND [2] NO symptoms  (Exception: A double dose of antibiotics.)   Negative: Diabetes drug error or overdose (e.g., took wrong type of insulin or took extra dose)    Answer Assessment - Initial Assessment Questions  1. NAME of MEDICINE: \"What medicine(s) are you calling about?\"      Atenolol 25 mg BID  2. QUESTION: \"What is your question?\" (e.g., double dose of medicine, side effect)      Pharmacy will not have medication until tomorrow  3. PRESCRIBER: \"Who prescribed the medicine?\" Reason: if prescribed by specialist, call should be referred to that group.      Mario Hammonds  4. SYMPTOMS: \"Do you have any symptoms?\" If Yes, ask: \"What symptoms are you having?\"  \"How bad are the symptoms (e.g., mild, moderate, severe)      *No Answer*  5. PREGNANCY:  \"Is there any chance that you are pregnant?\" \"When was your last menstrual period?\"      na    Protocols used: Medication Question Call-ADULT-AH    "

## 2024-11-26 NOTE — PLAN OF CARE
Goal Outcome Evaluation:  Plan of Care Reviewed With: patient        Progress: improving  Outcome Evaluation: Pt VSS. Continues tele, SR, HR 60's-70's, room air overnight. Pt denies pain, denies n/v/d, reports tolerating diet well. Pt up with assist, walker, to restroom. Pt reports plan to d/c home with HH soon.  Pt resting at this time.

## 2024-11-26 NOTE — DISCHARGE SUMMARY
Camille Pantoja  1955  [id number]    Hospitalists Discharge Summary    Date of Admission: 11/23/2024  Date of Discharge:  11/26/2024    Persistent uncontrolled atrial fib/flutter with RVR-  -Received 10mg IV metop total and 250 dig overnight which did convert to sinus for a little bit, but then rate back elevated  Given IV fluids and started on atenolol yesterday; she converted and has maintained SR  Will d/c on oral atenolol 25 mg BID    Continue Eliquis for AC  Follow-up with primary Cardiologist in 3-4 weeks for reevaluation         HTN- Continue home BP meds      HLD- continue home statin     Hx of multiple strokes- no acute changes here     Hx of non-ischemic cardiomyopathy EF 27% now recovered- follow-up with Cardiology as an outpatient      Patient is doing well, and medically stable for d/c home today.      PCP  Patient Care Team:  Gurjit Rodriguez DO as PCP - General (Family Medicine)  Jovita Brown MD as Consulting Physician (Cardiology)  Raji Rodriguez MD as Consulting Physician (Neurology)    Consults:   Consults       Date and Time Order Name Status Description    11/24/2024  6:50 AM Inpatient Cardiology Consult Completed             Operations and Procedures Performed:       XR Chest 1 View    Result Date: 11/23/2024  Narrative: XR CHEST 1 VW Date of Exam: 11/23/2024 8:28 PM EST Indication: Chest Pain Triage Protocol Comparison: Chest radiograph 1/9/2020 Findings: The heart size and pulmonary vascular markings are normal. There is mild diffuse emphysema. There are postsurgical changes of the right shoulder. There are healed posterior rib fractures on the left. There are no focal limitations.     Impression: Impression: Emphysema. No active disease. Electronically Signed: Vaibhav Cole MD  11/23/2024 8:47 PM EST  Workstation ID: [id number]     Allergies:  is allergic to cephalexin, melatonin, beef-derived products, citrus, duricef [cefadroxil], fish allergy, garlic, green beans, onion, other,  peach [prunus persica], wheat, and wheat.    Kj      Discharge Medications:     Discharge Medications        New Medications        Instructions Start Date   atenolol 25 MG tablet  Commonly known as: TENORMIN   25 mg, Oral, 2 Times Daily             Continue These Medications        Instructions Start Date   apixaban 5 MG tablet tablet  Commonly known as: Eliquis   5 mg, Oral, Every 12 Hours      atorvastatin 40 MG tablet  Commonly known as: LIPITOR   40 mg, Oral, Nightly      oxybutynin 5 MG tablet  Commonly known as: DITROPAN   5 mg, Oral, 2 Times Daily      vitamin D 1.25 MG (08817 UT) capsule capsule  Commonly known as: ERGOCALCIFEROL   50,000 Units, Oral, Weekly             Stop These Medications      metoprolol succinate XL 50 MG 24 hr tablet  Commonly known as: TOPROL-XL              Last Lab Results:   Lab Results (most recent)       Procedure Component Value Units Date/Time    Digoxin Level [[id number]]  (Abnormal) Collected: 11/25/24 1148    Specimen: Blood Updated: 11/25/24 1237     Digoxin 2.09 ng/mL     Digoxin Level [[id number]]  (Abnormal) Collected: 11/25/24 0315    Specimen: Blood Updated: 11/25/24 0503     Digoxin 3.04 ng/mL     High Sensitivity Troponin T [[id number]]  (Abnormal) Collected: 11/24/24 0548    Specimen: Blood Updated: 11/24/24 0647     HS Troponin T 37 ng/L     Narrative:      High Sensitive Troponin T Reference Range:  <14.0 ng/L- Negative Female for AMI  <22.0 ng/L- Negative Male for AMI  >=14 - Abnormal Female indicating possible myocardial injury.  >=22 - Abnormal Male indicating possible myocardial injury.   Clinicians would have to utilize clinical acumen, EKG, Troponin, and serial changes to determine if it is an Acute Myocardial Infarction or myocardial injury due to an underlying chronic condition.         High Sensitivity Troponin T 2Hr [[id number]]  (Abnormal) Collected: 11/23/24 2159    Specimen: Blood Updated: 11/23/24 2223     HS Troponin T 32 ng/L      Troponin T Delta  -2 ng/L     Narrative:      High Sensitive Troponin T Reference Range:  <14.0 ng/L- Negative Female for AMI  <22.0 ng/L- Negative Male for AMI  >=14 - Abnormal Female indicating possible myocardial injury.  >=22 - Abnormal Male indicating possible myocardial injury.   Clinicians would have to utilize clinical acumen, EKG, Troponin, and serial changes to determine if it is an Acute Myocardial Infarction or myocardial injury due to an underlying chronic condition.         Urinalysis, Microscopic Only - Urine, Clean Catch [[id number]]  (Abnormal) Collected: 11/23/24 2148    Specimen: Urine, Clean Catch Updated: 11/23/24 2155     RBC, UA 3-5 /HPF      WBC, UA 3-5 /HPF      Comment: Urine culture not indicated.        Bacteria, UA None Seen /HPF      Squamous Epithelial Cells, UA 3-6 /HPF      Hyaline Casts, UA None Seen /LPF      Methodology Manual Light Microscopy    Urinalysis With Culture If Indicated - Urine, Clean Catch [[id number]]  (Abnormal) Collected: 11/23/24 2148    Specimen: Urine, Clean Catch Updated: 11/23/24 2153     Color, UA Yellow     Appearance, UA Clear     pH, UA 6.0     Specific Gravity, UA 1.010     Glucose, UA Negative     Ketones, UA 40 mg/dL (2+)     Bilirubin, UA Negative     Blood, UA Small (1+)     Protein, UA Negative     Leuk Esterase, UA Trace     Nitrite, UA Negative     Urobilinogen, UA 0.2 E.U./dL    Narrative:      In absence of clinical symptoms, the presence of pyuria, bacteria, and/or nitrites on the urinalysis result does not correlate with infection.    Lipase [[id number]]  (Normal) Collected: 11/23/24 2009    Specimen: Blood Updated: 11/23/24 2050     Lipase 36 U/L     TSH Rfx On Abnormal To Free T4 [[id number]]  (Normal) Collected: 11/23/24 2009    Specimen: Blood Updated: 11/23/24 2050     TSH 0.591 uIU/mL     Magnesium [[id number]]  (Normal) Collected: 11/23/24 2009    Specimen: Blood Updated: 11/23/24 2050     Magnesium 1.6 mg/dL     Comprehensive Metabolic Panel [[id number]]   (Abnormal) Collected: 11/23/24 2009    Specimen: Blood Updated: 11/23/24 2031     Glucose 104 mg/dL      BUN 13 mg/dL      Creatinine 0.73 mg/dL      Sodium 137 mmol/L      Potassium 3.9 mmol/L      Chloride 99 mmol/L      CO2 22.3 mmol/L      Calcium 10.1 mg/dL      Total Protein 7.7 g/dL      Albumin 4.3 g/dL      ALT (SGPT) 14 U/L      AST (SGOT) 21 U/L      Alkaline Phosphatase 88 U/L      Total Bilirubin 1.1 mg/dL      Globulin 3.4 gm/dL      A/G Ratio 1.3 g/dL      BUN/Creatinine Ratio 17.8     Anion Gap 15.7 mmol/L      eGFR 88.6 mL/min/1.73     Narrative:      GFR Normal >60  Chronic Kidney Disease <60  Kidney Failure <15      High Sensitivity Troponin T [[id number]]  (Abnormal) Collected: 11/23/24 2009    Specimen: Blood Updated: 11/23/24 2031     HS Troponin T 34 ng/L     Narrative:      High Sensitive Troponin T Reference Range:  <14.0 ng/L- Negative Female for AMI  <22.0 ng/L- Negative Male for AMI  >=14 - Abnormal Female indicating possible myocardial injury.  >=22 - Abnormal Male indicating possible myocardial injury.   Clinicians would have to utilize clinical acumen, EKG, Troponin, and serial changes to determine if it is an Acute Myocardial Infarction or myocardial injury due to an underlying chronic condition.         [place] Draw [[id number]] Collected: 11/23/24 2009    Specimen: Blood Updated: 11/23/24 2016    Narrative:      The following orders were created for panel order [place] Draw.  Procedure                               Abnormality         Status                     ---------                               -----------         ------                     Green Top (Gel)[[id number]]                                  Final result               Lavender Top[[id number]]                                     Final result               Gold Top - SST[[id number]]                                   Final result               Light Blue Top[[id number]]                                   Final result                  Please view results for these tests on the individual orders.    Green Top (Gel) [[id number]] Collected: 11/23/24 2009    Specimen: Blood Updated: 11/23/24 2016     Extra Tube Hold for add-ons.     Comment: Auto resulted.       Lavender Top [[id number]] Collected: 11/23/24 2009    Specimen: Blood Updated: 11/23/24 2016     Extra Tube hold for add-on     Comment: Auto resulted       Gold Top - SST [[id number]] Collected: 11/23/24 2009    Specimen: Blood Updated: 11/23/24 2016     Extra Tube Hold for add-ons.     Comment: Auto resulted.       Light Blue Top [[id number]] Collected: 11/23/24 2009    Specimen: Blood Updated: 11/23/24 2016     Extra Tube Hold for add-ons.     Comment: Auto resulted       CBC & Differential [[id number]]  (Abnormal) Collected: 11/23/24 2009    Specimen: Blood Updated: 11/23/24 2013    Narrative:      The following orders were created for panel order CBC & Differential.  Procedure                               Abnormality         Status                     ---------                               -----------         ------                     CBC Auto Differential[[id number]]        Abnormal            Final result                 Please view results for these tests on the individual orders.    CBC Auto Differential [[id number]]  (Abnormal) Collected: 11/23/24 2009    Specimen: Blood Updated: 11/23/24 2013     WBC 7.92 10*3/mm3      RBC 4.33 10*6/mm3      Hemoglobin 13.2 g/dL      Hematocrit 39.8 %      MCV 91.9 fL      MCH 30.5 pg      MCHC 33.2 g/dL      RDW 13.7 %      RDW-SD 46.5 fl      MPV 11.5 fL      Platelets 172 10*3/mm3      Neutrophil % 80.5 %      Lymphocyte % 10.0 %      Monocyte % 8.6 %      Eosinophil % 0.1 %      Basophil % 0.4 %      Immature Grans % 0.4 %      Neutrophils, Absolute 6.38 10*3/mm3      Lymphocytes, Absolute 0.79 10*3/mm3      Monocytes, Absolute 0.68 10*3/mm3      Eosinophils, Absolute 0.01 10*3/mm3      Basophils, Absolute 0.03 10*3/mm3      Immature Grans,  Absolute 0.03 10*3/mm3      nRBC 0.0 /100 WBC           Imaging Results (Most Recent)       Procedure Component Value Units Date/Time    XR Chest 1 View [[id number]] Collected: 11/23/24 2046     Updated: 11/23/24 2050    Narrative:      XR CHEST 1 VW    Date of Exam: 11/23/2024 8:28 PM EST    Indication: Chest Pain Triage Protocol    Comparison: Chest radiograph 1/9/2020    Findings:  The heart size and pulmonary vascular markings are normal. There is mild diffuse emphysema. There are postsurgical changes of the right shoulder. There are healed posterior rib fractures on the left. There are no focal limitations.      Impression:      Impression:  Emphysema. No active disease.        Electronically Signed: Vaibhav Cole MD    11/23/2024 8:47 PM EST    Workstation ID: [id number]            PROCEDURES      Condition on Discharge:  Stable, Improved.     Physical Exam at Discharge  Vital Signs  Temp:  [97.8 °F (36.6 °C)-98.8 °F (37.1 °C)] 97.8 °F (36.6 °C)  Heart Rate:  [58-75] 62  Resp:  [18-20] 20  BP: (108-121)/(66-86) 109/70    Physical Exam  General: Sitting up in bed; NAD  CV: RRR, S1-S2  Lungs: CTA bilaterally  Abdomen: soft, NT, ND   Extremities: no clubbing, cyanosis, or edema     Discharge Disposition  Home         Discharge Diet:      Dietary Orders (From admission, onward)       Start     Ordered    11/24/24 1136  Diet: Cardiac; Healthy Heart (2-3 Na+); Fluid Consistency: Thin (IDDSI 0)  Diet Effective Now        References:    Diet Order [place]   Question Answer Comment   Diets: Cardiac    Cardiac Diet: Healthy Heart (2-3 Na+)    Fluid Consistency: Thin (IDDSI 0)        11/24/24 1135                    Activity at Discharge:  As tolerated    Pre-discharge education        Follow-up Appointments  Future Appointments   Date Time Provider Department Center   12/18/2024  2:30 PM Selam Joseph APRN [facility]   4/29/2025  1:45 PM Gurjit Rodriguez DO [facility]   10/17/2025  1:00 PM Jovita Brown  MD [facility]     Additional Instructions for the Follow-ups that You Need to Schedule       Discharge Follow-up with PCP   As directed       Currently Documented PCP:    Gurjit Rodriguez DO    PCP Phone Number:    [phone]     Follow Up Details: In 1-2 weeks        Discharge Follow-up with Specified Provider: Follow-up with Primary Cardiologist in 3-4 weeks; 3 Weeks   As directed      To: Follow-up with Primary Cardiologist in 3-4 weeks   Follow Up: 3 Weeks                Test Results Pending at Discharge      Total discharge took 36 minutes to complete      At [facility], we believe that sharing information builds trust and better relationships. You are receiving this note because you recently visited [facility]. It is possible you will see health information before a provider has talked with you about it. This kind of information can be easy to misunderstand. To help you fully understand what it means for your health, we urge you to discuss this note with your provider.    Mario Hammonds DO  11/26/24  11:19 EST

## 2024-11-26 NOTE — PROGRESS NOTES
LOS: 2 days   Patient Care Team:  Gurjit Rodriguez DO as PCP - General (Family Medicine)  Jovita Brown MD as Consulting Physician (Cardiology)  Raji Rodriguez MD as Consulting Physician (Neurology)    Chief Complaint:  Following for A-fib in the setting of a likely postviral illness.    Interval History:   She remains in sinus rhythm.  Fatigue and malaise symptoms persist.  She still has a hoarse voice    Objective   Vital Signs  Temp:  [97.9 °F (36.6 °C)-98.8 °F (37.1 °C)] 98 °F (36.7 °C)  Heart Rate:  [] 58  Resp:  [18-20] 18  BP: (108-121)/(66-86) 109/66    Intake/Output Summary (Last 24 hours) at 11/26/2024 0758  Last data filed at 11/26/2024 0439  Gross per 24 hour   Intake 2721.67 ml   Output 1100 ml   Net 1621.67 ml       Comfortable NAD, resting in bed  PERRL, conjunctivae clear.  Slightly hoarse voice  Neck supple, no JVD or thyromegaly appreciated  S1/S2 RRR, no m/r/g  Lungs CTA B, normal effort  Abdomen S/NT/ND (+) BS, no HSM appreciated  Extremities warm, no clubbing, cyanosis, no significant edema   No visible or palpable skin lesions  A/O x 3, mood and affect appropriate    Results Review:      Results from last 7 days   Lab Units 11/23/24 2009   SODIUM mmol/L 137   POTASSIUM mmol/L 3.9   CHLORIDE mmol/L 99   CO2 mmol/L 22.3   BUN mg/dL 13   CREATININE mg/dL 0.73   GLUCOSE mg/dL 104*   CALCIUM mg/dL 10.1     Results from last 7 days   Lab Units 11/24/24  0548 11/23/24  2159 11/23/24 2009   HSTROP T ng/L 37* 32* 34*     Results from last 7 days   Lab Units 11/23/24 2009   WBC 10*3/mm3 7.92   HEMOGLOBIN g/dL 13.2   HEMATOCRIT % 39.8   PLATELETS 10*3/mm3 172             Results from last 7 days   Lab Units 11/23/24 2009   MAGNESIUM mg/dL 1.6           I reviewed the patient's new clinical results.  I personally viewed and interpreted the patient's EKG/Telemetry data        Medication Review:   apixaban, 5 mg, Oral, Q12H  atenolol, 25 mg, Oral, BID  atorvastatin, 40 mg, Oral,  Nightly  oxybutynin, 5 mg, Oral, BID  senna-docusate sodium, 2 tablet, Oral, BID  sodium chloride, 10 mL, Intravenous, Q12H        sodium chloride, 100 mL/hr, Last Rate: 100 mL/hr (11/26/24 9529)        Assessment & Plan       Atrial fibrillation with rapid ventricular response    Paroxysmal atrial fibrillation  Continue Eliquis  Was started on atenolol 25 twice daily yesterday, remains in sinus rhythm.  Some mild sinus bradycardia at rest, tolerating well  Reviewed TTE from July 2021.  Normal.  No findings on exam to suggest repeating is necessary at this time  She had received a total of 1 mg digoxin through IV pushes, level was elevated.  Avoid further digoxin  Likely post-viral illness with malaise, fatigue, hoarse voice.  Supportive care per medicine team.  History of nonischemic cardiomyopathy with recovered LVEF  History of cardioembolic stroke from A-fib  Eliquis per above    From the cardiac standpoint, she is stable.  No new complaints.  No barriers to discharge in the cardiac standpoint.  We will sign off and see as needed.    Juancarlos Romero MD  11/26/24  07:58 EST      Part of this note may be an electronic transcription/translation of spoken language to printed text using the Dragon Dictation System.   Patent

## 2024-11-26 NOTE — DISCHARGE INSTR - APPOINTMENTS
Pt has a follow up with PCP-Dr. Rodriguez on 12/2 at 11:15AM.             [phone]    Pt has a follow up appointment with Cariology on 12/23 at 9:30AM.    [phone]

## 2024-11-26 NOTE — OUTREACH NOTE
Prep Survey      Flowsheet Row Responses   [facility] patient discharged from? [place]   Is LACE score < 7 ? No   Eligibility [facility]   Date of Admission 11/23/24   Date of Discharge 11/26/24   Discharge Disposition Home or Self Care   Discharge diagnosis Atrial fibrillation with RVR   Does the patient have one of the following disease processes/diagnoses(primary or secondary)? Other   Does the patient have Home health ordered? No   Is there a DME ordered? No   Prep survey completed? Yes            MADDIE DEL RIO - Registered Nurse

## 2024-11-26 NOTE — PLAN OF CARE
Goal Outcome Evaluation:  Plan of Care Reviewed With: patient        Progress: improving  Outcome Evaluation: VSS, Room air, up with walker and assist x 1. Discharge to home.       Patient decided to refuse flu shot and will follow up with Primary physician.

## 2024-11-27 ENCOUNTER — TRANSITIONAL CARE MANAGEMENT TELEPHONE ENCOUNTER (OUTPATIENT)
Dept: CALL CENTER | Facility: HOSPITAL | Age: 69
End: 2024-11-27
Payer: MEDICARE

## 2024-11-27 NOTE — OUTREACH NOTE
Call Center TCM Note      Flowsheet Row Responses   [facility] patient discharged from? [place]   Does the patient have one of the following disease processes/diagnoses(primary or secondary)? Other   TCM attempt successful? Yes   Call start time 0946   Call end time 0947   Discharge diagnosis Atrial fibrillation with RVR   Person spoke with today (if not patient) and relationship Patient   Meds reviewed with patient/caregiver? Yes   Prescription comments Atenolol will be picked up this afternoon   Comments 12/2/2024 11:15 AM  HOSPITAL FOLLOW UP 30 min [facility] PRIMARY [facility] Gurjit Rodriguez, DO   Does the patient have an appointment with their PCP within 7-14 days of discharge? Yes   Has home health visited the patient within 72 hours of discharge? N/A   Psychosocial issues? No   Did the patient receive a copy of their discharge instructions? Yes   Nursing interventions Reviewed instructions with patient   What is the patient's perception of their health status since discharge? Improving   Is the patient/caregiver able to teach back signs and symptoms related to disease process for when to call PCP? Yes   Is the patient/caregiver able to teach back signs and symptoms related to disease process for when to call 911? Yes   Is the patient/caregiver able to teach back the hierarchy of who to call/visit for symptoms/problems? PCP, Specialist, Home health nurse, Urgent Care, ED, 911 Yes   TCM call completed? Yes   Wrap up additional comments Patient reports doing well, No concerns or questions noted.   Call end time 0947   Would this patient benefit from a Referral to Amb Social Work? No   Is the patient interested in additional calls from an ambulatory ? No            Alexandra Parkinson RN    11/27/2024, 09:48 EST

## 2024-12-01 DIAGNOSIS — E55.9 VITAMIN D DEFICIENCY: ICD-10-CM

## 2024-12-02 ENCOUNTER — OFFICE VISIT (OUTPATIENT)
Dept: FAMILY MEDICINE CLINIC | Facility: CLINIC | Age: 69
End: 2024-12-02
Payer: MEDICARE

## 2024-12-02 VITALS
OXYGEN SATURATION: 98 % | HEART RATE: 53 BPM | WEIGHT: 127 LBS | DIASTOLIC BLOOD PRESSURE: 60 MMHG | SYSTOLIC BLOOD PRESSURE: 104 MMHG | HEIGHT: 65 IN | BODY MASS INDEX: 21.16 KG/M2

## 2024-12-02 DIAGNOSIS — Z09 HOSPITAL DISCHARGE FOLLOW-UP: Primary | ICD-10-CM

## 2024-12-02 DIAGNOSIS — I48.91 ATRIAL FIBRILLATION WITH RAPID VENTRICULAR RESPONSE: ICD-10-CM

## 2024-12-02 RX ORDER — ERGOCALCIFEROL 1.25 MG/1
50000 CAPSULE, LIQUID FILLED ORAL WEEKLY
Qty: 5 CAPSULE | Refills: 2 | Status: SHIPPED | OUTPATIENT
Start: 2024-12-02

## 2024-12-02 RX ORDER — ALENDRONATE SODIUM 10 MG/1
10 TABLET ORAL
COMMUNITY
Start: 2024-11-12 | End: 2024-12-02
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
## 2024-12-11 ENCOUNTER — TELEPHONE (OUTPATIENT)
Dept: NEUROLOGY | Facility: CLINIC | Age: 69
End: 2024-12-11
Payer: MEDICARE

## 2024-12-11 NOTE — TELEPHONE ENCOUNTER
Laboratory workup shows vitamin B12 deficiency.  Recommend B12 replacement injections per protocol. V/u and will call back to schedule. Does not drive and needs to speak with friend that will be bringing her.

## 2024-12-11 NOTE — TELEPHONE ENCOUNTER
----- Message from Camilo Hein sent at 12/11/2024  8:15 AM EST -----  Laboratory workup shows vitamin B12 deficiency.  Recommend B12 replacement injections per protocol.

## 2024-12-16 ENCOUNTER — CLINICAL SUPPORT (OUTPATIENT)
Dept: NEUROLOGY | Facility: CLINIC | Age: 69
End: 2024-12-16
Payer: MEDICARE

## 2024-12-16 DIAGNOSIS — E53.8 B12 DEFICIENCY: Primary | ICD-10-CM

## 2024-12-16 PROCEDURE — 96372 THER/PROPH/DIAG INJ SC/IM: CPT | Performed by: PSYCHIATRY & NEUROLOGY

## 2024-12-16 RX ORDER — CYANOCOBALAMIN 1000 UG/ML
1000 INJECTION, SOLUTION INTRAMUSCULAR; SUBCUTANEOUS
Status: DISCONTINUED | OUTPATIENT
Start: 2024-12-16 | End: 2024-12-23

## 2024-12-16 RX ADMIN — CYANOCOBALAMIN 1000 MCG: 1000 INJECTION, SOLUTION INTRAMUSCULAR; SUBCUTANEOUS at 11:33

## 2024-12-18 ENCOUNTER — OFFICE VISIT (OUTPATIENT)
Dept: NEUROLOGY | Facility: CLINIC | Age: 69
End: 2024-12-18
Payer: MEDICARE

## 2024-12-18 VITALS — SYSTOLIC BLOOD PRESSURE: 100 MMHG | DIASTOLIC BLOOD PRESSURE: 64 MMHG | HEART RATE: 56 BPM | OXYGEN SATURATION: 99 %

## 2024-12-18 DIAGNOSIS — G93.89 CEREBRAL VENTRICULOMEGALY: ICD-10-CM

## 2024-12-18 DIAGNOSIS — E53.8 B12 DEFICIENCY: ICD-10-CM

## 2024-12-18 DIAGNOSIS — G60.9 IDIOPATHIC PERIPHERAL NEUROPATHY: Primary | ICD-10-CM

## 2024-12-18 DIAGNOSIS — G62.9 POLYNEUROPATHY: ICD-10-CM

## 2024-12-18 DIAGNOSIS — Z86.73 HISTORY OF CVA (CEREBROVASCULAR ACCIDENT): ICD-10-CM

## 2024-12-18 RX ORDER — CYANOCOBALAMIN 1000 UG/ML
1000 INJECTION, SOLUTION INTRAMUSCULAR; SUBCUTANEOUS
Status: DISCONTINUED | OUTPATIENT
Start: 2024-12-18 | End: 2024-12-23

## 2024-12-18 RX ADMIN — CYANOCOBALAMIN 1000 MCG: 1000 INJECTION, SOLUTION INTRAMUSCULAR; SUBCUTANEOUS at 15:02

## 2024-12-18 NOTE — PROGRESS NOTES
Notes by MA:  Ms Pantoja is here today for a follow up appointment and to receive her B12 injection.  B12 injection administered by MA.      CC: Polyneuropathy/stroke follow-up    HPI:  Camille Pantoja is a  68 y.o. female with known past medical history of atrial fibrillation (on Eliquis), CHF, prior history of EtOH abuse, reported seizures, hypertension, hyperlipidemia and skin care I am seeing today in follow-up for polyneuropathy and stroke.  Patient was last seen in follow-up November 2024 for the same.  Since that time she denies any worsening or changing neuropathy symptoms.  She had episode of palpitations/uncontrolled A-fib and presented to the ER and was admitted 11/23 through 11/26 there was some medication adjustments made during that admission per cardiology.  Patient found to have low serum B12 and has been initiated on B12 replacement. Treatable cause peripheral neuropathy labs unremarkable other than MMA and B12.  Since last evaluation patient denies any falls.  She continues to use walker to assist with mobility.     Guarding stroke, patient denies any new signs and/or symptoms of stroke jose roberto on full dose Eliquis twice daily and atorvastatin 40 mg daily for secondary stroke prevention.  Today blood pressure 100/64.  She does not check blood pressure frequently at home.  Denies any specific issues with mood.  No concern for obstructive sleep apnea.  Recommendations below.     Past Medical History:   Diagnosis Date    A-fib     CHF (congestive heart failure)     GERD (gastroesophageal reflux disease)     History of ETOH abuse     Hx of seizure disorder     Hyperlipidemia     Hypertension     Irregular heart beat     Memory loss     after strokes Jan 2020    Non compliance w medication regimen     Non-ischemic cardiomyopathy     EF 27%    Osteoporosis     Skin cancer     Stroke     multiple strokes Jan 2020; R side hemiparesis         Past Surgical History:   Procedure Laterality Date    CARDIAC  CATHETERIZATION N/A 01/10/2020    Procedure: Left Heart Cath;  Surgeon: Cain Mcneil MD;  Location:  [facility];  Service: Cardiovascular    CARDIAC CATHETERIZATION N/A 01/10/2020    Procedure: Coronary angiography;  Surgeon: Cain Mcneil MD;  Location:  [facility];  Service: Cardiovascular    FINGER SURGERY      HYSTERECTOMY      JOINT REPLACEMENT      ORIF SHOULDER DISLOCATION W/ HUMERAL FRACTURE      TIBIA FRACTURE SURGERY             Current Outpatient Medications:     apixaban (Eliquis) 5 MG tablet tablet, Take 1 tablet by mouth Every 12 (Twelve) Hours., Disp: 180 tablet, Rfl: 2    atenolol (TENORMIN) 25 MG tablet, Take 1 tablet by mouth 2 (Two) Times a Day., Disp: 60 tablet, Rfl: 0    atorvastatin (LIPITOR) 40 MG tablet, Take 1 tablet by mouth Every Night., Disp: 90 tablet, Rfl: 2    oxybutynin (DITROPAN) 5 MG tablet, Take 1 tablet by mouth 2 (Two) Times a Day., Disp: 180 tablet, Rfl: 2    vitamin D (ERGOCALCIFEROL) 1.25 MG (11884 UT) capsule capsule, TAKE 1 CAPSULE BY MOUTH ONCE WEEKLY, Disp: 5 capsule, Rfl: 2    Current Facility-Administered Medications:     cyanocobalamin injection 1,000 mcg, 1,000 mcg, Intramuscular, Q28 Days, Camilo Hein MD, 1,000 mcg at 12/16/24 1133    cyanocobalamin injection 1,000 mcg, 1,000 mcg, Intramuscular, Q28 Days, Selam Joseph APRN, 1,000 mcg at 12/18/24 1502      Family History   Problem Relation Age of Onset    Breast cancer Brother     Macular degeneration Mother     Heart disease Mother     Heart disease Father     Heart disease Maternal Uncle          Social History     Socioeconomic History    Marital status:    Tobacco Use    Smoking status: Never     Passive exposure: Never    Smokeless tobacco: Never   Vaping Use    Vaping status: Never Used   Substance and Sexual Activity    Alcohol use: Not Currently     Comment: Reports a hx of drinking everyday for 18 years atleast. She says that she quit May  2018, but says that she drinks occasionally now.     Drug use: No     Comment: caffeine use: 2 cups daily.     Sexual activity: Not Currently         Allergies   Allergen Reactions    Cephalexin Other (See Comments)     Reports she doesn't remember having an allergic reaction.     Melatonin Other (See Comments)     Not Efficacious    Beef-Derived Products Rash    Citrus Rash    Duricef [Cefadroxil] Rash    Fish Allergy Rash    Garlic Rash    Green Beans Rash    Onion Rash    Other Rash    Peach [Prunus Persica] Rash    Wheat Rash    Wheat Rash         Pain Scale:0        ROS:  Review of Systems   Musculoskeletal:  Positive for gait problem.   Neurological:  Positive for numbness.           Physical Exam:  Vitals:    12/18/24 1443   BP: 100/64   BP Location: Left arm   Patient Position: Sitting   Cuff Size: Adult   Pulse: 56   SpO2: 99%     Head: Head is erect and midline: skull is normocephalic, symmetrical and smooth without deformity. Facial features are symmetrical.  Widespread dental caries.  Neck:  Trachea is midline; no JVD.   Eyes: conjunctivae pink; sclerae white; PERRL. No tearing noted. Pupils constricted 4mm to 2mm   Ears:  Normal position.  Chest:  The trachea is midline. The chest is normal in appearance. The chest is clear to percussion and auscultation.  Heart:  HR 56 beats per minute, S1 and S2 noted with regular rhythm. /64. No abnormal jugular venous distention. No rubs, murmurs, or gallops noted upon auscultation.   Musculoskeletal:  The extremities are normal in color, size, and temperature. All pulses in the upper and lower extremities are grade II and equal. No clubbing, cyanosis, or edema is present.   Neurological:  Alert, relaxed, cooperative. Thought process coherent. Oriented to person, place and time. CN II- XII intact. Good muscle bulk and tone. Strength 5/5 throughout. Cerebellar: Rapid alternating movements:finger-to-nose intact. Gait: Abnormal/antalgic-uses walker to assist with  mobility. Sensory: Impaired bilateral lower extremities to light touch/temperature/vibration.  Intact to light touch/vibration/temperature in upper extremities.        Results:  Lab Results   Component Value Date    GLUCOSE 104 (H) 11/23/2024    BUN 13 11/23/2024    CREATININE 0.73 11/23/2024    EGFRIFNONA 71 12/14/2021    EGFRIFAFRI 82 12/14/2021    BCR 17.8 11/23/2024    CO2 22.3 11/23/2024    CALCIUM 10.1 11/23/2024    PROTENTOTREF 6.8 10/29/2024    ALBUMIN 4.3 11/23/2024    LABIL2 1.5 04/24/2024    AST 21 11/23/2024    ALT 14 11/23/2024       Lab Results   Component Value Date    WBC 7.92 11/23/2024    HGB 13.2 11/23/2024    HCT 39.8 11/23/2024    MCV 91.9 11/23/2024     11/23/2024         .  Lab Results   Component Value Date    RPR Non-Reactive 11/17/2023         Lab Results   Component Value Date    TSH 0.591 11/23/2024         Lab Results   Component Value Date    MKRAIGPJ94 246 11/14/2024         Lab Results   Component Value Date    FOLATE 9.02 11/17/2023         Lab Results   Component Value Date    HGBA1C 5.8 (H) 10/29/2024         Assessment:   1.  Polyneuropathy; treatment cause peripheral neuropathy labs unremarkable except abnormal (low) B12/elevated MMA-patient receiving B12 replacement IM.  Could consider motor or sensory neuropathy panel/skin biopsy  2.  History of stroke; on Eliquis/statin for secondary stroke prevention  3.  A-fib  4.  Uses walker to assist with mobility    Plan:  Continue B12/vitamin D replacement  Continue Eliquis 5 mg twice daily and atorvastatin 40 mg daily  Follow-up with me in 2 to 3 months; sooner if needed        Diagnoses and all orders for this visit:    1. Idiopathic peripheral neuropathy (Primary)    2. B12 deficiency  -     cyanocobalamin injection 1,000 mcg    3. Polyneuropathy    4. Cerebral ventriculomegaly    5. History of CVA (cerebrovascular accident)                                    Dictated utilizing Dragon dictation.

## 2024-12-20 ENCOUNTER — CLINICAL SUPPORT (OUTPATIENT)
Dept: NEUROLOGY | Facility: CLINIC | Age: 69
End: 2024-12-20
Payer: MEDICARE

## 2024-12-20 DIAGNOSIS — E53.8 B12 DEFICIENCY: Primary | ICD-10-CM

## 2024-12-20 PROCEDURE — 96372 THER/PROPH/DIAG INJ SC/IM: CPT | Performed by: PSYCHIATRY & NEUROLOGY

## 2024-12-20 RX ORDER — CYANOCOBALAMIN 1000 UG/ML
1000 INJECTION, SOLUTION INTRAMUSCULAR; SUBCUTANEOUS
Status: SHIPPED | OUTPATIENT
Start: 2024-12-20

## 2024-12-20 RX ADMIN — CYANOCOBALAMIN 1000 MCG: 1000 INJECTION, SOLUTION INTRAMUSCULAR; SUBCUTANEOUS at 13:23

## 2024-12-23 ENCOUNTER — OFFICE VISIT (OUTPATIENT)
Age: 69
End: 2024-12-23
Payer: MEDICARE

## 2024-12-23 ENCOUNTER — CLINICAL SUPPORT (OUTPATIENT)
Dept: NEUROLOGY | Facility: CLINIC | Age: 69
End: 2024-12-23
Payer: MEDICARE

## 2024-12-23 VITALS
OXYGEN SATURATION: 99 % | DIASTOLIC BLOOD PRESSURE: 72 MMHG | HEIGHT: 60 IN | WEIGHT: 131 LBS | HEART RATE: 51 BPM | BODY MASS INDEX: 25.72 KG/M2 | SYSTOLIC BLOOD PRESSURE: 110 MMHG

## 2024-12-23 DIAGNOSIS — I48.0 PAROXYSMAL ATRIAL FIBRILLATION WITH RAPID VENTRICULAR RESPONSE: ICD-10-CM

## 2024-12-23 DIAGNOSIS — F10.21 ALCOHOLISM IN REMISSION: ICD-10-CM

## 2024-12-23 DIAGNOSIS — E53.8 B12 DEFICIENCY: Primary | ICD-10-CM

## 2024-12-23 DIAGNOSIS — E78.2 MIXED HYPERLIPIDEMIA: ICD-10-CM

## 2024-12-23 DIAGNOSIS — I10 PRIMARY HYPERTENSION: ICD-10-CM

## 2024-12-23 DIAGNOSIS — I42.8 NICM (NONISCHEMIC CARDIOMYOPATHY): Primary | ICD-10-CM

## 2024-12-23 PROCEDURE — 93000 ELECTROCARDIOGRAM COMPLETE: CPT | Performed by: PHYSICIAN ASSISTANT

## 2024-12-23 PROCEDURE — 3074F SYST BP LT 130 MM HG: CPT | Performed by: PHYSICIAN ASSISTANT

## 2024-12-23 PROCEDURE — 99214 OFFICE O/P EST MOD 30 MIN: CPT | Performed by: PHYSICIAN ASSISTANT

## 2024-12-23 PROCEDURE — 1160F RVW MEDS BY RX/DR IN RCRD: CPT | Performed by: PHYSICIAN ASSISTANT

## 2024-12-23 PROCEDURE — 96372 THER/PROPH/DIAG INJ SC/IM: CPT | Performed by: PSYCHIATRY & NEUROLOGY

## 2024-12-23 PROCEDURE — 3078F DIAST BP <80 MM HG: CPT | Performed by: PHYSICIAN ASSISTANT

## 2024-12-23 PROCEDURE — 1159F MED LIST DOCD IN RCRD: CPT | Performed by: PHYSICIAN ASSISTANT

## 2024-12-23 RX ORDER — CYANOCOBALAMIN 1000 UG/ML
1000 INJECTION, SOLUTION INTRAMUSCULAR; SUBCUTANEOUS ONCE
Status: COMPLETED | OUTPATIENT
Start: 2024-12-23 | End: 2024-12-23

## 2024-12-23 RX ORDER — ATENOLOL 25 MG/1
25 TABLET ORAL 2 TIMES DAILY
Qty: 60 TABLET | Refills: 6 | Status: SHIPPED | OUTPATIENT
Start: 2024-12-23

## 2024-12-23 RX ADMIN — CYANOCOBALAMIN 1000 MCG: 1000 INJECTION, SOLUTION INTRAMUSCULAR; SUBCUTANEOUS at 10:33

## 2024-12-23 NOTE — PROGRESS NOTES
"    CARDIOLOGY        Patient Name: Camille Pantoja  :1955  Age: 69 y.o.  Primary Cardiologist: Jovita Brown MD  Encounter Provider:  Braxton Bazan PA-C    Date of Service: 24            CHIEF COMPLAINT / REASON FOR OFFICE VISIT     1 month hospital follow-up      HISTORY OF PRESENT ILLNESS       HPI  Camille Pantoja is a 69 y.o. female who presents today for 1 month follow-up.     Pt has a  history significant for DVT, alcohol abuse, hypertension, GERD, atrial fibrillation, cardiomyopathy, stroke, and hyperlipidemia presents for 1 month hospital follow-up.  Patient was admitted on 2024 through 2024 for persistence of atrial atrial fibrillation.  Patient received metoprolol IV and digoxin and spontaneous converted.  She was discharged on atenolol 25 mg twice daily along with Eliquis.  She was to follow-up in 3 to 4 weeks.    Patient has been doing well since her discharge from hospital.  She has not had any episodes of feeling that she was in atrial fibrillation.  She denies any chest pain or chest pressure.  Denies any shortness of breath, lightheadedness, dizziness, palpitations, fatigue, edema to her legs, or any bleeding issues.  She otherwise has been doing well.    The following portions of the patient's history were reviewed and updated as appropriate: allergies, current medications, past family history, past medical history, past social history, past surgical history and problem list.      VITAL SIGNS     Visit Vitals  /72 (BP Location: Left arm, Patient Position: Sitting, Cuff Size: Adult)   Pulse 51   Ht 152.4 cm (60\")   Wt 59.4 kg (131 lb)   SpO2 99%   BMI 25.58 kg/m²       @[email or web address]  Wt Readings from Last 3 Encounters:   24 59.4 kg (131 lb)   24 57.6 kg (127 lb)   24 57.9 kg (127 lb 9.6 oz)     Body mass index is 25.58 kg/m².        PHYSICAL EXAMINATION     Constitutional:       General: Awake. Not in acute distress.     " Appearance: Not in distress.   Pulmonary:      Effort: Pulmonary effort is normal.      Breath sounds: Normal breath sounds.   Cardiovascular:      Normal rate. Regular rhythm.      Murmurs: There is no murmur.   Edema:     Pretibial: bilateral trace edema of the pretibial area.  Skin:     General: Skin is warm.   Neurological:      Mental Status: Alert.   Psychiatric:         Behavior: Behavior is cooperative.           REVIEWED DATA       ECG 12 Lead    Date/Time: 12/23/2024 9:50 AM  Performed by: Braxton Bazan PA-C    Authorized by: Braxton Bazan PA-C  Comparison: compared with previous ECG   Similar to previous ECG  Rhythm: sinus rhythm  Rate: normal  BPM: 50  Conduction: 1st degree AV block  QRS axis: left    Clinical impression: non-specific ECG  Comments: Similar to previous EKG          Cardiac Procedures:    Transthoracic echo on 7/1/2021  Interpretation Summary    Calculated left ventricular EF = 56% Estimated left ventricular EF was in agreement with the calculated left ventricular EF. Left ventricular systolic function is normal.  Left ventricular diastolic function was normal    Cardiac catheterization on 1/10/2020  Conclusions:  Angiographically normal coronary arteries with no significant coronary artery disease.  Compensated left ventricular filling pressures of 12 mmHg     Recommendations:   Will continue optimal medical therapy for management of congestive heart failure with continuation of beta-blocker and ARB.  Will continue to work on rate controlling of atrial fibrillation.  Given her recent stroke which was found incidentally on cardiac MRI would be hesitant to consider cardioversion due to risk of potentiating stroke but can reassess needs for that based upon her response to up titration of her beta-blocker.  Restarting Coumadin and would be reasonable to bridge with Coumadin given her recent stroke patient presented with subtherapeutic INR but her wound and has been recently  discontinued due to concomitant antibiotic use.    Lipid Panel          4/24/2024    00:00 10/29/2024    00:00   Lipid Panel   Total Cholesterol 127  123    Triglycerides 88  73    HDL Cholesterol 55  55    VLDL Cholesterol 17  15    LDL Cholesterol  55  53        Lab Results   Component Value Date     11/23/2024     10/29/2024    K 3.9 11/23/2024    K 4.2 10/29/2024    CL 99 11/23/2024     10/29/2024    CO2 22.3 11/23/2024    CO2 24 10/29/2024    BUN 13 11/23/2024    BUN 18 10/29/2024    CREATININE 0.73 11/23/2024    CREATININE 0.87 10/29/2024    EGFRIFNONA 71 12/14/2021    EGFRIFNONA 67 06/07/2021    EGFRIFAFRI 82 12/14/2021    EGFRIFAFRI 78 06/07/2021    GLUCOSE 104 (H) 11/23/2024    GLUCOSE 91 10/29/2024    CALCIUM 10.1 11/23/2024    CALCIUM 10.0 10/29/2024    PROTENTOTREF 6.8 10/29/2024    PROTENTOTREF 7.6 04/24/2024    ALBUMIN 4.3 11/23/2024    ALBUMIN 4.3 10/29/2024    BILITOT 1.1 11/23/2024    BILITOT 0.7 10/29/2024    AST 21 11/23/2024    AST 18 10/29/2024    ALT 14 11/23/2024    ALT 13 10/29/2024     Lab Results   Component Value Date    WBC 7.92 11/23/2024    WBC 6.01 04/24/2024    HGB 13.2 11/23/2024    HGB 11.3 (L) 04/24/2024    HCT 39.8 11/23/2024    HCT 35.5 04/24/2024    MCV 91.9 11/23/2024    MCV 93.7 04/24/2024     11/23/2024     04/24/2024     Lab Results   Component Value Date    PROBNP 11,108.0 (H) 01/10/2020     Lab Results   Component Value Date    CKTOTAL 179 01/10/2020    TROPONINT 37 (H) 11/24/2024     Lab Results   Component Value Date    TSH 0.591 11/23/2024    TSH 0.994 11/17/2023             ASSESSMENT & PLAN     Diagnoses and all orders for this visit:    1. NICM (nonischemic cardiomyopathy) (Primary)    2. Paroxysmal atrial fibrillation with rapid ventricular response    3. Primary hypertension    4. Mixed hyperlipidemia    NICM EF 27% on TTE 1/2020 (had pna, afib rvr, and multiple CVA at that time), now recovered EF 56% on TTE 7/2021. No HF.   PAF in  normal sinus rhythm today.  Continues on metoprolol and AC on apixaban.  HTN this is well-controlled  HLD on statin therapy  CVA - multiple strokes 2020 follows with neurology  Hx seizures  Hx ETOH abuse      Patient has been doing well since her discharge from the hospital.  I will make no medication changes today.  She has not had echo since 2021 and will going to update her echo with her recent bout of A-fib.  Barring any issues continue with her follow-up in October with Dr. Alva.    Return for Dr. Brown, Next scheduled follow up.    Future Appointments         Provider Department Center    12/23/2024 10:20 AM NURSE/[facility]ARR[facility]    1/14/2025 1:30 PM [facility] ECHO CART 1 [facility]ARDIOLOG[facility]    3/19/2025 2:00 PM Selam Joseph APRN [facility]UR[facility]    4/29/2025 1:45 PM Gurjit Rodriguez DO [facility]    10/17/2025 1:00 PM Jovita Brown MD [facility] CARDI[facility]                MEDICATIONS         Discharge Medications            Accurate as of December 23, 2024 10:04 AM. If you have any questions, ask your nurse or doctor.                Continue These Medications        Instructions Start Date   apixaban 5 MG tablet tablet  Commonly known as: Eliquis   5 mg, Oral, Every 12 Hours      atenolol 25 MG tablet  Commonly known as: TENORMIN   25 mg, Oral, 2 Times Daily      atorvastatin 40 MG tablet  Commonly known as: LIPITOR   40 mg, Oral, Nightly      oxybutynin 5 MG tablet  Commonly known as: DITROPAN   5 mg, Oral, 2 Times Daily      vitamin D 1.25 MG (36776 UT) capsule capsule  Commonly known as: ERGOCALCIFEROL   50,000 Units, Oral, Weekly                   **Dragon Disclaimer:   Much of this encounter note is an electronic transcription/translation of spoken language to printed text. The electronic translation of spoken language may permit erroneous, or  at times, nonsensical words or phrases to be inadvertently transcribed. Although I have reviewed the note for such errors, some may still exist.

## 2024-12-30 ENCOUNTER — CLINICAL SUPPORT (OUTPATIENT)
Dept: NEUROLOGY | Facility: CLINIC | Age: 69
End: 2024-12-30
Payer: MEDICARE

## 2024-12-30 DIAGNOSIS — E53.8 B12 DEFICIENCY: Primary | ICD-10-CM

## 2024-12-30 PROCEDURE — 96372 THER/PROPH/DIAG INJ SC/IM: CPT | Performed by: PSYCHIATRY & NEUROLOGY

## 2024-12-30 RX ORDER — CYANOCOBALAMIN 1000 UG/ML
1000 INJECTION, SOLUTION INTRAMUSCULAR; SUBCUTANEOUS ONCE
Status: COMPLETED | OUTPATIENT
Start: 2024-12-30 | End: 2024-12-30

## 2024-12-30 RX ADMIN — CYANOCOBALAMIN 1000 MCG: 1000 INJECTION, SOLUTION INTRAMUSCULAR; SUBCUTANEOUS at 13:01

## 2025-01-05 ENCOUNTER — TELEPHONE (OUTPATIENT)
Dept: NEUROLOGY | Facility: CLINIC | Age: 70
End: 2025-01-05
Payer: MEDICARE

## 2025-01-14 ENCOUNTER — HOSPITAL ENCOUNTER (OUTPATIENT)
Dept: CARDIOLOGY | Facility: HOSPITAL | Age: 70
Discharge: HOME | End: 2025-01-14
Admitting: PHYSICIAN ASSISTANT
Payer: MEDICARE

## 2025-01-14 ENCOUNTER — CLINICAL SUPPORT (OUTPATIENT)
Dept: NEUROLOGY | Facility: CLINIC | Age: 70
End: 2025-01-14
Payer: MEDICARE

## 2025-01-14 VITALS
BODY MASS INDEX: 25.72 KG/M2 | HEIGHT: 60 IN | DIASTOLIC BLOOD PRESSURE: 70 MMHG | WEIGHT: 131 LBS | SYSTOLIC BLOOD PRESSURE: 131 MMHG | HEART RATE: 54 BPM

## 2025-01-14 DIAGNOSIS — E53.8 B12 DEFICIENCY: Primary | ICD-10-CM

## 2025-01-14 DIAGNOSIS — E78.2 MIXED HYPERLIPIDEMIA: ICD-10-CM

## 2025-01-14 DIAGNOSIS — I42.8 NICM (NONISCHEMIC CARDIOMYOPATHY): ICD-10-CM

## 2025-01-14 DIAGNOSIS — I10 PRIMARY HYPERTENSION: ICD-10-CM

## 2025-01-14 DIAGNOSIS — I48.0 PAROXYSMAL ATRIAL FIBRILLATION WITH RAPID VENTRICULAR RESPONSE: ICD-10-CM

## 2025-01-14 LAB
AORTIC ARCH: 1.9 CM
AORTIC DIMENSIONLESS INDEX: 0.9 (DI)
ASCENDING AORTA: 3.7 CM
AV MEAN PRESS GRAD SYS DOP V1V2: 4.6 MMHG
AV VMAX SYS DOP: 144.4 CM/SEC
BH CV ECHO MEAS - ACS: 2.2 CM
BH CV ECHO MEAS - AI P1/2T: 888.8 MSEC
BH CV ECHO MEAS - AO MAX PG: 8.3 MMHG
BH CV ECHO MEAS - AO ROOT DIAM: 3.3 CM
BH CV ECHO MEAS - AO V2 VTI: 31.9 CM
BH CV ECHO MEAS - AVA(I,D): 2.9 CM2
BH CV ECHO MEAS - EDV(CUBED): 85.2 ML
BH CV ECHO MEAS - EDV(MOD-SP2): 48 ML
BH CV ECHO MEAS - EDV(MOD-SP4): 59 ML
BH CV ECHO MEAS - EF(MOD-SP2): 60.4 %
BH CV ECHO MEAS - EF(MOD-SP4): 62.7 %
BH CV ECHO MEAS - ESV(CUBED): 26.3 ML
BH CV ECHO MEAS - ESV(MOD-SP2): 19 ML
BH CV ECHO MEAS - ESV(MOD-SP4): 22 ML
BH CV ECHO MEAS - FS: 32.4 %
BH CV ECHO MEAS - IVS/LVPW: 0.82 CM
BH CV ECHO MEAS - IVSD: 0.9 CM
BH CV ECHO MEAS - LA DIMENSION: 3 CM
BH CV ECHO MEAS - LAT PEAK E' VEL: 11.7 CM/SEC
BH CV ECHO MEAS - LV DIASTOLIC VOL/BSA (35-75): 37.8 CM2
BH CV ECHO MEAS - LV MASS(C)D: 147.8 GRAMS
BH CV ECHO MEAS - LV MAX PG: 6.3 MMHG
BH CV ECHO MEAS - LV MEAN PG: 2.8 MMHG
BH CV ECHO MEAS - LV SYSTOLIC VOL/BSA (12-30): 14.1 CM2
BH CV ECHO MEAS - LV V1 MAX: 125.2 CM/SEC
BH CV ECHO MEAS - LV V1 VTI: 28.8 CM
BH CV ECHO MEAS - LVIDD: 4.4 CM
BH CV ECHO MEAS - LVIDS: 3 CM
BH CV ECHO MEAS - LVOT AREA: 3.2 CM2
BH CV ECHO MEAS - LVOT DIAM: 2.01 CM
BH CV ECHO MEAS - LVPWD: 1.1 CM
BH CV ECHO MEAS - MED PEAK E' VEL: 9.7 CM/SEC
BH CV ECHO MEAS - MR MAX PG: 55.9 MMHG
BH CV ECHO MEAS - MR MAX VEL: 373.8 CM/SEC
BH CV ECHO MEAS - MV A MAX VEL: 25.2 CM/SEC
BH CV ECHO MEAS - MV DEC SLOPE: 426.5 CM/SEC2
BH CV ECHO MEAS - MV DEC TIME: 176 SEC
BH CV ECHO MEAS - MV E MAX VEL: 76 CM/SEC
BH CV ECHO MEAS - MV E/A: 3
BH CV ECHO MEAS - MV MAX PG: 3.7 MMHG
BH CV ECHO MEAS - MV MEAN PG: 0.97 MMHG
BH CV ECHO MEAS - MV P1/2T: 65.3 MSEC
BH CV ECHO MEAS - MV V2 VTI: 30 CM
BH CV ECHO MEAS - MVA(P1/2T): 3.4 CM2
BH CV ECHO MEAS - MVA(VTI): 3.1 CM2
BH CV ECHO MEAS - PA V2 MAX: 88.2 CM/SEC
BH CV ECHO MEAS - PI END-D VEL: 94.6 CM/SEC
BH CV ECHO MEAS - QP/QS: 0.23
BH CV ECHO MEAS - RAP SYSTOLE: 3 MMHG
BH CV ECHO MEAS - RV MAX PG: 0.76 MMHG
BH CV ECHO MEAS - RV V1 MAX: 43.5 CM/SEC
BH CV ECHO MEAS - RV V1 VTI: 12 CM
BH CV ECHO MEAS - RVOT DIAM: 1.48 CM
BH CV ECHO MEAS - RVSP: 24 MMHG
BH CV ECHO MEAS - SV(LVOT): 91.7 ML
BH CV ECHO MEAS - SV(MOD-SP2): 29 ML
BH CV ECHO MEAS - SV(MOD-SP4): 37 ML
BH CV ECHO MEAS - SV(RVOT): 20.7 ML
BH CV ECHO MEAS - SVI(LVOT): 58.8 ML/M2
BH CV ECHO MEAS - SVI(MOD-SP2): 18.6 ML/M2
BH CV ECHO MEAS - SVI(MOD-SP4): 23.7 ML/M2
BH CV ECHO MEAS - TAPSE (>1.6): 1.6 CM
BH CV ECHO MEAS - TR MAX PG: 21 MMHG
BH CV ECHO MEAS - TR MAX VEL: 229.4 CM/SEC
BH CV ECHO MEASUREMENTS AVERAGE E/E' RATIO: 7.1
BH CV XLRA - RV BASE: 2.38 CM
BH CV XLRA - RV LENGTH: 5.5 CM
BH CV XLRA - RV MID: 2.5 CM
BH CV XLRA - TDI S': 12.4 CM/SEC
LEFT ATRIUM VOLUME INDEX: 47.3 ML/M2
LV EF BIPLANE MOD: 61.2 %
SINUS: 3 CM
STJ: 2.9 CM

## 2025-01-14 PROCEDURE — 93306 TTE W/DOPPLER COMPLETE: CPT

## 2025-01-14 PROCEDURE — 96372 THER/PROPH/DIAG INJ SC/IM: CPT | Performed by: PSYCHIATRY & NEUROLOGY

## 2025-01-14 RX ORDER — CYANOCOBALAMIN 1000 UG/ML
1000 INJECTION, SOLUTION INTRAMUSCULAR; SUBCUTANEOUS ONCE
Status: COMPLETED | OUTPATIENT
Start: 2025-01-14 | End: 2025-01-14

## 2025-01-14 RX ADMIN — CYANOCOBALAMIN 1000 MCG: 1000 INJECTION, SOLUTION INTRAMUSCULAR; SUBCUTANEOUS at 14:53

## 2025-01-15 ENCOUNTER — TELEPHONE (OUTPATIENT)
Dept: CARDIOLOGY | Facility: CLINIC | Age: 70
End: 2025-01-15
Payer: MEDICARE

## 2025-01-15 NOTE — TELEPHONE ENCOUNTER
Patient is calling for Echo results. Echo was done yesterday and she states she doesn't like to wait for results.

## 2025-01-21 ENCOUNTER — TELEPHONE (OUTPATIENT)
Dept: FAMILY MEDICINE CLINIC | Facility: CLINIC | Age: 70
End: 2025-01-21
Payer: MEDICARE

## 2025-01-21 DIAGNOSIS — I48.91 ATRIAL FIBRILLATION, UNSPECIFIED TYPE: ICD-10-CM

## 2025-01-21 DIAGNOSIS — E78.2 MIXED HYPERLIPIDEMIA: ICD-10-CM

## 2025-01-21 DIAGNOSIS — N32.89 BLADDER SPASMS: ICD-10-CM

## 2025-01-21 RX ORDER — ATORVASTATIN CALCIUM 40 MG/1
40 TABLET, FILM COATED ORAL NIGHTLY
Qty: 90 TABLET | Refills: 2 | Status: SHIPPED | OUTPATIENT
Start: 2025-01-21

## 2025-01-21 RX ORDER — OXYBUTYNIN CHLORIDE 5 MG/1
5 TABLET ORAL 2 TIMES DAILY
Qty: 180 TABLET | Refills: 2 | Status: SHIPPED | OUTPATIENT
Start: 2025-01-21

## 2025-01-21 NOTE — TELEPHONE ENCOUNTER
Caller: Camille Pantoja    Relationship: Self    Best call back number:     [phone]       Requested Prescriptions:     oxybutynin (DITROPAN) 5 MG tablet       atorvastatin (LIPITOR) 40 MG tablet       apixaban (Eliquis) 5 MG tablet table     Requested Prescriptions      No prescriptions requested or ordered in this encounter        Pharmacy where request should be sent:       [id number] [address] - [phone]  - [phone]         Last office visit with prescribing clinician: 12/2/2024   Last telemedicine visit with prescribing clinician: Visit date not found   Next office visit with prescribing clinician: 4/29/2025     Additional details provided by patient: PATIENT IS CALLING TO REQUEST NEW 90 DAY PRESCRIPTIONS WITH REFILLS FOR THE ABOVE MEDICATIONS.    Does the patient have less than a 3 day supply:  [x] Yes  [] No    Would you like a call back once the refill request has been completed: [] Yes [] No    If the office needs to give you a call back, can they leave a voicemail: [] Yes [] No    America Gómez, [place] Rep   01/21/25 14:33 EST     PLEASE ADVISE.

## 2025-01-22 ENCOUNTER — CLINICAL SUPPORT (OUTPATIENT)
Dept: NEUROLOGY | Facility: CLINIC | Age: 70
End: 2025-01-22
Payer: MEDICARE

## 2025-01-22 DIAGNOSIS — E53.8 B12 DEFICIENCY: Primary | ICD-10-CM

## 2025-01-22 PROCEDURE — 96372 THER/PROPH/DIAG INJ SC/IM: CPT | Performed by: PSYCHIATRY & NEUROLOGY

## 2025-01-22 RX ORDER — CYANOCOBALAMIN 1000 UG/ML
1000 INJECTION, SOLUTION INTRAMUSCULAR; SUBCUTANEOUS
Status: SHIPPED | OUTPATIENT
Start: 2025-01-22

## 2025-01-22 RX ADMIN — CYANOCOBALAMIN 1000 MCG: 1000 INJECTION, SOLUTION INTRAMUSCULAR; SUBCUTANEOUS at 14:21

## 2025-01-29 ENCOUNTER — CLINICAL SUPPORT (OUTPATIENT)
Dept: NEUROLOGY | Facility: CLINIC | Age: 70
End: 2025-01-29
Payer: MEDICARE

## 2025-01-29 DIAGNOSIS — E53.8 B12 DEFICIENCY: Primary | ICD-10-CM

## 2025-02-06 NOTE — PLAN OF CARE
Problem: Patient Care Overview  Goal: Plan of Care Review  Outcome: Ongoing (interventions implemented as appropriate)  Flowsheets (Taken 1/20/2020 6646)  Progress: no change  Plan of Care Reviewed With: patient  Note:   Continues with therapy.   Polite and cooperative with care. Safety measures in place.    Subjective   Patient ID: Melissa is a 24 year old female who presents today for prenatal visit.  She is of 34w4d gestation.      positive fetal movement, No bleeding, No rupture of membranes, No uterine contractions    ASSESSMENT:  Pregnancy at 34w4d weeks gestation.      PLAN:  Follow up visit in 1 weeks  Referral to M placed  Strict kick counts discussed        Fetal Non-Stress Test    Date/Time: 2/6/2025 12:15 PM    Performed by: Crystal Chapin MD  Authorized by: Crystal Chapin MD    Number of Fetuses:  1  Contractions:  No contractions  Interpretation - Baby A:     Nonstress Test Interpretation: reactive    Nonstress Test - Baby A:     Variability: moderate      Decelerations: no decelerations      Accelerations: at least 15 BPM for 15 seconds      Baseline:  140

## 2025-02-07 ENCOUNTER — TELEPHONE (OUTPATIENT)
Dept: CARDIOLOGY | Facility: CLINIC | Age: 70
End: 2025-02-07
Payer: MEDICARE

## 2025-02-07 NOTE — TELEPHONE ENCOUNTER
Patient called and stated that she is to have some tooth extractions and wanted to know if it was ok for her to hold her Eliquis?  Please advise.    CB: [phone]    NOV-10/17/25-  LOV-12/23/24-NM    1. NICM (nonischemic cardiomyopathy) (Primary)     2. Paroxysmal atrial fibrillation with rapid ventricular response     3. Primary hypertension     4. Mixed hyperlipidemia     NICM EF 27% on TTE 1/2020 (had pna, afib rvr, and multiple CVA at that time), now recovered EF 56% on TTE 7/2021. No HF.   PAF in normal sinus rhythm today.  Continues on metoprolol and AC on apixaban.  HTN this is well-controlled  HLD on statin therapy  CVA - multiple strokes 2020 follows with neurology  Hx seizures  Hx ETOH abuse      Patient has been doing well since her discharge from the hospital.  I will make no medication changes today.  She has not had echo since 2021 and will going to update her echo with her recent bout of A-fib.  Barring any issues continue with her follow-up in October with Dr. Alva.

## 2025-02-07 NOTE — TELEPHONE ENCOUNTER
Patient called today because she is having to have all of her teeth pulled by Dr. Kyle Luna. Patient takes Eliquis and they are wanting her to hold it for 5 days. Patient would like advice on this from Dr. Brown. Please call patient.

## 2025-02-07 NOTE — TELEPHONE ENCOUNTER
Called and spoke with the patient and advised her of the above.  She verbalized understanding.    Ashley

## 2025-02-08 DIAGNOSIS — M81.0 AGE-RELATED OSTEOPOROSIS WITHOUT CURRENT PATHOLOGICAL FRACTURE: ICD-10-CM

## 2025-02-10 RX ORDER — ALENDRONATE SODIUM 10 MG/1
10 TABLET ORAL
Qty: 90 TABLET | Refills: 3 | OUTPATIENT
Start: 2025-02-10

## 2025-02-26 ENCOUNTER — CLINICAL SUPPORT (OUTPATIENT)
Dept: NEUROLOGY | Facility: CLINIC | Age: 70
End: 2025-02-26
Payer: MEDICARE

## 2025-02-26 DIAGNOSIS — E53.8 B12 DEFICIENCY: Primary | ICD-10-CM

## 2025-02-26 RX ORDER — CYANOCOBALAMIN 1000 UG/ML
1000 INJECTION, SOLUTION INTRAMUSCULAR; SUBCUTANEOUS
Status: SHIPPED | OUTPATIENT
Start: 2025-02-26

## 2025-02-26 RX ADMIN — CYANOCOBALAMIN 1000 MCG: 1000 INJECTION, SOLUTION INTRAMUSCULAR; SUBCUTANEOUS at 13:26

## 2025-03-19 ENCOUNTER — TELEPHONE (OUTPATIENT)
Dept: NEUROLOGY | Facility: CLINIC | Age: 70
End: 2025-03-19
Payer: MEDICARE

## 2025-03-19 DIAGNOSIS — E55.9 VITAMIN D DEFICIENCY: ICD-10-CM

## 2025-03-19 RX ORDER — ERGOCALCIFEROL 1.25 MG/1
50000 CAPSULE, LIQUID FILLED ORAL WEEKLY
Qty: 15 CAPSULE | Refills: 2 | Status: SHIPPED | OUTPATIENT
Start: 2025-03-19

## 2025-03-19 NOTE — TELEPHONE ENCOUNTER
Patient's follow up with Selam had to be rescheduled due to provider being out of office. Patient wanted to see if she is due for her B12 injection soon and if so, when? I can call her and let her know if she is needing to come in soon to do so.

## 2025-03-28 ENCOUNTER — CLINICAL SUPPORT (OUTPATIENT)
Dept: NEUROLOGY | Facility: CLINIC | Age: 70
End: 2025-03-28
Payer: MEDICARE

## 2025-03-28 DIAGNOSIS — E53.8 B12 DEFICIENCY: Primary | ICD-10-CM

## 2025-03-28 PROCEDURE — 96372 THER/PROPH/DIAG INJ SC/IM: CPT | Performed by: PSYCHIATRY & NEUROLOGY

## 2025-03-28 RX ORDER — CYANOCOBALAMIN 1000 UG/ML
1000 INJECTION, SOLUTION INTRAMUSCULAR; SUBCUTANEOUS
Status: SHIPPED | OUTPATIENT
Start: 2025-03-28

## 2025-03-28 RX ADMIN — CYANOCOBALAMIN 1000 MCG: 1000 INJECTION, SOLUTION INTRAMUSCULAR; SUBCUTANEOUS at 13:24

## 2025-05-02 ENCOUNTER — CLINICAL SUPPORT (OUTPATIENT)
Dept: NEUROLOGY | Facility: CLINIC | Age: 70
End: 2025-05-02
Payer: MEDICARE

## 2025-05-02 DIAGNOSIS — E53.8 B12 DEFICIENCY: Primary | ICD-10-CM

## 2025-05-02 PROCEDURE — 96372 THER/PROPH/DIAG INJ SC/IM: CPT | Performed by: PSYCHIATRY & NEUROLOGY

## 2025-05-02 RX ORDER — CYANOCOBALAMIN 1000 UG/ML
1000 INJECTION, SOLUTION INTRAMUSCULAR; SUBCUTANEOUS
Status: SHIPPED | OUTPATIENT
Start: 2025-05-02

## 2025-05-02 RX ADMIN — CYANOCOBALAMIN 1000 MCG: 1000 INJECTION, SOLUTION INTRAMUSCULAR; SUBCUTANEOUS at 11:50

## 2025-05-08 ENCOUNTER — OFFICE VISIT (OUTPATIENT)
Dept: FAMILY MEDICINE CLINIC | Facility: CLINIC | Age: 70
End: 2025-05-08
Payer: MEDICARE

## 2025-05-08 VITALS
DIASTOLIC BLOOD PRESSURE: 70 MMHG | HEART RATE: 98 BPM | HEIGHT: 60 IN | RESPIRATION RATE: 16 BRPM | WEIGHT: 127 LBS | SYSTOLIC BLOOD PRESSURE: 108 MMHG | OXYGEN SATURATION: 99 % | BODY MASS INDEX: 24.94 KG/M2

## 2025-05-08 DIAGNOSIS — I10 PRIMARY HYPERTENSION: ICD-10-CM

## 2025-05-08 DIAGNOSIS — R73.03 PREDIABETES: ICD-10-CM

## 2025-05-08 DIAGNOSIS — E78.2 MIXED HYPERLIPIDEMIA: Primary | ICD-10-CM

## 2025-05-08 DIAGNOSIS — E55.9 VITAMIN D DEFICIENCY: ICD-10-CM

## 2025-05-08 PROCEDURE — 99213 OFFICE O/P EST LOW 20 MIN: CPT | Performed by: STUDENT IN AN ORGANIZED HEALTH CARE EDUCATION/TRAINING PROGRAM

## 2025-05-08 PROCEDURE — 3078F DIAST BP <80 MM HG: CPT | Performed by: STUDENT IN AN ORGANIZED HEALTH CARE EDUCATION/TRAINING PROGRAM

## 2025-05-08 PROCEDURE — 3074F SYST BP LT 130 MM HG: CPT | Performed by: STUDENT IN AN ORGANIZED HEALTH CARE EDUCATION/TRAINING PROGRAM

## 2025-05-08 PROCEDURE — 1125F AMNT PAIN NOTED PAIN PRSNT: CPT | Performed by: STUDENT IN AN ORGANIZED HEALTH CARE EDUCATION/TRAINING PROGRAM

## 2025-05-08 PROCEDURE — 36415 COLL VENOUS BLD VENIPUNCTURE: CPT | Performed by: STUDENT IN AN ORGANIZED HEALTH CARE EDUCATION/TRAINING PROGRAM

## 2025-05-08 NOTE — PROGRESS NOTES
Gurjit Rodriguez,   [facility]  [address]  [place] [id number] [phone]    Subjective      Name Camille Pantoja MRN [id number]    1955 AGE/SEX 69 y.o. / female      Chief Complaint Chief Complaint   Patient presents with    Med Management     Check up. Had all top teeth removed since last visit and has lost some weight since this has been done due to limited food intake          Visit History for  2025    Camille Pantoja is a 69 y.o. female who presented today for Med Management (Check up. Had all top teeth removed since last visit and has lost some weight since this has been done due to limited food intake )       History of Present Illness  The patient presents for atrial fibrillation.    She is currently on anticoagulant therapy, which she had to discontinue 5 days prior to a dental procedure. Her cardiologist advised her to resume the medication 2 days post-procedure. She underwent a cardiac evaluation in 2025, during which she was informed of a minor leak in her heart. She expresses concern about the potential need for valve replacement surgery and its associated risks. She is on blood thinners and reports being able to notice the difference when she is not on them.    She is due for a colonoscopy this year and mentions the difficulty of taking large pills required for the procedure. She has been experiencing dental issues, including cavities and broken teeth, which have necessitated extractions and fillings. She is scheduled for a digital scan of her upper jaw on 2025, followed by a similar procedure for her lower jaw. She reports difficulty in chewing food due to these dental problems and has had to modify her diet accordingly.    FAMILY HISTORY  Both of her parents had bad hearts.  Her brother has cancer, which is currently in remission.       Medications and Allergies   Current Outpatient Medications   Medication Instructions     "apixaban (ELIQUIS) 5 mg, Oral, Every 12 Hours Scheduled    atenolol (TENORMIN) 25 mg, Oral, 2 Times Daily    atorvastatin (LIPITOR) 40 mg, Oral, Nightly    oxybutynin (DITROPAN) 5 mg, Oral, 2 Times Daily    vitamin D (ERGOCALCIFEROL) 50,000 Units, Oral, Weekly     Allergies   Allergen Reactions    Beef-Derived Drug Products Rash    Cephalexin Other (See Comments)     Reports she doesn't remember having an allergic reaction.     Citrus Rash    Duricef [Cefadroxil] Rash    Fish Allergy Rash    Garlic Rash    Green Beans Rash    Melatonin Other (See Comments)     Not Efficacious    Onion Rash    Other Rash    Peach [Prunus Persica] Rash    Wheat Rash    Wheat Rash      I have reviewed the above medications and allergies     Objective:      Vitals Vitals:    05/08/25 1436   BP: 108/70   BP Location: Left arm   Patient Position: Sitting   Cuff Size: Adult   Pulse: 98   Resp: 16   SpO2: 99%   Weight: 57.6 kg (127 lb)   Height: 152.4 cm (60\")     Body mass index is 24.8 kg/m².    Physical Exam  Vitals reviewed.   Constitutional:       General: She is not in acute distress.     Appearance: She is not ill-appearing.   Cardiovascular:      Rate and Rhythm: Normal rate. Rhythm irregular.   Pulmonary:      Effort: Pulmonary effort is normal.      Breath sounds: Normal breath sounds.   Neurological:      Mental Status: She is alert.   Psychiatric:         Mood and Affect: Mood normal.         Behavior: Behavior normal.         Thought Content: Thought content normal.         Judgment: Judgment normal.          Physical Exam  Respiratory: Clear to auscultation, no wheezing, rales or rhonchi  Cardiovascular: Irregular rhythm consistent with atrial fibrillation, no murmurs, rubs, or gallops     Results  Imaging   - Echocardiogram: 01/2025, Ejection fraction is high, indicating good heart pumping. Left atrium of the heart is dilated. The valve that comes off of the left atrium and goes into the body is stiff and has some " regurgitation.     Assessment/Plan   Issues Addressed/ Plan   Diagnosis Plan   1. Mixed hyperlipidemia  Lipid Panel      2. Prediabetes  Hemoglobin A1c      3. Primary hypertension  Comprehensive Metabolic Panel      4. Vitamin D deficiency  Vitamin D 25 hydroxy         Assessment & Plan  1. Atrial Fibrillation.  - Her ejection fraction remains within normal limits, indicating satisfactory cardiac function.  - There is significant dilation of the left atrium, likely secondary to atrial fibrillation. Additionally, there is evidence of valvular regurgitation, which is not currently a cause for concern.  - The plan is to continue monitoring the condition closely. If the valvular regurgitation worsens, valve replacement surgery may be considered.  - Blood work will be ordered to further evaluate her condition.     BMI is within normal parameters. No other follow-up for BMI required.     There are no Patient Instructions on file for this visit.   Follow up  recommended No follow-ups on file.   - Dragon voice recognition software was utilized to complete this chart.  Every reasonable attempt was made to edit and correct the text, however some incorrect words may remain.   Gurjit Rodriguez DO    Patient or patient representative verbalized consent for the use of Ambient Listening during the visit with  Gurjit Rodriguez DO for chart documentation. 5/19/2025  15:05 EDT

## 2025-05-08 NOTE — PROGRESS NOTES
Venipuncture Blood Specimen Collection  Venipuncture performed in left arm by Kamilah Call MA with good hemostasis. Patient tolerated the procedure well without complications.   05/08/25   Kamilah Call MA

## 2025-05-09 LAB
25(OH)D3+25(OH)D2 SERPL-MCNC: 88.7 NG/ML (ref 30–100)
ALBUMIN SERPL-MCNC: 4.6 G/DL (ref 3.5–5.2)
ALBUMIN/GLOB SERPL: 1.6 G/DL
ALP SERPL-CCNC: 82 U/L (ref 39–117)
ALT SERPL-CCNC: 12 U/L (ref 1–33)
AST SERPL-CCNC: 21 U/L (ref 1–32)
BILIRUB SERPL-MCNC: 0.8 MG/DL (ref 0–1.2)
BUN SERPL-MCNC: 18 MG/DL (ref 8–23)
BUN/CREAT SERPL: 18.2 (ref 7–25)
CALCIUM SERPL-MCNC: 10.5 MG/DL (ref 8.6–10.5)
CHLORIDE SERPL-SCNC: 104 MMOL/L (ref 98–107)
CHOLEST SERPL-MCNC: 134 MG/DL (ref 0–200)
CO2 SERPL-SCNC: 22.1 MMOL/L (ref 22–29)
CREAT SERPL-MCNC: 0.99 MG/DL (ref 0.57–1)
EGFRCR SERPLBLD CKD-EPI 2021: 61.1 ML/MIN/1.73
GLOBULIN SER CALC-MCNC: 2.8 GM/DL
GLUCOSE SERPL-MCNC: 110 MG/DL (ref 65–99)
HBA1C MFR BLD: 5.7 % (ref 4.8–5.6)
HDLC SERPL-MCNC: 55 MG/DL (ref 40–60)
LDLC SERPL CALC-MCNC: 63 MG/DL (ref 0–100)
POTASSIUM SERPL-SCNC: 4.6 MMOL/L (ref 3.5–5.2)
PROT SERPL-MCNC: 7.4 G/DL (ref 6–8.5)
SODIUM SERPL-SCNC: 139 MMOL/L (ref 136–145)
TRIGL SERPL-MCNC: 81 MG/DL (ref 0–150)
VLDLC SERPL CALC-MCNC: 16 MG/DL (ref 5–40)

## 2025-05-15 ENCOUNTER — TELEPHONE (OUTPATIENT)
Dept: FAMILY MEDICINE CLINIC | Facility: CLINIC | Age: 70
End: 2025-05-15
Payer: MEDICARE

## 2025-05-15 NOTE — TELEPHONE ENCOUNTER
Caller: Camille Pantoja    Relationship: Self    Best call back number: [phone]     What test was performed: LABS    When was the test performed: 5/8/25    Where was the test performed: IN OFFICE    Additional notes: PATIENT REQUESTS A CALL BACK TO DISCUSS TEST RESULTS WHEN AVAILABLE.

## 2025-05-19 ENCOUNTER — RESULTS FOLLOW-UP (OUTPATIENT)
Dept: FAMILY MEDICINE CLINIC | Facility: CLINIC | Age: 70
End: 2025-05-19
Payer: MEDICARE

## 2025-06-02 ENCOUNTER — OFFICE VISIT (OUTPATIENT)
Dept: NEUROLOGY | Facility: CLINIC | Age: 70
End: 2025-06-02
Payer: MEDICARE

## 2025-06-02 VITALS
OXYGEN SATURATION: 98 % | SYSTOLIC BLOOD PRESSURE: 100 MMHG | HEIGHT: 60 IN | HEART RATE: 70 BPM | BODY MASS INDEX: 26.11 KG/M2 | DIASTOLIC BLOOD PRESSURE: 60 MMHG | WEIGHT: 133 LBS

## 2025-06-02 DIAGNOSIS — Z86.73 HISTORY OF CVA (CEREBROVASCULAR ACCIDENT): ICD-10-CM

## 2025-06-02 DIAGNOSIS — Z91.81 AT HIGH RISK FOR FALLS: ICD-10-CM

## 2025-06-02 DIAGNOSIS — R26.9 ABNORMAL GAIT: ICD-10-CM

## 2025-06-02 DIAGNOSIS — G60.9 IDIOPATHIC PERIPHERAL NEUROPATHY: ICD-10-CM

## 2025-06-02 DIAGNOSIS — Z79.01 CHRONIC ANTICOAGULATION: ICD-10-CM

## 2025-06-02 DIAGNOSIS — E53.8 B12 DEFICIENCY: ICD-10-CM

## 2025-06-02 DIAGNOSIS — I48.91 ATRIAL FIBRILLATION, UNSPECIFIED TYPE: ICD-10-CM

## 2025-06-02 DIAGNOSIS — I63.9 RECURRENT STROKES: Primary | ICD-10-CM

## 2025-06-02 PROCEDURE — 3078F DIAST BP <80 MM HG: CPT | Performed by: NURSE PRACTITIONER

## 2025-06-02 PROCEDURE — 99214 OFFICE O/P EST MOD 30 MIN: CPT | Performed by: NURSE PRACTITIONER

## 2025-06-02 PROCEDURE — 96372 THER/PROPH/DIAG INJ SC/IM: CPT | Performed by: NURSE PRACTITIONER

## 2025-06-02 PROCEDURE — 3074F SYST BP LT 130 MM HG: CPT | Performed by: NURSE PRACTITIONER

## 2025-06-02 RX ORDER — CYANOCOBALAMIN 1000 UG/ML
1000 INJECTION, SOLUTION INTRAMUSCULAR; SUBCUTANEOUS
Status: SHIPPED | OUTPATIENT
Start: 2025-06-02

## 2025-06-02 RX ADMIN — CYANOCOBALAMIN 1000 MCG: 1000 INJECTION, SOLUTION INTRAMUSCULAR; SUBCUTANEOUS at 13:22
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
## 2025-06-25 ENCOUNTER — LAB (OUTPATIENT)
Dept: LAB | Facility: HOSPITAL | Age: 70
End: 2025-06-25
Payer: MEDICARE

## 2025-06-25 DIAGNOSIS — E53.8 B12 DEFICIENCY: ICD-10-CM

## 2025-06-25 DIAGNOSIS — G60.9 IDIOPATHIC PERIPHERAL NEUROPATHY: ICD-10-CM

## 2025-06-25 LAB
FOLATE SERPL-MCNC: 13.6 NG/ML (ref 4.78–24.2)
VIT B12 BLD-MCNC: 592 PG/ML (ref 211–946)

## 2025-06-25 PROCEDURE — 82746 ASSAY OF FOLIC ACID SERUM: CPT

## 2025-06-25 PROCEDURE — 82607 VITAMIN B-12: CPT

## 2025-06-25 PROCEDURE — 36415 COLL VENOUS BLD VENIPUNCTURE: CPT

## 2025-06-25 PROCEDURE — 83921 ORGANIC ACID SINGLE QUANT: CPT

## 2025-06-26 ENCOUNTER — RESULTS FOLLOW-UP (OUTPATIENT)
Dept: NEUROLOGY | Facility: CLINIC | Age: 70
End: 2025-06-26
Payer: MEDICARE

## 2025-06-26 NOTE — TELEPHONE ENCOUNTER
B12 improved last result to 46 result from yesterday 592 goal B12 level greater than 500. Folate level within normal limits at 13.601-year ago was at 9.0. No change in current plan continue B12 replacement.       Patient aware of results.

## 2025-06-29 LAB — METHYLMALONATE SERPL-SCNC: 185 NMOL/L (ref 0–378)

## 2025-07-03 ENCOUNTER — CLINICAL SUPPORT (OUTPATIENT)
Dept: NEUROLOGY | Facility: CLINIC | Age: 70
End: 2025-07-03
Payer: MEDICARE

## 2025-07-03 DIAGNOSIS — E53.8 B12 DEFICIENCY: Primary | ICD-10-CM

## 2025-07-03 PROCEDURE — 96372 THER/PROPH/DIAG INJ SC/IM: CPT | Performed by: PSYCHIATRY & NEUROLOGY

## 2025-07-03 RX ORDER — CYANOCOBALAMIN 1000 UG/ML
1000 INJECTION, SOLUTION INTRAMUSCULAR; SUBCUTANEOUS ONCE
Status: COMPLETED | OUTPATIENT
Start: 2025-07-03 | End: 2025-07-03

## 2025-07-03 RX ADMIN — CYANOCOBALAMIN 1000 MCG: 1000 INJECTION, SOLUTION INTRAMUSCULAR; SUBCUTANEOUS at 13:21

## 2025-07-14 DIAGNOSIS — E78.2 MIXED HYPERLIPIDEMIA: ICD-10-CM

## 2025-07-14 DIAGNOSIS — F10.21 ALCOHOLISM IN REMISSION: ICD-10-CM

## 2025-07-14 DIAGNOSIS — I10 PRIMARY HYPERTENSION: ICD-10-CM

## 2025-07-14 DIAGNOSIS — I42.8 NICM (NONISCHEMIC CARDIOMYOPATHY): ICD-10-CM

## 2025-07-14 DIAGNOSIS — I48.0 PAROXYSMAL ATRIAL FIBRILLATION WITH RAPID VENTRICULAR RESPONSE: ICD-10-CM

## 2025-07-14 RX ORDER — ATENOLOL 25 MG/1
25 TABLET ORAL 2 TIMES DAILY
Qty: 180 TABLET | Refills: 3 | Status: SHIPPED | OUTPATIENT
Start: 2025-07-14

## 2025-07-14 NOTE — TELEPHONE ENCOUNTER
Refill Protocol Failed, Requesting atenolol    LOV w/ NM on 12/23/24   FU w/ RM on 10/17/25  Last Labs- 06/25/25    Please review.    Kelly YATES CMA

## 2025-07-15 ENCOUNTER — HOSPITAL ENCOUNTER (INPATIENT)
Facility: HOSPITAL | Age: 70
LOS: 1 days | Discharge: OTHER INSTITUTION | End: 2025-07-16
Attending: STUDENT IN AN ORGANIZED HEALTH CARE EDUCATION/TRAINING PROGRAM
Payer: MEDICARE

## 2025-07-15 ENCOUNTER — APPOINTMENT (OUTPATIENT)
Dept: GENERAL RADIOLOGY | Facility: HOSPITAL | Age: 70
End: 2025-07-15
Payer: MEDICARE

## 2025-07-15 DIAGNOSIS — S72.001A CLOSED FRACTURE OF RIGHT HIP, INITIAL ENCOUNTER: Primary | ICD-10-CM

## 2025-07-15 PROCEDURE — 25010000002 FENTANYL CITRATE (PF) 50 MCG/ML SOLUTION: Performed by: STUDENT IN AN ORGANIZED HEALTH CARE EDUCATION/TRAINING PROGRAM

## 2025-07-15 PROCEDURE — 99285 EMERGENCY DEPT VISIT HI MDM: CPT | Performed by: STUDENT IN AN ORGANIZED HEALTH CARE EDUCATION/TRAINING PROGRAM

## 2025-07-15 PROCEDURE — 86308 HETEROPHILE ANTIBODY SCREEN: CPT | Performed by: STUDENT IN AN ORGANIZED HEALTH CARE EDUCATION/TRAINING PROGRAM

## 2025-07-15 PROCEDURE — 73502 X-RAY EXAM HIP UNI 2-3 VIEWS: CPT

## 2025-07-15 PROCEDURE — 73552 X-RAY EXAM OF FEMUR 2/>: CPT

## 2025-07-15 RX ORDER — FENTANYL CITRATE 50 UG/ML
1 INJECTION, SOLUTION INTRAMUSCULAR; INTRAVENOUS ONCE
Refills: 0 | Status: DISCONTINUED | OUTPATIENT
Start: 2025-07-15 | End: 2025-07-15

## 2025-07-15 RX ORDER — SODIUM CHLORIDE 0.9 % (FLUSH) 0.9 %
10 SYRINGE (ML) INJECTION AS NEEDED
Status: DISCONTINUED | OUTPATIENT
Start: 2025-07-15 | End: 2025-07-16 | Stop reason: HOSPADM

## 2025-07-15 RX ORDER — FENTANYL CITRATE 50 UG/ML
50 INJECTION, SOLUTION INTRAMUSCULAR; INTRAVENOUS ONCE
Refills: 0 | Status: COMPLETED | OUTPATIENT
Start: 2025-07-16 | End: 2025-07-15

## 2025-07-15 RX ADMIN — FENTANYL CITRATE 50 MCG: 50 INJECTION, SOLUTION INTRAMUSCULAR; INTRAVENOUS at 23:59

## 2025-07-15 NOTE — Clinical Note
Level of Care: Med/Surg [1]   Diagnosis: Closed right hip fracture [614248]   Certification: I Certify That Inpatient Hospital Services Are Medically Necessary For Greater Than 2 Midnights

## 2025-07-16 VITALS
HEART RATE: 120 BPM | WEIGHT: 127 LBS | BODY MASS INDEX: 24.94 KG/M2 | SYSTOLIC BLOOD PRESSURE: 130 MMHG | DIASTOLIC BLOOD PRESSURE: 94 MMHG | HEIGHT: 60 IN | TEMPERATURE: 98.5 F | RESPIRATION RATE: 16 BRPM | OXYGEN SATURATION: 96 %

## 2025-07-16 PROBLEM — S72.001A CLOSED RIGHT HIP FRACTURE: Status: ACTIVE | Noted: 2025-07-16

## 2025-07-16 LAB
HETEROPH AB SER QL LA: NEGATIVE
HOLD SPECIMEN: NORMAL
HOLD SPECIMEN: NORMAL
WHOLE BLOOD HOLD COAG: NORMAL
WHOLE BLOOD HOLD SPECIMEN: NORMAL

## 2025-07-16 PROCEDURE — 25010000002 FENTANYL CITRATE (PF) 50 MCG/ML SOLUTION: Performed by: EMERGENCY MEDICINE

## 2025-07-16 RX ORDER — FENTANYL CITRATE 50 UG/ML
50 INJECTION, SOLUTION INTRAMUSCULAR; INTRAVENOUS ONCE
Refills: 0 | Status: COMPLETED | OUTPATIENT
Start: 2025-07-16 | End: 2025-07-16

## 2025-07-16 RX ADMIN — FENTANYL CITRATE 50 MCG: 50 INJECTION, SOLUTION INTRAMUSCULAR; INTRAVENOUS at 02:28

## 2025-07-16 RX ADMIN — FENTANYL CITRATE 50 MCG: 50 INJECTION, SOLUTION INTRAMUSCULAR; INTRAVENOUS at 04:10

## 2025-07-16 NOTE — ED PROVIDER NOTES
Subjective   History of Present Illness    Review of Systems    Past Medical History:   Diagnosis Date    A-fib     CHF (congestive heart failure)     GERD (gastroesophageal reflux disease)     History of ETOH abuse     Hx of seizure disorder     Hyperlipidemia     Hypertension     Irregular heart beat     Memory loss     after strokes Jan 2020    Non compliance w medication regimen     Non-ischemic cardiomyopathy     EF 27%    Osteoporosis     Skin cancer     Stroke     multiple strokes Jan 2020; R side hemiparesis       Allergies   Allergen Reactions    Beef-Derived Drug Products Rash    Cephalexin Other (See Comments)     Reports she doesn't remember having an allergic reaction.     Citrus Rash    Duricef [Cefadroxil] Rash    Fish Allergy Rash    Garlic Rash    Green Beans Rash    Melatonin Other (See Comments)     Not Efficacious    Onion Rash    Other Rash    Peach [Prunus Persica] Rash    Wheat Rash    Wheat Rash       Past Surgical History:   Procedure Laterality Date    CARDIAC CATHETERIZATION N/A 01/10/2020    Procedure: Left Heart Cath;  Surgeon: Cain Mcneil MD;  Location:  [facility];  Service: Cardiovascular    CARDIAC CATHETERIZATION N/A 01/10/2020    Procedure: Coronary angiography;  Surgeon: Cain Mcneil MD;  Location:  [facility];  Service: Cardiovascular    FINGER SURGERY      HYSTERECTOMY      JOINT REPLACEMENT      ORIF SHOULDER DISLOCATION W/ HUMERAL FRACTURE      TIBIA FRACTURE SURGERY         Family History   Problem Relation Age of Onset    Breast cancer Brother     Macular degeneration Mother     Heart disease Mother     Heart disease Father     Heart disease Maternal Uncle        Social History     Socioeconomic History    Marital status:    Tobacco Use    Smoking status: Never     Passive exposure: Never    Smokeless tobacco: Never   Vaping Use    Vaping status: Never Used   Substance and Sexual Activity    Alcohol use: Not Currently      Comment: Reports a hx of drinking everyday for 18 years atleast. She says that she quit May 2018, but says that she drinks occasionally now.     Drug use: No     Comment: caffeine use: 2 cups daily.     Sexual activity: Not Currently           Objective   Physical Exam    Procedures           ED Course      00:19 EDT, 07/16/25:  Dr. Arias, on-call for orthopedics, was paged out.    00:19 EDT, 07/16/25:  The patient's diagnosis of right hip fracture was discussed with her.  Treatment plan of admission and operative repair of the fracture was discussed with her.  All of her questions were answered she will be admitted in stable condition.    00:25 EDT, 07/16/25:  Dr. Arias, on-call for orthopedics, has viewed the films and would like the patient transferred to [facility] due to the complexity of the fracture.    00:26 EDT, 07/16/25:  Orthopedics on-call at [facility] has been paged out.  Dr. Ramirez instructed the transfer center  to transfer the patient to the emergency department to be seen by orthopedics in the emergency department.    00:34 EDT, 07/16/25:  Spoke with Dr. Del Real, the [facility] ED attending, he will accept the patient in the emergency department.                                                   Medical Decision Making  Amount and/or Complexity of Data Reviewed  Radiology: ordered.    Risk  Prescription drug management.        Final diagnoses:   Closed fracture of right hip, initial encounter       ED Disposition  ED Disposition       None            No follow-up provider specified.       Medication List      No changes were made to your prescriptions during this visit.            Philippe Carreon MD  07/16/25 0028       Philippe Carreon MD  07/16/25 0034

## 2025-07-16 NOTE — ED TRIAGE NOTES
Fell while on her deck did not hit head denies dizziness or lightheadedness     Immediate R hip pain unable to get up    Hx previous R hip fx

## 2025-08-15 ENCOUNTER — TELEPHONE (OUTPATIENT)
Dept: NEUROLOGY | Facility: CLINIC | Age: 70
End: 2025-08-15
Payer: MEDICARE

## (undated) DEVICE — RADIFOCUS OPTITORQUE ANGIOGRAPHIC CATHETER: Brand: OPTITORQUE

## (undated) DEVICE — TR BAND RADIAL ARTERY COMPRESSION DEVICE: Brand: TR BAND

## (undated) DEVICE — GLIDESHEATH SLENDER STAINLESS STEEL KIT: Brand: GLIDESHEATH SLENDER

## (undated) DEVICE — CATH VENT MIV RADL PIG ST TIP 5F 110CM

## (undated) DEVICE — RADIFOCUS GLIDEWIRE: Brand: GLIDEWIRE

## (undated) DEVICE — GW EMR FIX EXCHG J STD .035 3MM 260CM

## (undated) DEVICE — PK CATH CARD 40

## (undated) DEVICE — KT MANIFLD CARDIAC